# Patient Record
Sex: FEMALE | Race: AMERICAN INDIAN OR ALASKA NATIVE
[De-identification: names, ages, dates, MRNs, and addresses within clinical notes are randomized per-mention and may not be internally consistent; named-entity substitution may affect disease eponyms.]

---

## 2019-07-30 ENCOUNTER — LABORATORY RESULT (OUTPATIENT)
Age: 69
End: 2019-07-30

## 2019-07-30 ENCOUNTER — APPOINTMENT (OUTPATIENT)
Dept: HEMATOLOGY ONCOLOGY | Facility: CLINIC | Age: 69
End: 2019-07-30
Payer: MEDICARE

## 2019-07-30 VITALS
HEART RATE: 107 BPM | SYSTOLIC BLOOD PRESSURE: 166 MMHG | BODY MASS INDEX: 25.63 KG/M2 | RESPIRATION RATE: 16 BRPM | TEMPERATURE: 97.5 F | DIASTOLIC BLOOD PRESSURE: 79 MMHG | HEIGHT: 60.63 IN | WEIGHT: 134 LBS

## 2019-07-30 DIAGNOSIS — E03.9 HYPOTHYROIDISM, UNSPECIFIED: ICD-10-CM

## 2019-07-30 DIAGNOSIS — R73.03 PREDIABETES.: ICD-10-CM

## 2019-07-30 DIAGNOSIS — Z78.9 OTHER SPECIFIED HEALTH STATUS: ICD-10-CM

## 2019-07-30 DIAGNOSIS — I10 ESSENTIAL (PRIMARY) HYPERTENSION: ICD-10-CM

## 2019-07-30 DIAGNOSIS — Z80.41 FAMILY HISTORY OF MALIGNANT NEOPLASM OF OVARY: ICD-10-CM

## 2019-07-30 DIAGNOSIS — I73.00 RAYNAUD'S SYNDROME W/OUT GANGRENE: ICD-10-CM

## 2019-07-30 LAB
HCT VFR BLD CALC: 25.4 %
HGB BLD-MCNC: 7.8 G/DL
MCHC RBC-ENTMCNC: 26.4 PG
MCHC RBC-ENTMCNC: 30.7 G/DL
MCV RBC AUTO: 85.8 FL
PLATELET # BLD AUTO: 337 K/UL
PMV BLD: 9.4 FL
RBC # BLD: 2.96 M/UL
RBC # FLD: 19.8 %
WBC # FLD AUTO: 7.24 K/UL

## 2019-07-30 PROCEDURE — 99204 OFFICE O/P NEW MOD 45 MIN: CPT

## 2019-07-31 LAB
ALBUMIN SERPL ELPH-MCNC: 3.7 G/DL
ALP BLD-CCNC: 76 U/L
ALT SERPL-CCNC: 19 U/L
ANION GAP SERPL CALC-SCNC: 11 MMOL/L
AST SERPL-CCNC: 25 U/L
BILIRUB SERPL-MCNC: <0.2 MG/DL
BUN SERPL-MCNC: 21 MG/DL
CALCIUM SERPL-MCNC: 9.1 MG/DL
CHLORIDE SERPL-SCNC: 107 MMOL/L
CO2 SERPL-SCNC: 26 MMOL/L
CREAT SERPL-MCNC: 1.8 MG/DL
DEPRECATED KAPPA LC FREE/LAMBDA SER: 0.88 RATIO
FERRITIN SERPL-MCNC: 15 NG/ML
GLUCOSE SERPL-MCNC: 105 MG/DL
HAPTOGLOB SERPL-MCNC: 133 MG/DL
IGA SER QL IEP: 139 MG/DL
IGG SER QL IEP: 517 MG/DL
IGM SER QL IEP: 20 MG/DL
IRON SATN MFR SERPL: 6 %
IRON SERPL-MCNC: 22 UG/DL
KAPPA LC CSF-MCNC: 3.73 MG/DL
KAPPA LC SERPL-MCNC: 3.29 MG/DL
LDH SERPL-CCNC: 323
POTASSIUM SERPL-SCNC: 5.3 MMOL/L
PROT SERPL-MCNC: 5.9 G/DL
SODIUM SERPL-SCNC: 144 MMOL/L
TIBC SERPL-MCNC: 370 UG/DL
UIBC SERPL-MCNC: 348 UG/DL
VIT B12 SERPL-MCNC: 562 PG/ML

## 2019-07-31 NOTE — ASSESSMENT
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

## 2019-07-31 NOTE — HISTORY OF PRESENT ILLNESS
[de-identified] : 70 y/o F was referred by PMD Dr. Leroy Chacon for evaluation and treatment of anemia. \par \par History goes back to Oct 2018 when pt was found to have new anemia (Hb 5.9) and GIUSEPPE. She was treated at [facility] with blood transfusions. Since then, her Hb has ranged 7-10. Last Hb from 7/24/19 was 7.7. WBC and plts have been normal. Anemia has been micro to normocytic. Her GIUSEPPE has progressed to CKD 4. Per pt's daughter, pt has had extensive w/u at [facility] for CKD including renal biopsy on which she was found to have glomerulosclerosis. She is on cyclophosphamide and prednisone. Pt does have significant proteinuria (almost nephrotic range - 3200 prot/Cr ratio) with LE edema. Her highest serum Ca was 10.4 and total protein 8.4. She was never prescribed procrit or IV iron. She was only prescribed PO iron which she stopped taking because of constipation. Per daughter, the blood work done in Feb 2018 was normal. \par \par Pt does c/o fatigue, but no CP, SOB, palpitations or dizziness. No melena, BRBPR, hematochezia, hematemesis, hematuria or vaginal bleeding. No bleeding from any other site. Per daughter, pt had a normal colonoscopy about a year ago. She had EGD years ago for GERD.

## 2019-07-31 NOTE — CONSULT LETTER
[Dear  ___] : Dear  [unfilled], [Consult Letter:] : I had the pleasure of evaluating your patient, [unfilled]. [Please see my note below.] : Please see my note below. [Consult Closing:] : Thank you very much for allowing me to participate in the care of this patient.  If you have any questions, please do not hesitate to contact me. [Sincerely,] : Sincerely, [FreeTextEntry3] : Dr Treadwell

## 2019-07-31 NOTE — REASON FOR VISIT
[Initial Consultation] : an initial consultation for [Family Member] : family member [FreeTextEntry2] : Anemia

## 2019-07-31 NOTE — PHYSICAL EXAM
[Restricted in physically strenuous activity but ambulatory and able to carry out work of a light or sedentary nature] : Status 1- Restricted in physically strenuous activity but ambulatory and able to carry out work of a light or sedentary nature, e.g., light house work, office work [Normal] : normoactive bowel sounds, soft and nontender, no hepatosplenomegaly or masses appreciated [de-identified] : ?mild lymphadenopathy [de-identified] : Edema in B/L LE

## 2019-08-01 LAB
ALBUMIN MFR SERPL ELPH: 59.7 %
ALBUMIN SERPL-MCNC: 3.5 G/DL
ALBUMIN/GLOB SERPL: 1.5 RATIO
ALPHA1 GLOB MFR SERPL ELPH: 6.2 %
ALPHA1 GLOB SERPL ELPH-MCNC: 0.4 G/DL
ALPHA2 GLOB MFR SERPL ELPH: 11.4 %
ALPHA2 GLOB SERPL ELPH-MCNC: 0.7 G/DL
B-GLOBULIN MFR SERPL ELPH: 13.4 %
B-GLOBULIN SERPL ELPH-MCNC: 0.8 G/DL
EPO SERPL-MCNC: 37 MIU/ML
GAMMA GLOB FLD ELPH-MCNC: 0.5 G/DL
GAMMA GLOB MFR SERPL ELPH: 9.3 %
INTERPRETATION SERPL IEP-IMP: NORMAL
M PROTEIN SPEC IFE-MCNC: NORMAL
PROT SERPL-MCNC: 5.8 G/DL
PROT SERPL-MCNC: 5.8 G/DL

## 2019-08-02 LAB — METHYLMALONATE SERPL-SCNC: 261 NMOL/L

## 2019-08-14 ENCOUNTER — APPOINTMENT (OUTPATIENT)
Dept: INFUSION THERAPY | Facility: CLINIC | Age: 69
End: 2019-08-14

## 2019-08-14 RX ORDER — IRON SUCROSE 20 MG/ML
200 INJECTION, SOLUTION INTRAVENOUS ONCE
Refills: 0 | Status: COMPLETED | OUTPATIENT
Start: 2019-08-14 | End: 2019-08-14

## 2019-08-14 RX ADMIN — IRON SUCROSE 200 MILLIGRAM(S): 20 INJECTION, SOLUTION INTRAVENOUS at 10:46

## 2019-08-14 RX ADMIN — IRON SUCROSE 220 MILLIGRAM(S): 20 INJECTION, SOLUTION INTRAVENOUS at 09:46

## 2019-08-21 ENCOUNTER — APPOINTMENT (OUTPATIENT)
Dept: INFUSION THERAPY | Facility: CLINIC | Age: 69
End: 2019-08-21

## 2019-08-21 RX ORDER — IRON SUCROSE 20 MG/ML
200 INJECTION, SOLUTION INTRAVENOUS ONCE
Refills: 0 | Status: COMPLETED | OUTPATIENT
Start: 2019-08-21 | End: 2019-08-21

## 2019-08-21 RX ORDER — ONDANSETRON 8 MG/1
4 TABLET, FILM COATED ORAL ONCE
Refills: 0 | Status: COMPLETED | OUTPATIENT
Start: 2019-08-21 | End: 2019-08-21

## 2019-08-21 RX ADMIN — ONDANSETRON 4 MILLIGRAM(S): 8 TABLET, FILM COATED ORAL at 10:50

## 2019-08-21 RX ADMIN — IRON SUCROSE 200 MILLIGRAM(S): 20 INJECTION, SOLUTION INTRAVENOUS at 11:27

## 2019-08-21 RX ADMIN — ONDANSETRON 156 MILLIGRAM(S): 8 TABLET, FILM COATED ORAL at 10:26

## 2019-08-21 RX ADMIN — IRON SUCROSE 220 MILLIGRAM(S): 20 INJECTION, SOLUTION INTRAVENOUS at 10:27

## 2019-08-29 ENCOUNTER — APPOINTMENT (OUTPATIENT)
Dept: INFUSION THERAPY | Facility: CLINIC | Age: 69
End: 2019-08-29

## 2019-08-29 VITALS
HEART RATE: 80 BPM | SYSTOLIC BLOOD PRESSURE: 123 MMHG | TEMPERATURE: 96.9 F | RESPIRATION RATE: 18 BRPM | DIASTOLIC BLOOD PRESSURE: 68 MMHG

## 2019-08-29 RX ORDER — IRON SUCROSE 20 MG/ML
200 INJECTION, SOLUTION INTRAVENOUS ONCE
Refills: 0 | Status: COMPLETED | OUTPATIENT
Start: 2019-08-29 | End: 2019-08-29

## 2019-08-29 RX ADMIN — IRON SUCROSE 220 MILLIGRAM(S): 20 INJECTION, SOLUTION INTRAVENOUS at 09:44

## 2019-08-29 RX ADMIN — IRON SUCROSE 200 MILLIGRAM(S): 20 INJECTION, SOLUTION INTRAVENOUS at 10:15

## 2019-09-04 ENCOUNTER — APPOINTMENT (OUTPATIENT)
Dept: INFUSION THERAPY | Facility: CLINIC | Age: 69
End: 2019-09-04

## 2019-09-04 RX ORDER — IRON SUCROSE 20 MG/ML
200 INJECTION, SOLUTION INTRAVENOUS ONCE
Refills: 0 | Status: COMPLETED | OUTPATIENT
Start: 2019-09-04 | End: 2019-09-04

## 2019-09-04 RX ADMIN — IRON SUCROSE 200 MILLIGRAM(S): 20 INJECTION, SOLUTION INTRAVENOUS at 10:18

## 2019-09-04 RX ADMIN — IRON SUCROSE 220 MILLIGRAM(S): 20 INJECTION, SOLUTION INTRAVENOUS at 09:43

## 2019-09-11 ENCOUNTER — APPOINTMENT (OUTPATIENT)
Dept: INFUSION THERAPY | Facility: CLINIC | Age: 69
End: 2019-09-11

## 2019-09-11 RX ORDER — IRON SUCROSE 20 MG/ML
200 INJECTION, SOLUTION INTRAVENOUS ONCE
Refills: 0 | Status: COMPLETED | OUTPATIENT
Start: 2019-09-11 | End: 2019-09-11

## 2019-09-11 RX ORDER — ONDANSETRON 8 MG/1
8 TABLET, FILM COATED ORAL ONCE
Refills: 0 | Status: COMPLETED | OUTPATIENT
Start: 2019-09-11 | End: 2019-09-11

## 2019-09-11 RX ADMIN — IRON SUCROSE 220 MILLIGRAM(S): 20 INJECTION, SOLUTION INTRAVENOUS at 10:20

## 2019-09-11 RX ADMIN — ONDANSETRON 8 MILLIGRAM(S): 8 TABLET, FILM COATED ORAL at 10:18

## 2019-09-11 RX ADMIN — ONDANSETRON 162 MILLIGRAM(S): 8 TABLET, FILM COATED ORAL at 09:56

## 2019-09-11 RX ADMIN — IRON SUCROSE 200 MILLIGRAM(S): 20 INJECTION, SOLUTION INTRAVENOUS at 10:40

## 2019-10-09 ENCOUNTER — APPOINTMENT (OUTPATIENT)
Dept: HEMATOLOGY ONCOLOGY | Facility: CLINIC | Age: 69
End: 2019-10-09

## 2019-10-09 ENCOUNTER — LABORATORY RESULT (OUTPATIENT)
Age: 69
End: 2019-10-09

## 2019-10-09 DIAGNOSIS — Z00.00 ENCOUNTER FOR GENERAL ADULT MEDICAL EXAMINATION W/OUT ABNORMAL FINDINGS: ICD-10-CM

## 2019-10-09 LAB
FERRITIN SERPL-MCNC: 418 NG/ML
HCT VFR BLD CALC: 26.2 %
HGB BLD-MCNC: 8.1 G/DL
IRON SATN MFR SERPL: 22 %
IRON SERPL-MCNC: 48 UG/DL
MCHC RBC-ENTMCNC: 27 PG
MCHC RBC-ENTMCNC: 30.9 G/DL
MCV RBC AUTO: 87.3 FL
PLATELET # BLD AUTO: 222 K/UL
PMV BLD: 9.2 FL
RBC # BLD: 3 M/UL
RBC # FLD: 19.8 %
TIBC SERPL-MCNC: 222 UG/DL
UIBC SERPL-MCNC: 174 UG/DL
WBC # FLD AUTO: 2.7 K/UL

## 2019-10-21 ENCOUNTER — LABORATORY RESULT (OUTPATIENT)
Age: 69
End: 2019-10-21

## 2019-10-21 ENCOUNTER — APPOINTMENT (OUTPATIENT)
Dept: HEMATOLOGY ONCOLOGY | Facility: CLINIC | Age: 69
End: 2019-10-21
Payer: MEDICARE

## 2019-10-21 VITALS
TEMPERATURE: 96.3 F | WEIGHT: 120 LBS | HEIGHT: 60 IN | SYSTOLIC BLOOD PRESSURE: 95 MMHG | HEART RATE: 76 BPM | BODY MASS INDEX: 23.56 KG/M2 | DIASTOLIC BLOOD PRESSURE: 52 MMHG | RESPIRATION RATE: 18 BRPM

## 2019-10-21 LAB
DIRECT COOMBS: NORMAL
HCT VFR BLD CALC: 26.1 %
HGB BLD-MCNC: 8.1 G/DL
LDH SERPL-CCNC: 220
MCHC RBC-ENTMCNC: 27.3 PG
MCHC RBC-ENTMCNC: 31 G/DL
MCV RBC AUTO: 87.9 FL
PLATELET # BLD AUTO: 188 K/UL
PMV BLD: 9.9 FL
RBC # BLD: 2.97 M/UL
RBC # FLD: 20.7 %
RETICS # AUTO: 2.4 %
RETICS AGGREG/RBC NFR: 71.9 K/UL
WBC # FLD AUTO: 2.36 K/UL

## 2019-10-21 PROCEDURE — 99214 OFFICE O/P EST MOD 30 MIN: CPT

## 2019-10-21 NOTE — ASSESSMENT
[FreeTextEntry1] : 69 year old female with multifactorial anemia secondary to CKD , iron deficiency and chemotherapy , \par negative EGD and colonoscopy , consider capsule endoscopy . \par elevated LDH . will repeat , also check coomb's test . \par will also start on procrit 20,000 weekly . \par will likely switch to rituxan as per renal . \par Plan discussed with patient and daughter , all questions addressed . \par

## 2019-10-21 NOTE — HISTORY OF PRESENT ILLNESS
[de-identified] : 10/21/2019 patient returns for follow up for CKD with anemia . She was found with iron deficiency , Hb was is still  8.1 after corretion of iron deficiency . She complains only of mild easy fatigability . Pertinent findings were elevated LDH , retic : 2 % , cytoxan dose was recently reduced to 50mg daily probably due to low wbc .  [de-identified] : \par 70 y/o F was referred by PMD Dr. Leroy Chacon for evaluation and treatment of anemia. \par \par History goes back to Oct 2018 when pt was found to have new anemia (Hb 5.9) and GIUSEPPE. She was treated at [facility] with blood transfusions. Since then, her Hb has ranged 7-10. Last Hb from 7/24/19 was 7.7. WBC and plts have been normal. Anemia has been micro to normocytic. Her GIUSEPPE has progressed to CKD 4. Per pt's daughter, pt has had extensive w/u at [facility] for CKD including renal biopsy on which she was found to have glomerulosclerosis. She is on cyclophosphamide and prednisone. Pt does have significant proteinuria (almost nephrotic range - 3200 prot/Cr ratio) with LE edema. Her highest serum Ca was 10.4 and total protein 8.4. She was never prescribed procrit or IV iron. She was only prescribed PO iron which she stopped taking because of constipation. Per daughter, the blood work done in Feb 2018 was normal. \par \par Pt does c/o fatigue, but no CP, SOB, palpitations or dizziness. No melena, BRBPR, hematochezia, hematemesis, hematuria or vaginal bleeding. No bleeding from any other site. Per daughter, pt had a normal colonoscopy about a year ago. She had EGD years ago for GERD. \par \par

## 2019-10-21 NOTE — PHYSICAL EXAM
[Thin] : thin [Normal] : normoactive bowel sounds, soft and nontender, no hepatosplenomegaly or masses appreciated

## 2019-10-22 LAB — HAPTOGLOB SERPL-MCNC: 84 MG/DL

## 2019-10-28 LAB
LDH SERPL-CCNC: 198 U/L
LDH1 CFR SERPL ELPH: 23 %
LDH2 CFR SERPL ELPH: 35 %
LDH3 CFR SERPL ELPH: 24 %
LDH4 CFR SERPL ELPH: 9 %
LDH5 CFR SERPL ELPH: 10 %

## 2019-11-15 ENCOUNTER — OUTPATIENT (OUTPATIENT)
Dept: OUTPATIENT SERVICES | Facility: HOSPITAL | Age: 69
LOS: 1 days | Discharge: HOME | End: 2019-11-15

## 2019-11-15 ENCOUNTER — LABORATORY RESULT (OUTPATIENT)
Age: 69
End: 2019-11-15

## 2019-11-15 ENCOUNTER — APPOINTMENT (OUTPATIENT)
Dept: HEMATOLOGY ONCOLOGY | Facility: CLINIC | Age: 69
End: 2019-11-15
Payer: MEDICARE

## 2019-11-15 ENCOUNTER — APPOINTMENT (OUTPATIENT)
Dept: INFUSION THERAPY | Facility: CLINIC | Age: 69
End: 2019-11-15
Payer: MEDICARE

## 2019-11-15 VITALS
TEMPERATURE: 96.3 F | WEIGHT: 120 LBS | BODY MASS INDEX: 23.56 KG/M2 | DIASTOLIC BLOOD PRESSURE: 56 MMHG | HEART RATE: 70 BPM | HEIGHT: 60 IN | SYSTOLIC BLOOD PRESSURE: 116 MMHG | RESPIRATION RATE: 18 BRPM

## 2019-11-15 DIAGNOSIS — D64.9 ANEMIA, UNSPECIFIED: ICD-10-CM

## 2019-11-15 LAB
HCT VFR BLD CALC: 28.3 %
HGB BLD-MCNC: 9 G/DL
MCHC RBC-ENTMCNC: 28.2 PG
MCHC RBC-ENTMCNC: 31.8 G/DL
MCV RBC AUTO: 88.7 FL
PLATELET # BLD AUTO: 200 K/UL
PMV BLD: 11.4 FL
RBC # BLD: 3.19 M/UL
RBC # FLD: 17.7 %
WBC # FLD AUTO: 3.4 K/UL

## 2019-11-15 PROCEDURE — 99213 OFFICE O/P EST LOW 20 MIN: CPT

## 2019-11-15 RX ORDER — DARBEPOETIN ALFA IN POLYSORBAT 200MCG/0.4
60 PEN INJECTOR (ML) SUBCUTANEOUS ONCE
Refills: 0 | Status: COMPLETED | OUTPATIENT
Start: 2019-11-15 | End: 2019-11-15

## 2019-11-15 RX ADMIN — Medication 60 MICROGRAM(S): at 12:58

## 2019-11-15 NOTE — CONSULT LETTER
[Courtesy Letter:] : I had the pleasure of seeing your patient, [unfilled], in my office today. [Dear  ___] : Dear  [unfilled],

## 2019-11-15 NOTE — ASSESSMENT
[FreeTextEntry1] : Multifactorial anemia including iron deficiency , CKD and chemotherapy . Today's Hb is 9 . will start on aranesp 60 micrograms every 3 weeks and titrate to maintain Hb around 10 . she will also start on rituxan soon

## 2019-11-15 NOTE — HISTORY OF PRESENT ILLNESS
[de-identified] : \par 68 y/o F was referred by PMD Dr. Leroy Chacon for evaluation and treatment of anemia. \par \par History goes back to Oct 2018 when pt was found to have new anemia (Hb 5.9) and GIUSEPPE. She was treated at [facility] with blood transfusions. Since then, her Hb has ranged 7-10. Last Hb from 7/24/19 was 7.7. WBC and plts have been normal. Anemia has been micro to normocytic. Her GIUSEPPE has progressed to CKD 4. Per pt's daughter, pt has had extensive w/u at [facility] for CKD including renal biopsy on which she was found to have glomerulosclerosis. She is on cyclophosphamide and prednisone. Pt does have significant proteinuria (almost nephrotic range - 3200 prot/Cr ratio) with LE edema. Her highest serum Ca was 10.4 and total protein 8.4. She was never prescribed procrit or IV iron. She was only prescribed PO iron which she stopped taking because of constipation. Per daughter, the blood work done in Feb 2018 was normal. \par \par Pt does c/o fatigue, but no CP, SOB, palpitations or dizziness. No melena, BRBPR, hematochezia, hematemesis, hematuria or vaginal bleeding. No bleeding from any other site. Per daughter, pt had a normal colonoscopy about a year ago. She had EGD years ago for GERD. \par \par  \par  [de-identified] : 10/21/2019 patient returns for follow up for CKD with anemia . She was found with iron deficiency , Hb was is still  8.1 after corretion of iron deficiency . She complains only of mild easy fatigability . Pertinent findings were elevated LDH , retic : 2 % , cytoxan dose was recently reduced to 50mg daily probably due to low wbc . \par \par 11/14/2019 Patient returns for multifactorial  anemia , Hemoglobin improved to 9.0 with iron replacement and reducing cytoxan to 50 mg daily . She continues to complain of mild easy fatigability .

## 2019-11-29 ENCOUNTER — APPOINTMENT (OUTPATIENT)
Dept: INFUSION THERAPY | Facility: CLINIC | Age: 69
End: 2019-11-29

## 2019-12-13 ENCOUNTER — LABORATORY RESULT (OUTPATIENT)
Age: 69
End: 2019-12-13

## 2019-12-13 ENCOUNTER — APPOINTMENT (OUTPATIENT)
Dept: INFUSION THERAPY | Facility: CLINIC | Age: 69
End: 2019-12-13

## 2019-12-14 LAB
HCT VFR BLD CALC: 32.7 %
HGB BLD-MCNC: 10.4 G/DL
MCHC RBC-ENTMCNC: 28.3 PG
MCHC RBC-ENTMCNC: 31.8 G/DL
MCV RBC AUTO: 88.9 FL
PLATELET # BLD AUTO: 199 K/UL
PMV BLD: 10.2 FL
RBC # BLD: 3.68 M/UL
RBC # FLD: 15.3 %
WBC # FLD AUTO: 4.75 K/UL

## 2019-12-26 ENCOUNTER — LABORATORY RESULT (OUTPATIENT)
Age: 69
End: 2019-12-26

## 2019-12-26 ENCOUNTER — APPOINTMENT (OUTPATIENT)
Dept: HEMATOLOGY ONCOLOGY | Facility: CLINIC | Age: 69
End: 2019-12-26
Payer: MEDICARE

## 2019-12-26 ENCOUNTER — APPOINTMENT (OUTPATIENT)
Dept: INFUSION THERAPY | Facility: CLINIC | Age: 69
End: 2019-12-26
Payer: MEDICARE

## 2019-12-26 ENCOUNTER — OUTPATIENT (OUTPATIENT)
Dept: OUTPATIENT SERVICES | Facility: HOSPITAL | Age: 69
LOS: 1 days | Discharge: HOME | End: 2019-12-26

## 2019-12-26 VITALS
WEIGHT: 119 LBS | HEIGHT: 60 IN | DIASTOLIC BLOOD PRESSURE: 76 MMHG | RESPIRATION RATE: 18 BRPM | TEMPERATURE: 97.1 F | SYSTOLIC BLOOD PRESSURE: 143 MMHG | HEART RATE: 78 BPM | BODY MASS INDEX: 23.36 KG/M2

## 2019-12-26 DIAGNOSIS — C83.30 DIFFUSE LARGE B-CELL LYMPHOMA, UNSPECIFIED SITE: ICD-10-CM

## 2019-12-26 DIAGNOSIS — N18.4 CHRONIC KIDNEY DISEASE, STAGE 4 (SEVERE): ICD-10-CM

## 2019-12-26 PROCEDURE — 99213 OFFICE O/P EST LOW 20 MIN: CPT

## 2019-12-26 NOTE — ASSESSMENT
[FreeTextEntry1] : 69 year old female with multifactorial anemia including iron deficiency, CKD, and previously on cytoxan. Today's Hb is 10.6.  Currently on Aranesp 60 micrograms every 3 weeks and titrate to maintain Hb around 10.\par \par PLAN:\par -  Hold Aranesp 60 mcg today \par - Follow up with nephrology- continue to hold cytoxan as advised, possible rituxan\par - RTC in one month for CBC, visit, and possible aranesp \par \par Case discussed with Dr. Treadwell who agrees with plan of care.\par \par

## 2019-12-26 NOTE — END OF VISIT
[FreeTextEntry3] : I was physically present for the key portions of the evaluation and management service provided.  I agree with the history and physical, and plan which I have reviewed and edited where appropriate.\par \par

## 2019-12-26 NOTE — REVIEW OF SYSTEMS
[Fatigue] : fatigue [Negative] : Heme/Lymph [FreeTextEntry2] : occasional mild fatigue  Health Care Proxy (HCP)

## 2019-12-26 NOTE — HISTORY OF PRESENT ILLNESS
[de-identified] : \par 68 y/o F was referred by PMD Dr. Leroy Chacon for evaluation and treatment of anemia. \par \par History goes back to Oct 2018 when pt was found to have new anemia (Hb 5.9) and GIUSEPPE. She was treated at [facility] with blood transfusions. Since then, her Hb has ranged 7-10. Last Hb from 7/24/19 was 7.7. WBC and plts have been normal. Anemia has been micro to normocytic. Her GIUSEPPE has progressed to CKD 4. Per pt's daughter, pt has had extensive w/u at [facility] for CKD including renal biopsy on which she was found to have glomerulosclerosis. She is on cyclophosphamide and prednisone. Pt does have significant proteinuria (almost nephrotic range - 3200 prot/Cr ratio) with LE edema. Her highest serum Ca was 10.4 and total protein 8.4. She was never prescribed procrit or IV iron. She was only prescribed PO iron which she stopped taking because of constipation. Per daughter, the blood work done in Feb 2018 was normal. \par \par Pt does c/o fatigue, but no CP, SOB, palpitations or dizziness. No melena, BRBPR, hematochezia, hematemesis, hematuria or vaginal bleeding. No bleeding from any other site. Per daughter, pt had a normal colonoscopy about a year ago. She had EGD years ago for GERD. \par \par  [de-identified] : 10/21/2019 patient returns for follow up for CKD with anemia . She was found with iron deficiency , Hb was is still  8.1 after corretion of iron deficiency . She complains only of mild easy fatigability . Pertinent findings were elevated LDH , retic : 2 % , cytoxan dose was recently reduced to 50mg daily probably due to low wbc . \par \par 11/14/2019 Patient returns for multifactorial  anemia , Hemoglobin improved to 9.0 with iron replacement and reducing cytoxan to 50 mg daily . She continues to complain of mild easy fatigability \par \par 12/26/2019:\par Patient here for follow up of anemia with CKD. She was treated with venofer x5. She has stopped cytoxan. Her last dose of Aranesp was 11/15/2019. Anemia improving last Hgb 10.4. She denies SOB, chest palpitations, abnormal bleeding, and dizziness at this time.

## 2019-12-26 NOTE — PHYSICAL EXAM
[Thin] : thin [Normal] : RRR, normal S1S2, no murmurs, rubs, gallops [de-identified] : mildly pale

## 2019-12-30 LAB
HCT VFR BLD CALC: 33.3 %
HGB BLD-MCNC: 10.6 G/DL
MCHC RBC-ENTMCNC: 28 PG
MCHC RBC-ENTMCNC: 31.8 G/DL
MCV RBC AUTO: 87.9 FL
PLATELET # BLD AUTO: 172 K/UL
PMV BLD: 10.1 FL
RBC # BLD: 3.79 M/UL
RBC # FLD: 14.1 %
WBC # FLD AUTO: 4.84 K/UL

## 2020-01-06 DIAGNOSIS — D64.9 ANEMIA, UNSPECIFIED: ICD-10-CM

## 2020-01-06 DIAGNOSIS — N18.4 CHRONIC KIDNEY DISEASE, STAGE 4 (SEVERE): ICD-10-CM

## 2020-04-20 ENCOUNTER — LABORATORY RESULT (OUTPATIENT)
Age: 70
End: 2020-04-20

## 2020-04-20 ENCOUNTER — APPOINTMENT (OUTPATIENT)
Dept: INFUSION THERAPY | Facility: CLINIC | Age: 70
End: 2020-04-20
Payer: MEDICARE

## 2020-04-20 ENCOUNTER — APPOINTMENT (OUTPATIENT)
Dept: HEMATOLOGY ONCOLOGY | Facility: CLINIC | Age: 70
End: 2020-04-20
Payer: MEDICARE

## 2020-04-20 VITALS
SYSTOLIC BLOOD PRESSURE: 130 MMHG | RESPIRATION RATE: 14 BRPM | TEMPERATURE: 96.1 F | HEIGHT: 60 IN | DIASTOLIC BLOOD PRESSURE: 73 MMHG | HEART RATE: 72 BPM | WEIGHT: 105 LBS | BODY MASS INDEX: 20.62 KG/M2

## 2020-04-20 LAB
ALBUMIN SERPL ELPH-MCNC: 4 G/DL
ALP BLD-CCNC: 181 U/L
ALT SERPL-CCNC: 9 U/L
ANION GAP SERPL CALC-SCNC: 11 MMOL/L
AST SERPL-CCNC: 18 U/L
BILIRUB SERPL-MCNC: <0.2 MG/DL
BUN SERPL-MCNC: 27 MG/DL
CALCIUM SERPL-MCNC: 9.2 MG/DL
CHLORIDE SERPL-SCNC: 104 MMOL/L
CO2 SERPL-SCNC: 26 MMOL/L
CREAT SERPL-MCNC: 1.4 MG/DL
GLUCOSE SERPL-MCNC: 101 MG/DL
HCT VFR BLD CALC: 32.8 %
HGB BLD-MCNC: 9.9 G/DL
MCHC RBC-ENTMCNC: 24.7 PG
MCHC RBC-ENTMCNC: 30.2 G/DL
MCV RBC AUTO: 81.8 FL
PLATELET # BLD AUTO: 243 K/UL
PMV BLD: 8.8 FL
POTASSIUM SERPL-SCNC: 5.7 MMOL/L
PROT SERPL-MCNC: 6.9 G/DL
RBC # BLD: 4.01 M/UL
RBC # FLD: 16.4 %
SODIUM SERPL-SCNC: 141 MMOL/L
WBC # FLD AUTO: 2.51 K/UL

## 2020-04-20 PROCEDURE — 99214 OFFICE O/P EST MOD 30 MIN: CPT

## 2020-04-21 LAB — FERRITIN SERPL-MCNC: 64 NG/ML

## 2020-04-21 NOTE — ASSESSMENT
[FreeTextEntry1] : 69 year old female with h/o RPGN of the kidney -  multifactorial anemia including iron deficiency, CKD, and was previously on cytoxan. She received venofer Aug-Sep 2019 and received 1 dose aranesp in Nov 2019. She has been following with nephro Dr Chaitanya Javier at [facility] for RPGN/ ANCA+ nephritis and received 2 rituxan doses in Nov and Dec 2019. \par \par Today's Hb is 9.9, MCV-81 , WBC-2.51, ANC-360, Platelet- 243\par Will  order iron studies and ferritin today \par Leukopenia/neutropenia could be from rituxan treatment - No fever/chills/recent infections \par Patient is scheduled to see Nephrologist next week for dose 3 of rituxan infusion- Lab work CBC provided. \par Further decision about rituxan at Nephrologist discretion as she has severe neutropenia with ANC of 360. \par \par RTC in 2 months \par Case discussed with Dr. Treadwell who agrees with plan of care.\par \par

## 2020-04-21 NOTE — HISTORY OF PRESENT ILLNESS
[de-identified] : 10/21/2019 patient returns for follow up for CKD with anemia . She was found with iron deficiency , Hb was is still  8.1 after corretion of iron deficiency . She complains only of mild easy fatigability . Pertinent findings were elevated LDH , retic : 2 % , cytoxan dose was recently reduced to 50mg daily probably due to low wbc . \par \par 11/14/2019 Patient returns for multifactorial  anemia , Hemoglobin improved to 9.0 with iron replacement and reducing cytoxan to 50 mg daily . She continues to complain of mild easy fatigability \par \par 12/26/2019:\par Patient here for follow up of anemia with CKD. She was treated with venofer x5. She has stopped cytoxan. Her last dose of Aranesp was 11/15/2019. Anemia improving last Hgb 10.4. She denies SOB, chest palpitations, abnormal bleeding, and dizziness at this time.\par \par 4/20/20- She is here for follow up. She was treated with 5 doses venofer in Aug-Sep 2019 and received 1 dose aranesp in Nov 2019. She has been following with nephro Dr Chaitanya Javier at [facility] for RPGN/ ANCA+ nephritis and received rituxan dosos in Nov and Dec 2019 and was advised to get her 3rd dose. She feels well. c/o chronic fatigue, on and off dizziness. No CP/SOB. She has lost weight since last visit  [de-identified] : \par 68 y/o F was referred by PMD Dr. Leroy Chacon for evaluation and treatment of anemia. \par \par History goes back to Oct 2018 when pt was found to have new anemia (Hb 5.9) and GIUSEPPE. She was treated at [facility] with blood transfusions. Since then, her Hb has ranged 7-10. Last Hb from 7/24/19 was 7.7. WBC and plts have been normal. Anemia has been micro to normocytic. Her GIUSEPPE has progressed to CKD 4. Per pt's daughter, pt has had extensive w/u at [facility] for CKD including renal biopsy on which she was found to have glomerulosclerosis. She is on cyclophosphamide and prednisone. Pt does have significant proteinuria (almost nephrotic range - 3200 prot/Cr ratio) with LE edema. Her highest serum Ca was 10.4 and total protein 8.4. She was never prescribed procrit or IV iron. She was only prescribed PO iron which she stopped taking because of constipation. Per daughter, the blood work done in Feb 2018 was normal. \par \par Pt does c/o fatigue, but no CP, SOB, palpitations or dizziness. No melena, BRBPR, hematochezia, hematemesis, hematuria or vaginal bleeding. No bleeding from any other site. Per daughter, pt had a normal colonoscopy about a year ago. She had EGD years ago for GERD. \par \par

## 2020-04-24 ENCOUNTER — APPOINTMENT (OUTPATIENT)
Dept: INFUSION THERAPY | Facility: CLINIC | Age: 70
End: 2020-04-24

## 2020-04-24 VITALS
TEMPERATURE: 98.2 F | RESPIRATION RATE: 16 BRPM | SYSTOLIC BLOOD PRESSURE: 157 MMHG | DIASTOLIC BLOOD PRESSURE: 95 MMHG | HEART RATE: 85 BPM

## 2020-04-24 RX ORDER — DIPHENHYDRAMINE HCL 50 MG
50 CAPSULE ORAL ONCE
Refills: 0 | Status: COMPLETED | OUTPATIENT
Start: 2020-04-24 | End: 2020-04-24

## 2020-04-24 RX ORDER — ACETAMINOPHEN 500 MG
650 TABLET ORAL ONCE
Refills: 0 | Status: COMPLETED | OUTPATIENT
Start: 2020-04-24 | End: 2020-04-24

## 2020-04-24 RX ORDER — FERUMOXYTOL 510 MG/17ML
510 INJECTION INTRAVENOUS ONCE
Refills: 0 | Status: COMPLETED | OUTPATIENT
Start: 2020-04-24 | End: 2020-04-24

## 2020-04-24 RX ADMIN — FERUMOXYTOL 156 MILLIGRAM(S): 510 INJECTION INTRAVENOUS at 14:08

## 2020-04-24 RX ADMIN — FERUMOXYTOL 510 MILLIGRAM(S): 510 INJECTION INTRAVENOUS at 15:00

## 2020-04-24 RX ADMIN — Medication 50 MILLIGRAM(S): at 14:08

## 2020-04-24 RX ADMIN — Medication 650 MILLIGRAM(S): at 14:07

## 2020-06-22 ENCOUNTER — OUTPATIENT (OUTPATIENT)
Dept: OUTPATIENT SERVICES | Facility: HOSPITAL | Age: 70
LOS: 1 days | Discharge: HOME | End: 2020-06-22

## 2020-06-22 ENCOUNTER — APPOINTMENT (OUTPATIENT)
Dept: HEMATOLOGY ONCOLOGY | Facility: CLINIC | Age: 70
End: 2020-06-22
Payer: MEDICARE

## 2020-06-22 ENCOUNTER — LABORATORY RESULT (OUTPATIENT)
Age: 70
End: 2020-06-22

## 2020-06-22 VITALS
BODY MASS INDEX: 19.32 KG/M2 | RESPIRATION RATE: 16 BRPM | HEIGHT: 62 IN | SYSTOLIC BLOOD PRESSURE: 146 MMHG | WEIGHT: 105 LBS | TEMPERATURE: 99.6 F | HEART RATE: 77 BPM | DIASTOLIC BLOOD PRESSURE: 80 MMHG

## 2020-06-22 DIAGNOSIS — D64.9 ANEMIA, UNSPECIFIED: ICD-10-CM

## 2020-06-22 DIAGNOSIS — C83.30 DIFFUSE LARGE B-CELL LYMPHOMA, UNSPECIFIED SITE: ICD-10-CM

## 2020-06-22 LAB
HCT VFR BLD CALC: 33.7 %
HGB BLD-MCNC: 10.4 G/DL
MCHC RBC-ENTMCNC: 25.1 PG
MCHC RBC-ENTMCNC: 30.9 G/DL
MCV RBC AUTO: 81.2 FL
PLATELET # BLD AUTO: 219 K/UL
PMV BLD: 9.8 FL
RBC # BLD: 4.15 M/UL
RBC # FLD: 16.3 %
WBC # FLD AUTO: 6.19 K/UL

## 2020-06-22 PROCEDURE — 99213 OFFICE O/P EST LOW 20 MIN: CPT

## 2020-06-22 NOTE — HISTORY OF PRESENT ILLNESS
[de-identified] : \par 68 y/o F was referred by PMD Dr. Leroy Chacon for evaluation and treatment of anemia. \par \par History goes back to Oct 2018 when pt was found to have new anemia (Hb 5.9) and GIUSEPPE. She was treated at [facility] with blood transfusions. Since then, her Hb has ranged 7-10. Last Hb from 7/24/19 was 7.7. WBC and plts have been normal. Anemia has been micro to normocytic. Her GIUSEPPE has progressed to CKD 4. Per pt's daughter, pt has had extensive w/u at [facility] for CKD including renal biopsy on which she was found to have glomerulosclerosis. She is on cyclophosphamide and prednisone. Pt does have significant proteinuria (almost nephrotic range - 3200 prot/Cr ratio) with LE edema. Her highest serum Ca was 10.4 and total protein 8.4. She was never prescribed procrit or IV iron. She was only prescribed PO iron which she stopped taking because of constipation. Per daughter, the blood work done in Feb 2018 was normal. \par \par Pt does c/o fatigue, but no CP, SOB, palpitations or dizziness. No melena, BRBPR, hematochezia, hematemesis, hematuria or vaginal bleeding. No bleeding from any other site. Per daughter, pt had a normal colonoscopy about a year ago. She had EGD years ago for GERD. \par \par  [de-identified] : 10/21/2019 patient returns for follow up for CKD with anemia . She was found with iron deficiency , Hb was is still  8.1 after corretion of iron deficiency . She complains only of mild easy fatigability . Pertinent findings were elevated LDH , retic : 2 % , cytoxan dose was recently reduced to 50mg daily probably due to low wbc . \par \par 11/14/2019 Patient returns for multifactorial  anemia , Hemoglobin improved to 9.0 with iron replacement and reducing cytoxan to 50 mg daily . She continues to complain of mild easy fatigability . \par \par 06/22/2020 patient returns for follow up , she feels well , Hb is 10 to 11 for several months without EPO , wbc is normal .  she denies any hematochezia . no new complaints .

## 2020-06-22 NOTE — ASSESSMENT
[FreeTextEntry1] : 69 year old female with h/o RPGN of the kidney -  multifactorial anemia including iron deficiency, CKD, \par Hb >10 without growth factors. \par will repeat CBC ferritin , scheduled for another rituxan this week . \par follow up in 6 months .

## 2020-06-23 LAB
FERRITIN SERPL-MCNC: 161 NG/ML
IRON SERPL-MCNC: 21 UG/DL

## 2020-09-22 ENCOUNTER — APPOINTMENT (OUTPATIENT)
Dept: HEMATOLOGY ONCOLOGY | Facility: CLINIC | Age: 70
End: 2020-09-22

## 2020-09-22 ENCOUNTER — OUTPATIENT (OUTPATIENT)
Dept: OUTPATIENT SERVICES | Facility: HOSPITAL | Age: 70
LOS: 1 days | Discharge: HOME | End: 2020-09-22

## 2020-09-22 ENCOUNTER — LABORATORY RESULT (OUTPATIENT)
Age: 70
End: 2020-09-22

## 2020-09-22 LAB
HCT VFR BLD CALC: 34.5 %
HGB BLD-MCNC: 10.5 G/DL
IRON SATN MFR SERPL: 20 %
IRON SERPL-MCNC: 44 UG/DL
MCHC RBC-ENTMCNC: 25.7 PG
MCHC RBC-ENTMCNC: 30.4 G/DL
MCV RBC AUTO: 84.6 FL
PLATELET # BLD AUTO: 173 K/UL
PMV BLD: 8.9 FL
RBC # BLD: 4.08 M/UL
RBC # FLD: 15.9 %
TIBC SERPL-MCNC: 220 UG/DL
UIBC SERPL-MCNC: 176 UG/DL
WBC # FLD AUTO: 3.17 K/UL

## 2020-09-23 LAB — FERRITIN SERPL-MCNC: 82 NG/ML

## 2020-10-21 DIAGNOSIS — D64.9 ANEMIA, UNSPECIFIED: ICD-10-CM

## 2024-04-06 ENCOUNTER — INPATIENT (INPATIENT)
Facility: HOSPITAL | Age: 74
LOS: 30 days | Discharge: NURSING, REHAB OR LONG-TERM CARE | DRG: 641 | End: 2024-05-07
Attending: INTERNAL MEDICINE | Admitting: HOSPITALIST
Payer: MEDICARE

## 2024-04-06 VITALS — DIASTOLIC BLOOD PRESSURE: 57 MMHG | SYSTOLIC BLOOD PRESSURE: 90 MMHG | OXYGEN SATURATION: 100 % | HEART RATE: 54 BPM

## 2024-04-06 DIAGNOSIS — E87.5 HYPERKALEMIA: ICD-10-CM

## 2024-04-06 LAB
ALBUMIN SERPL ELPH-MCNC: 2.6 G/DL — LOW (ref 3.5–5.2)
ALP SERPL-CCNC: 322 U/L — HIGH (ref 30–115)
ALT FLD-CCNC: 52 U/L — HIGH (ref 0–41)
AMORPH SED URNS QL MICRO: PRESENT
ANION GAP SERPL CALC-SCNC: 9 MMOL/L — SIGNIFICANT CHANGE UP (ref 7–14)
APPEARANCE UR: CLEAR — SIGNIFICANT CHANGE UP
APTT BLD: 44 SEC — HIGH (ref 27–39.2)
AST SERPL-CCNC: 265 U/L — HIGH (ref 0–41)
BACTERIA # UR AUTO: ABNORMAL /HPF
BASE EXCESS BLDV CALC-SCNC: -8.2 MMOL/L — LOW (ref -2–3)
BASOPHILS # BLD AUTO: 0.02 K/UL — SIGNIFICANT CHANGE UP (ref 0–0.2)
BASOPHILS NFR BLD AUTO: 0.2 % — SIGNIFICANT CHANGE UP (ref 0–1)
BILIRUB SERPL-MCNC: 0.3 MG/DL — SIGNIFICANT CHANGE UP (ref 0.2–1.2)
BILIRUB UR-MCNC: NEGATIVE — SIGNIFICANT CHANGE UP
BLD GP AB SCN SERPL QL: SIGNIFICANT CHANGE UP
BUN SERPL-MCNC: 32 MG/DL — HIGH (ref 10–20)
CA-I SERPL-SCNC: 1.33 MMOL/L — SIGNIFICANT CHANGE UP (ref 1.15–1.33)
CALCIUM SERPL-MCNC: 9.4 MG/DL — SIGNIFICANT CHANGE UP (ref 8.4–10.5)
CHLORIDE SERPL-SCNC: 109 MMOL/L — SIGNIFICANT CHANGE UP (ref 98–110)
CO2 SERPL-SCNC: 22 MMOL/L — SIGNIFICANT CHANGE UP (ref 17–32)
COLOR SPEC: YELLOW — SIGNIFICANT CHANGE UP
CREAT SERPL-MCNC: 1.5 MG/DL — SIGNIFICANT CHANGE UP (ref 0.7–1.5)
DIFF PNL FLD: ABNORMAL
EGFR: 36 ML/MIN/1.73M2 — LOW
EOSINOPHIL # BLD AUTO: 0.06 K/UL — SIGNIFICANT CHANGE UP (ref 0–0.7)
EOSINOPHIL NFR BLD AUTO: 0.6 % — SIGNIFICANT CHANGE UP (ref 0–8)
EPI CELLS # UR: PRESENT
FLUAV AG NPH QL: SIGNIFICANT CHANGE UP
FLUBV AG NPH QL: SIGNIFICANT CHANGE UP
GAS PNL BLDA: SIGNIFICANT CHANGE UP
GAS PNL BLDA: SIGNIFICANT CHANGE UP
GAS PNL BLDV: 135 MMOL/L — LOW (ref 136–145)
GAS PNL BLDV: SIGNIFICANT CHANGE UP
GLUCOSE BLDC GLUCOMTR-MCNC: 271 MG/DL — HIGH (ref 70–99)
GLUCOSE SERPL-MCNC: 310 MG/DL — HIGH (ref 70–99)
GLUCOSE UR QL: 250 MG/DL
HCO3 BLDV-SCNC: 22 MMOL/L — SIGNIFICANT CHANGE UP (ref 22–29)
HCT VFR BLD CALC: 28.7 % — LOW (ref 37–47)
HCT VFR BLDA CALC: 35 % — SIGNIFICANT CHANGE UP (ref 34.5–46.5)
HGB BLD CALC-MCNC: 11.5 G/DL — LOW (ref 11.7–16.1)
HGB BLD-MCNC: 8.7 G/DL — LOW (ref 12–16)
HOROWITZ INDEX BLDV+IHG-RTO: 100 — SIGNIFICANT CHANGE UP
IMM GRANULOCYTES NFR BLD AUTO: 0.6 % — HIGH (ref 0.1–0.3)
INR BLD: 1.22 RATIO — SIGNIFICANT CHANGE UP (ref 0.65–1.3)
KETONES UR-MCNC: NEGATIVE MG/DL — SIGNIFICANT CHANGE UP
LACTATE BLDV-MCNC: 6.6 MMOL/L — CRITICAL HIGH (ref 0.5–2)
LACTATE SERPL-SCNC: 3.5 MMOL/L — HIGH (ref 0.7–2)
LEUKOCYTE ESTERASE UR-ACNC: NEGATIVE — SIGNIFICANT CHANGE UP
LIDOCAIN IGE QN: 256 U/L — HIGH (ref 7–60)
LYMPHOCYTES # BLD AUTO: 0.54 K/UL — LOW (ref 1.2–3.4)
LYMPHOCYTES # BLD AUTO: 5.4 % — LOW (ref 20.5–51.1)
MAGNESIUM SERPL-MCNC: 2.1 MG/DL — SIGNIFICANT CHANGE UP (ref 1.8–2.4)
MCHC RBC-ENTMCNC: 26.2 PG — LOW (ref 27–31)
MCHC RBC-ENTMCNC: 30.3 G/DL — LOW (ref 32–37)
MCV RBC AUTO: 86.4 FL — SIGNIFICANT CHANGE UP (ref 81–99)
MONOCYTES # BLD AUTO: 0.19 K/UL — SIGNIFICANT CHANGE UP (ref 0.1–0.6)
MONOCYTES NFR BLD AUTO: 1.9 % — SIGNIFICANT CHANGE UP (ref 1.7–9.3)
NEUTROPHILS # BLD AUTO: 9.05 K/UL — HIGH (ref 1.4–6.5)
NEUTROPHILS NFR BLD AUTO: 91.3 % — HIGH (ref 42.2–75.2)
NITRITE UR-MCNC: NEGATIVE — SIGNIFICANT CHANGE UP
NRBC # BLD: 0 /100 WBCS — SIGNIFICANT CHANGE UP (ref 0–0)
PCO2 BLDV: 67 MMHG — HIGH (ref 39–42)
PH BLDV: 7.12 — CRITICAL LOW (ref 7.32–7.43)
PH UR: 6.5 — SIGNIFICANT CHANGE UP (ref 5–8)
PLATELET # BLD AUTO: 163 K/UL — SIGNIFICANT CHANGE UP (ref 130–400)
PMV BLD: 10.7 FL — HIGH (ref 7.4–10.4)
PO2 BLDV: 51 MMHG — HIGH (ref 25–45)
POTASSIUM BLDV-SCNC: 7.3 MMOL/L — CRITICAL HIGH (ref 3.5–5.1)
POTASSIUM SERPL-MCNC: 5.3 MMOL/L — HIGH (ref 3.5–5)
POTASSIUM SERPL-SCNC: 5.3 MMOL/L — HIGH (ref 3.5–5)
PROT SERPL-MCNC: 5.3 G/DL — LOW (ref 6–8)
PROT UR-MCNC: 100 MG/DL
PROTHROM AB SERPL-ACNC: 13.9 SEC — HIGH (ref 9.95–12.87)
RBC # BLD: 3.32 M/UL — LOW (ref 4.2–5.4)
RBC # FLD: 17.7 % — HIGH (ref 11.5–14.5)
RBC CASTS # UR COMP ASSIST: 3 /HPF — SIGNIFICANT CHANGE UP (ref 0–4)
RSV RNA NPH QL NAA+NON-PROBE: SIGNIFICANT CHANGE UP
SAO2 % BLDV: 75.3 % — SIGNIFICANT CHANGE UP (ref 67–88)
SARS-COV-2 RNA SPEC QL NAA+PROBE: SIGNIFICANT CHANGE UP
SODIUM SERPL-SCNC: 140 MMOL/L — SIGNIFICANT CHANGE UP (ref 135–146)
SP GR SPEC: 1.02 — SIGNIFICANT CHANGE UP (ref 1–1.03)
TROPONIN SAMPLING TIME: SIGNIFICANT CHANGE UP
TROPONIN T, HIGH SENSITIVITY RESULT: 56 NG/L — CRITICAL HIGH (ref 6–13)
TROPONIN T, HIGH SENSITIVITY RESULT: 79 NG/L — CRITICAL HIGH (ref 6–13)
TROPONIN T, HIGH SENSITIVITY RESULT: 84 NG/L — CRITICAL HIGH (ref 6–13)
UROBILINOGEN FLD QL: 0.2 MG/DL — SIGNIFICANT CHANGE UP (ref 0.2–1)
WBC # BLD: 9.92 K/UL — SIGNIFICANT CHANGE UP (ref 4.8–10.8)
WBC # FLD AUTO: 9.92 K/UL — SIGNIFICANT CHANGE UP (ref 4.8–10.8)
WBC UR QL: 5 /HPF — SIGNIFICANT CHANGE UP (ref 0–5)

## 2024-04-06 PROCEDURE — 36415 COLL VENOUS BLD VENIPUNCTURE: CPT

## 2024-04-06 PROCEDURE — 94003 VENT MGMT INPAT SUBQ DAY: CPT

## 2024-04-06 PROCEDURE — 82533 TOTAL CORTISOL: CPT

## 2024-04-06 PROCEDURE — 0225U NFCT DS DNA&RNA 21 SARSCOV2: CPT

## 2024-04-06 PROCEDURE — 84436 ASSAY OF TOTAL THYROXINE: CPT

## 2024-04-06 PROCEDURE — 85014 HEMATOCRIT: CPT

## 2024-04-06 PROCEDURE — 82803 BLOOD GASES ANY COMBINATION: CPT

## 2024-04-06 PROCEDURE — 82330 ASSAY OF CALCIUM: CPT

## 2024-04-06 PROCEDURE — 80053 COMPREHEN METABOLIC PANEL: CPT

## 2024-04-06 PROCEDURE — 76705 ECHO EXAM OF ABDOMEN: CPT

## 2024-04-06 PROCEDURE — 74177 CT ABD & PELVIS W/CONTRAST: CPT | Mod: 26,MC

## 2024-04-06 PROCEDURE — 82746 ASSAY OF FOLIC ACID SERUM: CPT

## 2024-04-06 PROCEDURE — 82570 ASSAY OF URINE CREATININE: CPT

## 2024-04-06 PROCEDURE — 84156 ASSAY OF PROTEIN URINE: CPT

## 2024-04-06 PROCEDURE — 86900 BLOOD TYPING SEROLOGIC ABO: CPT

## 2024-04-06 PROCEDURE — 36430 TRANSFUSION BLD/BLD COMPNT: CPT

## 2024-04-06 PROCEDURE — 84540 ASSAY OF URINE/UREA-N: CPT

## 2024-04-06 PROCEDURE — 76770 US EXAM ABDO BACK WALL COMP: CPT

## 2024-04-06 PROCEDURE — 80048 BASIC METABOLIC PNL TOTAL CA: CPT

## 2024-04-06 PROCEDURE — 86803 HEPATITIS C AB TEST: CPT

## 2024-04-06 PROCEDURE — 71045 X-RAY EXAM CHEST 1 VIEW: CPT | Mod: 26,76

## 2024-04-06 PROCEDURE — 84132 ASSAY OF SERUM POTASSIUM: CPT

## 2024-04-06 PROCEDURE — 82962 GLUCOSE BLOOD TEST: CPT

## 2024-04-06 PROCEDURE — 70450 CT HEAD/BRAIN W/O DYE: CPT | Mod: MC

## 2024-04-06 PROCEDURE — 93005 ELECTROCARDIOGRAM TRACING: CPT

## 2024-04-06 PROCEDURE — 84295 ASSAY OF SERUM SODIUM: CPT

## 2024-04-06 PROCEDURE — 85730 THROMBOPLASTIN TIME PARTIAL: CPT

## 2024-04-06 PROCEDURE — 87640 STAPH A DNA AMP PROBE: CPT

## 2024-04-06 PROCEDURE — 99291 CRITICAL CARE FIRST HOUR: CPT | Mod: 25

## 2024-04-06 PROCEDURE — 95819 EEG AWAKE AND ASLEEP: CPT

## 2024-04-06 PROCEDURE — 83516 IMMUNOASSAY NONANTIBODY: CPT

## 2024-04-06 PROCEDURE — 84484 ASSAY OF TROPONIN QUANT: CPT

## 2024-04-06 PROCEDURE — 86923 COMPATIBILITY TEST ELECTRIC: CPT

## 2024-04-06 PROCEDURE — 84480 ASSAY TRIIODOTHYRONINE (T3): CPT

## 2024-04-06 PROCEDURE — 93010 ELECTROCARDIOGRAM REPORT: CPT | Mod: 76

## 2024-04-06 PROCEDURE — 84133 ASSAY OF URINE POTASSIUM: CPT

## 2024-04-06 PROCEDURE — 85610 PROTHROMBIN TIME: CPT

## 2024-04-06 PROCEDURE — 83935 ASSAY OF URINE OSMOLALITY: CPT

## 2024-04-06 PROCEDURE — 83540 ASSAY OF IRON: CPT

## 2024-04-06 PROCEDURE — 84443 ASSAY THYROID STIM HORMONE: CPT

## 2024-04-06 PROCEDURE — 87070 CULTURE OTHR SPECIMN AEROBIC: CPT

## 2024-04-06 PROCEDURE — 36620 INSERTION CATHETER ARTERY: CPT

## 2024-04-06 PROCEDURE — 85027 COMPLETE CBC AUTOMATED: CPT

## 2024-04-06 PROCEDURE — 86036 ANCA SCREEN EACH ANTIBODY: CPT

## 2024-04-06 PROCEDURE — 86235 NUCLEAR ANTIGEN ANTIBODY: CPT

## 2024-04-06 PROCEDURE — 85025 COMPLETE CBC W/AUTO DIFF WBC: CPT

## 2024-04-06 PROCEDURE — 95714 VEEG EA 12-26 HR UNMNTR: CPT

## 2024-04-06 PROCEDURE — 83605 ASSAY OF LACTIC ACID: CPT

## 2024-04-06 PROCEDURE — 82728 ASSAY OF FERRITIN: CPT

## 2024-04-06 PROCEDURE — 81001 URINALYSIS AUTO W/SCOPE: CPT

## 2024-04-06 PROCEDURE — 99292 CRITICAL CARE ADDL 30 MIN: CPT | Mod: 25

## 2024-04-06 PROCEDURE — 94002 VENT MGMT INPAT INIT DAY: CPT

## 2024-04-06 PROCEDURE — 70450 CT HEAD/BRAIN W/O DYE: CPT | Mod: 26,MC

## 2024-04-06 PROCEDURE — 85018 HEMOGLOBIN: CPT

## 2024-04-06 PROCEDURE — 86901 BLOOD TYPING SEROLOGIC RH(D): CPT

## 2024-04-06 PROCEDURE — 86431 RHEUMATOID FACTOR QUANT: CPT

## 2024-04-06 PROCEDURE — 86850 RBC ANTIBODY SCREEN: CPT

## 2024-04-06 PROCEDURE — L8699: CPT

## 2024-04-06 PROCEDURE — 84466 ASSAY OF TRANSFERRIN: CPT

## 2024-04-06 PROCEDURE — 83550 IRON BINDING TEST: CPT

## 2024-04-06 PROCEDURE — 93970 EXTREMITY STUDY: CPT

## 2024-04-06 PROCEDURE — 36556 INSERT NON-TUNNEL CV CATH: CPT | Mod: LT

## 2024-04-06 PROCEDURE — 87086 URINE CULTURE/COLONY COUNT: CPT

## 2024-04-06 PROCEDURE — 84100 ASSAY OF PHOSPHORUS: CPT

## 2024-04-06 PROCEDURE — 87641 MR-STAPH DNA AMP PROBE: CPT

## 2024-04-06 PROCEDURE — 87449 NOS EACH ORGANISM AG IA: CPT

## 2024-04-06 PROCEDURE — C9113: CPT

## 2024-04-06 PROCEDURE — P9016: CPT

## 2024-04-06 PROCEDURE — 84300 ASSAY OF URINE SODIUM: CPT

## 2024-04-06 PROCEDURE — 83735 ASSAY OF MAGNESIUM: CPT

## 2024-04-06 PROCEDURE — 93970 EXTREMITY STUDY: CPT | Mod: 26

## 2024-04-06 PROCEDURE — 86255 FLUORESCENT ANTIBODY SCREEN: CPT

## 2024-04-06 PROCEDURE — 84145 PROCALCITONIN (PCT): CPT

## 2024-04-06 PROCEDURE — 71275 CT ANGIOGRAPHY CHEST: CPT | Mod: 26,MC

## 2024-04-06 PROCEDURE — 93306 TTE W/DOPPLER COMPLETE: CPT

## 2024-04-06 PROCEDURE — 99291 CRITICAL CARE FIRST HOUR: CPT

## 2024-04-06 PROCEDURE — 95711 VEEG 2-12 HR UNMONITORED: CPT

## 2024-04-06 PROCEDURE — 86038 ANTINUCLEAR ANTIBODIES: CPT

## 2024-04-06 PROCEDURE — 82607 VITAMIN B-12: CPT

## 2024-04-06 PROCEDURE — 95700 EEG CONT REC W/VID EEG TECH: CPT

## 2024-04-06 PROCEDURE — 87899 AGENT NOS ASSAY W/OPTIC: CPT

## 2024-04-06 PROCEDURE — 82550 ASSAY OF CK (CPK): CPT

## 2024-04-06 PROCEDURE — 86256 FLUORESCENT ANTIBODY TITER: CPT

## 2024-04-06 PROCEDURE — 71045 X-RAY EXAM CHEST 1 VIEW: CPT

## 2024-04-06 PROCEDURE — 71045 X-RAY EXAM CHEST 1 VIEW: CPT | Mod: 26,77

## 2024-04-06 RX ORDER — DEXTROSE 50 % IN WATER 50 %
100 SYRINGE (ML) INTRAVENOUS ONCE
Refills: 0 | Status: COMPLETED | OUTPATIENT
Start: 2024-04-06 | End: 2024-04-06

## 2024-04-06 RX ORDER — HYDROCORTISONE 20 MG
100 TABLET ORAL ONCE
Refills: 0 | Status: COMPLETED | OUTPATIENT
Start: 2024-04-06 | End: 2024-04-06

## 2024-04-06 RX ORDER — FENTANYL CITRATE 50 UG/ML
0.5 INJECTION INTRAVENOUS
Qty: 2500 | Refills: 0 | Status: DISCONTINUED | OUTPATIENT
Start: 2024-04-06 | End: 2024-04-08

## 2024-04-06 RX ORDER — HEPARIN SODIUM 5000 [USP'U]/ML
5000 INJECTION INTRAVENOUS; SUBCUTANEOUS EVERY 12 HOURS
Refills: 0 | Status: DISCONTINUED | OUTPATIENT
Start: 2024-04-06 | End: 2024-04-24

## 2024-04-06 RX ORDER — SODIUM CHLORIDE 9 MG/ML
1000 INJECTION INTRAMUSCULAR; INTRAVENOUS; SUBCUTANEOUS ONCE
Refills: 0 | Status: COMPLETED | OUTPATIENT
Start: 2024-04-06 | End: 2024-04-06

## 2024-04-06 RX ORDER — ATROPINE SULFATE 0.1 MG/ML
1 SYRINGE (ML) INJECTION ONCE
Refills: 0 | Status: COMPLETED | OUTPATIENT
Start: 2024-04-06 | End: 2024-04-06

## 2024-04-06 RX ORDER — VANCOMYCIN HCL 1 G
1000 VIAL (EA) INTRAVENOUS EVERY 24 HOURS
Refills: 0 | Status: DISCONTINUED | OUTPATIENT
Start: 2024-04-06 | End: 2024-04-06

## 2024-04-06 RX ORDER — CEFEPIME 1 G/1
1000 INJECTION, POWDER, FOR SOLUTION INTRAMUSCULAR; INTRAVENOUS EVERY 12 HOURS
Refills: 0 | Status: DISCONTINUED | OUTPATIENT
Start: 2024-04-06 | End: 2024-04-07

## 2024-04-06 RX ORDER — VANCOMYCIN HCL 1 G
500 VIAL (EA) INTRAVENOUS EVERY 24 HOURS
Refills: 0 | Status: DISCONTINUED | OUTPATIENT
Start: 2024-04-06 | End: 2024-04-07

## 2024-04-06 RX ORDER — CALCITRIOL 0.5 UG/1
1 CAPSULE ORAL
Refills: 0 | DISCHARGE

## 2024-04-06 RX ORDER — CHOLECALCIFEROL (VITAMIN D3) 125 MCG
0 CAPSULE ORAL
Refills: 0 | DISCHARGE

## 2024-04-06 RX ORDER — INSULIN HUMAN 100 [IU]/ML
10 INJECTION, SOLUTION SUBCUTANEOUS ONCE
Refills: 0 | Status: COMPLETED | OUTPATIENT
Start: 2024-04-06 | End: 2024-04-06

## 2024-04-06 RX ORDER — LEVOTHYROXINE SODIUM 125 MCG
100 TABLET ORAL DAILY
Refills: 0 | Status: DISCONTINUED | OUTPATIENT
Start: 2024-04-06 | End: 2024-04-25

## 2024-04-06 RX ORDER — PANTOPRAZOLE SODIUM 20 MG/1
40 TABLET, DELAYED RELEASE ORAL DAILY
Refills: 0 | Status: DISCONTINUED | OUTPATIENT
Start: 2024-04-06 | End: 2024-04-25

## 2024-04-06 RX ORDER — FOLIC ACID 0.8 MG
0 TABLET ORAL
Refills: 0 | DISCHARGE

## 2024-04-06 RX ORDER — CALCIUM GLUCONATE 100 MG/ML
3 VIAL (ML) INTRAVENOUS ONCE
Refills: 0 | Status: COMPLETED | OUTPATIENT
Start: 2024-04-06 | End: 2024-04-06

## 2024-04-06 RX ORDER — CHLORHEXIDINE GLUCONATE 213 G/1000ML
15 SOLUTION TOPICAL EVERY 12 HOURS
Refills: 0 | Status: DISCONTINUED | OUTPATIENT
Start: 2024-04-06 | End: 2024-04-25

## 2024-04-06 RX ORDER — CHLORHEXIDINE GLUCONATE 213 G/1000ML
1 SOLUTION TOPICAL
Refills: 0 | Status: DISCONTINUED | OUTPATIENT
Start: 2024-04-06 | End: 2024-04-25

## 2024-04-06 RX ORDER — CEFEPIME 1 G/1
1000 INJECTION, POWDER, FOR SOLUTION INTRAMUSCULAR; INTRAVENOUS ONCE
Refills: 0 | Status: COMPLETED | OUTPATIENT
Start: 2024-04-06 | End: 2024-04-06

## 2024-04-06 RX ORDER — LABETALOL HCL 100 MG
1 TABLET ORAL
Refills: 0 | DISCHARGE

## 2024-04-06 RX ORDER — NOREPINEPHRINE BITARTRATE/D5W 8 MG/250ML
0.05 PLASTIC BAG, INJECTION (ML) INTRAVENOUS
Qty: 8 | Refills: 0 | Status: DISCONTINUED | OUTPATIENT
Start: 2024-04-06 | End: 2024-04-10

## 2024-04-06 RX ORDER — HYDRALAZINE HCL 50 MG
10 TABLET ORAL EVERY 6 HOURS
Refills: 0 | Status: DISCONTINUED | OUTPATIENT
Start: 2024-04-06 | End: 2024-04-07

## 2024-04-06 RX ADMIN — Medication 4.69 MICROGRAM(S)/KG/MIN: at 19:29

## 2024-04-06 RX ADMIN — Medication 1 MILLIGRAM(S): at 11:40

## 2024-04-06 RX ADMIN — SODIUM CHLORIDE 1000 MILLILITER(S): 9 INJECTION INTRAMUSCULAR; INTRAVENOUS; SUBCUTANEOUS at 12:29

## 2024-04-06 RX ADMIN — Medication 100 MILLIGRAM(S): at 18:35

## 2024-04-06 RX ADMIN — CEFEPIME 100 MILLIGRAM(S): 1 INJECTION, POWDER, FOR SOLUTION INTRAMUSCULAR; INTRAVENOUS at 18:35

## 2024-04-06 RX ADMIN — Medication 1 MILLIGRAM(S): at 11:00

## 2024-04-06 RX ADMIN — Medication 1 MILLIGRAM(S): at 11:27

## 2024-04-06 RX ADMIN — Medication 4.69 MICROGRAM(S)/KG/MIN: at 18:35

## 2024-04-06 RX ADMIN — CEFEPIME 100 MILLIGRAM(S): 1 INJECTION, POWDER, FOR SOLUTION INTRAMUSCULAR; INTRAVENOUS at 11:36

## 2024-04-06 RX ADMIN — FENTANYL CITRATE 2.5 MICROGRAM(S)/KG/HR: 50 INJECTION INTRAVENOUS at 19:29

## 2024-04-06 RX ADMIN — Medication 100 MILLIGRAM(S): at 11:36

## 2024-04-06 RX ADMIN — FENTANYL CITRATE 2.5 MICROGRAM(S)/KG/HR: 50 INJECTION INTRAVENOUS at 17:51

## 2024-04-06 RX ADMIN — INSULIN HUMAN 10 UNIT(S): 100 INJECTION, SOLUTION SUBCUTANEOUS at 11:27

## 2024-04-06 RX ADMIN — PANTOPRAZOLE SODIUM 40 MILLIGRAM(S): 20 TABLET, DELAYED RELEASE ORAL at 18:34

## 2024-04-06 RX ADMIN — Medication 100 GRAM(S): at 11:27

## 2024-04-06 RX ADMIN — Medication 10 MILLIGRAM(S): at 17:46

## 2024-04-06 RX ADMIN — Medication 100 MILLILITER(S): at 12:29

## 2024-04-06 RX ADMIN — HEPARIN SODIUM 5000 UNIT(S): 5000 INJECTION INTRAVENOUS; SUBCUTANEOUS at 17:51

## 2024-04-06 RX ADMIN — CHLORHEXIDINE GLUCONATE 15 MILLILITER(S): 213 SOLUTION TOPICAL at 17:51

## 2024-04-06 NOTE — ED PROVIDER NOTE - PHYSICAL EXAMINATION
VITAL SIGNS: I have reviewed nursing notes and confirm.  CONSTITUTIONAL: ill-appearing, intubated, thin female  SKIN: Warm dry, decreased skin turgor  HEAD: NCAT  EYES: PERRLA  ENT: Dry mucous membranes, normal pharynx with no erythema or exudates  NECK: No cervical LAD.  CARD: bradycardic, no murmurs, rubs or gallops  RESP: clear to ausculation b/l.  No rales, rhonchi, or wheezing.  ABD: soft, + BS, non-tender, non-distended, no rebound or guarding.   EXT: no pedal edema, no obvious deformities  NEURO: Unable to assess.

## 2024-04-06 NOTE — ED PROVIDER NOTE - WET READ LAUNCH FT
How Severe Is Your Cyst?: mild
Is This A New Presentation, Or A Follow-Up?: Cyst
Additional History: Patient states she has had the cyst for 17 years and it drained 10 years ago.
There are no Wet Read(s) to document.

## 2024-04-06 NOTE — H&P ADULT - ATTENDING COMMENTS
Pt seen w resident and agree w above.  PMHx noted above.  Pt became unresponsive at home, EMS called by the patient's daughter, upon arrival the pt was intubated and subsequent CPR. Epi x 4 administered, resuscitative efforts continued in the ED and pt was found to have an organized rhythm and Pulse.  Pt noted to be bradycardic and hypothermic which has improved. Pt was started on neosynephrine and sedation.  Subsequent w/ for cva was negtaive and ct angio was negative for PE.  Pt w multiorgan failure due to cardiac arrest.  Will continue supportive care, currently not requiring norepinephrine.  obtain EEG.  Neuro and Cardio evals.  Continue empiric abx. Monitor in ICU. Pt seen w resident and agree w above.  PMHx noted above.  Pt became unresponsive at home, EMS called by the patient's daughter, upon arrival the pt was intubated and subsequent CPR. Epi x 4 administered, resuscitative efforts continued in the ED and pt was found to have an organized rhythm and Pulse.  Pt noted to be bradycardic and hypothermic which has improved. Pt was started on neosynephrine and sedation.  Subsequent w/ for cva was negtaive and ct angio was negative for PE.  Pt w multiorgan failure due to cardiac arrest.  Will continue supportive care, currently not requiring norepinephrine.  obtain EEG.  Neuro and Cardio evals.  Continue empiric abx. Monitor in ICU.  Critical Care time spent 50 minutes.

## 2024-04-06 NOTE — ED ADULT NURSE NOTE - OTHER COMPLAINTS
As per son, at 0838 pt stumbled to bathroom; family assisted. Pt had BM, became disoriented and collapsed at 0910. 911 called.

## 2024-04-06 NOTE — ED PROVIDER NOTE - ATTENDING CONTRIBUTION TO CARE
74-year-old female with a past medical history brought in by EMS as notification status post cardiac arrest, subsequent history from family is that she was stumbling about and collapsed, on EMS arrival patient with agonal respirations and heart rate in the 40s, briefly transcutaneously paced then arrested, asystole, intubated and ACLS initiated, end-tidal CO2 33, given 4 epis and transported to ED, in the ED ROSC achieved on first pulse check, head NC/AT, pupils fixed, conjunctive slightly pale, cor bradycardic and irregular, lungs CTA, abdomen soft nondistended, no clubbing cyanosis or edema, GCS 3 T, patient placed on Levophed, intermittently in secondary heart block, empirically treated for hyperkalemia, found to be hypothermic, heart rate responded to warming, covered for possible sepsis as well as adrenal crisis, labs and studies appreciated, underlying etiology unclear, will admit ICU, family aware prognosis poor

## 2024-04-06 NOTE — ED ADULT TRIAGE NOTE - CHIEF COMPLAINT QUOTE
Notification BIBA CPR in progress.  Pt arrested 15 min PTA ALEXANDER in use.  Pt received  epinephrine 1 mg x 4 doses PTA .  Pt intubated ET tube 7.5 with LL 22 ambu .

## 2024-04-06 NOTE — ED ADULT NURSE NOTE - NSICDXPASTMEDICALHX_GEN_ALL_CORE_FT
PAST MEDICAL HISTORY:  H/O Raynaud's syndrome     H/O vasculitis     H/O Whipple procedure     History of COPD     History of knee replacement     Hypertension     Pancreatic cancer     Stage 3 chronic kidney disease

## 2024-04-06 NOTE — ED PROVIDER NOTE - OBJECTIVE STATEMENT
74-year-old female with past medical history of pancreatic cancer status post Whipple procedure, stage III chronic kidney disease, hypertension, COPD, Raynaud's syndrome who presents for cardiac arrest.  Patient was found down, EMS intubated her and gave her 4 rounds of epinephrine.  ROSC was achieved within a minute of her arrival to the ED.  Patient was found to be hypothermic and bradycardic.

## 2024-04-06 NOTE — H&P ADULT - NSHPPHYSICALEXAM_GEN_ALL_CORE
GENERAL: NAD, lying in bed comfortably  HEAD:  Atraumatic, normocephalic  EYES: EOMI, PERRLA, conjunctiva and sclera clear  ENT: Moist mucous membranes  NECK: Supple, no JVD  HEART: Regular rate and rhythm, no murmurs, rubs, or gallops  LUNGS: Unlabored respirations.  Clear to auscultation bilaterally, no crackles, wheezing, or rhonchi  ABDOMEN: Soft, nontender, nondistended, +BS  EXTREMITIES: 2+ peripheral pulses bilaterally. No clubbing, cyanosis, or edema  NERVOUS SYSTEM:  A&Ox3, no focal deficits   SKIN: No rashes or lesions GENERAL: acutely ill, intubated and sedated  HEAD:  Atraumatic, normocephalic  EYES:  pupils fixed, nonreactive, conjunctiva and sclera clear  ENT: Moist mucous membranes  NECK: Supple, no JVD  HEART: Regular rate and rhythm, no murmurs, rubs, or gallops  LUNGS: unlabored, clear breath sounds, diminished at bases  ABDOMEN: Soft, nontender, nondistended, +BS  EXTREMITIES: 2+ peripheral pulses bilaterally. No clubbing, cyanosis, or edema  NERVOUS SYSTEM:  A&Ox0, sedated, no focal deficits   SKIN: no rashes, decreased skin turgor

## 2024-04-06 NOTE — ED PROCEDURE NOTE - CPROC ED TIME OUT STATEMENT1
“Patient's name, , procedure and correct site were confirmed during the [place] Timeout.”
“Patient's name, , procedure and correct site were confirmed during the [place] Timeout.”

## 2024-04-06 NOTE — H&P ADULT - ASSESSMENT
IMPRESSION:  #Unresponsive s/p Cardiac arrest  #Acute Hypoxic Respiratory Failure s/p intubation   #Hyperkalemia  #Bradycardia s/p atropine x4  #Normocytic Anemia  #NSTEMI  #Lactic Acidosis  #Transaminitis r/o Cholangitis  #HO pancreatic CA s/p whipple  #HO HTN, COPD, CKD3, Raynaud's      PLAN:    CNS: CTH noted. Sedation with fentanyl. Can add precedex if needed. Daily SATs    HEENT: Oral care    PULMONARY:  HOB @ 45 degrees.  Aspiration precautions. On mechanical ventilation. Repeat ABG now. Daily CXR and ABGs.    CARDIOVASCULAR: Keep normotensive. BP parameters systolic 140-160. Labetalol prn for SBP >160. Trend CE. Check TTE.     GI: GI prophylaxis.  Feeding.  Bowel regimen     RENAL:  Follow up lytes.  Correct as needed    INFECTIOUS DISEASE: Follow up cultures. Empiric abx w/ vancomycin and cefepime. ID eval. Check procal.     HEMATOLOGICAL:  DVT prophylaxis. Check LE duplex     ENDOCRINE:  Follow up FS.  Insulin protocol if needed. Check thyroid function tests.     MUSCULOSKELETAL: Bedrest    MICU  Poor Prognosis      L IJ 4/6/24  L A-line 4/6/24  Stack       IMPRESSION:  #Unresponsive s/p Cardiac arrest  #Acute Hypoxic Respiratory Failure s/p intubation   #Hyperkalemia  #Bradycardia s/p atropine x4  #Normocytic Anemia  #NSTEMI  #Lactic Acidosis  #Transaminitis r/o Cholangitis  #HO pancreatic CA s/p whipple  #HO HTN, COPD, CKD3, Raynaud's      PLAN:    CNS: CTH noted. Check EEG. Sedation with fentanyl. Can add precedex if needed. Daily SATs    HEENT: Oral care    PULMONARY:  HOB @ 45 degrees.  Aspiration precautions. On mechanical ventilation. Repeat ABG now. Daily CXR and ABGs.    CARDIOVASCULAR: Keep normotensive. BP parameters systolic 140-160. Labetalol prn for SBP >160. Trend CE. Check TTE.     GI: GI prophylaxis.  Feeding.  Bowel regimen     RENAL:  Follow up lytes.  Correct as needed    INFECTIOUS DISEASE: Follow up cultures. Empiric abx w/ vancomycin and cefepime. ID eval. Check procal.     HEMATOLOGICAL:  DVT prophylaxis. Check LE duplex     ENDOCRINE:  Follow up FS.  Insulin protocol if needed. Check thyroid function tests.     MUSCULOSKELETAL: Bedrest    MICU  Poor Prognosis      L IJ 4/6/24  L A-line 4/6/24  Stack       IMPRESSION:  #Unresponsive s/p Cardiac arrest  #Acute Hypoxic Respiratory Failure s/p intubation   #Hyperkalemia  #Bradycardia s/p atropine x4  #Normocytic Anemia  #NSTEMI  #Lactic Acidosis  #Transaminitis r/o Cholangitis  #HO pancreatic CA s/p whipple  #HO HTN, COPD, CKD3, Raynaud's      PLAN:    CNS: CTH noted. Check EEG. Sedation with fentanyl. Can add precedex if needed. Daily SATs    HEENT: Oral care    PULMONARY:  HOB @ 45 degrees.  Aspiration precautions. On mechanical ventilation. Repeat ABG now. Daily CXR and ABGs.    CARDIOVASCULAR: Keep normotensive. BP parameters systolic 140-160. Labetalol prn for SBP >160. Trend CE. Check TTE.     GI: GI prophylaxis.  Feeding.  Bowel regimen. GI eval     RENAL:  Follow up lytes.  Correct as needed    INFECTIOUS DISEASE: Follow up cultures. Empiric abx w/ vancomycin and cefepime. ID eval. Check procal.     HEMATOLOGICAL:  DVT prophylaxis. Check LE duplex     ENDOCRINE:  Follow up FS.  Insulin protocol if needed. Check thyroid function tests.     MUSCULOSKELETAL: Bedrest    MICU  Poor Prognosis      L IJ 4/6/24  L A-line 4/6/24  Stack       IMPRESSION:  #Unresponsive s/p Cardiac arrest  #Acute Hypoxic Respiratory Failure s/p intubation   #Hyperkalemia  #Bradycardia s/p atropine x4  #Normocytic Anemia  #NSTEMI  #Lactic Acidosis  #Transaminitis r/o Cholangitis  #HO pancreatic CA s/p whipple  #HO HTN, COPD, CKD3, Raynaud's      PLAN:    CNS: CTH noted. Check EEG. Sedation with fentanyl. Can add precedex if needed. Daily SATs    HEENT: Oral care    PULMONARY:  HOB @ 45 degrees.  Aspiration precautions. On mechanical ventilation. Repeat ABG now. Daily CXR and ABGs.    CARDIOVASCULAR: Keep normotensive. BP parameters systolic 140-160. Labetalol prn for SBP >160. Trend CE. Check TTE. Hold home antihypertensives (labetalol and lisinopril)    GI: GI prophylaxis.  Feeding.  Bowel regimen. GI eval     RENAL:  Follow up lytes.  Correct as needed    INFECTIOUS DISEASE: Follow up cultures. Empiric abx w/ vancomycin and cefepime. ID eval. Check procal.     HEMATOLOGICAL:  DVT prophylaxis. Check LE duplex     ENDOCRINE:  Follow up FS.  Insulin protocol if needed. Check thyroid function tests.     MUSCULOSKELETAL: Bedrest    MICU  Poor Prognosis      L IJ 4/6/24  L A-line 4/6/24  Stack       IMPRESSION:  #Unresponsive s/p Cardiac arrest  #Acute Hypoxic Respiratory Failure s/p intubation   #Hyperkalemia  #Bradycardia s/p atropine x4  #Normocytic Anemia  #NSTEMI  #Lactic Acidosis  #Transaminitis r/o Cholangitis  #HO pancreatic CA s/p whipple, Pulmonary Fibrosis, ANCA Vasculitis, PRESS syndrome  #HO HTN, COPD, CKD3, Raynaud's      PLAN:    CNS: CTH noted. Check EEG. Sedation with fentanyl. Can add precedex if needed. Daily SATs    HEENT: Oral care    PULMONARY:  HOB @ 45 degrees.  Aspiration precautions. On mechanical ventilation. Repeat ABG now. Daily CXR and ABGs.    CARDIOVASCULAR: Keep normotensive. BP parameters systolic 140-160. Labetalol prn for SBP >160. Trend CE. Check TTE. Hold home antihypertensives (labetalol and lisinopril)    GI: GI prophylaxis.  Feeding.  Bowel regimen. GI eval     RENAL:  Follow up lytes.  Correct as needed    INFECTIOUS DISEASE: Follow up cultures. Empiric abx w/ vancomycin and cefepime. ID eval. Check procal.     HEMATOLOGICAL:  DVT prophylaxis. Check LE duplex     ENDOCRINE:  Follow up FS.  Insulin protocol if needed. Check thyroid function tests.     MUSCULOSKELETAL: Bedrest    MICU  Poor Prognosis      L IJ 4/6/24  L A-line 4/6/24  Stack

## 2024-04-06 NOTE — H&P ADULT - HISTORY OF PRESENT ILLNESS
74-year-old female with PHMx of pancreatic CA s/p Whipple, CKD stage 3, HTN, COPD, Raynaud's syndrome presents to ED due to cardiac arrest. Patient was found down by EMS, intubated her in the field, and resuscitated her with compressions and 4 rounds of epinephrine. ROSC achieved within a minute of her arrival to the ED. Patient was found to be hypothermic and bradycardic.    ED vitals and workup:  BP 90/57, HR 88, Temp 94.1F, satting 100% on ventilator   Labs: Hgb 8.7, Trop 56 -->79, K 5.3, Lactate 3.5, Lipase 256, , , ALT 52   74-year-old female with PHMx of pancreatic CA s/p Whipple, CKD stage 3, HTN, COPD, Raynaud's syndrome presents to ED due to cardiac arrest. Patient was found down by EMS, intubated her in the field, and resuscitated her with compressions and 4 rounds of epinephrine. ROSC achieved within a minute of her arrival to the ED. Patient was found to be hypothermic and bradycardic.    ED vitals and workup:  BP 90/57, HR 88, Temp 94.1F, satting 100% on ventilator   Labs: Hgb 8.7, Trop 56 -->79, K 5.3, Lactate 3.5, Lipase 256, , , ALT 52  VBG: pH 7.12, pCO2 67, Lactate 6.6    CXR bilateral opacities. cardiomegaly  CTH: mild atrophic changes  CT angio chest PE, A/P w/ IV cont: Bilary ductal enhancement s/p CCY. Trace bilateral effusions. NO PE.    s/p atropine 1mg x4 doses, calcium gluconate 3g, [id number] x1, insulin 10 units, cefepime 1g in the ED.     Admitted to MICU for unresponsiveness s/p cardiac arrest.     74-year-old female with PHMx of pancreatic CA s/p Whipple, CKD stage 3, HTN, COPD, pulmonary fibrosis, PRESS syndrome (hospitalized in 2019), Raynaud's syndrome, ANCA vasculitis, Scleroderma  presents to ED due to cardiac arrest. Pt lives at home with son and daughter, went to use the bathroom. Daughter notes that she heard the patient's walker scratching the floor, went upstairs to see what was going on. Daughter noticed patient was slumped over and struggling to get onto the toilet. Daughter helped patient onto the toilet, but noticed that the patient became less responsive and alert so she called EMS. Patient was found down by EMS, intubated her in the field, and resuscitated her with compressions and 4 rounds of epinephrine. ROSC achieved within a minute of her arrival to the ED. Patient was found to be hypothermic and bradycardic. Of note, patient was hospitalized back in Dec 2023 in [facility] for 18 days for multifocal pneumonia 2/2 human metapneumovirus. Patient also was hospitalized in 2019 for PRESS syndrome. Pt had a negative stress test in Sept 2023. She follows pulmonology for pulmonary fibrosis, had recent PFTs done.     ED vitals and workup:  BP 90/57, HR 88, Temp 94.1F, satting 100% on ventilator   Labs: Hgb 8.7, Trop 56 -->79, K 5.3, Lactate 3.5, Lipase 256, , , ALT 52  VBG: pH 7.12, pCO2 67, Lactate 6.6    CXR bilateral opacities. cardiomegaly  CTH: mild atrophic changes  CT angio chest PE, A/P w/ IV cont: Bilary ductal enhancement s/p CCY. Trace bilateral effusions. NO PE.    s/p atropine 1mg x4 doses, calcium gluconate 3g, [id number] x1, insulin 10 units, cefepime 1g in the ED.     Admitted to MICU for unresponsiveness s/p cardiac arrest.     74-year-old female with PHMx of pancreatic CA s/p Whipple, CKD stage 3, HTN, COPD, pulmonary fibrosis, PRESS syndrome (hospitalized in 2019), Raynaud's syndrome, ANCA vasculitis, Scleroderma  presents to ED due to cardiac arrest. Pt lives at home with son and daughter, went to use the bathroom. Daughter notes that she heard the patient's walker scratching the floor, went upstairs to see what was going on. Daughter noticed patient was slumped over and struggling to get onto the toilet. Daughter helped patient onto the toilet, but noticed that the patient became less responsive and alert so she called EMS. Patient was found down by EMS, intubated her in the field, and resuscitated her with compressions and 4 rounds of epinephrine. ROSC achieved within a minute of her arrival to the ED. Patient was found to be hypothermic and bradycardic. Of note, patient was hospitalized back in Dec 2023 in [facility] for 18 days for multifocal pneumonia 2/2 human metapneumovirus. Patient also was hospitalized in 2019 for PRESS syndrome. Pt had a negative stress test in Sept 2023. She follows pulmonology for pulmonary fibrosis, had recent PFTs done.     ED vitals and workup:  BP 90/57, HR 88, Temp 94.1F, satting 100% on ventilator   Labs: Hgb 8.7, Trop 56 -->79, K 5.3, Lactate 3.5, Lipase 256, , , ALT 52  VBG: pH 7.12, pCO2 67, Lactate 6.6    CXR bilateral opacities. cardiomegaly  CTH: mild atrophic changes  CT angio chest PE, A/P w/ IV cont: Bilary ductal enhancement s/p CCY. Diffuse fibrotic changes of lungs. Trace bilateral effusions. NO PE.    s/p atropine 1mg x4 doses, calcium gluconate 3g, [id number] x1, insulin 10 units, cefepime 1g in the ED.     Admitted to MICU for unresponsiveness s/p cardiac arrest.

## 2024-04-07 LAB
ALBUMIN SERPL ELPH-MCNC: 3 G/DL — LOW (ref 3.5–5.2)
ALP SERPL-CCNC: 250 U/L — HIGH (ref 30–115)
ALT FLD-CCNC: 41 U/L — SIGNIFICANT CHANGE UP (ref 0–41)
ANION GAP SERPL CALC-SCNC: 9 MMOL/L — SIGNIFICANT CHANGE UP (ref 7–14)
AST SERPL-CCNC: 78 U/L — HIGH (ref 0–41)
BASOPHILS # BLD AUTO: 0.01 K/UL — SIGNIFICANT CHANGE UP (ref 0–0.2)
BASOPHILS NFR BLD AUTO: 0.1 % — SIGNIFICANT CHANGE UP (ref 0–1)
BILIRUB SERPL-MCNC: 0.3 MG/DL — SIGNIFICANT CHANGE UP (ref 0.2–1.2)
BUN SERPL-MCNC: 49 MG/DL — HIGH (ref 10–20)
CALCIUM SERPL-MCNC: 8.8 MG/DL — SIGNIFICANT CHANGE UP (ref 8.4–10.5)
CHLORIDE SERPL-SCNC: 106 MMOL/L — SIGNIFICANT CHANGE UP (ref 98–110)
CO2 SERPL-SCNC: 22 MMOL/L — SIGNIFICANT CHANGE UP (ref 17–32)
CREAT SERPL-MCNC: 1.8 MG/DL — HIGH (ref 0.7–1.5)
EGFR: 29 ML/MIN/1.73M2 — LOW
EOSINOPHIL # BLD AUTO: 0 K/UL — SIGNIFICANT CHANGE UP (ref 0–0.7)
EOSINOPHIL NFR BLD AUTO: 0 % — SIGNIFICANT CHANGE UP (ref 0–8)
FERRITIN SERPL-MCNC: 42 NG/ML — SIGNIFICANT CHANGE UP (ref 13–330)
FOLATE SERPL-MCNC: 7.4 NG/ML — SIGNIFICANT CHANGE UP
GAS PNL BLDA: SIGNIFICANT CHANGE UP
GAS PNL BLDA: SIGNIFICANT CHANGE UP
GLUCOSE SERPL-MCNC: 131 MG/DL — HIGH (ref 70–99)
HCT VFR BLD CALC: 27.7 % — LOW (ref 37–47)
HGB BLD-MCNC: 9.1 G/DL — LOW (ref 12–16)
IMM GRANULOCYTES NFR BLD AUTO: 0.5 % — HIGH (ref 0.1–0.3)
LYMPHOCYTES # BLD AUTO: 0.95 K/UL — LOW (ref 1.2–3.4)
LYMPHOCYTES # BLD AUTO: 9.9 % — LOW (ref 20.5–51.1)
MAGNESIUM SERPL-MCNC: 2 MG/DL — SIGNIFICANT CHANGE UP (ref 1.8–2.4)
MCHC RBC-ENTMCNC: 26.2 PG — LOW (ref 27–31)
MCHC RBC-ENTMCNC: 32.9 G/DL — SIGNIFICANT CHANGE UP (ref 32–37)
MCV RBC AUTO: 80.3 FL — LOW (ref 81–99)
MONOCYTES # BLD AUTO: 0.67 K/UL — HIGH (ref 0.1–0.6)
MONOCYTES NFR BLD AUTO: 7 % — SIGNIFICANT CHANGE UP (ref 1.7–9.3)
MRSA PCR RESULT.: NEGATIVE — SIGNIFICANT CHANGE UP
NEUTROPHILS # BLD AUTO: 7.96 K/UL — HIGH (ref 1.4–6.5)
NEUTROPHILS NFR BLD AUTO: 82.5 % — HIGH (ref 42.2–75.2)
NRBC # BLD: 0 /100 WBCS — SIGNIFICANT CHANGE UP (ref 0–0)
PHOSPHATE SERPL-MCNC: 5.4 MG/DL — HIGH (ref 2.1–4.9)
PLATELET # BLD AUTO: 203 K/UL — SIGNIFICANT CHANGE UP (ref 130–400)
PMV BLD: 11.4 FL — HIGH (ref 7.4–10.4)
POTASSIUM SERPL-MCNC: 5.4 MMOL/L — HIGH (ref 3.5–5)
POTASSIUM SERPL-SCNC: 5.4 MMOL/L — HIGH (ref 3.5–5)
PROCALCITONIN SERPL-MCNC: 4.49 NG/ML — HIGH (ref 0.02–0.1)
PROT SERPL-MCNC: 6.3 G/DL — SIGNIFICANT CHANGE UP (ref 6–8)
RBC # BLD: 3.47 M/UL — LOW (ref 4.2–5.4)
RBC # FLD: 17.2 % — HIGH (ref 11.5–14.5)
RHEUMATOID FACT SERPL-ACNC: 13 IU/ML — SIGNIFICANT CHANGE UP (ref 0–13)
SODIUM SERPL-SCNC: 137 MMOL/L — SIGNIFICANT CHANGE UP (ref 135–146)
TROPONIN T, HIGH SENSITIVITY RESULT: 89 NG/L — CRITICAL HIGH (ref 6–13)
VIT B12 SERPL-MCNC: 452 PG/ML — SIGNIFICANT CHANGE UP (ref 232–1245)
WBC # BLD: 9.64 K/UL — SIGNIFICANT CHANGE UP (ref 4.8–10.8)
WBC # FLD AUTO: 9.64 K/UL — SIGNIFICANT CHANGE UP (ref 4.8–10.8)

## 2024-04-07 PROCEDURE — 95819 EEG AWAKE AND ASLEEP: CPT | Mod: 26

## 2024-04-07 PROCEDURE — 76705 ECHO EXAM OF ABDOMEN: CPT | Mod: 26

## 2024-04-07 PROCEDURE — 99223 1ST HOSP IP/OBS HIGH 75: CPT

## 2024-04-07 PROCEDURE — 99233 SBSQ HOSP IP/OBS HIGH 50: CPT

## 2024-04-07 PROCEDURE — 99291 CRITICAL CARE FIRST HOUR: CPT

## 2024-04-07 PROCEDURE — 71045 X-RAY EXAM CHEST 1 VIEW: CPT | Mod: 26

## 2024-04-07 RX ORDER — PIPERACILLIN AND TAZOBACTAM 4; .5 G/20ML; G/20ML
3.38 INJECTION, POWDER, LYOPHILIZED, FOR SOLUTION INTRAVENOUS ONCE
Refills: 0 | Status: COMPLETED | OUTPATIENT
Start: 2024-04-07 | End: 2024-04-07

## 2024-04-07 RX ORDER — PROPOFOL 10 MG/ML
10 INJECTION, EMULSION INTRAVENOUS
Qty: 1000 | Refills: 0 | Status: DISCONTINUED | OUTPATIENT
Start: 2024-04-07 | End: 2024-04-08

## 2024-04-07 RX ORDER — HYDRALAZINE HCL 50 MG
10 TABLET ORAL ONCE
Refills: 0 | Status: COMPLETED | OUTPATIENT
Start: 2024-04-07 | End: 2024-04-07

## 2024-04-07 RX ORDER — SODIUM CHLORIDE 9 MG/ML
1000 INJECTION, SOLUTION INTRAVENOUS
Refills: 0 | Status: DISCONTINUED | OUTPATIENT
Start: 2024-04-07 | End: 2024-04-09

## 2024-04-07 RX ORDER — PIPERACILLIN AND TAZOBACTAM 4; .5 G/20ML; G/20ML
3.38 INJECTION, POWDER, LYOPHILIZED, FOR SOLUTION INTRAVENOUS EVERY 8 HOURS
Refills: 0 | Status: DISCONTINUED | OUTPATIENT
Start: 2024-04-07 | End: 2024-04-11

## 2024-04-07 RX ORDER — SODIUM ZIRCONIUM CYCLOSILICATE 10 G/10G
10 POWDER, FOR SUSPENSION ORAL ONCE
Refills: 0 | Status: COMPLETED | OUTPATIENT
Start: 2024-04-07 | End: 2024-04-07

## 2024-04-07 RX ADMIN — PIPERACILLIN AND TAZOBACTAM 25 GRAM(S): 4; .5 INJECTION, POWDER, LYOPHILIZED, FOR SOLUTION INTRAVENOUS at 10:00

## 2024-04-07 RX ADMIN — PIPERACILLIN AND TAZOBACTAM 200 GRAM(S): 4; .5 INJECTION, POWDER, LYOPHILIZED, FOR SOLUTION INTRAVENOUS at 06:39

## 2024-04-07 RX ADMIN — CEFEPIME 100 MILLIGRAM(S): 1 INJECTION, POWDER, FOR SOLUTION INTRAMUSCULAR; INTRAVENOUS at 05:06

## 2024-04-07 RX ADMIN — CHLORHEXIDINE GLUCONATE 15 MILLILITER(S): 213 SOLUTION TOPICAL at 17:01

## 2024-04-07 RX ADMIN — PANTOPRAZOLE SODIUM 40 MILLIGRAM(S): 20 TABLET, DELAYED RELEASE ORAL at 11:29

## 2024-04-07 RX ADMIN — FENTANYL CITRATE 2.5 MICROGRAM(S)/KG/HR: 50 INJECTION INTRAVENOUS at 19:57

## 2024-04-07 RX ADMIN — CHLORHEXIDINE GLUCONATE 15 MILLILITER(S): 213 SOLUTION TOPICAL at 05:06

## 2024-04-07 RX ADMIN — PIPERACILLIN AND TAZOBACTAM 25 GRAM(S): 4; .5 INJECTION, POWDER, LYOPHILIZED, FOR SOLUTION INTRAVENOUS at 15:00

## 2024-04-07 RX ADMIN — FENTANYL CITRATE 2.5 MICROGRAM(S)/KG/HR: 50 INJECTION INTRAVENOUS at 07:35

## 2024-04-07 RX ADMIN — PROPOFOL 2.4 MICROGRAM(S)/KG/MIN: 10 INJECTION, EMULSION INTRAVENOUS at 02:12

## 2024-04-07 RX ADMIN — Medication 100 MICROGRAM(S): at 05:06

## 2024-04-07 RX ADMIN — PIPERACILLIN AND TAZOBACTAM 25 GRAM(S): 4; .5 INJECTION, POWDER, LYOPHILIZED, FOR SOLUTION INTRAVENOUS at 21:08

## 2024-04-07 RX ADMIN — Medication 10 MILLIGRAM(S): at 00:51

## 2024-04-07 RX ADMIN — SODIUM ZIRCONIUM CYCLOSILICATE 10 GRAM(S): 10 POWDER, FOR SUSPENSION ORAL at 09:48

## 2024-04-07 RX ADMIN — PIPERACILLIN AND TAZOBACTAM 3.38 GRAM(S): 4; .5 INJECTION, POWDER, LYOPHILIZED, FOR SOLUTION INTRAVENOUS at 14:00

## 2024-04-07 RX ADMIN — HEPARIN SODIUM 5000 UNIT(S): 5000 INJECTION INTRAVENOUS; SUBCUTANEOUS at 05:06

## 2024-04-07 RX ADMIN — CHLORHEXIDINE GLUCONATE 1 APPLICATION(S): 213 SOLUTION TOPICAL at 05:06

## 2024-04-07 RX ADMIN — SODIUM CHLORIDE 70 MILLILITER(S): 9 INJECTION, SOLUTION INTRAVENOUS at 19:57

## 2024-04-07 RX ADMIN — HEPARIN SODIUM 5000 UNIT(S): 5000 INJECTION INTRAVENOUS; SUBCUTANEOUS at 17:02

## 2024-04-07 RX ADMIN — SODIUM CHLORIDE 70 MILLILITER(S): 9 INJECTION, SOLUTION INTRAVENOUS at 09:48

## 2024-04-07 NOTE — PROGRESS NOTE ADULT - ASSESSMENT
74-year-old female with PHMx of pancreatic CA s/p Whipple, CKD stage 3, HTN, COPD, pulmonary fibrosis, PRESS syndrome (hospitalized in 2019), Raynaud's syndrome, ANCA vasculitis, Scleroderma, hypothyroid  presents to ED due to cardiac arrest. Pt lives at home with son and daughter, went to use the bathroom and became unresponsive.  EMS intubated her and gave her 4 rounds of epinephrine.  ROSC was achieved within a minute of her arrival to the ED.  Patient was found to be hypothermic and bradycardic    acute respiratory failure / cardiac arrest      - continue to hold sedation and follow mental status   - targeted temperature management    - poor prognosis - family aware   - IV Zosyn   - DVT and GI prophylaxis    I discussed plan with CC   50 minutes total spent today on patient care

## 2024-04-07 NOTE — CONSULT NOTE ADULT - SUBJECTIVE AND OBJECTIVE BOX
KIRAN MORFIN  74y, Female  Allergy: celecoxib (Rash)  Originally Entered as [U UNKNOWN] reaction to [celebrit] (Unknown)      All historical available data reviewed.    HPI:  74-year-old female with PHMx of pancreatic CA s/p Whipple, CKD stage 3, HTN, COPD, pulmonary fibrosis, PRESS syndrome (hospitalized in 2019), Raynaud's syndrome, ANCA vasculitis, Scleroderma  presents to ED due to cardiac arrest. Pt lives at home with son and daughter, went to use the bathroom. Daughter notes that she heard the patient's walker scratching the floor, went upstairs to see what was going on. Daughter noticed patient was slumped over and struggling to get onto the toilet. Daughter helped patient onto the toilet, but noticed that the patient became less responsive and alert so she called EMS. Patient was found down by EMS, intubated her in the field, and resuscitated her with compressions and 4 rounds of epinephrine. ROSC achieved within a minute of her arrival to the ED. Patient was found to be hypothermic and bradycardic. Of note, patient was hospitalized back in Dec 2023 in [facility] for 18 days for multifocal pneumonia 2/2 human metapneumovirus. Patient also was hospitalized in 2019 for PRESS syndrome. Pt had a negative stress test in 2023. She follows pulmonology for pulmonary fibrosis, had recent PFTs done.     ED vitals and workup:  BP 90/57, HR 88, Temp 94.1F, satting 100% on ventilator   Labs: Hgb 8.7, Trop 56 -->79, K 5.3, Lactate 3.5, Lipase 256, , , ALT 52  VBG: pH 7.12, pCO2 67, Lactate 6.6    CXR bilateral opacities. cardiomegaly  CTH: mild atrophic changes  CT angio chest PE, A/P w/ IV cont: Bilary ductal enhancement s/p CCY. Diffuse fibrotic changes of lungs. Trace bilateral effusions. NO PE.    s/p atropine 1mg x4 doses, calcium gluconate 3g, [id number] x1, insulin 10 units, cefepime 1g in the ED.     Admitted to MICU for unresponsiveness s/p cardiac arrest.     (2024 15:43)    FAMILY HISTORY:    PAST MEDICAL & SURGICAL HISTORY:  H/O vasculitis      Pancreatic cancer      History of COPD      Hypertension      H/O Raynaud's syndrome      Stage 3 chronic kidney disease      History of knee replacement      H/O Whipple procedure            VITALS:  T(F): 99.3, Max: 99.3 (24 @ 03:05)  HR: 62  BP: 113/60  RR: 20Vital Signs Last 24 Hrs  T(C): 37.4 (2024 03:05), Max: 37.4 (2024 03:05)  T(F): 99.3 (2024 03:05), Max: 99.3 (2024 03:05)  HR: 62 (2024 04:15) (40 - 88)  BP: 113/60 (2024 04:15) (80/48 - 191/88)  BP(mean): 81 (2024 04:15) (60 - 133)  RR: 20 (2024 04:15) (18 - 20)  SpO2: 100% (2024 04:15) (93% - 100%)    Parameters below as of 2024 04:15  Patient On (Oxygen Delivery Method): ventilator    O2 Concentration (%): 40    TESTS & MEASUREMENTS:                        8.7    9.92  )-----------( 163      ( 2024 11:45 )             28.7     04-06    140  |  109  |  32<H>  ----------------------------<  310<H>  5.3<H>   |  22  |  1.5    Ca    9.4      2024 11:45  Mg     2.1     04-06    TPro  5.3<L>  /  Alb  2.6<L>  /  TBili  0.3  /  DBili  x   /  AST  265<H>  /  ALT  52<H>  /  AlkPhos  322<H>  04-    LIVER FUNCTIONS - ( 2024 11:45 )  Alb: 2.6 g/dL / Pro: 5.3 g/dL / ALK PHOS: 322 U/L / ALT: 52 U/L / AST: 265 U/L / GGT: x             Urinalysis Basic - ( 2024 20:15 )    Color: Yellow / Appearance: Clear / S.018 / pH: x  Gluc: x / Ketone: Negative mg/dL  / Bili: Negative / Urobili: 0.2 mg/dL   Blood: x / Protein: 100 mg/dL / Nitrite: Negative   Leuk Esterase: Negative / RBC: 3 /HPF / WBC 5 /HPF   Sq Epi: x / Non Sq Epi: x / Bacteria: Few /HPF          RADIOLOGY & ADDITIONAL TESTS:  Personal review of radiological diagnostics performed  Echo and EKG results noted when applicable.     MEDICATIONS:  cefepime   IVPB 1000 milliGRAM(s) IV Intermittent every 12 hours  chlorhexidine 0.12% Liquid 15 milliLiter(s) Oral Mucosa every 12 hours  chlorhexidine 2% Cloths 1 Application(s) Topical <User Schedule>  fentaNYL   Infusion. 0.5 MICROgram(s)/kG/Hr IV Continuous <Continuous>  heparin   Injectable 5000 Unit(s) SubCutaneous every 12 hours  hydrALAZINE Injectable 10 milliGRAM(s) IV Push every 6 hours PRN  levothyroxine 100 MICROGram(s) Oral daily  norepinephrine Infusion 0.05 MICROgram(s)/kG/Min IV Continuous <Continuous>  pantoprazole  Injectable 40 milliGRAM(s) IV Push daily  propofol Infusion 10 MICROgram(s)/kG/Min IV Continuous <Continuous>  vancomycin  IVPB 500 milliGRAM(s) IV Intermittent every 24 hours      ANTIBIOTICS:  cefepime   IVPB 1000 milliGRAM(s) IV Intermittent every 12 hours  vancomycin  IVPB 500 milliGRAM(s) IV Intermittent every 24 hours

## 2024-04-07 NOTE — DIETITIAN INITIAL EVALUATION ADULT - PERTINENT MEDS FT
MEDICATIONS  (STANDING):  chlorhexidine 0.12% Liquid 15 milliLiter(s) Oral Mucosa every 12 hours  chlorhexidine 2% Cloths 1 Application(s) Topical <User Schedule>  fentaNYL   Infusion. 0.5 MICROgram(s)/kG/Hr (2.5 mL/Hr) IV Continuous <Continuous>  heparin   Injectable 5000 Unit(s) SubCutaneous every 12 hours  lactated ringers. 1000 milliLiter(s) (70 mL/Hr) IV Continuous <Continuous>  levothyroxine 100 MICROGram(s) Oral daily  norepinephrine Infusion 0.05 MICROgram(s)/kG/Min (4.69 mL/Hr) IV Continuous <Continuous>  pantoprazole  Injectable 40 milliGRAM(s) IV Push daily  piperacillin/tazobactam IVPB.. 3.375 Gram(s) IV Intermittent every 8 hours  propofol Infusion 10 MICROgram(s)/kG/Min (2.4 mL/Hr) IV Continuous <Continuous>    MEDICATIONS  (PRN):

## 2024-04-07 NOTE — DIETITIAN INITIAL EVALUATION ADULT - ORAL INTAKE PTA/DIET HISTORY
unable to assess pt intubated to vent, no family at bedside. No EMR hx, RD to obtain upon f/u visit       presently intubated to vent. MAP >65

## 2024-04-07 NOTE — CONSULT NOTE ADULT - ASSESSMENT
74-year-old female with PHMx of pancreatic CA s/p Whipple, CKD stage 3, HTN, COPD, pulmonary fibrosis, PRESS syndrome (hospitalized in 2019), Raynaud's syndrome, ANCA vasculitis, Scleroderma  presents to ED due to cardiac arrest. Pt lives at home with son and daughter, went to use the bathroom. Daughter notes that she heard the patient's walker scratching the floor, went upstairs to see what was going on. Daughter noticed patient was slumped over and struggling to get onto the toilet. Daughter helped patient onto the toilet, but noticed that the patient became less responsive and alert so she called EMS. Patient was found down by EMS, intubated her in the field, and resuscitated her with compressions and 4 rounds of epinephrine. ROSC achieved within a minute of her arrival to the ED. Patient was found to be hypothermic and bradycardic. Of note, patient was hospitalized back in Dec 2023 in [facility] for 18 days for multifocal pneumonia 2/2 human metapneumovirus. Patient also was hospitalized in 2019 for PRESS syndrome. Pt had a negative stress test in Sept 2023. She follows pulmonology for pulmonary fibrosis, had recent PFTs done.     ED : BP 90/57, HR 88, Temp 94.1F, satting 100% on ventilator   Labs: Hgb 8.7, Trop 56 -->79, K 5.3, Lactate 3.5, Lipase 256, , , ALT 52  VBG: pH 7.12, pCO2 67, Lactate 6.6    CXR bilateral opacities. cardiomegaly  CTH: mild atrophic changes  CT angio chest PE, A/P w/ IV cont: Bilary ductal enhancement s/p CCY. Diffuse fibrotic changes of lungs. Trace bilateral effusions. NO PE. 74-year-old female with PHMx of pancreatic CA s/p Whipple, CKD stage 3, HTN, COPD, pulmonary fibrosis, PRESS syndrome (hospitalized in 2019), Raynaud's syndrome, ANCA vasculitis, Scleroderma  presents to ED due to cardiac arrest. Pt lives at home with son and daughter, went to use the bathroom. Daughter notes that she heard the patient's walker scratching the floor, went upstairs to see what was going on. Daughter noticed patient was slumped over and struggling to get onto the toilet. Daughter helped patient onto the toilet, but noticed that the patient became less responsive and alert so she called EMS. Patient was found down by EMS, intubated her in the field, and resuscitated her with compressions and 4 rounds of epinephrine. ROSC achieved within a minute of her arrival to the ED. Patient was found to be hypothermic and bradycardic. Of note, patient was hospitalized back in Dec 2023 in [facility] for 18 days for multifocal pneumonia 2/2 human metapneumovirus. Patient also was hospitalized in 2019 for PRESS syndrome. Pt had a negative stress test in Sept 2023. She follows pulmonology for pulmonary fibrosis, had recent PFTs done.     ED : BP 90/57, HR 88, Temp 94.1F, satting 100% on ventilator   Labs: Hgb 8.7, Trop 56 -->79, K 5.3, Lactate 3.5, Lipase 256, , , ALT 52  VBG: pH 7.12, pCO2 67, Lactate 6.6    CXR bilateral opacities. cardiomegaly  CTH: mild atrophic changes  CT angio chest PE, A/P w/ IV cont: Bilary ductal enhancement s/p CCY. Diffuse fibrotic changes of lungs. Trace bilateral effusions. NO PE.      IMPRESSION/RECOMMENDATIONS  Immunosuppression/Immunosenescence could result in poor clinical outcome.   HO pancreatic CA s/p whipple, Pulmonary Fibrosis, ANCA Vasculitis, PRESS syndrome  S/p cardiac event with cardiogenic shock  Hypoxic respiratory failure leading to MV with FiO2 40%  NSTEMI  Does not seem that sepsis the inciting event. Clinically based on Ho doubt PNA or acute cholangitis but will recommend treating for both  CT shows diffuse opacities b/l ( which could well be old )  Cholangitis ? doubt with normal Tbili. US/MRCP would be helpful.    -Urine for strep pneumonia/legionella antigen  -Nares ORSA  -Sputum gm stain, cultures  -BCx   -RUQ sono  -GI evaluation  -trend LFTs  -Zosyn 3.375 gm iv q8h over 4h  -d/c other ABx  -Off loading to prevent pressure sores and preventive measures to avoid aspiration

## 2024-04-07 NOTE — PROGRESS NOTE ADULT - CONVERSATION DETAILS
wants to give at least 24hrs of full support and ventilator full code for now  however if pt does not improve may proceed to CMO

## 2024-04-07 NOTE — CONSULT NOTE ADULT - SUBJECTIVE AND OBJECTIVE BOX
74-year-old female with PHMx of pancreatic CA s/p Whipple, CKD stage 3, HTN, COPD, pulmonary fibrosis, PRESS syndrome (hospitalized in 2019), Raynaud's syndrome, ANCA vasculitis, Scleroderma, admitted after cardiac arrest, being evaluated for transaminitis and concern of cholangitis.     hx limited due to pt's status (intubate and sedated). most hx taken from chart.    pt found to be weak by daughter then became less responsive and EMS were called.   Patient was found down by EMS, intubated her in the field, and resuscitated her with compressions and 4 rounds of epinephrine.   ROSC achieved within a minute of her arrival to the ED. Patient was found to be hypothermic and bradycardic.   Of note, patient was hospitalized back in Dec 2023 in [facility] for 18 days for multifocal pneumonia 2/2 human metapneumovirus.   Patient also was hospitalized in 2019 for PRESS syndrome. Pt had a negative stress test in Sept 2023. She follows pulmonology for pulmonary fibrosis, had recent PFTs done.     we are called for concern of ? cholangitis as CT showed biliary duct enhancement.   limited ROS due to pt's status.     CXR bilateral opacities. cardiomegaly  CTH: mild atrophic changes  CT angio chest PE, A/P w/ IV cont: Bilary ductal enhancement s/p CCY. Diffuse fibrotic changes of lungs. Trace bilateral effusions. NO PE.    s/p atropine 1mg x4 doses, calcium gluconate 3g, [id number] x1, insulin 10 units, cefepime 1g in the ED.       PAST MEDICAL & SURGICAL HISTORY:  H/O vasculitis  Pancreatic cancer  History of COPD  Hypertension  H/O Raynaud's syndrome  Stage 3 chronic kidney disease  History of knee replacement  H/O Whipple procedure      FAMILY HISTORY: non contirbutory      Social History:  unable to obtain.    Home Medications:  calcitriol 0.25 mcg oral capsule: 1 cap(s) orally three times per week (06 Apr 2024 15:09)  folic acid: prn (06 Apr 2024 15:09)  labetalol 100 mg oral tablet: 1 tab(s) orally 3 times a day (06 Apr 2024 15:09)  levothyroxine 100 mcg (0.1 mg) oral tablet: 1 tab(s) orally once a day (at bedtime) (06 Apr 2024 15:09)  lisinopril 20 mg oral tablet: 1 tab(s) orally once a day (06 Apr 2024 15:09)  Lyrica 150 mg oral capsule: 1 cap(s) orally 3 times a day (06 Apr 2024 15:09)  vitamin d3: prn (06 Apr 2024 15:09)    MEDICATIONS  (STANDING):  chlorhexidine 0.12% Liquid 15 milliLiter(s) Oral Mucosa every 12 hours  chlorhexidine 2% Cloths 1 Application(s) Topical <User Schedule>  fentaNYL   Infusion. 0.5 MICROgram(s)/kG/Hr (2.5 mL/Hr) IV Continuous <Continuous>  heparin   Injectable 5000 Unit(s) SubCutaneous every 12 hours  lactated ringers. 1000 milliLiter(s) (70 mL/Hr) IV Continuous <Continuous>  levothyroxine 100 MICROGram(s) Oral daily  norepinephrine Infusion 0.05 MICROgram(s)/kG/Min (4.69 mL/Hr) IV Continuous <Continuous>  pantoprazole  Injectable 40 milliGRAM(s) IV Push daily  piperacillin/tazobactam IVPB.. 3.375 Gram(s) IV Intermittent every 8 hours  propofol Infusion 10 MICROgram(s)/kG/Min (2.4 mL/Hr) IV Continuous <Continuous>    MEDICATIONS  (PRN):      Allergies  celecoxib (Rash)  Originally Entered as [U UNKNOWN] reaction to [celebrit] (Unknown)      Review of Systems:   limited ROS due to pt's status; intubated and sedated.          Physical Examination:  T(C): 36.1 (04-07-24 @ 19:00), Max: 37.9 (04-07-24 @ 09:01)  HR: 54 (04-07-24 @ 18:30) (52 - 70)  BP: 114/61 (04-07-24 @ 18:30) (80/48 - 168/72)  RR: 22 (04-07-24 @ 19:00) (19 - 22)  SpO2: 100% (04-07-24 @ 18:30) (97% - 100%)      04-06-24 @ 07:01  -  04-07-24 @ 07:00  --------------------------------------------------------  IN: 512.2 mL / OUT: 605 mL / NET: -92.8 mL    04-07-24 @ 07:01  -  04-07-24 @ 20:02  --------------------------------------------------------  IN: 1100.9 mL / OUT: 210 mL / NET: 890.9 mL        Constitutional: intubated  Eyes:. Conjunctivae are clear, Sclera is non-icteric.  Respiratory:  intubated - ET tube in place. bilateral rhonchi  Cardiovascular:  S1 S2, RRR  GI: Abdomen is soft, symmetric, and non-tender without distention.   Neuro: sedated  Skin: No rashes, No Jaundice.          Data:                        9.1    9.64  )-----------( 203      ( 07 Apr 2024 06:05 )             27.7     Hgb Trend:  9.1  04-07-24 @ 06:05  8.7  04-06-24 @ 11:45        04-07    137  |  106  |  49<H>  ----------------------------<  131<H>  5.4<H>   |  22  |  1.8<H>    Ca    8.8      07 Apr 2024 06:05  Phos  5.4     04-07  Mg     2.0     04-07    TPro  6.3  /  Alb  3.0<L>  /  TBili  0.3  /  DBili  x   /  AST  78<H>  /  ALT  41  /  AlkPhos  250<H>  04-07    Liver panel trend:  TBili 0.3   /   AST 78   /   ALT 41   /   AlkP 250   /   Tptn 6.3   /   Alb 3.0    /   DBili --      04-07  TBili 0.3   /      /   ALT 52   /   AlkP 322   /   Tptn 5.3   /   Alb 2.6    /   DBili --      04-06      PT/INR - ( 06 Apr 2024 11:45 )   PT: 13.90 sec;   INR: 1.22 ratio         PTT - ( 06 Apr 2024 11:45 )  PTT:44.0 sec        Radiology:    US Abdomen Upper Quadrant Right:   ACC: [id number] EXAM:  US ABDOMEN RT UPR QUADRANT   ORDERED BY: LENNY ALCOCER     PROCEDURE DATE:  04/07/2024          INTERPRETATION:  CLINICAL INFORMATION: Elevated liver function enzymes.    COMPARISON: CT scan of the abdomen and pelvis performed the prior day.    TECHNIQUE: Sonography of the right upper quadrant.    FINDINGS:  Liver: Within normal limits.  Bile ducts: Normal caliber. Common bile duct measures 4 mm.  Gallbladder: Cholecystectomy.  Pancreas: Visualized portions are within normal limits.  Right kidney: 8.5 cm. Echogenic in appearance without hydronephrosis or   nephrolithiasis..  Ascites: Trace ascites.  IVC: Visualized portions are within normal limits.    IMPRESSION:  Status post cholecystectomy. No ductal dilatation.    Echogenic kidney consistent with medical renal disease.        --- End of Report ---      LACEY CAMPOS MD; Attending Interventional Radiologist  This document has been electronically signed. Apr 7 2024  5:41PM (04-07-24 @ 12:01)    < from: CT Abdomen and Pelvis w/ IV Cont (04.06.24 @ 14:11) >  1.  No central or segmental pulmonary embolus.  2.  Biliary ductal enhancement described above. Correlate for cholangitis.  3.  Trace bilateral effusions.  4.  Additional CT findings above, some of which can be seen in   hypoperfusion states.    < end of copied text >

## 2024-04-07 NOTE — PROGRESS NOTE ADULT - SUBJECTIVE AND OBJECTIVE BOX
Patient is a 74y old  Female who presents with a chief complaint of cardiac arrest (07 Apr 2024 07:47)      T(F): 99.9 (04-07-24 @ 07:05), Max: 99.9 (04-07-24 @ 07:05)  HR: 61 (04-07-24 @ 10:30)  BP: 126/62 (04-07-24 @ 10:00)  RR: 20 (04-07-24 @ 10:30)  SpO2: 100% (04-07-24 @ 10:30) (96% - 100%)    PHYSICAL EXAM:  GENERAL: NAD  HEAD:  Atraumatic, Normocephalic  EYES: EOMI, PERRLA, conjunctiva and sclera clear  NERVOUS SYSTEM:  Alert & Oriented X3, no focal deficits   CHEST/LUNG: Clear to percussion bilaterally; No rales, rhonchi, wheezing, or rubs  HEART: Regular rate and rhythm; No murmurs, rubs, or gallops  ABDOMEN: Soft, Nontender, Nondistended; Bowel sounds present  EXTREMITIES:  2+ Peripheral Pulses, No clubbing, cyanosis, or edema    LABS  04-07    137  |  106  |  49<H>  ----------------------------<  131<H>  5.4<H>   |  22  |  1.8<H>    Ca    8.8      07 Apr 2024 06:05  Phos  5.4     04-07  Mg     2.0     04-07    TPro  6.3  /  Alb  3.0<L>  /  TBili  0.3  /  DBili  x   /  AST  78<H>  /  ALT  41  /  AlkPhos  250<H>  04-07                          9.1    9.64  )-----------( 203      ( 07 Apr 2024 06:05 )             27.7     PT/INR - ( 06 Apr 2024 11:45 )   PT: 13.90 sec;   INR: 1.22 ratio         PTT - ( 06 Apr 2024 11:45 )  PTT:44.0 sec  Mode: AC/ CMV (Assist Control/ Continuous Mandatory Ventilation)  RR (machine): 20  TV (machine): 350  FiO2: 40  PEEP: 8  ITime: 0.79  MAP: 13  PIP: 30    CARDIAC ENZYMES            RADIOLOGY    MEDICATIONS  (STANDING):  chlorhexidine 0.12% Liquid 15 milliLiter(s) Oral Mucosa every 12 hours  chlorhexidine 2% Cloths 1 Application(s) Topical <User Schedule>  fentaNYL   Infusion. 0.5 MICROgram(s)/kG/Hr (2.5 mL/Hr) IV Continuous <Continuous>  heparin   Injectable 5000 Unit(s) SubCutaneous every 12 hours  lactated ringers. 1000 milliLiter(s) (70 mL/Hr) IV Continuous <Continuous>  levothyroxine 100 MICROGram(s) Oral daily  norepinephrine Infusion 0.05 MICROgram(s)/kG/Min (4.69 mL/Hr) IV Continuous <Continuous>  pantoprazole  Injectable 40 milliGRAM(s) IV Push daily  piperacillin/tazobactam IVPB.- 3.375 Gram(s) IV Intermittent once  piperacillin/tazobactam IVPB.. 3.375 Gram(s) IV Intermittent every 8 hours  propofol Infusion 10 MICROgram(s)/kG/Min (2.4 mL/Hr) IV Continuous <Continuous>    MEDICATIONS  (PRN):  hydrALAZINE Injectable 10 milliGRAM(s) IV Push every 6 hours PRN SBP >160      Patient seen and evaluated this am, off sedation  remains unresponsive on ventilator      T(F): 99.9 (04-07-24 @ 07:05), Max: 99.9 (04-07-24 @ 07:05)  HR: 61 (04-07-24 @ 10:30)  BP: 126/62 (04-07-24 @ 10:00)  RR: 20 (04-07-24 @ 10:30)  SpO2: 100% (04-07-24 @ 10:30) (96% - 100%)    PHYSICAL EXAM:  GENERAL: cachectic   HEAD:  Atraumatic, Normocephalic  EYES: EOMI, PERRLA, conjunctiva and sclera clear  NERVOUS SYSTEM:  positive corneal reflex  CHEST/LUNG:  bilateral rhonchi  HEART: Regular rate and rhythm; No murmurs, rubs, or gallops  ABDOMEN: Soft, Nontender, Nondistended; Bowel sounds present  EXTREMITIES:  2+ Peripheral Pulses, No clubbing, cyanosis, or edema    LABS  04-07    137  |  106  |  49<H>  ----------------------------<  131<H>  5.4<H>   |  22  |  1.8<H>    Ca    8.8      07 Apr 2024 06:05  Phos  5.4     04-07  Mg     2.0     04-07    TPro  6.3  /  Alb  3.0<L>  /  TBili  0.3  /  DBili  x   /  AST  78<H>  /  ALT  41  /  AlkPhos  250<H>  04-07                          9.1    9.64  )-----------( 203      ( 07 Apr 2024 06:05 )             27.7     PT/INR - ( 06 Apr 2024 11:45 )   PT: 13.90 sec;   INR: 1.22 ratio         PTT - ( 06 Apr 2024 11:45 )  PTT:44.0 sec    Mode: AC/ CMV (Assist Control/ Continuous Mandatory Ventilation)  RR (machine): 20  TV (machine): 350  FiO2: 40  PEEP: 8      RADIOLOGY  < from: Xray Chest 1 View- PORTABLE-Routine (04.07.24 @ 07:05) >  Impression:    Bilateral opacifications. Grossly stable appearance of the thorax.      < end of copied text >  < from: VA Duplex Lower Ext Vein Scan, Bilat (04.06.24 @ 22:54) >  IMPRESSION:  No evidence of deep venous thrombosis in either lower extremity.      < end of copied text >  < from: CT Head No Cont (04.06.24 @ 14:11) >  IMPRESSION:    Mild atrophic changes.    < end of copied text >  < from: CT Angio Chest PE Protocol w/ IV Cont (04.06.24 @ 14:11) >    IMPRESSION:    1.  No central or segmental pulmonary embolus.  2.  Biliary ductal enhancement described above. Correlate for cholangitis.  3.  Trace bilateral effusions.  4.  Additional CT findings above, some of which can be seen in   hypoperfusion states.    < end of copied text >    MEDICATIONS  (STANDING):  chlorhexidine 0.12% Liquid 15 milliLiter(s) Oral Mucosa every 12 hours  chlorhexidine 2% Cloths 1 Application(s) Topical <User Schedule>  fentaNYL   Infusion. 0.5 MICROgram(s)/kG/Hr (2.5 mL/Hr) IV Continuous <Continuous>  heparin   Injectable 5000 Unit(s) SubCutaneous every 12 hours  lactated ringers. 1000 milliLiter(s) (70 mL/Hr) IV Continuous <Continuous>  levothyroxine 100 MICROGram(s) Oral daily  norepinephrine Infusion 0.05 MICROgram(s)/kG/Min (4.69 mL/Hr) IV Continuous <Continuous>  pantoprazole  Injectable 40 milliGRAM(s) IV Push daily  piperacillin/tazobactam IVPB.. 3.375 Gram(s) IV Intermittent every 8 hours      MEDICATIONS  (PRN):  hydrALAZINE Injectable 10 milliGRAM(s) IV Push every 6 hours PRN SBP >160

## 2024-04-07 NOTE — CONSULT NOTE ADULT - SUBJECTIVE AND OBJECTIVE BOX
Patient is a 74y old  Female who presents with a chief complaint of cardiac arrest (07 Apr 2024 05:16)      HPI:  74-year-old female with PHMx of pancreatic CA s/p Whipple, CKD stage 3, HTN, COPD, pulmonary fibrosis, PRESS syndrome (hospitalized in 2019), Raynaud's syndrome, ANCA vasculitis, Scleroderma  presents to ED due to cardiac arrest. Pt lives at home with son and daughter, went to use the bathroom. Daughter notes that she heard the patient's walker scratching the floor, went upstairs to see what was going on. Daughter noticed patient was slumped over and struggling to get onto the toilet. Daughter helped patient onto the toilet, but noticed that the patient became less responsive and alert so she called EMS. Patient was found down by EMS, intubated her in the field, and resuscitated her with compressions and 4 rounds of epinephrine. ROSC achieved within a minute of her arrival to the ED. Patient was found to be hypothermic and bradycardic. Of note, patient was hospitalized back in Dec 2023 in [facility] for 18 days for multifocal pneumonia 2/2 human metapneumovirus. Patient also was hospitalized in 2019 for PRESS syndrome. Pt had a negative stress test in Sept 2023. She follows pulmonology for pulmonary fibrosis, had recent PFTs done.     ED vitals and workup:  BP 90/57, HR 88, Temp 94.1F, satting 100% on ventilator   Labs: Hgb 8.7, Trop 56 -->79, K 5.3, Lactate 3.5, Lipase 256, , , ALT 52  VBG: pH 7.12, pCO2 67, Lactate 6.6    CXR bilateral opacities. cardiomegaly  CTH: mild atrophic changes  CT angio chest PE, A/P w/ IV cont: Bilary ductal enhancement s/p CCY. Diffuse fibrotic changes of lungs. Trace bilateral effusions. NO PE.    s/p atropine 1mg x4 doses, calcium gluconate 3g, [id number] x1, insulin 10 units, cefepime 1g in the ED.     Admitted to MICU for unresponsiveness s/p cardiac arrest.     (06 Apr 2024 15:43)      PAST MEDICAL & SURGICAL HISTORY:  H/O vasculitis      Pancreatic cancer      History of COPD      Hypertension      H/O Raynaud's syndrome      Stage 3 chronic kidney disease      History of knee replacement      H/O Whipple procedure        Allergies    celecoxib (Rash)  Originally Entered as [U UNKNOWN] reaction to [celebrit] (Unknown)    Intolerances      Family history : no cardiovscular family history   Home Medications:  calcitriol 0.25 mcg oral capsule: 1 cap(s) orally three times per week (06 Apr 2024 15:09)  folic acid: prn (06 Apr 2024 15:09)  labetalol 100 mg oral tablet: 1 tab(s) orally 3 times a day (06 Apr 2024 15:09)  levothyroxine 100 mcg (0.1 mg) oral tablet: 1 tab(s) orally once a day (at bedtime) (06 Apr 2024 15:09)  lisinopril 20 mg oral tablet: 1 tab(s) orally once a day (06 Apr 2024 15:09)  Lyrica 150 mg oral capsule: 1 cap(s) orally 3 times a day (06 Apr 2024 15:09)  vitamin d3: prn (06 Apr 2024 15:09)    Occupation:  Alochol: Denied  Smoking: Denied  Drug Use: Denied  Marital Status:         ROS: as in HPI; All other systems reviewed are negative    ICU Vital Signs Last 24 Hrs  T(C): 37.7 (07 Apr 2024 07:05), Max: 37.7 (07 Apr 2024 07:05)  T(F): 99.9 (07 Apr 2024 07:05), Max: 99.9 (07 Apr 2024 07:05)  HR: 60 (07 Apr 2024 06:30) (40 - 88)  BP: 93/52 (07 Apr 2024 06:30) (80/48 - 191/88)  BP(mean): 67 (07 Apr 2024 06:30) (60 - 133)  ABP: 106/43 (07 Apr 2024 06:30) (92/40 - 207/88)  ABP(mean): 61 (07 Apr 2024 06:30) (58 - 133)  RR: 20 (07 Apr 2024 07:05) (18 - 20)  SpO2: 100% (07 Apr 2024 07:05) (93% - 100%)    O2 Parameters below as of 07 Apr 2024 06:30  Patient On (Oxygen Delivery Method): ventilator    O2 Concentration (%): 40          Physical Examination:    General:  open eyes     HEENT: Pupils equal, reactive to light.      PULM: Clear to auscultation bilaterally, no significant sputum production    CVS: Regular rate and rhythm, no murmurs, rubs, or gallops    ABD: Soft, nondistended, nontender, normoactive bowel sounds, no masses    EXT: No edema, nontender, no clubbing     SKIN: Warm and well perfused, no rashes noted.    Neurology :  positive ga         Mode: AC/ CMV (Assist Control/ Continuous Mandatory Ventilation)  RR (machine): 20  TV (machine): 400  FiO2: 40  PEEP: 8  MAP: 14  PIP: 33      ABG - ( 07 Apr 2024 03:41 )  pH, Arterial: 7.37  pH, Blood: x     /  pCO2: 37    /  pO2: 185   / HCO3: 21    / Base Excess: -3.5  /  SaO2: 99.6                I&O's Detail    06 Apr 2024 07:01  -  07 Apr 2024 07:00  --------------------------------------------------------  IN:    FentaNYL: 187.8 mL    IV PiggyBack: 300 mL    Norepinephrine: 22 mL    Propofol: 2.4 mL  Total IN: 512.2 mL    OUT:    Indwelling Catheter - Urethral (mL): 605 mL  Total OUT: 605 mL    Total NET: -92.8 mL      07 Apr 2024 07:01  -  07 Apr 2024 07:48  --------------------------------------------------------  IN:  Total IN: 0 mL    OUT:    Indwelling Catheter - Urethral (mL): 0 mL  Total OUT: 0 mL    Total NET: 0 mL            LABS:                        8.7    9.92  )-----------( 163      ( 06 Apr 2024 11:45 )             28.7     07 Apr 2024 06:05    137    |  106    |  49     ----------------------------<  131    5.4     |  22     |  1.8      Ca    8.8        07 Apr 2024 06:05  Phos  5.4       07 Apr 2024 06:05  Mg     2.0       07 Apr 2024 06:05    TPro  6.3    /  Alb  3.0    /  TBili  0.3    /  DBili  x      /  AST  78     /  ALT  41     /  AlkPhos  250    07 Apr 2024 06:05  Amylase x     lipase x              CAPILLARY BLOOD GLUCOSE      POCT Blood Glucose.: 271 mg/dL (06 Apr 2024 11:15)    PT/INR - ( 06 Apr 2024 11:45 )   PT: 13.90 sec;   INR: 1.22 ratio         PTT - ( 06 Apr 2024 11:45 )  PTT:44.0 sec  Urinalysis Basic - ( 07 Apr 2024 06:05 )    Color: x / Appearance: x / SG: x / pH: x  Gluc: 131 mg/dL / Ketone: x  / Bili: x / Urobili: x   Blood: x / Protein: x / Nitrite: x   Leuk Esterase: x / RBC: x / WBC x   Sq Epi: x / Non Sq Epi: x / Bacteria: x      Culture    Lactate, Blood: 3.5 mmol/L (04-06-24 @ 11:45)      MEDICATIONS  (STANDING):  chlorhexidine 0.12% Liquid 15 milliLiter(s) Oral Mucosa every 12 hours  chlorhexidine 2% Cloths 1 Application(s) Topical <User Schedule>  fentaNYL   Infusion. 0.5 MICROgram(s)/kG/Hr (2.5 mL/Hr) IV Continuous <Continuous>  heparin   Injectable 5000 Unit(s) SubCutaneous every 12 hours  levothyroxine 100 MICROGram(s) Oral daily  norepinephrine Infusion 0.05 MICROgram(s)/kG/Min (4.69 mL/Hr) IV Continuous <Continuous>  pantoprazole  Injectable 40 milliGRAM(s) IV Push daily  piperacillin/tazobactam IVPB.- 3.375 Gram(s) IV Intermittent once  piperacillin/tazobactam IVPB.- 3.375 Gram(s) IV Intermittent once  piperacillin/tazobactam IVPB.. 3.375 Gram(s) IV Intermittent every 8 hours  propofol Infusion 10 MICROgram(s)/kG/Min (2.4 mL/Hr) IV Continuous <Continuous>    MEDICATIONS  (PRN):  hydrALAZINE Injectable 10 milliGRAM(s) IV Push every 6 hours PRN SBP >160        RADIOLOGY: ***     CXR: b/l opacity   TLC:  OG:  ET tube:        CAM ICU:  ECHO:

## 2024-04-07 NOTE — DIETITIAN INITIAL EVALUATION ADULT - OTHER INFO
pt is 74-year-old female with PMhx of pancreatic CA s/p Whipple, CKD stage 3, HTN, COPD, pulmonary fibrosis, PRESS syndrome (hospitalized in 2019), Raynaud's syndrome, ANCA vasculitis, Scleroderma, patient was hospitalized back in Dec 2023 in [facility] for 18 days for multifocal pneumonia 2/2 human metapneumovirus.   presents to ED due to cardiac arrest. As per GI no concern for cholangitis clinically pt has normal bilirubin and has no jaundice to concern for cholangitis mild transaminitis is downtrending and could be due to CPR

## 2024-04-07 NOTE — DIETITIAN INITIAL EVALUATION ADULT - ENTERAL
once medically feasible to initiate EN recommend Osmolite 1.2 at 10 ml/hr increase as tolerated to goal rate of 45 ml/hr will provide pt with 1080 ml TV, 1296 kcals and 59g protein meeting >80% of estimated nutrient needs

## 2024-04-07 NOTE — CONSULT NOTE ADULT - ASSESSMENT
IMPRESSION:  ACUTE RESP FAILURE SECONDARY TO CARDIAC ARREST   unknown etiology ?? cardiac bradycardia   lung fibrosis   GIUSEPPE   ?? cholangitis   anemia   hx pancreatic cancer     PLAN:    CNS:  EEG   neurology eval   target temp avoid hyperthermia keep around 35degree mc  DO sat if no seizure activity     HEENT: oral care     PULMONARY: keep pox >92%   monitor peak and palteau   ct scan more in favor chronic fibrosis     CARDIOVASCULAR: follow CE   cardiology eval   echo   keep is =<os   Iv fluid 70 cc/ hr   check LA   ck level   GI: GI prophylaxis.  follow LFT   GI consult ?? cholangitis     RENAL: follow is and os   lytes   byn, cr       INFECTIOUS DISEASE:abx as per ID   zosyn   send DTA   procal   nasal mrsa     HEMATOLOGICAL:  DVT prophylaxis.    ENDOCRINE:  Follow up FS.  Insulin protocol if needed.    MUSCULOSKELETAL:  MICU       CRITICAL CARE TIME SPENT: ***

## 2024-04-07 NOTE — DIETITIAN INITIAL EVALUATION ADULT - PERTINENT LABORATORY DATA
04-07    137  |  106  |  49<H>  ----------------------------<  131<H>  5.4<H>   |  22  |  1.8<H>    Ca    8.8      07 Apr 2024 06:05  Phos  5.4     04-07  Mg     2.0     04-07    TPro  6.3  /  Alb  3.0<L>  /  TBili  0.3  /  DBili  x   /  AST  78<H>  /  ALT  41  /  AlkPhos  250<H>  04-07                          9.1    9.64  )-----------( 203      ( 07 Apr 2024 06:05 )             27.7

## 2024-04-07 NOTE — CONSULT NOTE ADULT - ASSESSMENT
74-year-old female with PHMx of pancreatic CA s/p Whipple, CKD stage 3, HTN, COPD, pulmonary fibrosis, PRESS syndrome (hospitalized in 2019), Raynaud's syndrome, ANCA vasculitis, Scleroderma, admitted after cardiac arrest, being evaluated for transaminitis and concern of cholangitis.    #biliary ductal enhancement on CT  #mild transaminitis    no concern for cholangitis clinically  pt has normal bilirubin and has no jaundice to concern for cholangitis  mild transaminitis is downtrending and could be due to CPR (elevated Alk phos from bone etiology and AST from CK elevation)    -no indication for any GI intervention  -trend LFTs daily  -supportive care per primary team  -IV abx per ID  -f/u bld cx  -rest of management per ICU team

## 2024-04-07 NOTE — CONSULT NOTE ADULT - COMMENTS
on vent  Fio2 40%. Non purulent secretions  opens eyes, does not follow commands  minimal pressors  no diarrhea

## 2024-04-08 ENCOUNTER — RESULT REVIEW (OUTPATIENT)
Age: 74
End: 2024-04-08

## 2024-04-08 LAB
ALBUMIN SERPL ELPH-MCNC: 2.8 G/DL — LOW (ref 3.5–5.2)
ALP SERPL-CCNC: 179 U/L — HIGH (ref 30–115)
ALT FLD-CCNC: 31 U/L — SIGNIFICANT CHANGE UP (ref 0–41)
ANION GAP SERPL CALC-SCNC: 10 MMOL/L — SIGNIFICANT CHANGE UP (ref 7–14)
ANION GAP SERPL CALC-SCNC: 8 MMOL/L — SIGNIFICANT CHANGE UP (ref 7–14)
AST SERPL-CCNC: 57 U/L — HIGH (ref 0–41)
BASOPHILS # BLD AUTO: 0.02 K/UL — SIGNIFICANT CHANGE UP (ref 0–0.2)
BASOPHILS NFR BLD AUTO: 0.3 % — SIGNIFICANT CHANGE UP (ref 0–1)
BILIRUB SERPL-MCNC: 0.4 MG/DL — SIGNIFICANT CHANGE UP (ref 0.2–1.2)
BUN SERPL-MCNC: 43 MG/DL — HIGH (ref 10–20)
BUN SERPL-MCNC: 47 MG/DL — HIGH (ref 10–20)
CALCIUM SERPL-MCNC: 7.9 MG/DL — LOW (ref 8.4–10.5)
CALCIUM SERPL-MCNC: 8 MG/DL — LOW (ref 8.4–10.5)
CENTROMERE AB SER-ACNC: <0.2 AI — SIGNIFICANT CHANGE UP
CHLORIDE SERPL-SCNC: 106 MMOL/L — SIGNIFICANT CHANGE UP (ref 98–110)
CHLORIDE SERPL-SCNC: 107 MMOL/L — SIGNIFICANT CHANGE UP (ref 98–110)
CK SERPL-CCNC: 340 U/L — HIGH (ref 0–225)
CO2 SERPL-SCNC: 22 MMOL/L — SIGNIFICANT CHANGE UP (ref 17–32)
CO2 SERPL-SCNC: 23 MMOL/L — SIGNIFICANT CHANGE UP (ref 17–32)
CREAT SERPL-MCNC: 2 MG/DL — HIGH (ref 0.7–1.5)
CREAT SERPL-MCNC: 2 MG/DL — HIGH (ref 0.7–1.5)
CULTURE RESULTS: NO GROWTH — SIGNIFICANT CHANGE UP
EGFR: 26 ML/MIN/1.73M2 — LOW
EGFR: 26 ML/MIN/1.73M2 — LOW
ENA SCL70 AB SER-ACNC: <0.2 AI — SIGNIFICANT CHANGE UP
EOSINOPHIL # BLD AUTO: 0.24 K/UL — SIGNIFICANT CHANGE UP (ref 0–0.7)
EOSINOPHIL NFR BLD AUTO: 3.8 % — SIGNIFICANT CHANGE UP (ref 0–8)
GAS PNL BLDA: SIGNIFICANT CHANGE UP
GLUCOSE BLDC GLUCOMTR-MCNC: 33 MG/DL — CRITICAL LOW (ref 70–99)
GLUCOSE BLDC GLUCOMTR-MCNC: 35 MG/DL — CRITICAL LOW (ref 70–99)
GLUCOSE BLDC GLUCOMTR-MCNC: 353 MG/DL — HIGH (ref 70–99)
GLUCOSE BLDC GLUCOMTR-MCNC: 49 MG/DL — CRITICAL LOW (ref 70–99)
GLUCOSE BLDC GLUCOMTR-MCNC: 82 MG/DL — SIGNIFICANT CHANGE UP (ref 70–99)
GLUCOSE SERPL-MCNC: 215 MG/DL — HIGH (ref 70–99)
GLUCOSE SERPL-MCNC: 95 MG/DL — SIGNIFICANT CHANGE UP (ref 70–99)
HCT VFR BLD CALC: 25.9 % — LOW (ref 37–47)
HCV AB S/CO SERPL IA: 0.04 COI — SIGNIFICANT CHANGE UP
HCV AB SERPL-IMP: SIGNIFICANT CHANGE UP
HGB BLD-MCNC: 8.7 G/DL — LOW (ref 12–16)
IMM GRANULOCYTES NFR BLD AUTO: 0.2 % — SIGNIFICANT CHANGE UP (ref 0.1–0.3)
IRON SATN MFR SERPL: 14 UG/DL — LOW (ref 35–150)
IRON SATN MFR SERPL: 4 % — LOW (ref 15–50)
LEGIONELLA AG UR QL: NEGATIVE — SIGNIFICANT CHANGE UP
LYMPHOCYTES # BLD AUTO: 1.11 K/UL — LOW (ref 1.2–3.4)
LYMPHOCYTES # BLD AUTO: 17.6 % — LOW (ref 20.5–51.1)
MAGNESIUM SERPL-MCNC: 1.9 MG/DL — SIGNIFICANT CHANGE UP (ref 1.8–2.4)
MCHC RBC-ENTMCNC: 26.4 PG — LOW (ref 27–31)
MCHC RBC-ENTMCNC: 33.6 G/DL — SIGNIFICANT CHANGE UP (ref 32–37)
MCV RBC AUTO: 78.7 FL — LOW (ref 81–99)
MONOCYTES # BLD AUTO: 0.57 K/UL — SIGNIFICANT CHANGE UP (ref 0.1–0.6)
MONOCYTES NFR BLD AUTO: 9 % — SIGNIFICANT CHANGE UP (ref 1.7–9.3)
NEUTROPHILS # BLD AUTO: 4.36 K/UL — SIGNIFICANT CHANGE UP (ref 1.4–6.5)
NEUTROPHILS NFR BLD AUTO: 69.1 % — SIGNIFICANT CHANGE UP (ref 42.2–75.2)
NRBC # BLD: 0 /100 WBCS — SIGNIFICANT CHANGE UP (ref 0–0)
PHOSPHATE SERPL-MCNC: 4.5 MG/DL — SIGNIFICANT CHANGE UP (ref 2.1–4.9)
PLATELET # BLD AUTO: 179 K/UL — SIGNIFICANT CHANGE UP (ref 130–400)
PMV BLD: 11.3 FL — HIGH (ref 7.4–10.4)
POTASSIUM SERPL-MCNC: 4.3 MMOL/L — SIGNIFICANT CHANGE UP (ref 3.5–5)
POTASSIUM SERPL-MCNC: 4.5 MMOL/L — SIGNIFICANT CHANGE UP (ref 3.5–5)
POTASSIUM SERPL-SCNC: 4.3 MMOL/L — SIGNIFICANT CHANGE UP (ref 3.5–5)
POTASSIUM SERPL-SCNC: 4.5 MMOL/L — SIGNIFICANT CHANGE UP (ref 3.5–5)
PROT SERPL-MCNC: 5.8 G/DL — LOW (ref 6–8)
RBC # BLD: 3.29 M/UL — LOW (ref 4.2–5.4)
RBC # FLD: 16.8 % — HIGH (ref 11.5–14.5)
S PNEUM AG UR QL: NEGATIVE — SIGNIFICANT CHANGE UP
SODIUM SERPL-SCNC: 138 MMOL/L — SIGNIFICANT CHANGE UP (ref 135–146)
SODIUM SERPL-SCNC: 138 MMOL/L — SIGNIFICANT CHANGE UP (ref 135–146)
SPECIMEN SOURCE: SIGNIFICANT CHANGE UP
T3 SERPL-MCNC: 63 NG/DL — LOW (ref 80–200)
T4 AB SER-ACNC: 6.1 UG/DL — SIGNIFICANT CHANGE UP (ref 4.6–12)
TIBC SERPL-MCNC: 314 UG/DL — SIGNIFICANT CHANGE UP (ref 260–445)
TRANSFERRIN SERPL-MCNC: 262 MG/DL — SIGNIFICANT CHANGE UP (ref 200–360)
TSH SERPL-MCNC: 2.94 UIU/ML — SIGNIFICANT CHANGE UP (ref 0.27–4.2)
UIBC SERPL-MCNC: 300 UG/DL — SIGNIFICANT CHANGE UP (ref 110–370)
WBC # BLD: 6.31 K/UL — SIGNIFICANT CHANGE UP (ref 4.8–10.8)
WBC # FLD AUTO: 6.31 K/UL — SIGNIFICANT CHANGE UP (ref 4.8–10.8)

## 2024-04-08 PROCEDURE — 99233 SBSQ HOSP IP/OBS HIGH 50: CPT

## 2024-04-08 PROCEDURE — 99232 SBSQ HOSP IP/OBS MODERATE 35: CPT

## 2024-04-08 PROCEDURE — 71045 X-RAY EXAM CHEST 1 VIEW: CPT | Mod: 26

## 2024-04-08 PROCEDURE — 93010 ELECTROCARDIOGRAM REPORT: CPT

## 2024-04-08 PROCEDURE — 93306 TTE W/DOPPLER COMPLETE: CPT | Mod: 26

## 2024-04-08 PROCEDURE — 99291 CRITICAL CARE FIRST HOUR: CPT

## 2024-04-08 PROCEDURE — ZZZZZ: CPT

## 2024-04-08 PROCEDURE — 99222 1ST HOSP IP/OBS MODERATE 55: CPT

## 2024-04-08 PROCEDURE — 99223 1ST HOSP IP/OBS HIGH 75: CPT

## 2024-04-08 RX ORDER — FENTANYL CITRATE 50 UG/ML
0.5 INJECTION INTRAVENOUS
Qty: 2500 | Refills: 0 | Status: DISCONTINUED | OUTPATIENT
Start: 2024-04-08 | End: 2024-04-10

## 2024-04-08 RX ORDER — DEXTROSE 50 % IN WATER 50 %
50 SYRINGE (ML) INTRAVENOUS ONCE
Refills: 0 | Status: COMPLETED | OUTPATIENT
Start: 2024-04-08 | End: 2024-04-08

## 2024-04-08 RX ORDER — FENTANYL CITRATE 50 UG/ML
0.5 INJECTION INTRAVENOUS
Qty: 2500 | Refills: 0 | Status: DISCONTINUED | OUTPATIENT
Start: 2024-04-08 | End: 2024-04-08

## 2024-04-08 RX ADMIN — Medication 50 MILLILITER(S): at 17:45

## 2024-04-08 RX ADMIN — CHLORHEXIDINE GLUCONATE 1 APPLICATION(S): 213 SOLUTION TOPICAL at 05:02

## 2024-04-08 RX ADMIN — FENTANYL CITRATE 2 MICROGRAM(S)/KG/HR: 50 INJECTION INTRAVENOUS at 17:31

## 2024-04-08 RX ADMIN — CHLORHEXIDINE GLUCONATE 15 MILLILITER(S): 213 SOLUTION TOPICAL at 17:30

## 2024-04-08 RX ADMIN — CHLORHEXIDINE GLUCONATE 15 MILLILITER(S): 213 SOLUTION TOPICAL at 05:02

## 2024-04-08 RX ADMIN — PIPERACILLIN AND TAZOBACTAM 25 GRAM(S): 4; .5 INJECTION, POWDER, LYOPHILIZED, FOR SOLUTION INTRAVENOUS at 05:01

## 2024-04-08 RX ADMIN — SODIUM CHLORIDE 70 MILLILITER(S): 9 INJECTION, SOLUTION INTRAVENOUS at 13:49

## 2024-04-08 RX ADMIN — PIPERACILLIN AND TAZOBACTAM 25 GRAM(S): 4; .5 INJECTION, POWDER, LYOPHILIZED, FOR SOLUTION INTRAVENOUS at 14:13

## 2024-04-08 RX ADMIN — FENTANYL CITRATE 2 MICROGRAM(S)/KG/HR: 50 INJECTION INTRAVENOUS at 19:45

## 2024-04-08 RX ADMIN — PANTOPRAZOLE SODIUM 40 MILLIGRAM(S): 20 TABLET, DELAYED RELEASE ORAL at 12:35

## 2024-04-08 RX ADMIN — HEPARIN SODIUM 5000 UNIT(S): 5000 INJECTION INTRAVENOUS; SUBCUTANEOUS at 05:02

## 2024-04-08 RX ADMIN — HEPARIN SODIUM 5000 UNIT(S): 5000 INJECTION INTRAVENOUS; SUBCUTANEOUS at 17:30

## 2024-04-08 RX ADMIN — FENTANYL CITRATE 2 MICROGRAM(S)/KG/HR: 50 INJECTION INTRAVENOUS at 13:50

## 2024-04-08 RX ADMIN — Medication 100 MICROGRAM(S): at 05:02

## 2024-04-08 RX ADMIN — PIPERACILLIN AND TAZOBACTAM 25 GRAM(S): 4; .5 INJECTION, POWDER, LYOPHILIZED, FOR SOLUTION INTRAVENOUS at 21:44

## 2024-04-08 NOTE — CONSULT NOTE ADULT - ASSESSMENT
Patient is a a74yo Rt handed F with PHMx of pancreatic CA s/p Whipple, CKD stage 3, HTN, COPD, pulmonary fibrosis, PRESS syndrome (hospitalized in 2019), Raynaud's syndrome, ANCA vasculitis, Scleroderma  presents to ED due to cardiac arrest.     Impression   Cardiac arrest  Recommendations:   Wean down to off on sedation   Will need be off sedation for atleast 24hrs   Please correct all electrolytes as needed   patient is currently being rewarmed   MRI brain w/o contrast   EEG negative for seizures.   Will continue to monitor   Collect NSE- needs to be down 72hrs if possible

## 2024-04-08 NOTE — PROGRESS NOTE ADULT - SUBJECTIVE AND OBJECTIVE BOX
INFECTIOUS DISEASE FOLLOW UP NOTE:    Interval History/ROS: Patient is a 74y old  Female who presents with a chief complaint of cardiac arrest (08 Apr 2024 09:13)      Overnight events:    REVIEW OF SYSTEMS:        Prior hospital charts reviewed [Yes]  Primary team notes reviewed [Yes]  Other consultant notes reviewed [Yes]    Allergies:  celecoxib (Rash)  Originally Entered as [U UNKNOWN] reaction to [celebrit] (Unknown)      ANTIMICROBIALS:   piperacillin/tazobactam IVPB.. 3.375 every 8 hours      OTHER MEDS: MEDICATIONS  (STANDING):  fentaNYL   Infusion. 0.5 <Continuous>  heparin   Injectable 5000 every 12 hours  levothyroxine 100 daily  norepinephrine Infusion 0.05 <Continuous>  pantoprazole  Injectable 40 daily  propofol Infusion 10 <Continuous>      Vital Signs Last 24 Hrs  T(F): 96.9 (04-08-24 @ 09:00), Max: 100.2 (04-07-24 @ 09:01)    Vital Signs Last 24 Hrs  HR: 53 (04-08-24 @ 09:00) (48 - 63)  BP: 129/60 (04-08-24 @ 09:00) (114/57 - 147/67)  RR: 22 (04-08-24 @ 09:00)  SpO2: 100% (04-08-24 @ 09:00) (97% - 100%)  Wt(kg): --    EXAM:      Labs:                        8.7    6.31  )-----------( 179      ( 08 Apr 2024 05:36 )             25.9     04-08    138  |  106  |  47<H>  ----------------------------<  95  4.5   |  22  |  2.0<H>    Ca    8.0<L>      08 Apr 2024 05:36  Phos  4.5     04-08  Mg     1.9     04-08    TPro  5.8<L>  /  Alb  2.8<L>  /  TBili  0.4  /  DBili  x   /  AST  57<H>  /  ALT  31  /  AlkPhos  179<H>  04-08      WBC Trend:  WBC Count: 6.31 (04-08-24 @ 05:36)  WBC Count: 9.64 (04-07-24 @ 06:05)  WBC Count: 9.92 (04-06-24 @ 11:45)      Creatine Trend:  Creatinine: 2.0 (04-08)  Creatinine: 1.8 (04-07)  Creatinine: 1.5 (04-06)      Liver Biochemical Testing Trend:  Alanine Aminotransferase (ALT/SGPT): 31 (04-08)  Alanine Aminotransferase (ALT/SGPT): 41 (04-07)  Alanine Aminotransferase (ALT/SGPT): 52 *H* (04-06)  Aspartate Aminotransferase (AST/SGOT): 57 (04-08-24 @ 05:36)  Aspartate Aminotransferase (AST/SGOT): 78 (04-07-24 @ 06:05)  Aspartate Aminotransferase (AST/SGOT): 265 (04-06-24 @ 11:45)  Bilirubin Total: 0.4 (04-08)  Bilirubin Total: 0.3 (04-07)  Bilirubin Total: 0.3 (04-06)      Trend LDH      Urinalysis Basic - ( 08 Apr 2024 05:36 )    Color: x / Appearance: x / SG: x / pH: x  Gluc: 95 mg/dL / Ketone: x  / Bili: x / Urobili: x   Blood: x / Protein: x / Nitrite: x   Leuk Esterase: x / RBC: x / WBC x   Sq Epi: x / Non Sq Epi: x / Bacteria: x        MICROBIOLOGY:    MRSA PCR Result.: Negative (04-07-24 @ 10:17)      Culture - Blood (collected 06 Apr 2024 12:05)  Source: .Blood Blood-Venous  Preliminary Report:    No growth at 24 hours    Culture - Blood (collected 06 Apr 2024 12:05)  Source: .Blood Blood-Venous  Preliminary Report:    No growth at 24 hours    Procalcitonin, Serum: 4.49 (04-06)      Ferritin: 42 (04-06)    Troponin T, High Sensitivity Result: 89 (04-07)  Troponin T, High Sensitivity Result: 84 (04-06)  Troponin T, High Sensitivity Result: 79 (04-06)  Troponin T, High Sensitivity Result: 56 (04-06)    Blood Gas Arterial, Lactate: 0.8 (04-08 @ 03:49)  Blood Gas Arterial, Lactate: 1.7 (04-07 @ 17:22)  Blood Gas Arterial, Lactate: 1.3 (04-07 @ 03:41)  Blood Gas Arterial, Lactate: 0.8 (04-06 @ 17:11)      RADIOLOGY:  imaging below personally reviewed     INFECTIOUS DISEASE FOLLOW UP NOTE:    Interval History/ROS: Patient is a 74y old  Female who presents with a chief complaint of cardiac arrest (08 Apr 2024 09:13)      Overnight events: Remains intubated and sedated.     REVIEW OF SYSTEMS:  Unable to provide as patient is intubated and sedated      Prior hospital charts reviewed [Yes]  Primary team notes reviewed [Yes]  Other consultant notes reviewed [Yes]    Allergies:  celecoxib (Rash)  Originally Entered as [U UNKNOWN] reaction to [celebrit] (Unknown)      ANTIMICROBIALS:   piperacillin/tazobactam IVPB.. 3.375 every 8 hours      OTHER MEDS: MEDICATIONS  (STANDING):  fentaNYL   Infusion. 0.5 <Continuous>  heparin   Injectable 5000 every 12 hours  levothyroxine 100 daily  norepinephrine Infusion 0.05 <Continuous>  pantoprazole  Injectable 40 daily  propofol Infusion 10 <Continuous>      Vital Signs Last 24 Hrs  T(F): 96.9 (04-08-24 @ 09:00), Max: 100.2 (04-07-24 @ 09:01)    Vital Signs Last 24 Hrs  HR: 53 (04-08-24 @ 09:00) (48 - 63)  BP: 129/60 (04-08-24 @ 09:00) (114/57 - 147/67)  RR: 22 (04-08-24 @ 09:00)  SpO2: 100% (04-08-24 @ 09:00) (97% - 100%)  Wt(kg): --    EXAM:  GENERAL: Sedated and intubated, lying in bed  HEAD: No head lesions  EYES: Scleral icterus, pupils not reacting to light  EAR, NOSE, MOUTH, THROAT: Normal external ears and nose, no discharges; dry mucous membranes; + ET  NECK: Supple, nontender to palpation; no JVD  RESPIRATORY: Bibasilar crackles  CARDIOVASCULAR: S1 S2  GASTROINTESTINAL: Soft, nontender, nondistended; normoactive bowel sounds  EXTREMITIES: No clubbing, cyanosis, or petal edema  NERVOUS SYSTEM: Sedated   MUSCULOSKELETAL: No joint erythema, swelling  SKIN: No rashes or lesions, no superficial thrombophlebitis  PSYCH: Sedated      Labs:                        8.7    6.31  )-----------( 179      ( 08 Apr 2024 05:36 )             25.9     04-08    138  |  106  |  47<H>  ----------------------------<  95  4.5   |  22  |  2.0<H>    Ca    8.0<L>      08 Apr 2024 05:36  Phos  4.5     04-08  Mg     1.9     04-08    TPro  5.8<L>  /  Alb  2.8<L>  /  TBili  0.4  /  DBili  x   /  AST  57<H>  /  ALT  31  /  AlkPhos  179<H>  04-08      WBC Trend:  WBC Count: 6.31 (04-08-24 @ 05:36)  WBC Count: 9.64 (04-07-24 @ 06:05)  WBC Count: 9.92 (04-06-24 @ 11:45)      Creatine Trend:  Creatinine: 2.0 (04-08)  Creatinine: 1.8 (04-07)  Creatinine: 1.5 (04-06)      Liver Biochemical Testing Trend:  Alanine Aminotransferase (ALT/SGPT): 31 (04-08)  Alanine Aminotransferase (ALT/SGPT): 41 (04-07)  Alanine Aminotransferase (ALT/SGPT): 52 *H* (04-06)  Aspartate Aminotransferase (AST/SGOT): 57 (04-08-24 @ 05:36)  Aspartate Aminotransferase (AST/SGOT): 78 (04-07-24 @ 06:05)  Aspartate Aminotransferase (AST/SGOT): 265 (04-06-24 @ 11:45)  Bilirubin Total: 0.4 (04-08)  Bilirubin Total: 0.3 (04-07)  Bilirubin Total: 0.3 (04-06)      Trend LDH      Urinalysis Basic - ( 08 Apr 2024 05:36 )    Color: x / Appearance: x / SG: x / pH: x  Gluc: 95 mg/dL / Ketone: x  / Bili: x / Urobili: x   Blood: x / Protein: x / Nitrite: x   Leuk Esterase: x / RBC: x / WBC x   Sq Epi: x / Non Sq Epi: x / Bacteria: x        MICROBIOLOGY:    MRSA PCR Result.: Negative (04-07-24 @ 10:17)      Culture - Blood (collected 06 Apr 2024 12:05)  Source: .Blood Blood-Venous  Preliminary Report:    No growth at 24 hours    Culture - Blood (collected 06 Apr 2024 12:05)  Source: .Blood Blood-Venous  Preliminary Report:    No growth at 24 hours    Procalcitonin, Serum: 4.49 (04-06)      Ferritin: 42 (04-06)    Troponin T, High Sensitivity Result: 89 (04-07)  Troponin T, High Sensitivity Result: 84 (04-06)  Troponin T, High Sensitivity Result: 79 (04-06)  Troponin T, High Sensitivity Result: 56 (04-06)    Blood Gas Arterial, Lactate: 0.8 (04-08 @ 03:49)  Blood Gas Arterial, Lactate: 1.7 (04-07 @ 17:22)  Blood Gas Arterial, Lactate: 1.3 (04-07 @ 03:41)  Blood Gas Arterial, Lactate: 0.8 (04-06 @ 17:11)      RADIOLOGY:  imaging below personally reviewed    < from: Xray Chest 1 View-PORTABLE IMMEDIATE (Xray Chest 1 View-PORTABLE IMMEDIATE .) (04.08.24 @ 08:38) >    Impression:    Tip of the orogastric tube is seen overlying left abdomen    Stable bilateral lung opacities    < end of copied text >      < from: US Abdomen Upper Quadrant Right (04.07.24 @ 12:01) >  IMPRESSION:  Status post cholecystectomy. No ductal dilatation.    Echogenic kidney consistent with medical renal disease.    < end of copied text >    < from: CT Abdomen and Pelvis w/ IV Cont (04.06.24 @ 14:11) >  IMPRESSION:    1.  No central or segmental pulmonary embolus.  2.  Biliary ductal enhancement described above. Correlate for cholangitis.  3.  Trace bilateral effusions.  4.  Additional CT findings above, some of which can be seen in   hypoperfusion states.    < end of copied text >

## 2024-04-08 NOTE — CONSULT NOTE ADULT - ASSESSMENT
74-year-old female with PHMx of pancreatic CA s/p Whipple, CKD stage 3, HTN, COPD, pulmonary fibrosis, PRESS syndrome (hospitalized in 2019), Raynaud's syndrome, ANCA vasculitis, Scleroderma  presents to ED due to cardiac arrest. Pt lives at home with son and daughter, went to use the bathroom.. She stood ater being in bathroom Family was cleaning and dressing. her. She became unresponsive. CPR not started. Now on vent. Not responsive. Suspect anoxic brain damage. Explained to family. Event possibly began as vasovagal and hypotension. Support for now. Defin e goals care. Prognosis poor. amily aware

## 2024-04-08 NOTE — PROGRESS NOTE ADULT - SUBJECTIVE AND OBJECTIVE BOX
Discussed with Israel Viera-   She is hoping that on 2/22 she will be able to have Marline be seen by an APC. States the wounds are looking really good. She ran out of acticoat today and will come in Monday and pick it up-     Told her that both Zoila Meraz and Dr. Blaire Davenport were out of the office until Monday so we will be in touch on Monday to discuss picking up acticoat and the appointment   No further questions. 74yFemale  Being followed for altered LFTs  Interval history:  No hematemesis, melena, blood in stool reported.       PAST MEDICAL & SURGICAL HISTORY:   H/O vasculitis      Pancreatic cancer      History of COPD      Hypertension      H/O Raynaud's syndrome      Stage 3 chronic kidney disease      History of knee replacement      H/O Whipple procedure                Social History: No smoking. No alcohol. No illegal drug use.            MEDICATIONS  (STANDING):  chlorhexidine 0.12% Liquid 15 milliLiter(s) Oral Mucosa every 12 hours  chlorhexidine 2% Cloths 1 Application(s) Topical <User Schedule>  fentaNYL   Infusion. 0.5 MICROgram(s)/kG/Hr (2.5 mL/Hr) IV Continuous <Continuous>  heparin   Injectable 5000 Unit(s) SubCutaneous every 12 hours  lactated ringers. 1000 milliLiter(s) (70 mL/Hr) IV Continuous <Continuous>  levothyroxine 100 MICROGram(s) Oral daily  norepinephrine Infusion 0.05 MICROgram(s)/kG/Min (4.69 mL/Hr) IV Continuous <Continuous>  pantoprazole  Injectable 40 milliGRAM(s) IV Push daily  piperacillin/tazobactam IVPB.. 3.375 Gram(s) IV Intermittent every 8 hours  propofol Infusion 10 MICROgram(s)/kG/Min (2.4 mL/Hr) IV Continuous <Continuous>    Allergies:   celecoxib (Rash)  Originally Entered as [U UNKNOWN] reaction to [celebrit] (Unknown)  Intolerances          REVIEW OF SYSTEMS:  unobtainable         VITAL SIGNS:   T(F): 96.7 (04-08-24 @ 11:00), Max: 100.2 (04-08-24 @ 02:00)  HR: 58 (04-08-24 @ 12:00) (48 - 63)  BP: 111/56 (04-08-24 @ 12:00) (111/56 - 147/67)  RR: 22 (04-08-24 @ 12:00) (19 - 23)  SpO2: 100% (04-08-24 @ 12:00) (100% - 100%)    PHYSICAL EXAM:  GENERAL: +NG tube  HEAD:  Atraumatic, Normocephalic  EYES: conjunctiva and sclera clear  NECK: Supple, no JVD or thyromegaly  CHEST/LUNG: b/l rhonchi  HEART: Regular rate and rhythm; normal S1, S2, No murmurs.  ABDOMEN: Soft, nontender, nondistended; Bowel sounds present  NEUROLOGY: No asterixis or tremor.   SKIN: Intact, no jaundice            LABS:                        8.7    6.31  )-----------( 179      ( 08 Apr 2024 05:36 )             25.9     04-08    138  |  106  |  47<H>  ----------------------------<  95  4.5   |  22  |  2.0<H>    Ca    8.0<L>      08 Apr 2024 05:36  Phos  4.5     04-08  Mg     1.9     04-08    TPro  5.8<L>  /  Alb  2.8<L>  /  TBili  0.4  /  DBili  x   /  AST  57<H>  /  ALT  31  /  AlkPhos  179<H>  04-08    LIVER FUNCTIONS - ( 08 Apr 2024 05:36 )  Alb: 2.8 g/dL / Pro: 5.8 g/dL / ALK PHOS: 179 U/L / ALT: 31 U/L / AST: 57 U/L / GGT: x               IMAGING:    < from: Xray Chest 1 View-PORTABLE IMMEDIATE (Xray Chest 1 View-PORTABLE IMMEDIATE .) (04.08.24 @ 08:38) >    ACC: [id number] EXAM:  XR CHEST PORTABLE IMMED 1V   ORDERED BY: MERY HART     PROCEDURE DATE:  04/08/2024          INTERPRETATION:  Clinical History / Reason for exam: Advanced orogastric   tube    Comparison : Chest radiograph earlier in theday.    Technique/Positioning: Frontal., Lung apices not included    Findings:    Support devices: Tip of the orogastric tube is seen overlying left   abdomen. Left central venous catheter is stable. Endotracheal tube is not   well seen.    Cardiac/mediastinum/hilum: Stable    Lung parenchyma/Pleura: Stable bilateral lung opacities    Skeleton/soft tissues: Stable    Impression:    Tip of the orogastric tube is seen overlying left abdomen    Stable bilateral lung opacities    --- End of Report ---            MARICEL ROSENTHAL MD; Attending Radiologist  This document has been electronically signed. Apr 8 2024  8:42AM    < end of copied text >

## 2024-04-08 NOTE — PROGRESS NOTE ADULT - ASSESSMENT
IMPRESSION:    Acute Hypoxemic Respiratory Failure  SP CPA downtime 30 minutes   Cardiac bradycardia   Lung fibrosis   GIUSEPPE  ? Cholangitis   HO Raynaud's  HO PRESS    HO Anemia  HO Pancreatic cancer     PLAN:    CNS:  SAT. wean fentanyl, EEG, Neurology evaluation, rewarming     HEENT: oral and ET care     PULMONARY: keep pox >92%   monitor peak and palteau   CT Chest noted, decrease TV to 6cc/IBW    CARDIOVASCULAR: trend CE, CK level, Cardiology evaluation, 2D-Echo, LR@75cc/hr     RENAL: follow is and os, trend Cr    INFECTIOUS DISEASE: procalcitonin elevated, continue Zosyn, Vancomycin, ID evaluation, check nasal MRSA, full RVP      HEMATOLOGICAL:  DVT prophylaxis.    ENDOCRINE:  Follow up FS.  Insulin protocol if needed.    MUSCULOSKELETAL: bedrest    Full Code    Left Cordis    Stack    left Radial A-line    Grave prognosis     Palliative Care evaluation    ICU monitoring

## 2024-04-08 NOTE — PROGRESS NOTE ADULT - SUBJECTIVE AND OBJECTIVE BOX
KIRAN MORFIN, 74y, Female; MRN# [id number]  Hospital Stay: 2d.    Patient is a 74y old Female who presents with a chief complaint of cardiac arrest (08 Apr 2024 10:43)  Currently admitted to the ICU with the primary diagnosis of Cardiac arrest      SUBJECTIVE:  Interval/Overnight events: No acute events overnight; patient remains intubated on Fentanyl and Zosyn; TTM     REVIEW OF SYSTEMS:  As per HPI  OBJECTIVE:  VITALS:  T(F): 96.7 (04-08-24 @ 11:00), Max: 100.2 (04-08-24 @ 02:00)  HR: 58 (04-08-24 @ 12:00) (48 - 63)  BP: 111/56 (04-08-24 @ 12:00) (111/56 - 147/67)  ABP: 128/56 (04-08-24 @ 12:00) (114/53 - 166/65)  ABP(mean): 80 (04-08-24 @ 12:00) (76 - 100)  RR: 22 (04-08-24 @ 12:00) (19 - 23)  SpO2: 100% (04-08-24 @ 12:00) (100% - 100%)    Vent Settings  Device: [employer], Mode: AC/ CMV (Assist Control/ Continuous Mandatory Ventilation), RR (machine): 22, TV (machine): 300, FiO2: 40, PEEP: 8, ITime: 0.8, MAP: 10, PIP: 28  Blood Gas  04-08-24 @ 03:49  pH 7.35 | pCO2 41 | pO2 198 | HCO3 23 | O2 Sat 100.0  04-07-24 @ 17:22  pH 7.31 | pCO2 42 | pO2 208 | HCO3 21 | O2 Sat 99.6    Drips  fentaNYL   Infusion. 0.5 MICROgram(s)/kG/Hr (2.5 mL/Hr) IV Continuous <Continuous>  lactated ringers. 1000 milliLiter(s) (70 mL/Hr) IV Continuous <Continuous>  norepinephrine Infusion 0.05 MICROgram(s)/kG/Min (4.69 mL/Hr) IV Continuous <Continuous>  propofol Infusion 10 MICROgram(s)/kG/Min (2.4 mL/Hr) IV Continuous <Continuous>    I&Os:    04-07-24 @ 07:01  -  04-08-24 @ 07:00  --------------------------------------------------------  IN: 2163.3 mL / OUT: 470 mL / NET: 1693.3 mL    04-08-24 @ 07:01  -  04-08-24 @ 12:33  --------------------------------------------------------  IN: 0 mL / OUT: 20 mL / NET: -20 mL      PHYSICAL EXAM:    ACTIVE MEDICATIONS:  Standing  chlorhexidine 0.12% Liquid 15 milliLiter(s) Oral Mucosa every 12 hours  chlorhexidine 2% Cloths 1 Application(s) Topical <User Schedule>  fentaNYL   Infusion. 0.5 MICROgram(s)/kG/Hr (2.5 mL/Hr) IV Continuous <Continuous>  heparin   Injectable 5000 Unit(s) SubCutaneous every 12 hours  lactated ringers. 1000 milliLiter(s) (70 mL/Hr) IV Continuous <Continuous>  levothyroxine 100 MICROGram(s) Oral daily  norepinephrine Infusion 0.05 MICROgram(s)/kG/Min (4.69 mL/Hr) IV Continuous <Continuous>  pantoprazole  Injectable 40 milliGRAM(s) IV Push daily  piperacillin/tazobactam IVPB.. 3.375 Gram(s) IV Intermittent every 8 hours  propofol Infusion 10 MICROgram(s)/kG/Min (2.4 mL/Hr) IV Continuous <Continuous>    PRN    LABS:  04-08-24 @ 05:36  WBC 6.31, H/H 8.7<L>/25.9<L>, plt 179  Na 138, K 4.5, Cl 106, CO2 22, BUN 47<H>, Cr 2.0<H>, Glu 95    Ca 8.0<L>, Mg 1.9, Phosphorus 4.5    Tot Protein 5.8<L>, Alb 2.8<L>, T. Bili 0.4, D. Bili --  AST 57<H>, ALT 31, Alk phos 179<H>        04-06-24 @ 19:55:  CRP --, ESR --, Procal 4.49<H>, Ferritin 42, Fungitell --, D-dimer --    04-06-24 @ 19:55:  TSH 2.94                  CULTURES:    Culture - Blood (collected 04-06-24 @ 12:05)  Source: .Blood Blood-Venous  Preliminary Report (04-07-24 @ 20:02):    No growth at 24 hours    Culture - Blood (collected 04-06-24 @ 12:05)  Source: .Blood Blood-Venous  Preliminary Report (04-07-24 @ 20:02):    No growth at 24 hours      PENDING:  Creatine Kinase, Serum: AM Sched. Collection: 09-Apr-2024 04:30 (04-08-24 @ 11:31)  Respiratory Viral Panel with COVID-19 by SHANNA: Routine (04-08-24 @ 09:25)    IMAGING:  Latest imaging reviewed.  Xray Chest 1 View-PORTABLE IMMEDIATE: IMMEDIATE   Indication: advanced OGT  Transport: Portable,  w/ Monitor,  w/ Ventilator,  w/ IV Pole  Exam Completed (04-08-24 @ 08:08)  Xray Chest 1 View- PORTABLE-Routine: AM   Indication: intubated  Transport: Portable,  w/ Monitor,  w/ Ventilator,  w/ IV Pole  Exam Completed (04-07-24 @ 15:02)    ECHO:  TTE Echo Complete w/o Contrast w/ Doppler:   Transport: Stretcher-Crib  Monitor: w/ Monitor,  Transport w/ Ventilator,  Transport w/ IV Pole (04-06-24 @ 16:26)

## 2024-04-08 NOTE — CONSULT NOTE ADULT - SUBJECTIVE AND OBJECTIVE BOX
MRN-[id number]  Patient is a 74y old  Female who presents with a chief complaint of cardiac arrest (08 Apr 2024 13:05)    HPI:  Patient is a a74yo Rt handed F with PHMx of pancreatic CA s/p Whipple, CKD stage 3, HTN, COPD, pulmonary fibrosis, PRESS syndrome (hospitalized in 2019), Raynaud's syndrome, ANCA vasculitis, Scleroderma  presents to ED due to cardiac arrest. AS per chart review,  patient went to use the bathroom and the daughter noticed patient was slumped over and struggling to get onto the toilet. Daughter helped patient onto the toilet, but noticed that the patient became less responsive and alert so she called EMS. Patient was found down by EMS, intubated her in the field, and resuscitated her with compressions and 4 rounds of epinephrine. ROSC achieved within a minute of her arrival to the ED. Patient was found to be hypothermic and bradycardic.  Neurology consulted for cardiac arrest.         PAST MEDICAL & SURGICAL HISTORY:  H/O vasculitis      Pancreatic cancer      History of COPD      Hypertension      H/O Raynaud's syndrome      Stage 3 chronic kidney disease      History of knee replacement      H/O Whipple procedure        FAMILY HISTORY:    Social Hx:  Nonsmoker, no drug or alcohol use    Home Medications:  calcitriol 0.25 mcg oral capsule: 1 cap(s) orally three times per week (06 Apr 2024 15:09)  folic acid: prn (06 Apr 2024 15:09)  labetalol 100 mg oral tablet: 1 tab(s) orally 3 times a day (06 Apr 2024 15:09)  levothyroxine 100 mcg (0.1 mg) oral tablet: 1 tab(s) orally once a day (at bedtime) (06 Apr 2024 15:09)  lisinopril 20 mg oral tablet: 1 tab(s) orally once a day (06 Apr 2024 15:09)  Lyrica 150 mg oral capsule: 1 cap(s) orally 3 times a day (06 Apr 2024 15:09)  vitamin d3: prn (06 Apr 2024 15:09)    MEDICATIONS  (STANDING):  chlorhexidine 0.12% Liquid 15 milliLiter(s) Oral Mucosa every 12 hours  chlorhexidine 2% Cloths 1 Application(s) Topical <User Schedule>  fentaNYL   Infusion. 0.5 MICROgram(s)/kG/Hr (2 mL/Hr) IV Continuous <Continuous>  heparin   Injectable 5000 Unit(s) SubCutaneous every 12 hours  lactated ringers. 1000 milliLiter(s) (70 mL/Hr) IV Continuous <Continuous>  levothyroxine 100 MICROGram(s) Oral daily  norepinephrine Infusion 0.05 MICROgram(s)/kG/Min (4.69 mL/Hr) IV Continuous <Continuous>  pantoprazole  Injectable 40 milliGRAM(s) IV Push daily  piperacillin/tazobactam IVPB.. 3.375 Gram(s) IV Intermittent every 8 hours    MEDICATIONS  (PRN):    Allergies  celecoxib (Rash)  Originally Entered as [U UNKNOWN] reaction to [celebrit] (Unknown)    Intolerances      REVIEW OF SYSTEMS  General:	  Skin/Breast:	  Ophthalmologic:  ENMT:	  Respiratory and Thorax:	  Cardiovascular:	  Gastrointestinal:	  Genitourinary:	  Musculoskeletal:	  Neurological:	  Psychiatric:	  Hematology/Lymphatics:	  Endocrine:	  Allergic/Immunologic:	    ROS: Pertinent positives in HPI, all other ROS were reviewed and are negative.      Vital Signs Last 24 Hrs  T(C): 36.3 (08 Apr 2024 15:00), Max: 37.9 (08 Apr 2024 02:00)  T(F): 97.4 (08 Apr 2024 15:00), Max: 100.2 (08 Apr 2024 02:00)  HR: 66 (08 Apr 2024 14:50) (48 - 67)  BP: 113/56 (08 Apr 2024 15:00) (111/56 - 147/67)  BP(mean): 80 (08 Apr 2024 15:00) (78 - 97)  RR: 22 (08 Apr 2024 15:00) (19 - 23)  SpO2: 100% (08 Apr 2024 14:50) (100% - 100%)    Parameters below as of 08 Apr 2024 13:00      O2 Concentration (%): 40    GENERAL EXAM:  Constitutional: awake and alert. NAD  HEENT: PERRLA, EOMI  Neck: Supple  Respiratory: Breath sounds are clear bilaterally  Cardiovascular: S1 and S2, regular / irregular rhythm  Gastrointestinal: soft, nontender  Extremities: no edema, no cyanosis  Vascular: no carotid bruits  Musculoskeletal: no joint swelling/tenderness, no abnormal movements  Skin: no rashes    NEUROLOGICAL EXAM:  MS: Intubated and sedated on fentanyl. Does not follow commands.   CN: PERRL. Corneals, cough/gag intact   OCR not intact   Motor: no spontanous movement noted.   Sensation: no w/d to noxious stimuli.         Labs:   cbc                      8.7    6.31  )-----------( 179      ( 08 Apr 2024 05:36 )             25.9     [id number]    138  |  106  |  47<H>  ----------------------------<  95  4.5   |  22  |  2.0<H>    Ca    8.0<L>      08 Apr 2024 05:36  Phos  4.5     04-08  Mg     1.9     04-08    TPro  5.8<L>  /  Alb  2.8<L>  /  TBili  0.4  /  DBili  x   /  AST  57<H>  /  ALT  31  /  AlkPhos  179<H>  04-08    Coags  Lipids  A1C  CardiacMarkersCARDIAC MARKERS ( 08 Apr 2024 05:36 )  x     / x     / 340 U/L / x     / x          LFTsLIVER FUNCTIONS - ( 08 Apr 2024 05:36 )  Alb: 2.8 g/dL / Pro: 5.8 g/dL / ALK PHOS: 179 U/L / ALT: 31 U/L / AST: 57 U/L / GGT: x           UAUrinalysis Basic - ( 08 Apr 2024 05:36 )    Color: x / Appearance: x / SG: x / pH: x  Gluc: 95 mg/dL / Ketone: x  / Bili: x / Urobili: x   Blood: x / Protein: x / Nitrite: x   Leuk Esterase: x / RBC: x / WBC x   Sq Epi: x / Non Sq Epi: x / Bacteria: x    Radiology:  CT head w/o:   The third and lateral ventricles are mildly enlarged as are the cortical   sulci consistent with a mild degree of cortical atrophy.  The fourth   ventricle is normal in size and position.    There is no shift of the midline structures, evidence of acute   intracranial hemorrhage, or depressed skull fracture.    Bilateral basal ganglia calcifications.    IMPRESSION:    Mild atrophic changes.

## 2024-04-08 NOTE — PROGRESS NOTE ADULT - ASSESSMENT
74-year-old female with PHMx of pancreatic CA s/p Whipple, CKD stage 3, HTN, COPD, pulmonary fibrosis, PRESS syndrome (hospitalized in 2019), Raynaud's syndrome, ANCA vasculitis, Scleroderma  presents to ED due to cardiac arrest. Pt lives at home with son and daughter, went to use the bathroom. Daughter notes that she heard the patient's walker scratching the floor, went upstairs to see what was going on. Daughter noticed patient was slumped over and struggling to get onto the toilet. Daughter helped patient onto the toilet, but noticed that the patient became less responsive and alert so she called EMS. Patient was found down by EMS, intubated her in the field, and resuscitated her with compressions and 4 rounds of epinephrine. ROSC achieved within a minute of her arrival to the ED. Patient was found to be hypothermic and bradycardic. Of note, patient was hospitalized back in Dec 2023 in [facility] for 18 days for multifocal pneumonia 2/2 human metapneumovirus. Patient also was hospitalized in 2019 for PRESS syndrome. Pt had a negative stress test in Sept 2023. She follows pulmonology for pulmonary fibrosis, had recent PFTs done.     ED : BP 90/57, HR 88, Temp 94.1F, satting 100% on ventilator   Labs: Hgb 8.7, Trop 56 -->79, K 5.3, Lactate 3.5, Lipase 256, , , ALT 52  VBG: pH 7.12, pCO2 67, Lactate 6.6    CXR bilateral opacities. cardiomegaly  CTH: mild atrophic changes  CT angio chest PE, A/P w/ IV cont: Bilary ductal enhancement s/p CCY. Diffuse fibrotic changes of lungs. Trace bilateral effusions. NO PE.  MRSA nare PCR negative  Urine Strep Ag negative  Urine Legionella Ag negative      IMPRESSION:   Possible aspiration pneumonia  Sepsis  Acute hypoxemic respiratory failure  Cardiac arrest, ROSC after 30 minutes  HO pancreatic CA s/p whipple    RECOMMENDATIONS:  - Continue IV Zosyn 3.375g q8hrs  - MRSA nare PCR negative, d/c vancomycin  - Follow up BCx and UCx  - Follow up DTA  - Neuro eval for encephalopathy  - Offloading and frequent position changes, aspiration precaution  - Trend WBC, fever curve, transaminases, creatinine daily      Yolanda Franco D.O.  Attending Physician  Division of Infectious Diseases  [facility]  Please contact me via [employer] Teams

## 2024-04-08 NOTE — PROGRESS NOTE ADULT - ASSESSMENT
74-year-old female with PHMx of pancreatic CA s/p Whipple, CKD stage 3, HTN, COPD, pulmonary fibrosis, PRESS syndrome (hospitalized in 2019), Raynaud's syndrome, ANCA vasculitis, Scleroderma, admitted after cardiac arrest, being evaluated for transaminitis and concern of cholangitis.    #biliary ductal enhancement on CT  #mild transaminitis    no concern for cholangitis clinically  pt has normal bilirubin and has no jaundice to concern for cholangitis  mild transaminitis is downtrending and could be due to CPR (elevated Alk phos from bone etiology and AST from CK elevation)  Rec  -no indication for any GI intervention  -trend LFTs daily  -supportive care per primary team  -IV abx per ID  -f/u bld cx  -rest of management per ICU team    Recall GI if needed      74-year-old female with PHMx of pancreatic CA s/p Whipple, CKD stage 3, HTN, COPD, pulmonary fibrosis, PRESS syndrome (hospitalized in 2019), Raynaud's syndrome, ANCA vasculitis, Scleroderma, admitted after cardiac arrest, being evaluated for transaminitis and concern of cholangitis.    #biliary ductal enhancement on CT  #mild transaminitis    no concern for cholangitis clinically  pt has normal bilirubin and has no jaundice to concern for cholangitis  mild transaminitis is downtrending and could be due to CPR (elevated Alk phos from bone etiology and AST from CK elevation)  CBD 4mm on ultrasound  possibly altered LFTs from DILI patient on zosyn  Rec  -no indication for any GI intervention  -trend LFTs daily  -supportive care per primary team  -IV abx per ID  -f/u bld cx  -rest of management per ICU team    Recall GI if needed

## 2024-04-08 NOTE — PROGRESS NOTE ADULT - ASSESSMENT
Assessment and Plan:   · Assessment	  IMPRESSION:    Acute Hypoxemic Respiratory Failure  SP CPA downtime 30 minutes   Cardiac bradycardia   Lung fibrosis   GIUSEPPE  ? Cholangitis   HO Raynaud's  HO PRESS    HO Anemia  HO Pancreatic cancer     PLAN:    CNS:  SAT. wean fentanyl, EEG, Neurology evaluation, rewarming     HEENT: oral and ET care     PULMONARY: keep pox >92%   monitor peak and palteau   CT Chest noted, decrease TV to 6cc/IBW    CARDIOVASCULAR: trend CE, CK level, Cardiology evaluation, 2D-Echo, LR@75cc/hr     RENAL:   - Monitor urine is and os, trend Cr    INFECTIOUS DISEASE:   - trend procalcitonin (4.49>)  - continue Zosyn, Vancomycin,  - ID evaluation  - check nasal MRSA  - full RVP      HEMATOLOGICAL:    - DVT prophylaxis.    ENDOCRINE:   - Follow up FS; Insulin protocol if needed.    MUSCULOSKELETAL: bedrest    Misc:  - Full Code  - Palliative Consulted; f/u recommendations     Lines:   - Left Cordis  - Stack  - left Radial A-line    ICU monitoring  Poor Prognosis  Assessment and Plan:   · Assessment	  IMPRESSION:    Acute Hypoxemic Respiratory Failure  SP CPA downtime 30 minutes   Cardiac bradycardia   Lung fibrosis   GIUSEPPE  ? Cholangitis   HO Raynaud's  HO PRESS    HO Anemia  HO Pancreatic cancer     PLAN:    CNS:    - SAT  - D/C Propofol  - wean fentanyl  - vEEG, (4/7): severe generalized slowing  - f/u Neurology recommendation  - rewarming     HEENT: oral and ET care     PULMONARY: keep pox >92%   monitor peak and palteau   CT Chest noted, decrease TV to 6cc/IBW    CARDIOVASCULAR: trend CE, CK level, Cardiology evaluation, 2D-Echo, LR@75cc/hr     RENAL:   - Monitor urine is and os, trend Cr    INFECTIOUS DISEASE:   - trend procalcitonin (4.49>)  - continue Zosyn, Vancomycin,  - ID evaluation  - check nasal MRSA  - full RVP      HEMATOLOGICAL:    - DVT prophylaxis.    ENDOCRINE:   - Follow up FS; Insulin protocol if needed.    MUSCULOSKELETAL: bedrest    Misc:  - Full Code  - Palliative Consulted; f/u recommendations     Lines:   - Left Cordis  - Stack  - left Radial A-line    ICU monitoring  Poor Prognosis

## 2024-04-08 NOTE — PROGRESS NOTE ADULT - SUBJECTIVE AND OBJECTIVE BOX
Patient is a 74y old  Female who presents with a chief complaint of Hyperkalemia (07 Apr 2024 20:33)        Over Night Events: remains critically ill on mechanical ventilation, sedated on fentanyl         ICU Vital Signs Last 24 Hrs  T(C): 36.1 (08 Apr 2024 08:00), Max: 37.9 (08 Apr 2024 02:00)  T(F): 97.1 (08 Apr 2024 08:00), Max: 100.2 (08 Apr 2024 02:00)  HR: 51 (08 Apr 2024 08:00) (48 - 63)  BP: 139/63 (08 Apr 2024 08:00) (114/57 - 147/67)  BP(mean): 91 (08 Apr 2024 08:00) (79 - 97)  ABP: 143/54 (08 Apr 2024 08:00) (114/53 - 166/65)  ABP(mean): 86 (08 Apr 2024 08:00) (76 - 100)  RR: 22 (08 Apr 2024 08:00) (19 - 23)  SpO2: 100% (08 Apr 2024 08:00) (97% - 100%)    O2 Parameters below as of 08 Apr 2024 08:00  Patient On (Oxygen Delivery Method): ventilator    O2 Concentration (%): 40        CONSTITUTIONAL:   Ill appearing.     EYES:   Pupils equal,   Round and reactive to light.    CARDIAC:   bradycardica    RESPIRATORY:   Decreased bilateral breath sounds   No wheezing  Bilateral BS  Normal chest expansion  Not tachypneic,  No use of accessory muscles    GASTROINTESTINAL:  Abdomen soft,   Non-tender,   No guarding,   + BS      MUSCULOSKELETAL:   No clubbing, cyanosis    NEUROLOGICAL:   sedated     SKIN:   sacral stage I        04-07-24 @ 07:01  -  04-08-24 @ 07:00  --------------------------------------------------------  IN:    FentaNYL: 173.3 mL    Free Water: 120 mL    IV PiggyBack: 400 mL    Lactated Ringers: 1470 mL  Total IN: 2163.3 mL    OUT:    Indwelling Catheter - Urethral (mL): 470 mL  Total OUT: 470 mL    Total NET: 1693.3 mL      04-08-24 @ 07:01  -  04-08-24 @ 09:13  --------------------------------------------------------  IN:  Total IN: 0 mL    OUT:    Indwelling Catheter - Urethral (mL): 20 mL  Total OUT: 20 mL    Total NET: -20 mL          LABS:                            8.7    6.31  )-----------( 179      ( 08 Apr 2024 05:36 )             25.9                                               04-08    138  |  106  |  47<H>  ----------------------------<  95  4.5   |  22  |  2.0<H>    Ca    8.0<L>      08 Apr 2024 05:36  Phos  4.5     04-08  Mg     1.9     04-08    TPro  5.8<L>  /  Alb  2.8<L>  /  TBili  0.4  /  DBili  x   /  AST  57<H>  /  ALT  31  /  AlkPhos  179<H>  04-08      PT/INR - ( 06 Apr 2024 11:45 )   PT: 13.90 sec;   INR: 1.22 ratio         PTT - ( 06 Apr 2024 11:45 )  PTT:44.0 sec                                       Urinalysis Basic - ( 08 Apr 2024 05:36 )    Color: x / Appearance: x / SG: x / pH: x  Gluc: 95 mg/dL / Ketone: x  / Bili: x / Urobili: x   Blood: x / Protein: x / Nitrite: x   Leuk Esterase: x / RBC: x / WBC x   Sq Epi: x / Non Sq Epi: x / Bacteria: x        CARDIAC MARKERS ( 08 Apr 2024 05:36 )  x     / x     / 340 U/L / x     / x                                                LIVER FUNCTIONS - ( 08 Apr 2024 05:36 )  Alb: 2.8 g/dL / Pro: 5.8 g/dL / ALK PHOS: 179 U/L / ALT: 31 U/L / AST: 57 U/L / GGT: x                                                  Culture - Blood (collected 06 Apr 2024 12:05)  Source: .Blood Blood-Venous  Preliminary Report (07 Apr 2024 20:02):    No growth at 24 hours    Culture - Blood (collected 06 Apr 2024 12:05)  Source: .Blood Blood-Venous  Preliminary Report (07 Apr 2024 20:02):    No growth at 24 hours                                                   Mode: AC/ CMV (Assist Control/ Continuous Mandatory Ventilation)  RR (machine): 22  TV (machine): 350  FiO2: 40  PEEP: 5  ITime: 0.8  MAP: 10  PIP: 29                                      ABG - ( 08 Apr 2024 03:49 )  pH, Arterial: 7.35  pH, Blood: x     /  pCO2: 41    /  pO2: 198   / HCO3: 23    / Base Excess: -2.8  /  SaO2: 100.0               MEDICATIONS  (STANDING):  chlorhexidine 0.12% Liquid 15 milliLiter(s) Oral Mucosa every 12 hours  chlorhexidine 2% Cloths 1 Application(s) Topical <User Schedule>  fentaNYL   Infusion. 0.5 MICROgram(s)/kG/Hr (2.5 mL/Hr) IV Continuous <Continuous>  heparin   Injectable 5000 Unit(s) SubCutaneous every 12 hours  lactated ringers. 1000 milliLiter(s) (70 mL/Hr) IV Continuous <Continuous>  levothyroxine 100 MICROGram(s) Oral daily  norepinephrine Infusion 0.05 MICROgram(s)/kG/Min (4.69 mL/Hr) IV Continuous <Continuous>  pantoprazole  Injectable 40 milliGRAM(s) IV Push daily  piperacillin/tazobactam IVPB.. 3.375 Gram(s) IV Intermittent every 8 hours  propofol Infusion 10 MICROgram(s)/kG/Min (2.4 mL/Hr) IV Continuous <Continuous>    MEDICATIONS  (PRN):

## 2024-04-08 NOTE — PROGRESS NOTE ADULT - ASSESSMENT
74-year-old female with PHMx of pancreatic CA s/p Whipple, CKD stage 3, HTN, COPD, pulmonary fibrosis, PRESS syndrome (hospitalized in 2019), Raynaud's syndrome, ANCA vasculitis, Scleroderma, hypothyroid  presents to ED due to cardiac arrest. Pt lives at home with son and daughter, went to use the bathroom and became unresponsive.  EMS intubated her and gave her 4 rounds of epinephrine.  ROSC was achieved within a minute of her arrival to the ED.  Patient was found to be hypothermic and bradycardic    acute respiratory failure / cardiac arrest      - follow mental status   - targeted temperature management    - no anti seizure meds as per neuro   - poor prognosis - family aware   - still full code for now   - palliative consult   - continue IV Zosyn   - DVT and GI prophylaxis   - I discussed plan with CC

## 2024-04-08 NOTE — PROGRESS NOTE ADULT - SUBJECTIVE AND OBJECTIVE BOX
Patient seen and evaluated this am, sedated on ventilator with fentanyl      T(F): 97.4 (04-08-24 @ 15:00), Max: 100.2 (04-08-24 @ 02:00)  HR: 66 (04-08-24 @ 14:50)  BP: 113/56 (04-08-24 @ 15:00)  RR: 20  SpO2: 100% (04-08-24 @ 14:50) (100% - 100%)    PHYSICAL EXAM:  GENERAL: NAD  HEAD:  Atraumatic, Normocephalic  EYES: EOMI, PERRLA, conjunctiva and sclera clear  NERVOUS SYSTEM:  positive gag and corneal reflex  CHEST/LUNG:  bilateral rhonchi  HEART: Regular rate and rhythm; No murmurs, rubs, or gallops  ABDOMEN: Soft, Nontender, Nondistended; Bowel sounds present  EXTREMITIES:  2+ Peripheral Pulses, No clubbing, cyanosis, or edema    LABS  04-08    138  |  106  |  47<H>  ----------------------------<  95  4.5   |  22  |  2.0<H>    Ca    8.0<L>      08 Apr 2024 05:36  Phos  4.5     04-08  Mg     1.9     04-08    TPro  5.8<L>  /  Alb  2.8<L>  /  TBili  0.4  /  DBili  x   /  AST  57<H>  /  ALT  31  /  AlkPhos  179<H>  04-08                          8.7    6.31  )-----------( 179      ( 08 Apr 2024 05:36 )             25.9       Mode: AC/ CMV (Assist Control/ Continuous Mandatory Ventilation)  RR (machine): 22  TV (machine): 300  FiO2: 40  PEEP: 8    CARDIAC ENZYMES  Creatine Kinase, Serum: 340 (04-08 @ 05:36)    < from: TTE Echo Complete w/o Contrast w/ Doppler (04.08.24 @ 09:36) >    Summary:   1. LV Ejection Fraction by Carpenter's Method with a biplane EF of 61 %.   2. Increased LV wall thickness.   3. Normal left atrial size.   4. Mild mitral valve regurgitation.   5. Mild tricuspid regurgitation.   6. Mild aortic regurgitation.   7. Normal trileaflet aortic valve with normal opening.   8. Ascending aorta 3.7 cm.    < end of copied text >      Culture Results:   No growth (04-06-24)  Culture Results:   No growth at 24 hours (04-06-24)  Culture Results:   No growth at 24 hours (04-06-24)    RADIOLOGY  < from: Xray Chest 1 View-PORTABLE IMMEDIATE (Xray Chest 1 View-PORTABLE IMMEDIATE .) (04.08.24 @ 08:38) >  Stable bilateral lung opacities    < end of copied text >    MEDICATIONS  (STANDING):  chlorhexidine 0.12% Liquid 15 milliLiter(s) Oral Mucosa every 12 hours  chlorhexidine 2% Cloths 1 Application(s) Topical <User Schedule>  fentaNYL   Infusion. 0.5 MICROgram(s)/kG/Hr (2 mL/Hr) IV Continuous <Continuous>  heparin   Injectable 5000 Unit(s) SubCutaneous every 12 hours  lactated ringers. 1000 milliLiter(s) (70 mL/Hr) IV Continuous <Continuous>  levothyroxine 100 MICROGram(s) Oral daily  norepinephrine Infusion 0.05 MICROgram(s)/kG/Min (4.69 mL/Hr) IV Continuous <Continuous>  pantoprazole  Injectable 40 milliGRAM(s) IV Push daily  piperacillin/tazobactam IVPB.. 3.375 Gram(s) IV Intermittent every 8 hours    MEDICATIONS  (PRN):

## 2024-04-08 NOTE — CONSULT NOTE ADULT - SUBJECTIVE AND OBJECTIVE BOX
CARDIOLOGY CONSULT NOTE     CHIEF COMPLAINT/REASON FOR CONSULT:    HPI:  74-year-old female with PHMx of pancreatic CA s/p Whipple, CKD stage 3, HTN, COPD, pulmonary fibrosis, PRESS syndrome (hospitalized in 2019), Raynaud's syndrome, ANCA vasculitis, Scleroderma  presents to ED due to cardiac arrest. Pt lives at home with son and daughter, went to use the bathroom. Daughter notes that she heard the patient's walker scratching the floor, went upstairs to see what was going on. Daughter noticed patient was slumped over and struggling to get onto the toilet. Daughter helped patient onto the toilet, but noticed that the patient became less responsive and alert so she called EMS. Patient was found down by EMS, intubated her in the field, and resuscitated her with compressions and 4 rounds of epinephrine. ROSC achieved within a minute of her arrival to the ED. Patient was found to be hypothermic and bradycardic. Of note, patient was hospitalized back in Dec 2023 in [facility] for 18 days for multifocal pneumonia 2/2 human metapneumovirus. Patient also was hospitalized in 2019 for PRESS syndrome. Pt had a negative stress test in Sept 2023. She follows pulmonology for pulmonary fibrosis, had recent PFTs done.     ED vitals and workup:  BP 90/57, HR 88, Temp 94.1F, satting 100% on ventilator   Labs: Hgb 8.7, Trop 56 -->79, K 5.3, Lactate 3.5, Lipase 256, , , ALT 52  VBG: pH 7.12, pCO2 67, Lactate 6.6    CXR bilateral opacities. cardiomegaly  CTH: mild atrophic changes  CT angio chest PE, A/P w/ IV cont: Bilary ductal enhancement s/p CCY. Diffuse fibrotic changes of lungs. Trace bilateral effusions. NO PE.    s/p atropine 1mg x4 doses, calcium gluconate 3g, [id number] x1, insulin 10 units, cefepime 1g in the ED.     Admitted to MICU for unresponsiveness s/p cardiac arrest.     (06 Apr 2024 15:43)      PAST MEDICAL & SURGICAL HISTORY:  H/O vasculitis      Pancreatic cancer      History of COPD      Hypertension      H/O Raynaud's syndrome      Stage 3 chronic kidney disease      History of knee replacement      H/O Whipple procedure          Cardiac Risks:   [x ]HTN, [ ] DM, [ ] Smoking, [ ] FH,  [ ] Lipids        MEDICATIONS:  MEDICATIONS  (STANDING):  chlorhexidine 0.12% Liquid 15 milliLiter(s) Oral Mucosa every 12 hours  chlorhexidine 2% Cloths 1 Application(s) Topical <User Schedule>  fentaNYL   Infusion. 0.5 MICROgram(s)/kG/Hr (2.5 mL/Hr) IV Continuous <Continuous>  heparin   Injectable 5000 Unit(s) SubCutaneous every 12 hours  lactated ringers. 1000 milliLiter(s) (70 mL/Hr) IV Continuous <Continuous>  levothyroxine 100 MICROGram(s) Oral daily  norepinephrine Infusion 0.05 MICROgram(s)/kG/Min (4.69 mL/Hr) IV Continuous <Continuous>  pantoprazole  Injectable 40 milliGRAM(s) IV Push daily  piperacillin/tazobactam IVPB.. 3.375 Gram(s) IV Intermittent every 8 hours  propofol Infusion 10 MICROgram(s)/kG/Min (2.4 mL/Hr) IV Continuous <Continuous>      FAMILY HISTORY:      SOCIAL HISTORY:        Allergies    celecoxib (Rash)  Originally Entered as [U UNKNOWN] reaction to [celebrit] (Unknown)    Intolerances    	    REVIEW OF SYSTEMS:  CONSTITUTIONAL: No fever, weight loss, or fatigue  EYES: No eye pain, visual disturbances, or discharge  ENMT:  No difficulty hearing, tinnitus, vertigo; No sinus or throat pain  NECK: No pain or stiffness  RESPIRATORY: No cough, wheezing, chills or hemoptysis; No Shortness of Breath  CARDIOVASCULAR: No chest pain, palpitations, passing out, dizziness, or leg swelling  GASTROINTESTINAL: No abdominal or epigastric pain. No nausea, vomiting, or hematemesis; No diarrhea or constipation. No melena or hematochezia.  GENITOURINARY: No dysuria, frequency, hematuria, or incontinence  NEUROLOGICAL: No headaches, memory loss, loss of strength, numbness, or tremors  SKIN: No itching, burning, rashes, or lesions   	        PHYSICAL EXAM:  T(C): 36 (04-08-24 @ 09:00), Max: 37.9 (04-08-24 @ 02:00)  HR: 53 (04-08-24 @ 09:00) (48 - 63)  BP: 129/60 (04-08-24 @ 09:00) (114/57 - 147/67)  RR: 22 (04-08-24 @ 09:00) (19 - 23)  SpO2: 100% (04-08-24 @ 09:00) (97% - 100%)  Wt(kg): --  I&O's Summary    07 Apr 2024 07:01  -  08 Apr 2024 07:00  --------------------------------------------------------  IN: 2163.3 mL / OUT: 470 mL / NET: 1693.3 mL    08 Apr 2024 07:01  -  08 Apr 2024 10:44  --------------------------------------------------------  IN: 0 mL / OUT: 20 mL / NET: -20 mL        Appearance: Normal	  Psychiatry: A & O x 3, Mood & affect appropriate  HEENT:   Normal oral mucosa, PERRL, EOMI	  Lymphatic: No lymphadenopathy  Cardiovascular: Normal S1 S2,RRR, No JVD, No murmurs  Respiratory: Lungs clear to auscultation	  Gastrointestinal:  Soft, Non-tender, + BS	  Skin: No rashes, No ecchymoses, No cyanosis	  Neurologic: Non-focal  Extremities: Normal range of motion, No clubbing, cyanosis or edema  Vascular: Peripheral pulses palpable 2+ bilaterally      ECG:  	  < from: 12 Lead ECG (04.06.24 @ 11:15) >  Diagnosis Line Junctional rhythm  Rightward axis  Left ventricular hypertrophy with QRS widening  Nonspecific T wave abnormality  Abnormal ECG    Confirmed by GUNNAR SALINAS MD ([id number]) on 4/7/2024 6:26:29 AM    < end of copied text >    	  LABS:	 	    CARDIAC MARKERS:                                    8.7    6.31  )-----------( 179      ( 08 Apr 2024 05:36 )             25.9     04-08    138  |  106  |  47<H>  ----------------------------<  95  4.5   |  22  |  2.0<H>    Ca    8.0<L>      08 Apr 2024 05:36  Phos  4.5     04-08  Mg     1.9     04-08    TPro  5.8<L>  /  Alb  2.8<L>  /  TBili  0.4  /  DBili  x   /  AST  57<H>  /  ALT  31  /  AlkPhos  179<H>  04-08    PT/INR - ( 06 Apr 2024 11:45 )   PT: 13.90 sec;   INR: 1.22 ratio         PTT - ( 06 Apr 2024 11:45 )  PTT:44.0 sec

## 2024-04-09 DIAGNOSIS — J96.01 ACUTE RESPIRATORY FAILURE WITH HYPOXIA: ICD-10-CM

## 2024-04-09 DIAGNOSIS — N17.9 ACUTE KIDNEY FAILURE, UNSPECIFIED: ICD-10-CM

## 2024-04-09 DIAGNOSIS — I46.9 CARDIAC ARREST, CAUSE UNSPECIFIED: ICD-10-CM

## 2024-04-09 DIAGNOSIS — Z51.5 ENCOUNTER FOR PALLIATIVE CARE: ICD-10-CM

## 2024-04-09 LAB
ALBUMIN SERPL ELPH-MCNC: 2.7 G/DL — LOW (ref 3.5–5.2)
ALP SERPL-CCNC: 160 U/L — HIGH (ref 30–115)
ALT FLD-CCNC: 25 U/L — SIGNIFICANT CHANGE UP (ref 0–41)
ANION GAP SERPL CALC-SCNC: 10 MMOL/L — SIGNIFICANT CHANGE UP (ref 7–14)
AST SERPL-CCNC: 66 U/L — HIGH (ref 0–41)
BASE EXCESS BLDA CALC-SCNC: -2 MMOL/L — SIGNIFICANT CHANGE UP (ref -2–3)
BASE EXCESS BLDA CALC-SCNC: -3.8 MMOL/L — LOW (ref -2–3)
BILIRUB SERPL-MCNC: 0.2 MG/DL — SIGNIFICANT CHANGE UP (ref 0.2–1.2)
BUN SERPL-MCNC: 41 MG/DL — HIGH (ref 10–20)
CALCIUM SERPL-MCNC: 7.8 MG/DL — LOW (ref 8.4–10.5)
CHLORIDE SERPL-SCNC: 111 MMOL/L — HIGH (ref 98–110)
CK SERPL-CCNC: 200 U/L — SIGNIFICANT CHANGE UP (ref 0–225)
CO2 SERPL-SCNC: 21 MMOL/L — SIGNIFICANT CHANGE UP (ref 17–32)
CREAT SERPL-MCNC: 2.1 MG/DL — HIGH (ref 0.7–1.5)
EGFR: 24 ML/MIN/1.73M2 — LOW
FUNGITELL: 31 PG/ML — SIGNIFICANT CHANGE UP
GLUCOSE BLDC GLUCOMTR-MCNC: 138 MG/DL — HIGH (ref 70–99)
GLUCOSE BLDC GLUCOMTR-MCNC: 59 MG/DL — LOW (ref 70–99)
GLUCOSE BLDC GLUCOMTR-MCNC: 77 MG/DL — SIGNIFICANT CHANGE UP (ref 70–99)
GLUCOSE BLDC GLUCOMTR-MCNC: 86 MG/DL — SIGNIFICANT CHANGE UP (ref 70–99)
GLUCOSE BLDC GLUCOMTR-MCNC: 92 MG/DL — SIGNIFICANT CHANGE UP (ref 70–99)
GLUCOSE SERPL-MCNC: 81 MG/DL — SIGNIFICANT CHANGE UP (ref 70–99)
HCO3 BLDA-SCNC: 23 MMOL/L — SIGNIFICANT CHANGE UP (ref 21–28)
HCO3 BLDA-SCNC: 24 MMOL/L — SIGNIFICANT CHANGE UP (ref 21–28)
HCT VFR BLD CALC: 25.3 % — LOW (ref 37–47)
HGB BLD-MCNC: 8.1 G/DL — LOW (ref 12–16)
HOROWITZ INDEX BLDA+IHG-RTO: 40 — SIGNIFICANT CHANGE UP
MCHC RBC-ENTMCNC: 26.3 PG — LOW (ref 27–31)
MCHC RBC-ENTMCNC: 32 G/DL — SIGNIFICANT CHANGE UP (ref 32–37)
MCV RBC AUTO: 82.1 FL — SIGNIFICANT CHANGE UP (ref 81–99)
MPO AB + PR3 PNL SER: SIGNIFICANT CHANGE UP
NRBC # BLD: 0 /100 WBCS — SIGNIFICANT CHANGE UP (ref 0–0)
PCO2 BLDA: 40 MMHG — SIGNIFICANT CHANGE UP (ref 32–45)
PCO2 BLDA: 57 MMHG — HIGH (ref 32–45)
PH BLDA: 7.24 — LOW (ref 7.35–7.45)
PH BLDA: 7.37 — SIGNIFICANT CHANGE UP (ref 7.35–7.45)
PLATELET # BLD AUTO: 153 K/UL — SIGNIFICANT CHANGE UP (ref 130–400)
PMV BLD: 11.3 FL — HIGH (ref 7.4–10.4)
PO2 BLDA: 172 MMHG — HIGH (ref 83–108)
PO2 BLDA: 198 MMHG — HIGH (ref 83–108)
POTASSIUM SERPL-MCNC: 4.7 MMOL/L — SIGNIFICANT CHANGE UP (ref 3.5–5)
POTASSIUM SERPL-SCNC: 4.7 MMOL/L — SIGNIFICANT CHANGE UP (ref 3.5–5)
PROCALCITONIN SERPL-MCNC: 10 NG/ML — HIGH (ref 0.02–0.1)
PROT SERPL-MCNC: 5.5 G/DL — LOW (ref 6–8)
RAPID RVP RESULT: SIGNIFICANT CHANGE UP
RBC # BLD: 3.08 M/UL — LOW (ref 4.2–5.4)
RBC # FLD: 16.9 % — HIGH (ref 11.5–14.5)
SAO2 % BLDA: 99.3 % — HIGH (ref 94–98)
SAO2 % BLDA: 99.9 % — HIGH (ref 94–98)
SARS-COV-2 RNA SPEC QL NAA+PROBE: SIGNIFICANT CHANGE UP
SODIUM SERPL-SCNC: 142 MMOL/L — SIGNIFICANT CHANGE UP (ref 135–146)
TROPONIN T, HIGH SENSITIVITY RESULT: 64 NG/L — CRITICAL HIGH (ref 6–13)
WBC # BLD: 5.79 K/UL — SIGNIFICANT CHANGE UP (ref 4.8–10.8)
WBC # FLD AUTO: 5.79 K/UL — SIGNIFICANT CHANGE UP (ref 4.8–10.8)

## 2024-04-09 PROCEDURE — 99233 SBSQ HOSP IP/OBS HIGH 50: CPT

## 2024-04-09 PROCEDURE — 99291 CRITICAL CARE FIRST HOUR: CPT

## 2024-04-09 PROCEDURE — 99223 1ST HOSP IP/OBS HIGH 75: CPT | Mod: 95

## 2024-04-09 RX ORDER — LABETALOL HCL 100 MG
100 TABLET ORAL THREE TIMES A DAY
Refills: 0 | Status: DISCONTINUED | OUTPATIENT
Start: 2024-04-09 | End: 2024-04-09

## 2024-04-09 RX ORDER — LISINOPRIL 2.5 MG/1
20 TABLET ORAL DAILY
Refills: 0 | Status: DISCONTINUED | OUTPATIENT
Start: 2024-04-09 | End: 2024-04-24

## 2024-04-09 RX ORDER — LABETALOL HCL 100 MG
10 TABLET ORAL ONCE
Refills: 0 | Status: COMPLETED | OUTPATIENT
Start: 2024-04-09 | End: 2024-04-09

## 2024-04-09 RX ORDER — LABETALOL HCL 100 MG
100 TABLET ORAL THREE TIMES A DAY
Refills: 0 | Status: DISCONTINUED | OUTPATIENT
Start: 2024-04-09 | End: 2024-04-25

## 2024-04-09 RX ORDER — LABETALOL HCL 100 MG
20 TABLET ORAL ONCE
Refills: 0 | Status: COMPLETED | OUTPATIENT
Start: 2024-04-09 | End: 2024-04-09

## 2024-04-09 RX ORDER — SODIUM CHLORIDE 9 MG/ML
1000 INJECTION, SOLUTION INTRAVENOUS
Refills: 0 | Status: DISCONTINUED | OUTPATIENT
Start: 2024-04-09 | End: 2024-04-10

## 2024-04-09 RX ORDER — LISINOPRIL 2.5 MG/1
20 TABLET ORAL DAILY
Refills: 0 | Status: DISCONTINUED | OUTPATIENT
Start: 2024-04-09 | End: 2024-04-09

## 2024-04-09 RX ORDER — LABETALOL HCL 100 MG
20 TABLET ORAL
Refills: 0 | Status: DISCONTINUED | OUTPATIENT
Start: 2024-04-09 | End: 2024-04-14

## 2024-04-09 RX ORDER — LABETALOL HCL 100 MG
10 TABLET ORAL
Refills: 0 | Status: DISCONTINUED | OUTPATIENT
Start: 2024-04-09 | End: 2024-04-09

## 2024-04-09 RX ADMIN — CHLORHEXIDINE GLUCONATE 15 MILLILITER(S): 213 SOLUTION TOPICAL at 18:06

## 2024-04-09 RX ADMIN — Medication 100 MILLIGRAM(S): at 13:32

## 2024-04-09 RX ADMIN — Medication 20 MILLIGRAM(S): at 16:59

## 2024-04-09 RX ADMIN — PIPERACILLIN AND TAZOBACTAM 25 GRAM(S): 4; .5 INJECTION, POWDER, LYOPHILIZED, FOR SOLUTION INTRAVENOUS at 05:35

## 2024-04-09 RX ADMIN — SODIUM CHLORIDE 75 MILLILITER(S): 9 INJECTION, SOLUTION INTRAVENOUS at 14:07

## 2024-04-09 RX ADMIN — HEPARIN SODIUM 5000 UNIT(S): 5000 INJECTION INTRAVENOUS; SUBCUTANEOUS at 05:35

## 2024-04-09 RX ADMIN — Medication 20 MILLIGRAM(S): at 18:16

## 2024-04-09 RX ADMIN — CHLORHEXIDINE GLUCONATE 15 MILLILITER(S): 213 SOLUTION TOPICAL at 05:35

## 2024-04-09 RX ADMIN — Medication 100 MILLIGRAM(S): at 10:26

## 2024-04-09 RX ADMIN — PIPERACILLIN AND TAZOBACTAM 25 GRAM(S): 4; .5 INJECTION, POWDER, LYOPHILIZED, FOR SOLUTION INTRAVENOUS at 14:10

## 2024-04-09 RX ADMIN — HEPARIN SODIUM 5000 UNIT(S): 5000 INJECTION INTRAVENOUS; SUBCUTANEOUS at 18:06

## 2024-04-09 RX ADMIN — PANTOPRAZOLE SODIUM 40 MILLIGRAM(S): 20 TABLET, DELAYED RELEASE ORAL at 12:00

## 2024-04-09 RX ADMIN — Medication 20 MILLIGRAM(S): at 15:42

## 2024-04-09 RX ADMIN — Medication 10 MILLIGRAM(S): at 14:03

## 2024-04-09 RX ADMIN — Medication 100 MILLIGRAM(S): at 21:04

## 2024-04-09 RX ADMIN — Medication 10 MILLIGRAM(S): at 08:24

## 2024-04-09 RX ADMIN — PIPERACILLIN AND TAZOBACTAM 25 GRAM(S): 4; .5 INJECTION, POWDER, LYOPHILIZED, FOR SOLUTION INTRAVENOUS at 21:04

## 2024-04-09 RX ADMIN — SODIUM CHLORIDE 75 MILLILITER(S): 9 INJECTION, SOLUTION INTRAVENOUS at 21:05

## 2024-04-09 RX ADMIN — LISINOPRIL 20 MILLIGRAM(S): 2.5 TABLET ORAL at 15:48

## 2024-04-09 RX ADMIN — CHLORHEXIDINE GLUCONATE 1 APPLICATION(S): 213 SOLUTION TOPICAL at 05:41

## 2024-04-09 RX ADMIN — Medication 100 MICROGRAM(S): at 05:36

## 2024-04-09 NOTE — PROGRESS NOTE ADULT - ASSESSMENT
IMPRESSION:    Acute Hypoxemic Respiratory Failure on MV  SP CPA downtime 30 minutes   Cardiac bradycardia   Lung Fibrosis   GIUSEPPE  HO Raynaud's  HO PRESS    HO Anemia  HO Pancreatic cancer     PLAN:    CNS:  SAT. wean fentanyl, EEG noted, Neurology recommendations noted, rewarming     HEENT: oral and ET care     PULMONARY: keep pox >92%   monitor peak and palteau  CT Chest noted    CARDIOVASCULAR: trend CE, CK level downtrending, Cardiology evaluation, 2D-Echo, D51/2NS@75cc/hr, resume home labetalol dose     RENAL: follow is and os, trend Cr, Nephrology evaluation     INFECTIOUS DISEASE: procalcitonin elevated, continue Zosyn, DC Vancomycin, nasal MRSA, procalcitonin 10,. ID following     GASTRO: start tube feeds    HEMATOLOGICAL:  DVT prophylaxis.    ENDOCRINE:  Follow up FS.  Insulin protocol if needed.    MUSCULOSKELETAL: bedrest    Full Code, f/u Palliative     Left Cordis    Stack    left Radial A-line    Grave prognosis       ICU monitoring

## 2024-04-09 NOTE — CONSULT NOTE ADULT - NS ATTEST RISK PROBLEM GEN_ALL_CORE FT
[x ] 1 acute or chronic illnesses or injuries that may pose a threat to life or bodily function  2.2  [ x] Personally review and interpretation of  image or testing   2.3  [ x] Discussion of management or test interpretation with external physician/other qualified health care professional\appropriate source (not separately reported)

## 2024-04-09 NOTE — CONSULT NOTE ADULT - PROBLEM SELECTOR RECOMMENDATION 9
With sepsis in the setting of cardiac arrest, possible PNA  -continue IV antibiotics, pressors  -vent management per ICU

## 2024-04-09 NOTE — PROGRESS NOTE ADULT - SUBJECTIVE AND OBJECTIVE BOX
Patient is a 74y old  Female who presents with a chief complaint of cardiac arrest (08 Apr 2024 17:10)        Over Night Events: hypertensive overnight SP labetalol push, remains critically ill on MV, sedated on fentanyl, not on pressors, LR@70cc/hr     ICU Vital Signs Last 24 Hrs  T(C): 36.5 (09 Apr 2024 09:00), Max: 37.8 (09 Apr 2024 05:00)  T(F): 97.7 (09 Apr 2024 09:00), Max: 100.1 (09 Apr 2024 05:00)  HR: 66 (09 Apr 2024 09:00) (56 - 77)  BP: 203/82 (09 Apr 2024 08:00) (100/50 - 203/82)  BP(mean): 118 (09 Apr 2024 08:00) (72 - 118)  ABP: 188/68 (09 Apr 2024 09:00) (121/50 - 195/73)  ABP(mean): 110 (09 Apr 2024 09:00) (71 - 117)  RR: 22 (09 Apr 2024 09:00) (22 - 22)  SpO2: 100% (09 Apr 2024 09:00) (100% - 100%)    O2 Parameters below as of 09 Apr 2024 08:00  Patient On (Oxygen Delivery Method): ventilator    O2 Concentration (%): 40        CONSTITUTIONAL:   Ill appearing.     EYES:   positive corneal  Pupils equal,   Round and reactive to light.    CARDIAC:   bradycardica    RESPIRATORY:   Decreased bilateral breath sounds   No wheezing  Bilateral BS  Normal chest expansion  Not tachypneic,  No use of accessory muscles    GASTROINTESTINAL:  Abdomen soft,   Non-tender,   No guarding,   + BS      MUSCULOSKELETAL:   No clubbing, cyanosis    NEUROLOGICAL:   sedated   positive gag reflex     SKIN:   sacral stage I      04-08-24 @ 07:01  -  04-09-24 @ 07:00  --------------------------------------------------------  IN:    FentaNYL: 63 mL    FentaNYL: 100 mL    IV PiggyBack: 200 mL    Lactated Ringers: 910 mL  Total IN: 1273 mL    OUT:    Indwelling Catheter - Urethral (mL): 790 mL    Norepinephrine: 0 mL  Total OUT: 790 mL    Total NET: 483 mL      04-09-24 @ 07:01  -  04-09-24 @ 09:35  --------------------------------------------------------  IN:    FentaNYL: 12 mL    Lactated Ringers: 140 mL  Total IN: 152 mL    OUT:    Indwelling Catheter - Urethral (mL): 60 mL  Total OUT: 60 mL    Total NET: 92 mL          LABS:                            8.1    5.79  )-----------( 153      ( 09 Apr 2024 05:49 )             25.3                                               04-09    142  |  111<H>  |  41<H>  ----------------------------<  81  4.7   |  21  |  2.1<H>    Ca    7.8<L>      09 Apr 2024 05:49  Phos  4.5     04-08  Mg     1.9     04-08    TPro  5.5<L>  /  Alb  2.7<L>  /  TBili  0.2  /  DBili  x   /  AST  66<H>  /  ALT  25  /  AlkPhos  160<H>  04-09                                             Urinalysis Basic - ( 09 Apr 2024 05:49 )    Color: x / Appearance: x / SG: x / pH: x  Gluc: 81 mg/dL / Ketone: x  / Bili: x / Urobili: x   Blood: x / Protein: x / Nitrite: x   Leuk Esterase: x / RBC: x / WBC x   Sq Epi: x / Non Sq Epi: x / Bacteria: x        CARDIAC MARKERS ( 09 Apr 2024 05:59 )  x     / x     / 200 U/L / x     / x      CARDIAC MARKERS ( 08 Apr 2024 05:36 )  x     / x     / 340 U/L / x     / x                                                LIVER FUNCTIONS - ( 09 Apr 2024 05:49 )  Alb: 2.7 g/dL / Pro: 5.5 g/dL / ALK PHOS: 160 U/L / ALT: 25 U/L / AST: 66 U/L / GGT: x                                                  Culture - Urine (collected 06 Apr 2024 20:15)  Source: Clean Catch Clean Catch (Midstream)  Final Report (08 Apr 2024 16:27):    No growth    Culture - Blood (collected 06 Apr 2024 12:05)  Source: .Blood Blood-Venous  Preliminary Report (08 Apr 2024 20:02):    No growth at 48 Hours    Culture - Blood (collected 06 Apr 2024 12:05)  Source: .Blood Blood-Venous  Preliminary Report (08 Apr 2024 20:02):    No growth at 48 Hours                                                   Mode: AC/ CMV (Assist Control/ Continuous Mandatory Ventilation)  RR (machine): 22  TV (machine): 350  FiO2: 40  PEEP: 8  ITime: 0.8  MAP: 14  PIP: 34                                      ABG - ( 09 Apr 2024 05:45 )  pH, Arterial: 7.37  pH, Blood: x     /  pCO2: 40    /  pO2: 198   / HCO3: 23    / Base Excess: -2.0  /  SaO2: 99.9                MEDICATIONS  (STANDING):  chlorhexidine 0.12% Liquid 15 milliLiter(s) Oral Mucosa every 12 hours  chlorhexidine 2% Cloths 1 Application(s) Topical <User Schedule>  fentaNYL   Infusion. 0.5 MICROgram(s)/kG/Hr (2 mL/Hr) IV Continuous <Continuous>  heparin   Injectable 5000 Unit(s) SubCutaneous every 12 hours  lactated ringers. 1000 milliLiter(s) (70 mL/Hr) IV Continuous <Continuous>  levothyroxine 100 MICROGram(s) Oral daily  norepinephrine Infusion 0.05 MICROgram(s)/kG/Min (4.69 mL/Hr) IV Continuous <Continuous>  pantoprazole  Injectable 40 milliGRAM(s) IV Push daily  piperacillin/tazobactam IVPB.. 3.375 Gram(s) IV Intermittent every 8 hours    MEDICATIONS  (PRN):

## 2024-04-09 NOTE — CHART NOTE - NSCHARTNOTEFT_GEN_A_CORE
Registered Dietitian Follow-Up     Patient Profile Reviewed                           Yes [X]   No []     Nutrition History Previously Obtained        Yes []  No []       Pertinent  Information:     pt is 74-year-old female with PMhx of pancreatic CA s/p Whipple, CKD stage 3, HTN, COPD, pulmonary fibrosis, PRESS syndrome (hospitalized in 2019), Raynaud's syndrome, ANCA vasculitis, Scleroderma, patient was hospitalized back in Dec 2023 in [place] for 18 days for multifocal pneumonia 2/2 human metapneumovirus.   presents to ED due to cardiac arrest. As per GI no concern for cholangitis clinically pt has normal bilirubin and has no jaundice to concern for cholangitis mild transaminitis is downtrending and could be due to CPR  Pt remains intubated to vent, as per medical team pt to begin EN today, RD recommendation below, presently infusing at 10 ml/hr.     Diet, NPO with Tube Feed:   Tube Feeding Modality: Orogastric  Osmolite 1.2 Lei (OSMOLITERTH)  Total Volume for 24 Hours (mL): 1080  Continuous  Starting Tube Feed Rate {mL per Hour}: 10  Until Goal Tube Feed Rate (mL per Hour): 45  Tube Feed Duration (in Hours): 24  Tube Feed Start Time: 12:00  Free Water Flush  Free Water Flush Instructions:  150 Q8hr (04-09-24 @ 11:48) [Active]       Anthropometrics:  - Ht. 154.9 cm   - Wt.  39.6 kgs today vs 37.9 kgs upon admission   - %wt change  - BMI   - IBW        04-09    142  |  111<H>  |  41<H>  ----------------------------<  81  4.7   |  21  |  2.1<H>    Ca    7.8<L>      09 Apr 2024 05:49  Phos  4.5     04-08  Mg     1.9     04-08    TPro  5.5<L>  /  Alb  2.7<L>  /  TBili  0.2  /  DBili  x   /  AST  66<H>  /  ALT  25  /  AlkPhos  160<H>  04-09                            8.1    5.79  )-----------( 153      ( 09 Apr 2024 05:49 )             25.3       MEDICATIONS  (STANDING):  dextrose 5% + sodium chloride 0.45%. 1000 milliLiter(s) (75 mL/Hr) IV Continuous <Continuous>  fentaNYL   Infusion. 0.5 MICROgram(s)/kG/Hr (2 mL/Hr) IV Continuous <Continuous>  labetalol 100 milliGRAM(s) Oral three times a day  levothyroxine 100 MICROGram(s) Oral daily  lisinopril 20 milliGRAM(s) Oral daily  norepinephrine Infusion 0.05 MICROgram(s)/kG/Min (4.69 mL/Hr) IV Continuous <Continuous>  pantoprazole  Injectable 40 milliGRAM(s) IV Push daily  piperacillin/tazobactam IVPB.. 3.375 Gram(s) IV Intermittent every 8 hours    MEDICATIONS  (PRN):  labetalol Injectable 20 milliGRAM(s) IV Push every 1 hour PRN Systolic blood pressure >       Physical Findings:  - Appearance: intubated to vent   - GI function: +BS   - Tubes: OGT   - Oral/Mouth cavity:  - Skin: sacral spine stage I      Nutrition Requirements  Weight Used:  37.9 kgs     Estimated Energy Needs:  2942-9323 kcals (35-40 kcal/kg/BW)  45-53g protein (1.2-1.4g/kg/BW)  1;1 kcal for estimated fluid needs 2/2 elevated BUN noted         Nutrient Intake: presently receiving Osmolite 1.2 at 10 ml/hr tolerating thus far, at goal will provide pt with 1080 ml + 450 ml flushes, 1296 kcals and 59g protein meeting >80% of estimated needs        [] Previous Nutrition Diagnosis:            [X] Ongoing          [] Resolved    inadequate pro-energy intake remains      Nutrition Intervention: maintain on present feeds continue to advance as tolerated to goal     Goal/Expected Outcome: pt to tolerate EN and meet at least 80% of estimated nutrient needs within 3-5 days     Indicator/Monitoring: RD to monitor tolerance to EN, labs/meds, NFPF and f/u as needed within 3 days  high risk

## 2024-04-09 NOTE — CONSULT NOTE ADULT - SUBJECTIVE AND OBJECTIVE BOX
CC: cardiac arrest    HPI:  74-year-old female with PHMx of pancreatic CA s/p Whipple, CKD stage 3, HTN, COPD, pulmonary fibrosis, PRESS syndrome (hospitalized in 2019), Raynaud's syndrome, ANCA vasculitis, Scleroderma  presents to ED due to cardiac arrest. Pt lives at home with son and daughter, went to use the bathroom. Daughter notes that she heard the patient's walker scratching the floor, went upstairs to see what was going on. Daughter noticed patient was slumped over and struggling to get onto the toilet. Daughter helped patient onto the toilet, but noticed that the patient became less responsive and alert so she called EMS. Patient was found down by EMS, intubated her in the field, and resuscitated her with compressions and 4 rounds of epinephrine. ROSC achieved within a minute of her arrival to the ED. Patient was found to be hypothermic and bradycardic. Of note, patient was hospitalized back in Dec 2023 in [facility] for 18 days for multifocal pneumonia 2/2 human metapneumovirus. Patient also was hospitalized in 2019 for PRESS syndrome. Pt had a negative stress test in Sept 2023. She follows pulmonology for pulmonary fibrosis, had recent PFTs done.     ED vitals and workup:  BP 90/57, HR 88, Temp 94.1F, satting 100% on ventilator   Labs: Hgb 8.7, Trop 56 -->79, K 5.3, Lactate 3.5, Lipase 256, , , ALT 52  VBG: pH 7.12, pCO2 67, Lactate 6.6    CXR bilateral opacities. cardiomegaly  CTH: mild atrophic changes  CT angio chest PE, A/P w/ IV cont: Bilary ductal enhancement s/p CCY. Diffuse fibrotic changes of lungs. Trace bilateral effusions. NO PE.    s/p atropine 1mg x4 doses, calcium gluconate 3g, [id number] x1, insulin 10 units, cefepime 1g in the ED.     Admitted to MICU for unresponsiveness s/p cardiac arrest.     (06 Apr 2024 15:43)    PERTINENT PM/SXH:   H/O vasculitis    Pancreatic cancer    History of COPD    Hypertension    H/O Raynaud's syndrome    Stage 3 chronic kidney disease    History of knee replacement    H/O Whipple procedure        FAMILY HISTORY:  None pertinent    ITEMS NOT CHECKED ARE NOT PRESENT    SOCIAL HISTORY:   Significant other/partner[ ]  Children[ ]  Gnosticism/Spirituality:  Substance hx:  [ ]   Tobacco hx:  [ ]   Alcohol hx: [ ]   Living Situation: [x ]Home  [ ]Long term care  [ ]Rehab [ ]Other  Home Services: [ ] HHA [ ] Visting RN [ ] Hospice  Occupation:  Home Opioid hx:  [ ] Y [ ] N [x ] I-Stop Reference No:    Reference #: [id number]      Patient Demographic Information (PDI)       PDI	First Name	Last Name	Birth Date	Gender	Street Address	City	State	Zip Code  MANAV Sr	[place]	1950	Female	[address] 	[place]	[id number]    Prescription Information      PDI Filter:    PDI	Current Rx	Drug Type	Rx Written	Rx Dispensed	Drug	Quantity	Days Supply	Prescriber Name	Prescriber JACK #	Payment Method	Dispenser  A	N		08/07/2023	11/10/2023	lyrica 150 mg capsule	180	90	PerelStephan MD	[id number]	Medicare	[id number]  A	N		08/07/2023	08/08/2023	lyrica 150 mg capsule	180	90	PerelStephan MD	[id number]	Medicare	[id number]     ADVANCE DIRECTIVES:     [ x] Full Code [ ] DNR  MOLST  [ ]  Living Will  [ ]   DECISION MAKER(s):  [ ] Health Care Proxy(s)  [ x] Surrogate(s)  [ ] Guardian           Name(s): Phone Number(s):   Children      BASELINE (I)ADL(s) (prior to admission):    Cache: [ ]Total  [ ] Moderate [ ]Dependent  Palliative Performance Status Version 2:         %    http://[email or web address]/files/news/palliative_performance_scale_[email or web address].pdf    Allergies    celecoxib (Rash)  Originally Entered as [U UNKNOWN] reaction to [celebrit] (Unknown)    Intolerances    MEDICATIONS  (STANDING):  chlorhexidine 0.12% Liquid 15 milliLiter(s) Oral Mucosa every 12 hours  chlorhexidine 2% Cloths 1 Application(s) Topical <User Schedule>  dextrose 5% + sodium chloride 0.45%. 1000 milliLiter(s) (75 mL/Hr) IV Continuous <Continuous>  fentaNYL   Infusion. 0.5 MICROgram(s)/kG/Hr (2 mL/Hr) IV Continuous <Continuous>  heparin   Injectable 5000 Unit(s) SubCutaneous every 12 hours  labetalol 100 milliGRAM(s) Oral three times a day  levothyroxine 100 MICROGram(s) Oral daily  lisinopril 20 milliGRAM(s) Oral daily  norepinephrine Infusion 0.05 MICROgram(s)/kG/Min (4.69 mL/Hr) IV Continuous <Continuous>  pantoprazole  Injectable 40 milliGRAM(s) IV Push daily  piperacillin/tazobactam IVPB.. 3.375 Gram(s) IV Intermittent every 8 hours    MEDICATIONS  (PRN):    PRESENT SYMPTOMS: [ x]Unable to obtain due to poor mentation   Source if other than patient:  [ ]Family   [ ]Team     Pain: [ ]yes [ ]no  ALL PAIN ASSESSMENT COMPONENTS WERE ASKED ABOUT UNLESS OTHERWISE NOTED  QOL impact -   Location -                    Aggravating factors -  Quality -  Radiation -  Timing-  Severity (0-10 scale):  Minimal acceptable level (0-10 scale):     CPOT:  1  https://www.[facility].org/getattachment/[email or web address]-[email or web address]-[email or web address]/Critical-Care-Pain-Observation-Tool-(CPOT)    PAIN AD Score:   http://geriatrictoolkit.[facility]/cog/painad.pdf (press ctrl +  left click to view)    Dyspnea:                           [ ]None[ ]Mild [ ]Moderate [ ]Severe     Respiratory Distress Observation Scale (RDOS): 2  A score of 0 to 2 signifies little or no respiratory distress, 3 signifies mild distress, scores 4 to 6 indicate moderate distress, and scores greater than 7 signify severe distress  https://www.[facility].ca/sites/default/files/PDFS/935236-esisyzubmwu-wkcbfqom-fbuenbczxqr-ijecb.pdf    Anxiety:                             [ ]None[ ]Mild [ ]Moderate [ ]Severe   Fatigue:                             [ ]None[ ]Mild [ ]Moderate [ ]Severe   Nausea:                             [ ]None[ ]Mild [ ]Moderate [ ]Severe   Loss of appetite:              [ ]None[ ]Mild [ ]Moderate [ ]Severe   Constipation:                    [ ]None[ ]Mild [ ]Moderate [ ]Severe    Other Symptoms:  [ ]All other review of systems negative     Palliative Performance Status Version 2:         %    http://[facility].org/files/news/palliative_performance_scale_ppsv2.pdf    PHYSICAL EXAM:  Vital Signs Last 24 Hrs  T(C): 37.7 (09 Apr 2024 14:00), Max: 37.8 (09 Apr 2024 05:00)  T(F): 99.9 (09 Apr 2024 14:00), Max: 100.1 (09 Apr 2024 05:00)  HR: 60 (09 Apr 2024 14:00) (56 - 77)  BP: 196/81 (09 Apr 2024 14:00) (100/50 - 203/82)  BP(mean): 116 (09 Apr 2024 14:00) (72 - 118)  RR: 22 (09 Apr 2024 14:00) (12 - 22)  SpO2: 100% (09 Apr 2024 14:00) (100% - 100%)    Parameters below as of 09 Apr 2024 10:00  Patient On (Oxygen Delivery Method): ventilator    O2 Concentration (%): 40 I&O's Summary    08 Apr 2024 07:01  -  09 Apr 2024 07:00  --------------------------------------------------------  IN: 1273 mL / OUT: 790 mL / NET: 483 mL    09 Apr 2024 07:01  -  09 Apr 2024 15:10  --------------------------------------------------------  IN: 792 mL / OUT: 310 mL / NET: 482 mL        GENERAL:  [ ] No acute distress [ ]Lethargic  [x ]Unarousable  [ ]Verbal  [ ]Non-Verbal [ ]Cachexia    BEHAVIORAL/PSYCH:  [ ]Alert and Oriented x  [ ] Anxiety [ ] Delirium [ ] Agitation [x ] Calm   EYES: [ ] No scleral icterus [ ] Scleral icterus [ x] Closed  ENMT:  [ ]Dry mouth  [x ]No external oral lesions [ ] No external ear or nose lesions  CARDIOVASCULAR:  [ ]Regular [ ]Irregular [ ]Tachy [x ]Not Tachy  [ ]Jayy [ ] Edema [ ] No edema  PULMONARY:  [ ]Tachypnea  [ ]Audible excessive secretions [ x] No labored breathing [ ] labored breathing  GASTROINTESTINAL: [ ]Soft  [ ]Distended  [ x]Not distended [ ]Non tender [ ]Tender  MUSCULOSKELETAL: [ ]No clubbing [ ] clubbing  [x ] No cyanosis [ ] cyanosis  NEUROLOGIC: [ ]No focal deficits  [ ]Follows commands  [ x]Does not follow commands  [ ]Cognitive impairment  [ ]Dysphagia  [ ]Dysarthria  [ ]Paresis   SKIN: [ ] Jaundiced [x ] Non-jaundiced [ ]Rash [ ]No Rash [ ] Warm [ ] Dry  MISC/LINES: [ ] ET tube [ ] Trach [ ]NGT/OGT [ ]PEG [ ]Stack    LABS: reviewed by me                        8.1    5.79  )-----------( 153      ( 09 Apr 2024 05:49 )             25.3   04-09    142  |  111<H>  |  41<H>  ----------------------------<  81  4.7   |  21  |  2.1<H>    Ca    7.8<L>      09 Apr 2024 05:49  Phos  4.5     04-08  Mg     1.9     04-08    TPro  5.5<L>  /  Alb  2.7<L>  /  TBili  0.2  /  DBili  x   /  AST  66<H>  /  ALT  25  /  AlkPhos  160<H>  04-09      Urinalysis Basic - ( 09 Apr 2024 05:49 )    Color: x / Appearance: x / SG: x / pH: x  Gluc: 81 mg/dL / Ketone: x  / Bili: x / Urobili: x   Blood: x / Protein: x / Nitrite: x   Leuk Esterase: x / RBC: x / WBC x   Sq Epi: x / Non Sq Epi: x / Bacteria: x      RADIOLOGY & ADDITIONAL STUDIES: reviewed by me    < from: Xray Chest 1 View-PORTABLE IMMEDIATE (Xray Chest 1 View-PORTABLE IMMEDIATE .) (04.08.24 @ 08:38) >  Impression:    Tip of the orogastric tube is seen overlying left abdomen    Stable bilateral lung opacities    < end of copied text >      EKG: reviewed by me    < from: 12 Lead ECG (04.08.24 @ 13:48) >    Ventricular Rate 63 BPM    Atrial Rate 63 BPM    P-R Interval 168 ms    QRS Duration 132 ms    Q-T Interval 442 ms    QTC Calculation(Bazett) 452 ms    P Axis 52 degrees    R Axis 149 degrees    T Axis 56 degrees    Diagnosis Line Normal sinus rhythm  Right bundle branch block  Left posterior fascicular block  *** Bifascicular block ***  Abnormal ECG    < end of copied text >      PROTEIN CALORIE MALNUTRITION PRESENT: [ ]mild [ ]moderate [ ]severe [ ]underweight [ ]morbid obesity  https://www.andeal.org/vault/2440/web/files/ONC/Table_Clinical%20Characteristics%20to%20Document%20Malnutrition-White%20JV%20et%20al%202012.pdf    Height (cm): 154.9 (04-06-24 @ 16:20)  Weight (kg): 40 (04-06-24 @ 16:20)  BMI (kg/m2): 16.7 (04-06-24 @ 16:20)  [ ]PPSV2 < or = to 30% [ ]significant weight loss  [ ]poor nutritional intake  [ ]anasarca      [ ]Artificial Nutrition      Palliative Care Spiritual/Emotional Screening Tool Question  Severity (0-4):                    OR                    [ x] Unable to determine. Will assess at later time if appropriate.  Score of 2 or greater indicates recommendation of Chaplaincy and/or SW referral  Chaplaincy Referral: [ ] Yes [ ] Refused [ ] Following     Caregiver Dexter:  [ ] Yes [ ] No    OR    [x ] Unable to determine. Will assess at later time if appropriate.  Social Work Referral [ ]  Patient and Family Centered Care Referral [ ]    Anticipatory Grief Present: [ ] Yes [ ] No    OR     [ x] Unable to determine. Will assess at later time if appropriate.  Social Work Referral [ ]  Patient and Family Centered Care Referral [ ]    Patient discussed with primary medical team MD  Palliative care education provided to patient and/or family

## 2024-04-09 NOTE — PROGRESS NOTE ADULT - SUBJECTIVE AND OBJECTIVE BOX
Patient seen and evaluated this am, sedated on ventilator with fentanyl      T(F): 99 (04-09-24 @ 17:00), Max: 100.1 (04-09-24 @ 05:00)  HR: 63 (04-09-24 @ 17:00)  BP: 161/72 (04-09-24 @ 17:00)  RR: 22 (04-09-24 @ 17:00)  SpO2: 100% (04-09-24 @ 17:00) (100% - 100%)    PHYSICAL EXAM:  GENERAL: NAD  HEAD:  Atraumatic, Normocephalic  EYES: EOMI, PERRLA, conjunctiva and sclera clear  NERVOUS SYSTEM:  positive corneal reflex  CHEST/LUNG:  bilateral rhonchi  HEART: Regular rate and rhythm; No murmurs, rubs, or gallops  ABDOMEN: Soft, Nontender, Nondistended; Bowel sounds present  EXTREMITIES:  2+ Peripheral Pulses, No clubbing, cyanosis, or edema    LABS  04-09    142  |  111<H>  |  41<H>  ----------------------------<  81  4.7   |  21  |  2.1<H>    Ca    7.8<L>      09 Apr 2024 05:49  Phos  4.5     04-08  Mg     1.9     04-08    TPro  5.5<L>  /  Alb  2.7<L>  /  TBili  0.2  /  DBili  x   /  AST  66<H>  /  ALT  25  /  AlkPhos  160<H>  04-09                          8.1    5.79  )-----------( 153      ( 09 Apr 2024 05:49 )             25.3       Mode: AC/ CMV (Assist Control/ Continuous Mandatory Ventilation)  RR (machine): 22  TV (machine): 350  FiO2: 40  PEEP: 8      CARDIAC ENZYMES  Creatine Kinase, Serum: 200 (04-09 @ 05:59)  Creatine Kinase, Serum: 340 (04-08 @ 05:36)      < from: TTE Echo Complete w/o Contrast w/ Doppler (04.08.24 @ 09:36) >  Summary:   1. LV Ejection Fraction by Carpenter's Method with a biplane EF of 61 %.   2. Increased LV wall thickness.   3. Normal left atrial size.   4. Mild mitral valve regurgitation.   5. Mild tricuspid regurgitation.   6. Mild aortic regurgitation.   7. Normal trileaflet aortic valve with normal opening.   8. Ascending aorta 3.7 cm.    < end of copied text >      Culture Results:   No growth (04-06-24)  Culture Results:   No growth at 48 Hours (04-06-24)  Culture Results:   No growth at 48 Hours (04-06-24)    RADIOLOGY  < from: Xray Chest 1 View-PORTABLE IMMEDIATE (Xray Chest 1 View-PORTABLE IMMEDIATE .) (04.08.24 @ 08:38) >  Stable bilateral lung opacities    < end of copied text >  < from: CT Head No Cont (04.06.24 @ 14:11) >  IMPRESSION:    Mild atrophic changes.    < end of copied text >    MEDICATIONS  (STANDING):  chlorhexidine 0.12% Liquid 15 milliLiter(s) Oral Mucosa every 12 hours  chlorhexidine 2% Cloths 1 Application(s) Topical <User Schedule>  dextrose 5% + sodium chloride 0.45%. 1000 milliLiter(s) (75 mL/Hr) IV Continuous <Continuous>  fentaNYL   Infusion. 0.5 MICROgram(s)/kG/Hr (2 mL/Hr) IV Continuous <Continuous>  heparin   Injectable 5000 Unit(s) SubCutaneous every 12 hours  labetalol 100 milliGRAM(s) Oral three times a day  levothyroxine 100 MICROGram(s) Oral daily  lisinopril 20 milliGRAM(s) Oral daily  pantoprazole  Injectable 40 milliGRAM(s) IV Push daily  piperacillin/tazobactam IVPB.. 3.375 Gram(s) IV Intermittent every 8 hours    MEDICATIONS  (PRN):  labetalol Injectable 20 milliGRAM(s) IV Push every 1 hour PRN Systolic blood pressure >

## 2024-04-09 NOTE — PROGRESS NOTE ADULT - ASSESSMENT
Assessment and Plan:     IMPRESSION:    Acute Hypoxemic Respiratory Failure  SP CPA downtime 30 minutes   Cardiac bradycardia   Lung fibrosis   GIUSEPPE  ? Cholangitis   HO Raynaud's  HO PRESS    HO Anemia  HO Pancreatic cancer     PLAN:    CNS:    - SAT daily  - D/C Propofol  - wean fentanyl; currently off sedation  - Repeat vEEG 24 hour pending  - vEEG, (4/7): severe generalized slowing  - f/u Neurology recommendation  - rewarming     HEENT:   - oral and ET care     PULMONARY:   - keep pox >92%   - monitor peak and plateau    CT Chest noted  - decrease TV to 6cc/IBW    CARDIOVASCULAR:  - f/u Troponin   - CK downtrend   - Cardio recommendations pending  - Cardiology evaluation  - Echo (4/8): EF of 61 %  - D5 1/2NS @75cc/hr     RENAL:   - Monitor urine is and os,   - trend Cr    INFECTIOUS DISEASE:   - trend procalcitonin (4.49>10)  - continue Zosyn  - D/C Vancomycin  - ID evaluation  - check nasal MRSA  - full RVP      HEMATOLOGICAL:    - DVT prophylaxis.    ENDOCRINE:   - Follow up FS; Insulin protocol if needed.    MUSCULOSKELETAL: bedrest    Misc:  - Full Code  - Palliative Consulted; f/u recommendations     Lines:   - Left Cordis  - Stack  - left Radial A-line    ICU monitoring  Poor Prognosis

## 2024-04-09 NOTE — CONSULT NOTE ADULT - CONVERSATION DETAILS
Called and spoke with daughter Obdulia. Palliative care introduced. She was able to provide a medical history and hospital course. She noted that she was waiting on more information from the team and from neurology about expectations and prognosis moving forward. We discussed code status, including DNR.  She noted that she wished to hear about neuroprognosis prior to additional decisions about GOC. She wishes for the patient to remain Full code at this time.

## 2024-04-09 NOTE — PROGRESS NOTE ADULT - SUBJECTIVE AND OBJECTIVE BOX
KIRAN MORFIN, 74y, Female; MRN# [id number]  Hospital Stay: 3d.    Patient is a 74y old Female who presents with a chief complaint of cardiac arrest (09 Apr 2024 09:35)  Currently admitted to the ICU with the primary diagnosis of Cardiac arrest      SUBJECTIVE:  Interval/Overnight events: No acute events overnight; weaning Fentanyl    REVIEW OF SYSTEMS:  As per HPI  OBJECTIVE:  VITALS:  T(F): 97.2 (04-09-24 @ 11:03), Max: 100.1 (04-09-24 @ 05:00)  HR: 65 (04-09-24 @ 11:00) (56 - 77)  BP: 185/77 (04-09-24 @ 11:00) (100/50 - 203/82)  ABP: 191/69 (04-09-24 @ 11:00) (121/50 - 195/73)  ABP(mean): 112 (04-09-24 @ 11:00) (71 - 117)  RR: 12 (04-09-24 @ 11:03) (12 - 22)  SpO2: 100% (04-09-24 @ 11:00) (100% - 100%)    Vent Settings  Device: BellaVista, Mode: AC/ CMV (Assist Control/ Continuous Mandatory Ventilation), RR (machine): 22, TV (machine): 350, FiO2: 40, PEEP: 8, ITime: 0.8, MAP: 14, PIP: 34  Blood Gas  04-09-24 @ 05:45  pH 7.37 | pCO2 40 | pO2 198 | HCO3 23 | O2 Sat 99.9  04-09-24 @ 03:57  pH 7.24 | pCO2 57 | pO2 172 | HCO3 24 | O2 Sat 99.3    Drips  dextrose 5% + sodium chloride 0.45%. 1000 milliLiter(s) (75 mL/Hr) IV Continuous <Continuous>  fentaNYL   Infusion. 0.5 MICROgram(s)/kG/Hr (2 mL/Hr) IV Continuous <Continuous>  norepinephrine Infusion 0.05 MICROgram(s)/kG/Min (4.69 mL/Hr) IV Continuous <Continuous>    I&Os:    04-08-24 @ 07:01  -  04-09-24 @ 07:00  --------------------------------------------------------  IN: 1273 mL / OUT: 790 mL / NET: 483 mL    04-09-24 @ 07:01  -  04-09-24 @ 12:29  --------------------------------------------------------  IN: 282 mL / OUT: 210 mL / NET: 72 mL      PHYSICAL EXAM:    ACTIVE MEDICATIONS:  Standing  chlorhexidine 0.12% Liquid 15 milliLiter(s) Oral Mucosa every 12 hours  chlorhexidine 2% Cloths 1 Application(s) Topical <User Schedule>  dextrose 5% + sodium chloride 0.45%. 1000 milliLiter(s) (75 mL/Hr) IV Continuous <Continuous>  fentaNYL   Infusion. 0.5 MICROgram(s)/kG/Hr (2 mL/Hr) IV Continuous <Continuous>  heparin   Injectable 5000 Unit(s) SubCutaneous every 12 hours  labetalol 100 milliGRAM(s) Oral three times a day  labetalol 100 milliGRAM(s) Oral three times a day  levothyroxine 100 MICROGram(s) Oral daily  lisinopril 20 milliGRAM(s) Oral daily  norepinephrine Infusion 0.05 MICROgram(s)/kG/Min (4.69 mL/Hr) IV Continuous <Continuous>  pantoprazole  Injectable 40 milliGRAM(s) IV Push daily  piperacillin/tazobactam IVPB.. 3.375 Gram(s) IV Intermittent every 8 hours    PRN    LABS:  04-09-24 @ 05:49  WBC 5.79, H/H 8.1<L>/25.3<L>, plt 153  Na 142, K 4.7, Cl 111<H>, CO2 21, BUN 41<H>, Cr 2.1<H>, Glu 81    Ca 7.8<L>, Mg --, Phosphorus --    Tot Protein 5.5<L>, Alb 2.7<L>, T. Bili 0.2, D. Bili --  AST 66<H>, ALT 25, Alk phos 160<H>    04-08-24 @ 18:15  WBC --, H/H --/--, plt --  Na 138, K 4.3, Cl 107, CO2 23, BUN 43<H>, Cr 2.0<H>, Glu 215<H>    Ca 7.9<L>, Mg --, Phosphorus --    Tot Protein --, Alb --, T. Bili --, D. Bili --  AST --, ALT --, Alk phos --        04-08-24 @ 14:00:  CRP --, ESR --, Procal 10.00<H>, Ferritin --, Fungitell --, D-dimer --  04-06-24 @ 19:55:  CRP --, ESR --, Procal 4.49<H>, Ferritin 42, Fungitell --, D-dimer --    04-06-24 @ 19:55:  TSH 2.94                  CULTURES:    Culture - Urine (collected 04-06-24 @ 20:15)  Source: Clean Catch Clean Catch (Midstream)  Final Report (04-08-24 @ 16:27):    No growth    Culture - Blood (collected 04-06-24 @ 12:05)  Source: .Blood Blood-Venous  Preliminary Report (04-08-24 @ 20:02):    No growth at 48 Hours    Culture - Blood (collected 04-06-24 @ 12:05)  Source: .Blood Blood-Venous  Preliminary Report (04-08-24 @ 20:02):    No growth at 48 Hours      PENDING:    IMAGING:  Latest imaging reviewed.    ECHO:  TTE Echo Complete w/o Contrast w/ Doppler:   Transport: Stretcher-Crib  Monitor: w/ Monitor,  Transport w/ Ventilator,  Transport w/ IV Pole (04-06-24 @ 16:26)   KIRAN MORFIN, 74y, Female; MRN# [id number]  Hospital Stay: 3d.    Patient is a 74y old Female who presents with a chief complaint of cardiac arrest (09 Apr 2024 09:35)  Currently admitted to the ICU with the primary diagnosis of Cardiac arrest      SUBJECTIVE:  Interval/Overnight events: No acute events overnight; weaning Fentanyl. Became hypertensive (203/100) 10mg Labetalol IVP given and restarted home medications (Labetalol 100 TID and Lisinopril 20 QD).    REVIEW OF SYSTEMS:  As per HPI  OBJECTIVE:  VITALS:  T(F): 97.2 (04-09-24 @ 11:03), Max: 100.1 (04-09-24 @ 05:00)  HR: 65 (04-09-24 @ 11:00) (56 - 77)  BP: 185/77 (04-09-24 @ 11:00) (100/50 - 203/82)  ABP: 191/69 (04-09-24 @ 11:00) (121/50 - 195/73)  ABP(mean): 112 (04-09-24 @ 11:00) (71 - 117)  RR: 12 (04-09-24 @ 11:03) (12 - 22)  SpO2: 100% (04-09-24 @ 11:00) (100% - 100%)    Vent Settings  Device: BellaVista, Mode: AC/ CMV (Assist Control/ Continuous Mandatory Ventilation), RR (machine): 22, TV (machine): 350, FiO2: 40, PEEP: 8, ITime: 0.8, MAP: 14, PIP: 34  Blood Gas  04-09-24 @ 05:45  pH 7.37 | pCO2 40 | pO2 198 | HCO3 23 | O2 Sat 99.9  04-09-24 @ 03:57  pH 7.24 | pCO2 57 | pO2 172 | HCO3 24 | O2 Sat 99.3    Drips  dextrose 5% + sodium chloride 0.45%. 1000 milliLiter(s) (75 mL/Hr) IV Continuous <Continuous>  fentaNYL   Infusion. 0.5 MICROgram(s)/kG/Hr (2 mL/Hr) IV Continuous <Continuous>  norepinephrine Infusion 0.05 MICROgram(s)/kG/Min (4.69 mL/Hr) IV Continuous <Continuous>    I&Os:    04-08-24 @ 07:01  -  04-09-24 @ 07:00  --------------------------------------------------------  IN: 1273 mL / OUT: 790 mL / NET: 483 mL    04-09-24 @ 07:01  -  04-09-24 @ 12:29  --------------------------------------------------------  IN: 282 mL / OUT: 210 mL / NET: 72 mL      PHYSICAL EXAM:    ACTIVE MEDICATIONS:  Standing  chlorhexidine 0.12% Liquid 15 milliLiter(s) Oral Mucosa every 12 hours  chlorhexidine 2% Cloths 1 Application(s) Topical <User Schedule>  dextrose 5% + sodium chloride 0.45%. 1000 milliLiter(s) (75 mL/Hr) IV Continuous <Continuous>  fentaNYL   Infusion. 0.5 MICROgram(s)/kG/Hr (2 mL/Hr) IV Continuous <Continuous>  heparin   Injectable 5000 Unit(s) SubCutaneous every 12 hours  labetalol 100 milliGRAM(s) Oral three times a day  labetalol 100 milliGRAM(s) Oral three times a day  levothyroxine 100 MICROGram(s) Oral daily  lisinopril 20 milliGRAM(s) Oral daily  norepinephrine Infusion 0.05 MICROgram(s)/kG/Min (4.69 mL/Hr) IV Continuous <Continuous>  pantoprazole  Injectable 40 milliGRAM(s) IV Push daily  piperacillin/tazobactam IVPB.. 3.375 Gram(s) IV Intermittent every 8 hours    PRN    LABS:  04-09-24 @ 05:49  WBC 5.79, H/H 8.1<L>/25.3<L>, plt 153  Na 142, K 4.7, Cl 111<H>, CO2 21, BUN 41<H>, Cr 2.1<H>, Glu 81    Ca 7.8<L>, Mg --, Phosphorus --    Tot Protein 5.5<L>, Alb 2.7<L>, T. Bili 0.2, D. Bili --  AST 66<H>, ALT 25, Alk phos 160<H>    04-08-24 @ 18:15  WBC --, H/H --/--, plt --  Na 138, K 4.3, Cl 107, CO2 23, BUN 43<H>, Cr 2.0<H>, Glu 215<H>    Ca 7.9<L>, Mg --, Phosphorus --    Tot Protein --, Alb --, T. Bili --, D. Bili --  AST --, ALT --, Alk phos --        04-08-24 @ 14:00:  CRP --, ESR --, Procal 10.00<H>, Ferritin --, Fungitell --, D-dimer --  04-06-24 @ 19:55:  CRP --, ESR --, Procal 4.49<H>, Ferritin 42, Fungitell --, D-dimer --    04-06-24 @ 19:55:  TSH 2.94                  CULTURES:    Culture - Urine (collected 04-06-24 @ 20:15)  Source: Clean Catch Clean Catch (Midstream)  Final Report (04-08-24 @ 16:27):    No growth    Culture - Blood (collected 04-06-24 @ 12:05)  Source: .Blood Blood-Venous  Preliminary Report (04-08-24 @ 20:02):    No growth at 48 Hours    Culture - Blood (collected 04-06-24 @ 12:05)  Source: .Blood Blood-Venous  Preliminary Report (04-08-24 @ 20:02):    No growth at 48 Hours      PENDING:    IMAGING:  Latest imaging reviewed.    ECHO:  TTE Echo Complete w/o Contrast w/ Doppler:   Transport: Stretcher-Crib  Monitor: w/ Monitor,  Transport w/ Ventilator,  Transport w/ IV Pole (04-06-24 @ 16:26)

## 2024-04-09 NOTE — PROGRESS NOTE ADULT - ASSESSMENT
74-year-old female with PHMx of pancreatic CA s/p Whipple, CKD stage 3, HTN, COPD, pulmonary fibrosis, PRESS syndrome (hospitalized in 2019), Raynaud's syndrome, ANCA vasculitis, Scleroderma, hypothyroid  presents to ED due to cardiac arrest. Pt lives at home with son and daughter, went to use the bathroom and became unresponsive.  EMS intubated her and gave her 4 rounds of epinephrine.  ROSC was achieved within a minute of her arrival to the ED.  Patient was found to be hypothermic and bradycardic    acute respiratory failure / cardiac arrest / HTN     - DC sedation and follow mental status   - targeted temperature management    - no anti seizure meds as per neuro   - very poor prognosis - family aware   - still full code for now - palliative following    - continue IV Zosyn   - DVT and GI prophylaxis   - I discussed plan with CC     50 minutes total spent today on patient care

## 2024-04-09 NOTE — CONSULT NOTE ADULT - ASSESSMENT
74-year-old female with PHMx of pancreatic CA s/p Whipple, CKD stage 3, HTN, COPD, pulmonary fibrosis, PRESS syndrome (hospitalized in 2019), Raynaud's syndrome, ANCA vasculitis, Scleroderma  presents to ED due to cardiac arrest with ROSC after 30 minutes. Hospital course complicated by respiratory failure requiring intubation, sepsis, GIUSEPPE. Palliative care consulted for GOC.    Called and spoke with daughter Obdulia. Palliative care introduced. She was able to provide a medical history and hospital course. She noted that she was waiting on more information from the team and from neurology about expectations and prognosis moving forward. We discussed code status, including DNR.  She noted that she wished to hear about neuroprognosis prior to additional decisions about GOC. She wishes for the patient to remain Full code at this time.      Education about palliative care provided to patient/family.  See Recs below.    Please call [phone] with questions or concerns 24/7.   We will continue to follow.

## 2024-04-10 LAB
ALBUMIN SERPL ELPH-MCNC: 2.6 G/DL — LOW (ref 3.5–5.2)
ALP SERPL-CCNC: 183 U/L — HIGH (ref 30–115)
ALT FLD-CCNC: 26 U/L — SIGNIFICANT CHANGE UP (ref 0–41)
ANA PAT FLD IF-IMP: ABNORMAL
ANA TITR SER: ABNORMAL
ANION GAP SERPL CALC-SCNC: 6 MMOL/L — LOW (ref 7–14)
AST SERPL-CCNC: 87 U/L — HIGH (ref 0–41)
AUTO DIFF PNL BLD: NEGATIVE — SIGNIFICANT CHANGE UP
BASE EXCESS BLDA CALC-SCNC: -3.1 MMOL/L — LOW (ref -2–3)
BILIRUB SERPL-MCNC: 0.2 MG/DL — SIGNIFICANT CHANGE UP (ref 0.2–1.2)
BUN SERPL-MCNC: 37 MG/DL — HIGH (ref 10–20)
C-ANCA SER-ACNC: POSITIVE
C-ANCA TITR SER: ABNORMAL
CALCIUM SERPL-MCNC: 7.7 MG/DL — LOW (ref 8.4–10.5)
CHLORIDE SERPL-SCNC: 108 MMOL/L — SIGNIFICANT CHANGE UP (ref 98–110)
CO2 SERPL-SCNC: 22 MMOL/L — SIGNIFICANT CHANGE UP (ref 17–32)
CREAT SERPL-MCNC: 1.9 MG/DL — HIGH (ref 0.7–1.5)
EGFR: 27 ML/MIN/1.73M2 — LOW
GAS PNL BLDA: SIGNIFICANT CHANGE UP
GLUCOSE BLDC GLUCOMTR-MCNC: 135 MG/DL — HIGH (ref 70–99)
GLUCOSE BLDC GLUCOMTR-MCNC: 157 MG/DL — HIGH (ref 70–99)
GLUCOSE BLDC GLUCOMTR-MCNC: 162 MG/DL — HIGH (ref 70–99)
GLUCOSE BLDC GLUCOMTR-MCNC: 166 MG/DL — HIGH (ref 70–99)
GLUCOSE SERPL-MCNC: 165 MG/DL — HIGH (ref 70–99)
HCO3 BLDA-SCNC: 24 MMOL/L — SIGNIFICANT CHANGE UP (ref 21–28)
HCT VFR BLD CALC: 26.5 % — LOW (ref 37–47)
HGB BLD-MCNC: 8.2 G/DL — LOW (ref 12–16)
HOROWITZ INDEX BLDA+IHG-RTO: 40 — SIGNIFICANT CHANGE UP
MAGNESIUM SERPL-MCNC: 1.8 MG/DL — SIGNIFICANT CHANGE UP (ref 1.8–2.4)
MCHC RBC-ENTMCNC: 25.5 PG — LOW (ref 27–31)
MCHC RBC-ENTMCNC: 30.9 G/DL — LOW (ref 32–37)
MCV RBC AUTO: 82.6 FL — SIGNIFICANT CHANGE UP (ref 81–99)
NRBC # BLD: 0 /100 WBCS — SIGNIFICANT CHANGE UP (ref 0–0)
P-ANCA SER-ACNC: NEGATIVE — SIGNIFICANT CHANGE UP
PCO2 BLDA: 50 MMHG — HIGH (ref 32–45)
PH BLDA: 7.29 — LOW (ref 7.35–7.45)
PLATELET # BLD AUTO: 159 K/UL — SIGNIFICANT CHANGE UP (ref 130–400)
PMV BLD: 11.5 FL — HIGH (ref 7.4–10.4)
PO2 BLDA: 190 MMHG — HIGH (ref 83–108)
POTASSIUM SERPL-MCNC: 4.7 MMOL/L — SIGNIFICANT CHANGE UP (ref 3.5–5)
POTASSIUM SERPL-SCNC: 4.7 MMOL/L — SIGNIFICANT CHANGE UP (ref 3.5–5)
PROT SERPL-MCNC: 5.7 G/DL — LOW (ref 6–8)
RBC # BLD: 3.21 M/UL — LOW (ref 4.2–5.4)
RBC # FLD: 17.1 % — HIGH (ref 11.5–14.5)
SAO2 % BLDA: 99.4 % — HIGH (ref 94–98)
SODIUM SERPL-SCNC: 136 MMOL/L — SIGNIFICANT CHANGE UP (ref 135–146)
WBC # BLD: 6.19 K/UL — SIGNIFICANT CHANGE UP (ref 4.8–10.8)
WBC # FLD AUTO: 6.19 K/UL — SIGNIFICANT CHANGE UP (ref 4.8–10.8)

## 2024-04-10 PROCEDURE — 76770 US EXAM ABDO BACK WALL COMP: CPT | Mod: 26

## 2024-04-10 PROCEDURE — 99233 SBSQ HOSP IP/OBS HIGH 50: CPT

## 2024-04-10 PROCEDURE — 99291 CRITICAL CARE FIRST HOUR: CPT

## 2024-04-10 PROCEDURE — 95720 EEG PHY/QHP EA INCR W/VEEG: CPT

## 2024-04-10 RX ORDER — FENTANYL CITRATE 50 UG/ML
20 INJECTION INTRAVENOUS ONCE
Refills: 0 | Status: DISCONTINUED | OUTPATIENT
Start: 2024-04-10 | End: 2024-04-10

## 2024-04-10 RX ORDER — HYDRALAZINE HCL 50 MG
10 TABLET ORAL ONCE
Refills: 0 | Status: DISCONTINUED | OUTPATIENT
Start: 2024-04-10 | End: 2024-04-10

## 2024-04-10 RX ORDER — POLYETHYLENE GLYCOL 3350 17 G/17G
17 POWDER, FOR SOLUTION ORAL DAILY
Refills: 0 | Status: DISCONTINUED | OUTPATIENT
Start: 2024-04-10 | End: 2024-04-15

## 2024-04-10 RX ORDER — HYDRALAZINE HCL 50 MG
10 TABLET ORAL ONCE
Refills: 0 | Status: COMPLETED | OUTPATIENT
Start: 2024-04-10 | End: 2024-04-10

## 2024-04-10 RX ORDER — SENNA PLUS 8.6 MG/1
2 TABLET ORAL AT BEDTIME
Refills: 0 | Status: DISCONTINUED | OUTPATIENT
Start: 2024-04-10 | End: 2024-04-15

## 2024-04-10 RX ADMIN — Medication 20 MILLIGRAM(S): at 15:08

## 2024-04-10 RX ADMIN — Medication 1 APPLICATION(S): at 15:08

## 2024-04-10 RX ADMIN — FENTANYL CITRATE 20 MICROGRAM(S): 50 INJECTION INTRAVENOUS at 23:36

## 2024-04-10 RX ADMIN — PANTOPRAZOLE SODIUM 40 MILLIGRAM(S): 20 TABLET, DELAYED RELEASE ORAL at 13:58

## 2024-04-10 RX ADMIN — Medication 100 MILLIGRAM(S): at 21:03

## 2024-04-10 RX ADMIN — PIPERACILLIN AND TAZOBACTAM 25 GRAM(S): 4; .5 INJECTION, POWDER, LYOPHILIZED, FOR SOLUTION INTRAVENOUS at 13:57

## 2024-04-10 RX ADMIN — Medication 100 MILLIGRAM(S): at 13:57

## 2024-04-10 RX ADMIN — POLYETHYLENE GLYCOL 3350 17 GRAM(S): 17 POWDER, FOR SOLUTION ORAL at 18:01

## 2024-04-10 RX ADMIN — Medication 100 MILLIGRAM(S): at 05:18

## 2024-04-10 RX ADMIN — LISINOPRIL 20 MILLIGRAM(S): 2.5 TABLET ORAL at 05:19

## 2024-04-10 RX ADMIN — Medication 20 MILLIGRAM(S): at 22:43

## 2024-04-10 RX ADMIN — Medication 1 DROP(S): at 21:50

## 2024-04-10 RX ADMIN — SENNA PLUS 2 TABLET(S): 8.6 TABLET ORAL at 21:03

## 2024-04-10 RX ADMIN — Medication 100 MICROGRAM(S): at 05:18

## 2024-04-10 RX ADMIN — CHLORHEXIDINE GLUCONATE 15 MILLILITER(S): 213 SOLUTION TOPICAL at 18:00

## 2024-04-10 RX ADMIN — HEPARIN SODIUM 5000 UNIT(S): 5000 INJECTION INTRAVENOUS; SUBCUTANEOUS at 18:00

## 2024-04-10 RX ADMIN — Medication 20 MILLIGRAM(S): at 02:13

## 2024-04-10 RX ADMIN — Medication 20 MILLIGRAM(S): at 03:30

## 2024-04-10 RX ADMIN — Medication 10 MILLIGRAM(S): at 16:37

## 2024-04-10 RX ADMIN — PIPERACILLIN AND TAZOBACTAM 25 GRAM(S): 4; .5 INJECTION, POWDER, LYOPHILIZED, FOR SOLUTION INTRAVENOUS at 21:03

## 2024-04-10 RX ADMIN — HEPARIN SODIUM 5000 UNIT(S): 5000 INJECTION INTRAVENOUS; SUBCUTANEOUS at 05:18

## 2024-04-10 RX ADMIN — Medication 20 MILLIGRAM(S): at 08:47

## 2024-04-10 RX ADMIN — PIPERACILLIN AND TAZOBACTAM 25 GRAM(S): 4; .5 INJECTION, POWDER, LYOPHILIZED, FOR SOLUTION INTRAVENOUS at 05:18

## 2024-04-10 RX ADMIN — FENTANYL CITRATE 20 MICROGRAM(S): 50 INJECTION INTRAVENOUS at 23:51

## 2024-04-10 RX ADMIN — CHLORHEXIDINE GLUCONATE 15 MILLILITER(S): 213 SOLUTION TOPICAL at 05:18

## 2024-04-10 RX ADMIN — CHLORHEXIDINE GLUCONATE 1 APPLICATION(S): 213 SOLUTION TOPICAL at 05:19

## 2024-04-10 NOTE — PROGRESS NOTE ADULT - ASSESSMENT
74-year-old female with PHMx of pancreatic CA s/p Whipple, CKD stage 3, HTN, COPD, pulmonary fibrosis, PRESS syndrome (hospitalized in 2019), Raynaud's syndrome, ANCA vasculitis, Scleroderma  presents to ED due to cardiac arrest. Pt lives at home with son and daughter, went to use the bathroom. Daughter notes that she heard the patient's walker scratching the floor, went upstairs to see what was going on. Daughter noticed patient was slumped over and struggling to get onto the toilet. Daughter helped patient onto the toilet, but noticed that the patient became less responsive and alert so she called EMS. Patient was found down by EMS, intubated her in the field, and resuscitated her with compressions and 4 rounds of epinephrine. ROSC achieved within a minute of her arrival to the ED. Patient was found to be hypothermic and bradycardic. Of note, patient was hospitalized back in Dec 2023 in [facility] for 18 days for multifocal pneumonia 2/2 human metapneumovirus. Patient also was hospitalized in 2019 for PRESS syndrome. Pt had a negative stress test in Sept 2023. She follows pulmonology for pulmonary fibrosis, had recent PFTs done.     ED : BP 90/57, HR 88, Temp 94.1F, satting 100% on ventilator   Labs: Hgb 8.7, Trop 56 -->79, K 5.3, Lactate 3.5, Lipase 256, , , ALT 52  VBG: pH 7.12, pCO2 67, Lactate 6.6    CXR bilateral opacities. cardiomegaly  CTH: mild atrophic changes  CT angio chest PE, A/P w/ IV cont: Bilary ductal enhancement s/p CCY. Diffuse fibrotic changes of lungs. Trace bilateral effusions. NO PE.  MRSA nare PCR negative  Urine Strep Ag negative  Urine Legionella Ag negative      IMPRESSION:   Possible aspiration pneumonia  Anoxic brain injury  Acute hypoxemic respiratory failure  Cardiac arrest, ROSC after 30 minutes  HO pancreatic CA s/p whipple    RECOMMENDATIONS:  - Continue IV Zosyn 3.375g q8hrs and finish a 5-day course (until 4/11)  - Follow up BCx and UCx  - Follow up DTA  - Neuro eval for encephalopathy  - Offloading and frequent position changes, aspiration precaution  - Trend WBC, fever curve, transaminases, creatinine daily        * THIS IS AN INCOMPLETE NOTE. FINAL RECOMMENDATION IS PENDING *   74-year-old female with PHMx of pancreatic CA s/p Whipple, CKD stage 3, HTN, COPD, pulmonary fibrosis, PRESS syndrome (hospitalized in 2019), Raynaud's syndrome, ANCA vasculitis, Scleroderma  presents to ED due to cardiac arrest. Pt lives at home with son and daughter, went to use the bathroom. Daughter notes that she heard the patient's walker scratching the floor, went upstairs to see what was going on. Daughter noticed patient was slumped over and struggling to get onto the toilet. Daughter helped patient onto the toilet, but noticed that the patient became less responsive and alert so she called EMS. Patient was found down by EMS, intubated her in the field, and resuscitated her with compressions and 4 rounds of epinephrine. ROSC achieved within a minute of her arrival to the ED. Patient was found to be hypothermic and bradycardic. Of note, patient was hospitalized back in Dec 2023 in [facility] for 18 days for multifocal pneumonia 2/2 human metapneumovirus. Patient also was hospitalized in 2019 for PRESS syndrome. Pt had a negative stress test in Sept 2023. She follows pulmonology for pulmonary fibrosis, had recent PFTs done.     ED : BP 90/57, HR 88, Temp 94.1F, satting 100% on ventilator   Labs: Hgb 8.7, Trop 56 -->79, K 5.3, Lactate 3.5, Lipase 256, , , ALT 52  VBG: pH 7.12, pCO2 67, Lactate 6.6    CXR bilateral opacities. cardiomegaly  CTH: mild atrophic changes  CT angio chest PE, A/P w/ IV cont: Bilary ductal enhancement s/p CCY. Diffuse fibrotic changes of lungs. Trace bilateral effusions. NO PE.  MRSA nare PCR negative  Urine Strep Ag negative  Urine Legionella Ag negative      IMPRESSION:   Possible aspiration pneumonia  Anoxic brain injury  Acute hypoxemic respiratory failure  Cardiac arrest, ROSC after 30 minutes  HO pancreatic CA s/p whipple    RECOMMENDATIONS:  - Continue IV Zosyn 3.375g q8hrs and finish a 5-day course (until 4/11)  - Follow up BCx and UCx  - Follow up DTA  - Neuro eval for encephalopathy  - Offloading and frequent position changes, aspiration precaution  - Trend WBC, fever curve, transaminases, creatinine daily  - GOC with family      Yolanda Franco D.O.  Attending Physician  Division of Infectious Diseases  [facility]  Please contact me via [employer] Teams

## 2024-04-10 NOTE — EEG REPORT - NS EEG TEXT BOX
Patient Name:	KIRAN MORFIN    :	1950  MRN:	-  Study Date/Time:	2024, 8:32:22 AM  Referred by:	-    Brief Clinical History:  KIRAN MORFIN is a 74 year old Female; study performed to investigate for seizures or markers of epilepsy.   Diagnosis Code: R40.4 Transient alteration of awareness  CPT:  Sedated/comatos      Patient Medication:  -    SYNTROID    LEVOPHED    ZOSYN    DIPRAN    PROTONIX      Acquisition Details:  Electroencephalography was acquired using a minimum of 21 channels on an [employer] Neurology system v 9.3.1 with electrode placement according to the standard International 10-20 system following ACNS (American Clinical Neurophysiology Society) guidelines.  Anterior temporal T1 and T2 electrodes were utilized whenever possible.   The [employer] automated spike & seizure detections were all reviewed in detail, in addition to the entire raw EEG.    Findings:  Background:  discontinuous (10-49% suppressed).   Voltage:  Low (most <20uV LBP Peak-Trough)  Organization:  Absent  Posterior Dominant Rhythm:  <7 Hz ,symmetrical  Variability:  Unclear	Reactivity:  No  Sleep:  Absent.    Interictal Activity:  1- Independent left and right high amplitude sharp waves.2- Bursts of diffusely expressed high                                    Amplitude sharply contoured notched  theta admixed with spikes         Location:   bilateral and diffuse  Focal Slowing:  None  Generalized Slowing:  Severe  Events:  1)	No electrographic seizures or significant clinical events.  Provocations:  1)	Hyperventilation: was not performed.  2)	Photic stimulation: was not performed.  Impression:  Abnormal due the severe generalized slowing  as described above and also abnormal interictal as above    Clinical Correlation:  Findings consistent with severe diffuse electro cerebral dysfunction secondary to structural/metabolic/nonspecific etiology  The recording is also C/W very irritable cortical focus/ Foci     Christopher Woodson MD  Attending Neurologist, Division of Epilepsy  
Epilepsy Attending Note:     KIRAN MORFIN    74y Female  MRN [id number]    Vital Signs Last 24 Hrs  T(C): 36.7 (10 Apr 2024 10:00), Max: 37.8 (09 Apr 2024 15:45)  T(F): 98.2 (10 Apr 2024 10:00), Max: 100.1 (09 Apr 2024 15:45)  HR: 58 (10 Apr 2024 10:00) (58 - 69)  BP: 157/65 (10 Apr 2024 10:00) (136/62 - 206/81)  BP(mean): 94 (10 Apr 2024 10:00) (89 - 117)  RR: 26 (10 Apr 2024 10:00) (21 - 26)  SpO2: 100% (10 Apr 2024 10:00) (99% - 100%)    Parameters below as of 10 Apr 2024 10:00  Patient On (Oxygen Delivery Method): ventilator    O2 Concentration (%): 40                          8.2    6.19  )-----------( 159      ( 10 Apr 2024 06:28 )             26.5       04-10    136  |  108  |  37<H>  ----------------------------<  165<H>  4.7   |  22  |  1.9<H>    Ca    7.7<L>      10 Apr 2024 06:28  Mg     1.8     04-10    TPro  5.7<L>  /  Alb  2.6<L>  /  TBili  0.2  /  DBili  x   /  AST  87<H>  /  ALT  26  /  AlkPhos  183<H>  04-10      MEDICATIONS  (STANDING):  chlorhexidine 0.12% Liquid 15 milliLiter(s) Oral Mucosa every 12 hours  chlorhexidine 2% Cloths 1 Application(s) Topical <User Schedule>  heparin   Injectable 5000 Unit(s) SubCutaneous every 12 hours  labetalol 100 milliGRAM(s) Oral three times a day  levothyroxine 100 MICROGram(s) Oral daily  lisinopril 20 milliGRAM(s) Oral daily  pantoprazole  Injectable 40 milliGRAM(s) IV Push daily  piperacillin/tazobactam IVPB.. 3.375 Gram(s) IV Intermittent every 8 hours    MEDICATIONS  (PRN):  labetalol Injectable 20 milliGRAM(s) IV Push every 1 hour PRN Systolic blood pressure >            VEEG in the last 24 hours:    Background - very low amplitude superimposed by very large amount of muscle and noise artifact. In the early portions of the recording it shows burst suppression pattern, with bursts lasting less than 1 second and comprised of 1-2 Hz cortical activity more prominently from the right side. Towards the latter part the background is completely suppressed with no clear cortical activity and no reactivity to external stumulation.    Focal and generalized slowing - severe generalized slowing    Interictal activity - none    Events - none    Seizures - none    Impression: Abnormal VEEG as above. In the right setting, this EEG could be suggestive of ECS.    Plan - per neurology team

## 2024-04-10 NOTE — PROGRESS NOTE ADULT - ASSESSMENT
74-year-old female with PHMx of pancreatic CA s/p Whipple, CKD stage 3, HTN, COPD, pulmonary fibrosis, PRESS syndrome (hospitalized in 2019), Raynaud's syndrome, ANCA vasculitis, Scleroderma, hypothyroid  presents to ED due to cardiac arrest. Pt lives at home with son and daughter, went to use the bathroom and became unresponsive.  EMS intubated her and gave her 4 rounds of epinephrine.  ROSC was achieved within a minute of her arrival to the ED.  Patient was found to be hypothermic and bradycardic    acute respiratory failure / cardiac arrest / HTN     - remains unresponsive off sedation   - no anti seizure meds as per neuro   - very poor prognosis - family aware   - still full code for now - palliative following    - continue IV Zosyn   - DVT and GI prophylaxis   - I discussed plan with CC     50 minutes total spent today on patient care         74-year-old female with PHMx of pancreatic CA s/p Whipple, CKD stage 3, HTN, COPD, pulmonary fibrosis, PRESS syndrome (hospitalized in 2019), Raynaud's syndrome, ANCA vasculitis, Scleroderma, hypothyroid  presents to ED due to cardiac arrest. Pt lives at home with son and daughter, went to use the bathroom and became unresponsive.  EMS intubated her and gave her 4 rounds of epinephrine.  ROSC was achieved within a minute of her arrival to the ED.  Patient was found to be hypothermic and bradycardic    acute respiratory failure / cardiac arrest / HTN / possible aspiration pneumonia      - remains unresponsive off sedation   - continue to hold sedation and monitor mental status   - possible anoxic brain injury   - no anti seizure meds as per neuro    - I spoke with family at bedside    - they want to maintain  full code for now   - palliative following    - continue IV Zosyn   - Labetalol   - DVT and GI prophylaxis   - I discussed plan with CC     50 minutes total spent today on patient care

## 2024-04-10 NOTE — PROGRESS NOTE ADULT - SUBJECTIVE AND OBJECTIVE BOX
INFECTIOUS DISEASE FOLLOW UP NOTE:    Interval History/ROS: Patient is a 74y old  Female who presents with a chief complaint of cardiac arrest (10 Apr 2024 10:38)      Overnight events:    REVIEW OF SYSTEMS:        Prior hospital charts reviewed [Yes]  Primary team notes reviewed [Yes]  Other consultant notes reviewed [Yes]    Allergies:  celecoxib (Rash)  Originally Entered as [U UNKNOWN] reaction to [celebrit] (Unknown)      ANTIMICROBIALS:   piperacillin/tazobactam IVPB.. 3.375 every 8 hours      OTHER MEDS: MEDICATIONS  (STANDING):  heparin   Injectable 5000 every 12 hours  labetalol 100 three times a day  labetalol Injectable 20 every 1 hour PRN  levothyroxine 100 daily  lisinopril 20 daily  pantoprazole  Injectable 40 daily      Vital Signs Last 24 Hrs  T(F): 98.2 (04-10-24 @ 10:00), Max: 100.2 (04-07-24 @ 09:01)    Vital Signs Last 24 Hrs  HR: 58 (04-10-24 @ 10:00) (58 - 69)  BP: 157/65 (04-10-24 @ 10:00) (136/62 - 206/81)  RR: 26 (04-10-24 @ 10:00)  SpO2: 100% (04-10-24 @ 10:00) (99% - 100%)  Wt(kg): --    EXAM:      Labs:                        8.2    6.19  )-----------( 159      ( 10 Apr 2024 06:28 )             26.5     04-10    136  |  108  |  37<H>  ----------------------------<  165<H>  4.7   |  22  |  1.9<H>    Ca    7.7<L>      10 Apr 2024 06:28  Mg     1.8     04-10    TPro  5.7<L>  /  Alb  2.6<L>  /  TBili  0.2  /  DBili  x   /  AST  87<H>  /  ALT  26  /  AlkPhos  183<H>  04-10      WBC Trend:  WBC Count: 6.19 (04-10-24 @ 06:28)  WBC Count: 5.79 (04-09-24 @ 05:49)  WBC Count: 6.31 (04-08-24 @ 05:36)  WBC Count: 9.64 (04-07-24 @ 06:05)      Creatine Trend:  Creatinine: 1.9 (04-10)  Creatinine: 2.1 (04-09)  Creatinine: 2.0 (04-08)  Creatinine: 2.0 (04-08)      Liver Biochemical Testing Trend:  Alanine Aminotransferase (ALT/SGPT): 26 (04-10)  Alanine Aminotransferase (ALT/SGPT): 25 (04-09)  Alanine Aminotransferase (ALT/SGPT): 31 (04-08)  Alanine Aminotransferase (ALT/SGPT): 41 (04-07)  Alanine Aminotransferase (ALT/SGPT): 52 *H* (04-06)  Aspartate Aminotransferase (AST/SGOT): 87 (04-10-24 @ 06:28)  Aspartate Aminotransferase (AST/SGOT): 66 (04-09-24 @ 05:49)  Aspartate Aminotransferase (AST/SGOT): 57 (04-08-24 @ 05:36)  Aspartate Aminotransferase (AST/SGOT): 78 (04-07-24 @ 06:05)  Aspartate Aminotransferase (AST/SGOT): 265 (04-06-24 @ 11:45)  Bilirubin Total: 0.2 (04-10)  Bilirubin Total: 0.2 (04-09)  Bilirubin Total: 0.4 (04-08)  Bilirubin Total: 0.3 (04-07)  Bilirubin Total: 0.3 (04-06)      Trend LDH      Urinalysis Basic - ( 10 Apr 2024 06:28 )    Color: x / Appearance: x / SG: x / pH: x  Gluc: 165 mg/dL / Ketone: x  / Bili: x / Urobili: x   Blood: x / Protein: x / Nitrite: x   Leuk Esterase: x / RBC: x / WBC x   Sq Epi: x / Non Sq Epi: x / Bacteria: x        MICROBIOLOGY:    MRSA PCR Result.: Negative (04-07-24 @ 10:17)      Culture - Urine (collected 06 Apr 2024 20:15)  Source: Clean Catch Clean Catch (Midstream)  Final Report:    No growth    Culture - Blood (collected 06 Apr 2024 12:05)  Source: .Blood Blood-Venous  Preliminary Report:    No growth at 72 Hours    Culture - Blood (collected 06 Apr 2024 12:05)  Source: .Blood Blood-Venous  Preliminary Report:    No growth at 72 Hours    Rapid RVP Result: NotDetec (04-08 @ 11:00)  Legionella Antigen, Urine: Negative (04-07 @ 09:55)  Fungitell: 31 pg/mL (04-07 @ 06:05)        Procalcitonin, Serum: 10.00 (04-08)  Procalcitonin, Serum: 4.49 (04-06)      Ferritin: 42 (04-06)          Troponin T, High Sensitivity Result: 64 (04-09)  Troponin T, High Sensitivity Result: 89 (04-07)  Troponin T, High Sensitivity Result: 84 (04-06)  Troponin T, High Sensitivity Result: 79 (04-06)  Troponin T, High Sensitivity Result: 56 (04-06)    Blood Gas Arterial, Lactate: 0.8 (04-10 @ 05:17)  Blood Gas Arterial, Lactate: 0.7 (04-09 @ 05:45)  Blood Gas Arterial, Lactate: 0.5 (04-09 @ 03:57)  Blood Gas Arterial, Lactate: 0.8 (04-08 @ 03:49)      RADIOLOGY:  imaging below personally reviewed    < from: Xray Chest 1 View-PORTABLE IMMEDIATE (Xray Chest 1 View-PORTABLE IMMEDIATE .) (04.08.24 @ 08:38) >  Impression:    Tip of the orogastric tube is seen overlying left abdomen    Stable bilateral lung opacities    < end of copied text >   INFECTIOUS DISEASE FOLLOW UP NOTE:    Interval History/ROS: Patient is a 74y old  Female who presents with a chief complaint of cardiac arrest (10 Apr 2024 10:38)      Overnight events: Remains intubated. Off sedation. ON vEEG.    REVIEW OF SYSTEMS:  Unable to provide as patient is intubated      Prior hospital charts reviewed [Yes]  Primary team notes reviewed [Yes]  Other consultant notes reviewed [Yes]    Allergies:  celecoxib (Rash)  Originally Entered as [U UNKNOWN] reaction to [celebrit] (Unknown)      ANTIMICROBIALS:   piperacillin/tazobactam IVPB.. 3.375 every 8 hours      OTHER MEDS: MEDICATIONS  (STANDING):  heparin   Injectable 5000 every 12 hours  labetalol 100 three times a day  labetalol Injectable 20 every 1 hour PRN  levothyroxine 100 daily  lisinopril 20 daily  pantoprazole  Injectable 40 daily      Vital Signs Last 24 Hrs  T(F): 98.2 (04-10-24 @ 10:00), Max: 100.2 (04-07-24 @ 09:01)    Vital Signs Last 24 Hrs  HR: 58 (04-10-24 @ 10:00) (58 - 69)  BP: 157/65 (04-10-24 @ 10:00) (136/62 - 206/81)  RR: 26 (04-10-24 @ 10:00)  SpO2: 100% (04-10-24 @ 10:00) (99% - 100%)  Wt(kg): --    EXAM:  GENERAL: Intubated, lying in bed  HEAD: No head lesions  EYES: Scleral icterus, pupils not reacting to light  EAR, NOSE, MOUTH, THROAT: Normal external ears and nose, no discharges; dry mucous membranes; + ET and NGT  NECK: Supple, nontender to palpation; no JVD  RESPIRATORY: Bibasilar crackles  CARDIOVASCULAR: S1 S2  GASTROINTESTINAL: Soft, nondistended; normoactive bowel sounds  EXTREMITIES: No clubbing, cyanosis, or petal edema  NERVOUS SYSTEM: Unresponsive  MUSCULOSKELETAL: No joint erythema, swelling  SKIN: No rashes or lesions, no superficial thrombophlebitis  PSYCH: Unresponsive    Labs:                        8.2    6.19  )-----------( 159      ( 10 Apr 2024 06:28 )             26.5     04-10    136  |  108  |  37<H>  ----------------------------<  165<H>  4.7   |  22  |  1.9<H>    Ca    7.7<L>      10 Apr 2024 06:28  Mg     1.8     04-10    TPro  5.7<L>  /  Alb  2.6<L>  /  TBili  0.2  /  DBili  x   /  AST  87<H>  /  ALT  26  /  AlkPhos  183<H>  04-10      WBC Trend:  WBC Count: 6.19 (04-10-24 @ 06:28)  WBC Count: 5.79 (04-09-24 @ 05:49)  WBC Count: 6.31 (04-08-24 @ 05:36)  WBC Count: 9.64 (04-07-24 @ 06:05)      Creatine Trend:  Creatinine: 1.9 (04-10)  Creatinine: 2.1 (04-09)  Creatinine: 2.0 (04-08)  Creatinine: 2.0 (04-08)      Liver Biochemical Testing Trend:  Alanine Aminotransferase (ALT/SGPT): 26 (04-10)  Alanine Aminotransferase (ALT/SGPT): 25 (04-09)  Alanine Aminotransferase (ALT/SGPT): 31 (04-08)  Alanine Aminotransferase (ALT/SGPT): 41 (04-07)  Alanine Aminotransferase (ALT/SGPT): 52 *H* (04-06)  Aspartate Aminotransferase (AST/SGOT): 87 (04-10-24 @ 06:28)  Aspartate Aminotransferase (AST/SGOT): 66 (04-09-24 @ 05:49)  Aspartate Aminotransferase (AST/SGOT): 57 (04-08-24 @ 05:36)  Aspartate Aminotransferase (AST/SGOT): 78 (04-07-24 @ 06:05)  Aspartate Aminotransferase (AST/SGOT): 265 (04-06-24 @ 11:45)  Bilirubin Total: 0.2 (04-10)  Bilirubin Total: 0.2 (04-09)  Bilirubin Total: 0.4 (04-08)  Bilirubin Total: 0.3 (04-07)  Bilirubin Total: 0.3 (04-06)      Trend LDH      Urinalysis Basic - ( 10 Apr 2024 06:28 )    Color: x / Appearance: x / SG: x / pH: x  Gluc: 165 mg/dL / Ketone: x  / Bili: x / Urobili: x   Blood: x / Protein: x / Nitrite: x   Leuk Esterase: x / RBC: x / WBC x   Sq Epi: x / Non Sq Epi: x / Bacteria: x        MICROBIOLOGY:    MRSA PCR Result.: Negative (04-07-24 @ 10:17)      Culture - Urine (collected 06 Apr 2024 20:15)  Source: Clean Catch Clean Catch (Midstream)  Final Report:    No growth    Culture - Blood (collected 06 Apr 2024 12:05)  Source: .Blood Blood-Venous  Preliminary Report:    No growth at 72 Hours    Culture - Blood (collected 06 Apr 2024 12:05)  Source: .Blood Blood-Venous  Preliminary Report:    No growth at 72 Hours    Rapid RVP Result: NotDetec (04-08 @ 11:00)  Legionella Antigen, Urine: Negative (04-07 @ 09:55)  Fungitell: 31 pg/mL (04-07 @ 06:05)        Procalcitonin, Serum: 10.00 (04-08)  Procalcitonin, Serum: 4.49 (04-06)      Ferritin: 42 (04-06)          Troponin T, High Sensitivity Result: 64 (04-09)  Troponin T, High Sensitivity Result: 89 (04-07)  Troponin T, High Sensitivity Result: 84 (04-06)  Troponin T, High Sensitivity Result: 79 (04-06)  Troponin T, High Sensitivity Result: 56 (04-06)    Blood Gas Arterial, Lactate: 0.8 (04-10 @ 05:17)  Blood Gas Arterial, Lactate: 0.7 (04-09 @ 05:45)  Blood Gas Arterial, Lactate: 0.5 (04-09 @ 03:57)  Blood Gas Arterial, Lactate: 0.8 (04-08 @ 03:49)      RADIOLOGY:  imaging below personally reviewed    < from: Xray Chest 1 View-PORTABLE IMMEDIATE (Xray Chest 1 View-PORTABLE IMMEDIATE .) (04.08.24 @ 08:38) >  Impression:    Tip of the orogastric tube is seen overlying left abdomen    Stable bilateral lung opacities    < end of copied text >

## 2024-04-10 NOTE — PROGRESS NOTE ADULT - SUBJECTIVE AND OBJECTIVE BOX
Patient seen and evaluated this am, remains unresponsive on ventilator off sedation       T(F): 98.2 (04-10-24 @ 10:00), Max: 100.1 (04-09-24 @ 15:45)  HR: 58 (04-10-24 @ 10:00)  BP: 157/65 (04-10-24 @ 10:00)  RR: 26 (04-10-24 @ 10:00)  SpO2: 100% (04-10-24 @ 10:00) (99% - 100%)    PHYSICAL EXAM:  GENERAL: NAD  HEAD:  Atraumatic, Normocephalic  EYES: EOMI, PERRLA, conjunctiva and sclera clear  NERVOUS SYSTEM: positive corneal reflex   CHEST/LUNG:  bilateral rhonchi  HEART: Regular rate and rhythm; No murmurs, rubs, or gallops  ABDOMEN: Soft, Nontender, Nondistended; Bowel sounds present  EXTREMITIES:  2+ Peripheral Pulses, No clubbing, cyanosis, or edema    LABS  04-10    136  |  108  |  37<H>  ----------------------------<  165<H>  4.7   |  22  |  1.9<H>    Ca    7.7<L>      10 Apr 2024 06:28  Mg     1.8     04-10    TPro  5.7<L>  /  Alb  2.6<L>  /  TBili  0.2  /  DBili  x   /  AST  87<H>  /  ALT  26  /  AlkPhos  183<H>  04-10                          8.2    6.19  )-----------( 159      ( 10 Apr 2024 06:28 )             26.5     Procalcitonin, Serum (04.08.24 @ 14:00)   Procalcitonin, Serum: 4.5 -> 10.00    EEG:  Findings:  Background:  discontinuous (10-49% suppressed).   Voltage:  Low (most <20uV LBP Peak-Trough)  Organization:  Absent  Posterior Dominant Rhythm:  <7 Hz ,symmetrical  Variability:  Unclear	Reactivity:  No  Sleep:  Absent.    Interictal Activity:  1- Independent left and right high amplitude sharp waves.2- Bursts of diffusely expressed high                                    Amplitude sharply contoured notched  theta admixed with spikes         Location:   bilateral and diffuse  Focal Slowing:  None  Generalized Slowing:  Severe  Events:  1)	No electrographic seizures or significant clinical events.  Provocations:  1)	Hyperventilation: was not performed.  2)	Photic stimulation: was not performed.  Impression:  Abnormal due the severe generalized slowing  as described above and also abnormal interictal as above    Clinical Correlation:  Findings consistent with severe diffuse electro cerebral dysfunction secondary to structural/metabolic/nonspecific etiology  The recording is also C/W very irritable cortical focus/ Foci     Mode: AC/ CMV (Assist Control/ Continuous Mandatory Ventilation)  RR (machine): 24  TV (machine): 350  FiO2: 40  PEEP: 5        CARDIAC ENZYMES  Creatine Kinase, Serum: 200 (04-09 @ 05:59)  Creatine Kinase, Serum: 340 (04-08 @ 05:36)    < from: TTE Echo Complete w/o Contrast w/ Doppler (04.08.24 @ 09:36) >    Summary:   1. LV Ejection Fraction by Carpenter's Method with a biplane EF of 61 %.   2. Increased LV wall thickness.   3. Normal left atrial size.   4. Mild mitral valve regurgitation.   5. Mild tricuspid regurgitation.   6. Mild aortic regurgitation.   7. Normal trileaflet aortic valve with normal opening.   8. Ascending aorta 3.7 cm.    < end of copied text >      Culture Results:   No growth (04-06-24)  Culture Results:   No growth at 72 Hours (04-06-24)  Culture Results:   No growth at 72 Hours (04-06-24)    RADIOLOGY  < from: Xray Chest 1 View-PORTABLE IMMEDIATE (Xray Chest 1 View-PORTABLE IMMEDIATE .) (04.08.24 @ 08:38) >  Impression:    Tip of the orogastric tube is seen overlying left abdomen    Stable bilateral lung opacities    < end of copied text >    MEDICATIONS  (STANDING):  chlorhexidine 0.12% Liquid 15 milliLiter(s) Oral Mucosa every 12 hours  chlorhexidine 2% Cloths 1 Application(s) Topical <User Schedule>  heparin   Injectable 5000 Unit(s) SubCutaneous every 12 hours  labetalol 100 milliGRAM(s) Oral three times a day  levothyroxine 100 MICROGram(s) Oral daily  lisinopril 20 milliGRAM(s) Oral daily  pantoprazole  Injectable 40 milliGRAM(s) IV Push daily  piperacillin/tazobactam IVPB.. 3.375 Gram(s) IV Intermittent every 8 hours    MEDICATIONS  (PRN):  labetalol Injectable 20 milliGRAM(s) IV Push every 1 hour PRN Systolic blood pressure >

## 2024-04-10 NOTE — PROGRESS NOTE ADULT - SUBJECTIVE AND OBJECTIVE BOX
Patient is a 74y old  Female who presents with a chief complaint of cardiac arrest (09 Apr 2024 17:23)        Over Night Events: remains critically ill on MC, no acute events overnight, on VEEG, off sedation        ICU Vital Signs Last 24 Hrs  T(C): 36.6 (10 Apr 2024 07:05), Max: 37.8 (09 Apr 2024 15:45)  T(F): 97.9 (10 Apr 2024 07:05), Max: 100.1 (09 Apr 2024 15:45)  HR: 61 (10 Apr 2024 08:15) (59 - 69)  BP: 136/64 (10 Apr 2024 07:00) (136/62 - 206/81)  BP(mean): 92 (10 Apr 2024 07:00) (89 - 117)  ABP: 159/57 (10 Apr 2024 07:00) (159/57 - 210/79)  ABP(mean): 89 (10 Apr 2024 07:00) (89 - 127)  RR: 24 (10 Apr 2024 07:05) (12 - 24)  SpO2: 100% (10 Apr 2024 08:15) (99% - 100%)    O2 Parameters below as of 10 Apr 2024 07:00  Patient On (Oxygen Delivery Method): ventilator    O2 Concentration (%): 40          CONSTITUTIONAL:   Ill appearing.     EYES:   Pupils sluggish  Round and reactive to light.    CARDIAC:   bradycardica    RESPIRATORY:   Decreased bilateral breath sounds   No wheezing  Bilateral BS  Normal chest expansion  Not tachypneic,  No use of accessory muscles    GASTROINTESTINAL:  Abdomen soft,   Non-tender,   No guarding,   + BS      MUSCULOSKELETAL:   No clubbing, cyanosis    NEUROLOGICAL:   sedated   positive gag reflex     SKIN:   sacral stage I      04-09-24 @ 07:01  -  04-10-24 @ 07:00  --------------------------------------------------------  IN:    dextrose 5% + sodium chloride 0.45%: 1500 mL    FentaNYL: 12 mL    Free Water: 300 mL    IV PiggyBack: 200 mL    Lactated Ringers: 210 mL    Oral Fluid: 160 mL    Osmolite 1.2: 445 mL  Total IN: 2827 mL    OUT:    Indwelling Catheter - Urethral (mL): 825 mL  Total OUT: 825 mL    Total NET: 2002 mL      04-10-24 @ 07:01  -  04-10-24 @ 08:36  --------------------------------------------------------  IN:  Total IN: 0 mL    OUT:    Indwelling Catheter - Urethral (mL): 40 mL  Total OUT: 40 mL    Total NET: -40 mL          LABS:                            8.2    6.19  )-----------( 159      ( 10 Apr 2024 06:28 )             26.5                                               04-10    136  |  108  |  37<H>  ----------------------------<  165<H>  4.7   |  22  |  1.9<H>    Ca    7.7<L>      10 Apr 2024 06:28  Mg     1.8     04-10    TPro  5.7<L>  /  Alb  2.6<L>  /  TBili  0.2  /  DBili  x   /  AST  87<H>  /  ALT  26  /  AlkPhos  183<H>  04-10                                             Urinalysis Basic - ( 10 Apr 2024 06:28 )    Color: x / Appearance: x / SG: x / pH: x  Gluc: 165 mg/dL / Ketone: x  / Bili: x / Urobili: x   Blood: x / Protein: x / Nitrite: x   Leuk Esterase: x / RBC: x / WBC x   Sq Epi: x / Non Sq Epi: x / Bacteria: x        CARDIAC MARKERS ( 09 Apr 2024 05:59 )  x     / x     / 200 U/L / x     / x                                                LIVER FUNCTIONS - ( 10 Apr 2024 06:28 )  Alb: 2.6 g/dL / Pro: 5.7 g/dL / ALK PHOS: 183 U/L / ALT: 26 U/L / AST: 87 U/L / GGT: x                                                                                               Mode: AC/ CMV (Assist Control/ Continuous Mandatory Ventilation)  RR (machine): 24  TV (machine): 350  FiO2: 40  PEEP: 5  ITime: 0.8  MAP: 12  PIP: 31                                      ABG - ( 10 Apr 2024 05:17 )  pH, Arterial: 7.29  pH, Blood: x     /  pCO2: 50    /  pO2: 190   / HCO3: 24    / Base Excess: -3.1  /  SaO2: 99.4                MEDICATIONS  (STANDING):  chlorhexidine 0.12% Liquid 15 milliLiter(s) Oral Mucosa every 12 hours  chlorhexidine 2% Cloths 1 Application(s) Topical <User Schedule>  dextrose 5% + sodium chloride 0.45%. 1000 milliLiter(s) (75 mL/Hr) IV Continuous <Continuous>  fentaNYL   Infusion. 0.5 MICROgram(s)/kG/Hr (2 mL/Hr) IV Continuous <Continuous>  heparin   Injectable 5000 Unit(s) SubCutaneous every 12 hours  labetalol 100 milliGRAM(s) Oral three times a day  levothyroxine 100 MICROGram(s) Oral daily  lisinopril 20 milliGRAM(s) Oral daily  pantoprazole  Injectable 40 milliGRAM(s) IV Push daily  piperacillin/tazobactam IVPB.. 3.375 Gram(s) IV Intermittent every 8 hours    MEDICATIONS  (PRN):  labetalol Injectable 20 milliGRAM(s) IV Push every 1 hour PRN Systolic blood pressure >

## 2024-04-11 LAB
ANION GAP SERPL CALC-SCNC: 8 MMOL/L — SIGNIFICANT CHANGE UP (ref 7–14)
BASE EXCESS BLDA CALC-SCNC: -0.8 MMOL/L — SIGNIFICANT CHANGE UP (ref -2–3)
BUN SERPL-MCNC: 35 MG/DL — HIGH (ref 10–20)
CALCIUM SERPL-MCNC: 8 MG/DL — LOW (ref 8.4–10.5)
CHLORIDE SERPL-SCNC: 103 MMOL/L — SIGNIFICANT CHANGE UP (ref 98–110)
CO2 SERPL-SCNC: 23 MMOL/L — SIGNIFICANT CHANGE UP (ref 17–32)
CREAT SERPL-MCNC: 1.7 MG/DL — HIGH (ref 0.7–1.5)
CULTURE RESULTS: SIGNIFICANT CHANGE UP
CULTURE RESULTS: SIGNIFICANT CHANGE UP
EGFR: 31 ML/MIN/1.73M2 — LOW
GLUCOSE BLDC GLUCOMTR-MCNC: 147 MG/DL — HIGH (ref 70–99)
GLUCOSE BLDC GLUCOMTR-MCNC: 162 MG/DL — HIGH (ref 70–99)
GLUCOSE BLDC GLUCOMTR-MCNC: 167 MG/DL — HIGH (ref 70–99)
GLUCOSE BLDC GLUCOMTR-MCNC: 167 MG/DL — HIGH (ref 70–99)
GLUCOSE SERPL-MCNC: 170 MG/DL — HIGH (ref 70–99)
HCO3 BLDA-SCNC: 24 MMOL/L — SIGNIFICANT CHANGE UP (ref 21–28)
HCT VFR BLD CALC: 26.6 % — LOW (ref 37–47)
HGB BLD-MCNC: 8.7 G/DL — LOW (ref 12–16)
MCHC RBC-ENTMCNC: 25.8 PG — LOW (ref 27–31)
MCHC RBC-ENTMCNC: 32.7 G/DL — SIGNIFICANT CHANGE UP (ref 32–37)
MCV RBC AUTO: 78.9 FL — LOW (ref 81–99)
NRBC # BLD: 0 /100 WBCS — SIGNIFICANT CHANGE UP (ref 0–0)
PCO2 BLDA: 41 MMHG — SIGNIFICANT CHANGE UP (ref 32–45)
PH BLDA: 7.38 — SIGNIFICANT CHANGE UP (ref 7.35–7.45)
PLATELET # BLD AUTO: 161 K/UL — SIGNIFICANT CHANGE UP (ref 130–400)
PMV BLD: 11.1 FL — HIGH (ref 7.4–10.4)
PO2 BLDA: 212 MMHG — HIGH (ref 83–108)
POTASSIUM SERPL-MCNC: 4.3 MMOL/L — SIGNIFICANT CHANGE UP (ref 3.5–5)
POTASSIUM SERPL-SCNC: 4.3 MMOL/L — SIGNIFICANT CHANGE UP (ref 3.5–5)
RBC # BLD: 3.37 M/UL — LOW (ref 4.2–5.4)
RBC # FLD: 16.7 % — HIGH (ref 11.5–14.5)
SAO2 % BLDA: 99.7 % — HIGH (ref 94–98)
SODIUM SERPL-SCNC: 134 MMOL/L — LOW (ref 135–146)
SPECIMEN SOURCE: SIGNIFICANT CHANGE UP
SPECIMEN SOURCE: SIGNIFICANT CHANGE UP
WBC # BLD: 5.89 K/UL — SIGNIFICANT CHANGE UP (ref 4.8–10.8)
WBC # FLD AUTO: 5.89 K/UL — SIGNIFICANT CHANGE UP (ref 4.8–10.8)

## 2024-04-11 PROCEDURE — ZZZZZ: CPT

## 2024-04-11 PROCEDURE — 99291 CRITICAL CARE FIRST HOUR: CPT

## 2024-04-11 PROCEDURE — 99233 SBSQ HOSP IP/OBS HIGH 50: CPT

## 2024-04-11 PROCEDURE — 99232 SBSQ HOSP IP/OBS MODERATE 35: CPT

## 2024-04-11 PROCEDURE — 70450 CT HEAD/BRAIN W/O DYE: CPT | Mod: 26

## 2024-04-11 PROCEDURE — 99221 1ST HOSP IP/OBS SF/LOW 40: CPT

## 2024-04-11 RX ORDER — HYDRALAZINE HCL 50 MG
25 TABLET ORAL ONCE
Refills: 0 | Status: COMPLETED | OUTPATIENT
Start: 2024-04-11 | End: 2024-04-11

## 2024-04-11 RX ORDER — FENTANYL CITRATE 50 UG/ML
0.5 INJECTION INTRAVENOUS
Qty: 2500 | Refills: 0 | Status: DISCONTINUED | OUTPATIENT
Start: 2024-04-11 | End: 2024-04-11

## 2024-04-11 RX ORDER — FENTANYL CITRATE 50 UG/ML
25 INJECTION INTRAVENOUS ONCE
Refills: 0 | Status: DISCONTINUED | OUTPATIENT
Start: 2024-04-11 | End: 2024-04-11

## 2024-04-11 RX ORDER — FENTANYL CITRATE 50 UG/ML
0.5 INJECTION INTRAVENOUS
Qty: 2500 | Refills: 0 | Status: DISCONTINUED | OUTPATIENT
Start: 2024-04-11 | End: 2024-04-18

## 2024-04-11 RX ADMIN — Medication 20 MILLIGRAM(S): at 01:47

## 2024-04-11 RX ADMIN — Medication 1 DROP(S): at 21:24

## 2024-04-11 RX ADMIN — Medication 100 MICROGRAM(S): at 05:06

## 2024-04-11 RX ADMIN — FENTANYL CITRATE 25 MICROGRAM(S): 50 INJECTION INTRAVENOUS at 07:44

## 2024-04-11 RX ADMIN — Medication 0.1 MILLIGRAM(S): at 01:26

## 2024-04-11 RX ADMIN — CHLORHEXIDINE GLUCONATE 1 APPLICATION(S): 213 SOLUTION TOPICAL at 05:06

## 2024-04-11 RX ADMIN — PANTOPRAZOLE SODIUM 40 MILLIGRAM(S): 20 TABLET, DELAYED RELEASE ORAL at 11:53

## 2024-04-11 RX ADMIN — Medication 25 MILLIGRAM(S): at 01:00

## 2024-04-11 RX ADMIN — Medication 1 DROP(S): at 05:18

## 2024-04-11 RX ADMIN — Medication 20 MILLIGRAM(S): at 06:17

## 2024-04-11 RX ADMIN — CHLORHEXIDINE GLUCONATE 15 MILLILITER(S): 213 SOLUTION TOPICAL at 05:06

## 2024-04-11 RX ADMIN — FENTANYL CITRATE 2 MICROGRAM(S)/KG/HR: 50 INJECTION INTRAVENOUS at 21:20

## 2024-04-11 RX ADMIN — SENNA PLUS 2 TABLET(S): 8.6 TABLET ORAL at 21:20

## 2024-04-11 RX ADMIN — HEPARIN SODIUM 5000 UNIT(S): 5000 INJECTION INTRAVENOUS; SUBCUTANEOUS at 05:05

## 2024-04-11 RX ADMIN — FENTANYL CITRATE 2 MICROGRAM(S)/KG/HR: 50 INJECTION INTRAVENOUS at 08:57

## 2024-04-11 RX ADMIN — LISINOPRIL 20 MILLIGRAM(S): 2.5 TABLET ORAL at 05:06

## 2024-04-11 RX ADMIN — HEPARIN SODIUM 5000 UNIT(S): 5000 INJECTION INTRAVENOUS; SUBCUTANEOUS at 17:14

## 2024-04-11 RX ADMIN — PIPERACILLIN AND TAZOBACTAM 25 GRAM(S): 4; .5 INJECTION, POWDER, LYOPHILIZED, FOR SOLUTION INTRAVENOUS at 05:05

## 2024-04-11 RX ADMIN — Medication 1 APPLICATION(S): at 11:54

## 2024-04-11 RX ADMIN — CHLORHEXIDINE GLUCONATE 15 MILLILITER(S): 213 SOLUTION TOPICAL at 17:13

## 2024-04-11 RX ADMIN — Medication 1 DROP(S): at 13:29

## 2024-04-11 NOTE — PROGRESS NOTE ADULT - SUBJECTIVE AND OBJECTIVE BOX
[id number]  Patient is a 74y old  Female who presents with a chief complaint of s/p cardiac arrest; anoxic brain injury (11 Apr 2024 11:59)    Subjective: CT head w/o this AM noted to show anoxic brain injury.     Objective:   Home Medications:  calcitriol 0.25 mcg oral capsule: 1 cap(s) orally three times per week (06 Apr 2024 15:09)  folic acid: prn (06 Apr 2024 15:09)  labetalol 100 mg oral tablet: 1 tab(s) orally 3 times a day (06 Apr 2024 15:09)  levothyroxine 100 mcg (0.1 mg) oral tablet: 1 tab(s) orally once a day (at bedtime) (06 Apr 2024 15:09)  lisinopril 20 mg oral tablet: 1 tab(s) orally once a day (06 Apr 2024 15:09)  Lyrica 150 mg oral capsule: 1 cap(s) orally 3 times a day (06 Apr 2024 15:09)  vitamin d3: prn (06 Apr 2024 15:09)    MEDICATIONS  (STANDING):  artificial  tears Solution 1 Drop(s) Both EYES three times a day  chlorhexidine 0.12% Liquid 15 milliLiter(s) Oral Mucosa every 12 hours  chlorhexidine 2% Cloths 1 Application(s) Topical <User Schedule>  fentaNYL   Infusion 0.5 MICROgram(s)/kG/Hr (2 mL/Hr) IV Continuous <Continuous>  heparin   Injectable 5000 Unit(s) SubCutaneous every 12 hours  labetalol 100 milliGRAM(s) Oral three times a day  levothyroxine 100 MICROGram(s) Oral daily  lisinopril 20 milliGRAM(s) Oral daily  pantoprazole  Injectable 40 milliGRAM(s) IV Push daily  petrolatum Ophthalmic Ointment 1 Application(s) Both EYES daily  polyethylene glycol 3350 17 Gram(s) Oral daily  senna 2 Tablet(s) Oral at bedtime    MEDICATIONS  (PRN):  labetalol Injectable 20 milliGRAM(s) IV Push every 1 hour PRN Systolic blood pressure >    Allergies  celecoxib (Rash)  Originally Entered as [U UNKNOWN] reaction to [celebrit] (Unknown)    Intolerances      Vital Signs Last 24 Hrs  T(C): 36.8 (11 Apr 2024 15:01), Max: 36.8 (11 Apr 2024 15:01)  T(F): 98.3 (11 Apr 2024 15:01), Max: 98.3 (11 Apr 2024 15:01)  HR: 57 (11 Apr 2024 17:00) (48 - 70)  BP: 154/71 (11 Apr 2024 17:00) (117/56 - 196/88)  BP(mean): 102 (11 Apr 2024 17:00) (80 - 133)  RR: 26 (11 Apr 2024 17:00) (25 - 34)  SpO2: 100% (11 Apr 2024 17:00) (100% - 100%)    Parameters below as of 11 Apr 2024 17:00  Patient On (Oxygen Delivery Method): ventilator    O2 Concentration (%): 40    GENERAL EXAM:  Constitutional: comatose and intubated. not on sedation   Musculoskeletal: no joint swelling/tenderness, no abnormal movements  Skin: no rashes    NEUROLOGICAL EXAM:  MS: intubated. Comatose. Not on sedation Does not follow commands.   CN: pupils pinpoint and nonreactive b/l. OCR reflex not intact. mild corneal, cough/gag intact.    Motor: no spontaneous movement noted.   Tone: decrease.    Sensation: not intact to noxious stimuli x4.   Coordination: intact 4x.       Labs:   cbc                      8.7    5.89  )-----------( 161      ( 11 Apr 2024 05:40 )             26.6     [id number]    134<L>  |  103  |  35<H>  ----------------------------<  170<H>  4.3   |  23  |  1.7<H>    Ca    8.0<L>      11 Apr 2024 05:40  Mg     1.8     04-10    TPro  5.7<L>  /  Alb  2.6<L>  /  TBili  0.2  /  DBili  x   /  AST  87<H>  /  ALT  26  /  AlkPhos  183<H>  04-10    LFTsLIVER FUNCTIONS - ( 10 Apr 2024 06:28 )  Alb: 2.6 g/dL / Pro: 5.7 g/dL / ALK PHOS: 183 U/L / ALT: 26 U/L / AST: 87 U/L / GGT: x           UAUrinalysis Basic - ( 11 Apr 2024 05:40 )    Color: x / Appearance: x / SG: x / pH: x  Gluc: 170 mg/dL / Ketone: x  / Bili: x / Urobili: x   Blood: x / Protein: x / Nitrite: x   Leuk Esterase: x / RBC: x / WBC x   Sq Epi: x / Non Sq Epi: x / Bacteria: x    Radiology:  -CT Headw/o :   There has been interval development of diffuse cerebral and cerebellar   edema with loss of gray-white differentiation and hypoattenuation of the   deep gray nuclei and posterior fossa.    There is no evidence of acute intracranial hemorrhage, midline shift or   hydrocephalus.    The calvarium is intact. The visualized paranasal sinuses and mastoids   are clear. Status post left cataract surgery.    IMPRESSION:    Findings compatible with diffuse anoxic injury of the brain.

## 2024-04-11 NOTE — CONSULT NOTE ADULT - ASSESSMENT
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   Clean coccyx with soap and  water , pat dry apply triad with Allevyn foam dressing - monitor for progression   Pressure injury  preventive  measures  skin  and incontinence  care  Assess skin  and inform primary provider of any changes   Case discussed with primary Rn  Wound/ ostomy specialist  to f/u as needed     Offloading: [x ] Frequent position changes [ ] Devices/Equipment  Cleansing: [ ] Saline [ x] Soap/Water [ ] Other: ______  Topicals: [ x] Barrier Cream [ ] Antimicrobial [ ] Enzymatic Wound Debridement  Dressings: [ ] Dry, sterile [ ] Allevyn  Foam [ ] Absorbant Pads [ ] Collagenase    Other Recs.   Per Primary team      Total time for bedside assessment , review of medical records  and  discussion of plan of care with primary team greater than 35 min

## 2024-04-11 NOTE — CONSULT NOTE ADULT - SUBJECTIVE AND OBJECTIVE BOX
HPI:   Patient is a 74-year-old female with PHMx of pancreatic CA s/p Whipple, CKD stage 3, HTN, COPD, pulmonary fibrosis, PRESS syndrome (hospitalized in 2019), Raynaud's syndrome, ANCA vasculitis, Scleroderma  presents to ED due to cardiac arrest. Pt lives at home with son and daughter, went to use the bathroom. Daughter notes that she heard the patient's walker scratching the floor, went upstairs to see what was going on. Daughter noticed patient was slumped over and struggling to get onto the toilet. Daughter helped patient onto the toilet, but noticed that the patient became less responsive and alert so she called EMS. Patient was found down by EMS, intubated her in the field, and resuscitated her with compressions and 4 rounds of epinephrine. ROSC achieved within a minute of her arrival to the ED. Patient was found to be hypothermic and bradycardic. Of note, patient was hospitalized back in Dec 2023 in [facility] for 18 days for multifocal pneumonia 2/2 human metapneumovirus. Patient also was hospitalized in 2019 for PRESS syndrome. Pt had a negative stress test in Sept 2023. She follows pulmonology for pulmonary fibrosis, had recent PFTs done.   ED vitals and workup:  BP 90/57, HR 88, Temp 94.1F, satting 100% on ventilator   Labs: Hgb 8.7, Trop 56 -->79, K 5.3, Lactate 3.5, Lipase 256, , , ALT 52  VBG: pH 7.12, pCO2 67, Lactate 6.6  CXR bilateral opacities. cardiomegaly  CTH: mild atrophic changes  CT angio chest PE, A/P w/ IV cont: Bilary ductal enhancement s/p CCY. Diffuse fibrotic changes of lungs. Trace bilateral effusions. NO PE.  s/p atropine 1mg x4 doses, calcium gluconate 3g, [id number] x1, insulin 10 units, cefepime 1g in the ED.     Admitted to MICU for unresponsiveness s/p cardiac arrest.    PAST MEDICAL & SURGICAL HISTORY:  H/O vasculitis  Pancreatic cancer  History of COPD  Hypertension  H/O Raynaud's syndrome  Stage 3 chronic kidney disease  History of knee replacement  H/O Whipple procedure    REVIEW OF SYSTEMS: Pt unable to offer    MEDICATIONS  (STANDING):  artificial  tears Solution 1 Drop(s) Both EYES three times a day  chlorhexidine 0.12% Liquid 15 milliLiter(s) Oral Mucosa every 12 hours  chlorhexidine 2% Cloths 1 Application(s) Topical <User Schedule>  fentaNYL   Infusion 0.5 MICROgram(s)/kG/Hr (2 mL/Hr) IV Continuous <Continuous>  heparin   Injectable 5000 Unit(s) SubCutaneous every 12 hours  labetalol 100 milliGRAM(s) Oral three times a day  levothyroxine 100 MICROGram(s) Oral daily  lisinopril 20 milliGRAM(s) Oral daily  pantoprazole  Injectable 40 milliGRAM(s) IV Push daily  petrolatum Ophthalmic Ointment 1 Application(s) Both EYES daily  polyethylene glycol 3350 17 Gram(s) Oral daily  senna 2 Tablet(s) Oral at bedtime    MEDICATIONS  (PRN):  labetalol Injectable 20 milliGRAM(s) IV Push every 1 hour PRN Systolic blood pressure >      Allergies  celecoxib (Rash)  Originally Entered as [U UNKNOWN] reaction to [celebrit] (Unknown)  Intolerances        SOCIAL HISTORY:   Lives at home with family     FAMILY HISTORY:   No Pertinent family hx noted   PHYSICAL EXAM:  Vital Signs Last 24 Hrs  T(C): 35.7 (11 Apr 2024 07:05), Max: 37.2 (10 Apr 2024 13:00)  T(F): 96.3 (11 Apr 2024 07:05), Max: 98.9 (10 Apr 2024 13:00)  HR: 57 (11 Apr 2024 10:00) (50 - 70)  BP: 179/75 (11 Apr 2024 10:00) (121/59 - 196/88)  BP(mean): 108 (11 Apr 2024 10:00) (85 - 133)  RR: 26 (11 Apr 2024 10:00) (26 - 28)  SpO2: 100% (11 Apr 2024 10:00) (99% - 100%)    Parameters below as of 11 Apr 2024 10:00  Patient On (Oxygen Delivery Method): ventilator    O2 Concentration (%): 40     General: NAD , frail    HEENT:  NC/AT, PERRL, EOMI, sclera clear, mucosa moist, throat clear, trachea midline, neck supple  Cardiovascular: RRR   Respiratory: Vented   Gastrointestinal : Soft NT/ND (+)BS  (+) digni shield   Neurology:  Weakened strength & sensation   Psych:  Sedated   Musculoskeletal:  Limited   Vascular: B/ L LE equally cool,  Skin:  moist w/ good turgor      LABS/ CULTURES/ RADIOLOGY:                        8.7    5.89  )-----------( 161      ( 11 Apr 2024 05:40 )             26.6       134  |  103  |  35  ----------------------------<  170      [04-11-24 @ 05:40]  4.3   |  23  |  1.7        Ca     8.0     [04-11-24 @ 05:40]      Mg     1.8     [04-10-24 @ 06:28]      Phos  4.5     [04-08-24 @ 05:36]    TPro  5.7  /  Alb  2.6  /  TBili  0.2  /  DBili  x   /  AST  87  /  ALT  26  /  AlkPhos  183  [04-10-24 @ 06:28]              Culture - Blood (collected 04-06-24 @ 12:05)  Source: .Blood Blood-Venous  Preliminary Report (04-10-24 @ 20:01):    No growth at 4 days    Culture - Blood (collected 04-06-24 @ 12:05)  Source: .Blood Blood-Venous  Preliminary Report (04-10-24 @ 20:01):    No growth at 4 days

## 2024-04-11 NOTE — PROGRESS NOTE ADULT - SUBJECTIVE AND OBJECTIVE BOX
KIRAN MORFIN, 74y, Female; MRN# [id number]  Hospital Stay: 5d.    Patient is a 74y old Female who presents with a chief complaint of cardiac arrest (11 Apr 2024 10:49)  Currently admitted to the ICU with the primary diagnosis of Cardiac arrest      SUBJECTIVE:  Interval/Overnight events: No acute events overnight; patient withdrawing from pain; restarted fentanyl drip and BP has now improved.     REVIEW OF SYSTEMS:  As per HPI  OBJECTIVE:  VITALS:  T(F): 96.3 (04-11-24 @ 07:05), Max: 98.9 (04-10-24 @ 13:00)  HR: 57 (04-11-24 @ 10:00) (50 - 70)  BP: 179/75 (04-11-24 @ 10:00) (121/59 - 196/88)  ABP: 170/66 (04-11-24 @ 10:00) (126/53 - 192/74)  ABP(mean): 105 (04-11-24 @ 10:00) (75 - 123)  RR: 26 (04-11-24 @ 10:00) (26 - 27)  SpO2: 100% (04-11-24 @ 10:00) (99% - 100%)    Vent Settings  Device: bellavista, Mode: AC/ CMV (Assist Control/ Continuous Mandatory Ventilation), RR (machine): 26, TV (machine): 350, FiO2: 40, PEEP: 5, MAP: 9, PIP: 21  Blood Gas  04-11-24 @ 04:38  pH 7.38 | pCO2 41 | pO2 212 | HCO3 24 | O2 Sat 99.7    Drips  fentaNYL   Infusion 0.5 MICROgram(s)/kG/Hr (2 mL/Hr) IV Continuous <Continuous>    I&Os:    04-10-24 @ 07:01  -  04-11-24 @ 07:00  --------------------------------------------------------  IN: 1980 mL / OUT: 1555 mL / NET: 425 mL    04-11-24 @ 07:01  -  04-11-24 @ 12:00  --------------------------------------------------------  IN: 0 mL / OUT: 185 mL / NET: -185 mL      PHYSICAL EXAM:    ACTIVE MEDICATIONS:  Standing  artificial  tears Solution 1 Drop(s) Both EYES three times a day  chlorhexidine 0.12% Liquid 15 milliLiter(s) Oral Mucosa every 12 hours  chlorhexidine 2% Cloths 1 Application(s) Topical <User Schedule>  fentaNYL   Infusion 0.5 MICROgram(s)/kG/Hr (2 mL/Hr) IV Continuous <Continuous>  heparin   Injectable 5000 Unit(s) SubCutaneous every 12 hours  labetalol 100 milliGRAM(s) Oral three times a day  levothyroxine 100 MICROGram(s) Oral daily  lisinopril 20 milliGRAM(s) Oral daily  pantoprazole  Injectable 40 milliGRAM(s) IV Push daily  petrolatum Ophthalmic Ointment 1 Application(s) Both EYES daily  polyethylene glycol 3350 17 Gram(s) Oral daily  senna 2 Tablet(s) Oral at bedtime    PRN  labetalol Injectable 20 milliGRAM(s) IV Push every 1 hour PRN    LABS:  04-11-24 @ 05:40  WBC 5.89, H/H 8.7<L>/26.6<L>, plt 161  Na 134<L>, K 4.3, Cl 103, CO2 23, BUN 35<H>, Cr 1.7<H>, Glu 170<H>    Ca 8.0<L>, Mg --, Phosphorus --    Tot Protein --, Alb --, T. Bili --, D. Bili --  AST --, ALT --, Alk phos --        04-08-24 @ 14:00:  CRP --, ESR --, Procal 10.00<H>, Ferritin --, Fungitell --, D-dimer --    04-06-24 @ 19:55:  TSH 2.94                  CULTURES:    Culture - Urine (collected 04-06-24 @ 20:15)  Source: Clean Catch Clean Catch (Midstream)  Final Report (04-08-24 @ 16:27):    No growth    Culture - Blood (collected 04-06-24 @ 12:05)  Source: .Blood Blood-Venous  Preliminary Report (04-10-24 @ 20:01):    No growth at 4 days    Culture - Blood (collected 04-06-24 @ 12:05)  Source: .Blood Blood-Venous  Preliminary Report (04-10-24 @ 20:01):    No growth at 4 days      PENDING:  CT Head No Cont: Routine   Indication: s/p cardiac arrest  Transport: Stretcher-Crib,  w/ Monitor,  w/ Ventilator,  w/ IV Pole  Exam Completed (04-11-24 @ 09:59)  Proteinase 3 Antibody Assay: 06:05 (04-10-24 @ 21:05)  Myeloperoxidase Antibody Assay: 06:05 (04-10-24 @ 21:05)  EEG w/ Video 2-12 Hours, Unmonitored (04-10-24 @ 15:58)  EEG w/ Video Each 12-26 Hours, Unmonitored (04-10-24 @ 14:16)    IMAGING:  Latest imaging reviewed.    ECHO:  TTE Echo Complete w/o Contrast w/ Doppler:   Transport: Stretcher-Crib  Monitor: w/ Monitor,  Transport w/ Ventilator,  Transport w/ IV Pole (04-06-24 @ 16:26)

## 2024-04-11 NOTE — PROGRESS NOTE ADULT - ASSESSMENT
ASSESSMENT AND PLAN:    NEUROLOGICAL:  #Anoxic Brain Injury  - Not brain dead  - EEG and VEEG noted  - f/u Neurology Recommendations    - Repeat CT Head (4/11); results pending   - Aspiration precautions  - HOB 30  HEENT: oral and ET care    RESPIRATORY:     #Intubated  - SAT Daily    - fentanyl gtt/IV, for pain/comfort    ABG - ( 11 Apr 2024 04:38 )  pH, Arterial: 7.38  pH, Blood: x     /  pCO2: 41    /  pO2: 212   / HCO3: 24    / Base Excess: -0.8  /  SaO2: 99.7        CARDS:   #Hypertension  - Labetalol 10mg IVP  - Now Resolved    Meds:  labetalol 100 mg oral tablet: 1 tab(s) orally 3 times a day  lisinopril 20 mg oral tablet: 1 tab(s) orally once a day      GASTROINTESTINAL/NUTRITION:   #Diet, NPO with Tube Feed:   - Tube Feeding Modality: Orogastric  - Osmolite 1.2 Lei (OSMOLITERTH)   -OGT tube in place    #GI Prophylaxis    pantoprazole  Injectable 40 IV Push daily    #Bowel regimen    -senna/miralax if indicated    -last bowel movement      /RENAL:   #urine output in critically ill    -indwelling hill (placed     Labs          Electrolytes-(04-11 @ 05:40)Na 134 // K 4.3 // Mg -- //         HEME/ONC:   #DVT prophylaxis     -Chemical: heparin   Injectable 5000 Unit(s) SubCutaneous every 12 hours     -Mechanical: SCDs    -if DVT prophylaxis to be held, document and place VTE order        Labs: Hb/Hct:  8.2/26.5  (04-10 @ 06:28)  -->,  8.7/26.6  (04-11 @ 05:40)  -->                      Plts:  159  -->,  161  -->         ID:  #Sepsis  WBC- 5.79  --->>,  6.19  --->>,  5.89  --->>  Temp trend- 24hrs T(F): 96.3 (04-11 @ 07:05), Max: 98.9 (04-10 @ 13:00)  Current antibiotics- Zosyn d/c (4/11)  Cultures:   Culture - Urine (collected 04-06)  Source: Clean Catch Clean Catch (Midstream)  Final Report:    No growth    Culture - Blood (collected 04-06)  Source: .Blood Blood-Venous  Preliminary Report:    No growth at 4 days    Culture - Blood (collected 04-06)  Source: .Blood Blood-Venous  Preliminary Report:    No growth at 4 days      ENDOCRINE:    -FSG q6 if NPO or Tube feeds    -Glucose goal 140-180. if above 180 start ISS    MSK:         LINES/DRAINS:  PIV, Left Cordis, Hill    ADVANCED DIRECTIVES:  Full Code    HCP/Emergency Contact- Daughter: Marbella [phone]    INDICATION FOR SICU/SDU:     - S/P CARDIAC ARREST  - Anoxic Brain Injury      DISPO:  ICU ASSESSMENT AND PLAN:    NEUROLOGICAL:  #Anoxic Brain Injury  - Not brain dead  - EEG and VEEG noted  - f/u Neurology Recommendations    - Repeat CT Head (4/11); diffuse anoxic brain injury  - Aspiration precautions  - HOB 30  HEENT: oral and ET care    RESPIRATORY:     #Intubated  - SAT Daily    - fentanyl gtt/IV, for pain/comfort    ABG - ( 11 Apr 2024 04:38 )  pH, Arterial: 7.38  pH, Blood: x     /  pCO2: 41    /  pO2: 212   / HCO3: 24    / Base Excess: -0.8  /  SaO2: 99.7        CARDS:   #Hypertension  - Labetalol 10mg IVP  - Now Resolved    Meds:  labetalol 100 mg oral tablet: 1 tab(s) orally 3 times a day  lisinopril 20 mg oral tablet: 1 tab(s) orally once a day      GASTROINTESTINAL/NUTRITION:   #Diet, NPO with Tube Feed:   - Tube Feeding Modality: Orogastric  - Osmolite 1.2 Lei (OSMOLITERTH)   -OGT tube in place    #GI Prophylaxis    pantoprazole  Injectable 40 IV Push daily    #Bowel regimen    -senna/miralax if indicated    -last bowel movement      /RENAL:   #urine output in critically ill    -indwelling hill (placed     Labs          Electrolytes-(04-11 @ 05:40)Na 134 // K 4.3 // Mg -- //         HEME/ONC:   #DVT prophylaxis     -Chemical: heparin   Injectable 5000 Unit(s) SubCutaneous every 12 hours     -Mechanical: SCDs    -if DVT prophylaxis to be held, document and place VTE order        Labs: Hb/Hct:  8.2/26.5  (04-10 @ 06:28)  -->,  8.7/26.6  (04-11 @ 05:40)  -->                      Plts:  159  -->,  161  -->         ID:  #Sepsis  WBC- 5.79  --->>,  6.19  --->>,  5.89  --->>  Temp trend- 24hrs T(F): 96.3 (04-11 @ 07:05), Max: 98.9 (04-10 @ 13:00)  Current antibiotics- Zosyn d/c (4/11)  Cultures:   Culture - Urine (collected 04-06)  Source: Clean Catch Clean Catch (Midstream)  Final Report:    No growth    Culture - Blood (collected 04-06)  Source: .Blood Blood-Venous  Preliminary Report:    No growth at 4 days    Culture - Blood (collected 04-06)  Source: .Blood Blood-Venous  Preliminary Report:    No growth at 4 days      ENDOCRINE:    -FSG q6 if NPO or Tube feeds    -Glucose goal 140-180. if above 180 start ISS    MSK:         LINES/DRAINS:  PIV, Left Cordis, Hill    ADVANCED DIRECTIVES:  Full Code    HCP/Emergency Contact- Daughter: Marbella [phone]    INDICATION FOR SICU/SDU:     - S/P CARDIAC ARREST  - Anoxic Brain Injury      DISPO:  ICU

## 2024-04-11 NOTE — PROGRESS NOTE ADULT - ASSESSMENT
Patient is a a74yo Rt handed F with PHMx of pancreatic CA s/p Whipple, CKD stage 3, HTN, COPD, pulmonary fibrosis, PRESS syndrome (hospitalized in 2019), Raynaud's syndrome, ANCA vasculitis, Scleroderma  presents to ED due to cardiac arrest.     Impression   Cardiac arrest now has Anoxic brain injury   Recommendations:   CT head w/o noted to show- diffuse anoxic brain injury.   Been off sedation for greater than 24hrs   Please correct all electrolytes as needed   EEG negative for seizures-  there is concern for Electroclinical silence noted.   Will continue to monitor   Prognosis is poor- patient will not return to baseline   palliative care recommendations appreciated.

## 2024-04-11 NOTE — PROGRESS NOTE ADULT - ASSESSMENT
IMPRESSION:  Anoxic brain damage  Acute Hypoxemic Respiratory Failure on MV  SP CPA downtime 30 minutes   Cardiac bradycardia   Lung Fibrosis   GIUSEPPE  HO Raynaud's  HO PRESS    HO Anemia  HO Pancreatic cancer     PLAN:    CNS:  SAT. wean fentanyl, EEG noted, on VEEG, Neurology recommendations noted,    HEENT: oral and ET care     PULMONARY: keep pox >92%, increase rate 26  monitor peak and plateau  CT Chest noted    CARDIOVASCULAR: trend CE, CK level downtrending, Cardiology evaluation, 2D-Echo, DC IVF, continue labetalol dose     RENAL: follow is and os, trend Cr, Nephrology evaluation, RBUS    INFECTIOUS DISEASE: procalcitonin elevated, continue Zosyn, DC Vancomycin, nasal MRSA, procalcitonin 10,. ID following     GASTRO: GI ppx,  tube feeds    HEMATOLOGICAL:  DVT prophylaxis.    ENDOCRINE:  Follow up FS.  Insulin protocol if needed.    MUSCULOSKELETAL: bedrest    Full Code, Palliative     Left Cordis    Stack    left Radial A-line    Grave prognosis     The patient is critically ill with a high probability of further deterioration.  GOC discussion  ICU monitoring IMPRESSION:    Anoxic brain damage  Acute Hypoxemic Respiratory Failure on MV  SP CPA downtime 30 minutes   Cardiac bradycardia   Lung Fibrosis   GIUSEPPE, Cr downtrending   HO Raynaud's  HO PRESS    HO Anemia  HO Pancreatic cancer     PLAN:    CNS:  SAT. wean fentanyl, EEG and VEEG noted, Neurology following, repeat CT Head     HEENT: oral and ET care     PULMONARY: keep pox >92%, increase rate 26  monitor peak and plateau  CT Chest noted    CARDIOVASCULAR: trend CE, CK level downtrending, Cardiology evaluation, 2D-Echo, continue labetalol dose     RENAL: follow is and os, trend Cr, Nephrology evaluation, RBUS unremarkable     INFECTIOUS DISEASE: cultures negative to date, procalcitonin elevated, continue Zosyn, nasal MRSA, procalcitonin 10,. ID following     GASTRO: GI ppx,  tube feeds    HEMATOLOGICAL:  DVT prophylaxis.    ENDOCRINE:  Follow up FS.  Insulin protocol if needed.    MUSCULOSKELETAL: bedrest    Full Code, Palliative     Left Cordis    Stack    DC left Radial A-line    Grave prognosis     The patient is critically ill with a high probability of further deterioration.  GOC discussion  ICU monitoring IMPRESSION:    Anoxic brain damage  Acute Hypoxemic Respiratory Failure on MV  SP CPA downtime 30 minutes   Cardiac bradycardia   Lung Fibrosis   GIUSEPPE, Cr downtrending   HO Raynaud's  HO PRESS    HO Anemia  HO Pancreatic cancer     PLAN:    CNS:  not brain dead, SAT. wean fentanyl, EEG and VEEG noted, Neurology following, repeat CT Head     HEENT: oral and ET care     PULMONARY: keep pox >92%, increase rate 26  monitor peak and plateau  CT Chest noted    CARDIOVASCULAR: trend CE, CK level downtrending, Cardiology evaluation, 2D-Echo, continue labetalol dose     RENAL: follow is and os, trend Cr, Nephrology evaluation, RBUS unremarkable     INFECTIOUS DISEASE: cultures negative to date, procalcitonin elevated, continue Zosyn, nasal MRSA, procalcitonin 10,. ID following     GASTRO: GI ppx,  tube feeds    HEMATOLOGICAL:  DVT prophylaxis.    ENDOCRINE:  Follow up FS.  Insulin protocol if needed.    MUSCULOSKELETAL: bedrest    Full Code, Palliative     Left Cordis    Stack    DC left Radial A-line    Grave prognosis     The patient is critically ill with a high probability of further deterioration.  GOC discussion  ICU monitoring

## 2024-04-11 NOTE — PROGRESS NOTE ADULT - SUBJECTIVE AND OBJECTIVE BOX
Patient is a 74y old  Female who presents with a chief complaint of cardiac arrest (10 Apr 2024 11:24)        Over Night Events:      ICU Vital Signs Last 24 Hrs  T(C): 35.7 (11 Apr 2024 07:05), Max: 37.2 (10 Apr 2024 13:00)  T(F): 96.3 (11 Apr 2024 07:05), Max: 98.9 (10 Apr 2024 13:00)  HR: 50 (11 Apr 2024 08:00) (50 - 70)  BP: 121/59 (11 Apr 2024 08:00) (121/59 - 196/88)  BP(mean): 85 (11 Apr 2024 08:00) (85 - 133)  ABP: 126/53 (11 Apr 2024 08:00) (126/53 - 192/74)  ABP(mean): 75 (11 Apr 2024 08:00) (75 - 123)  RR: 27 (11 Apr 2024 08:00) (24 - 28)  SpO2: 100% (11 Apr 2024 08:00) (99% - 100%)    O2 Parameters below as of 11 Apr 2024 08:00  Patient On (Oxygen Delivery Method): ventilator    O2 Concentration (%): 40        CONSTITUTIONAL:   Ill appearing.     EYES:   Pupils sluggish  Round and reactive to light.    CARDIAC:   bradycardica    RESPIRATORY:   Decreased bilateral breath sounds   No wheezing  Bilateral BS  Normal chest expansion  Not tachypneic,  No use of accessory muscles    GASTROINTESTINAL:  Abdomen soft,   Non-tender,   No guarding,   + BS      MUSCULOSKELETAL:   No clubbing, cyanosis    NEUROLOGICAL:   sedated   positive gag reflex     SKIN:   sacral stage I    04-10-24 @ 07:01  -  04-11-24 @ 07:00  --------------------------------------------------------  IN:    dextrose 5% + sodium chloride 0.45%: 300 mL    Free Water: 300 mL    IV PiggyBack: 300 mL    Osmolite 1.2: 1080 mL  Total IN: 1980 mL    OUT:    Indwelling Catheter - Urethral (mL): 1255 mL    Rectal Tube (mL): 300 mL  Total OUT: 1555 mL    Total NET: 425 mL      04-11-24 @ 07:01  -  04-11-24 @ 08:59  --------------------------------------------------------  IN:  Total IN: 0 mL    OUT:    Indwelling Catheter - Urethral (mL): 130 mL  Total OUT: 130 mL    Total NET: -130 mL          LABS:                            8.7    5.89  )-----------( 161      ( 11 Apr 2024 05:40 )             26.6                                               04-11    134<L>  |  103  |  35<H>  ----------------------------<  170<H>  4.3   |  23  |  1.7<H>    Ca    8.0<L>      11 Apr 2024 05:40  Mg     1.8     04-10    TPro  5.7<L>  /  Alb  2.6<L>  /  TBili  0.2  /  DBili  x   /  AST  87<H>  /  ALT  26  /  AlkPhos  183<H>  04-10                                             Urinalysis Basic - ( 11 Apr 2024 05:40 )    Color: x / Appearance: x / SG: x / pH: x  Gluc: 170 mg/dL / Ketone: x  / Bili: x / Urobili: x   Blood: x / Protein: x / Nitrite: x   Leuk Esterase: x / RBC: x / WBC x   Sq Epi: x / Non Sq Epi: x / Bacteria: x                                                  LIVER FUNCTIONS - ( 10 Apr 2024 06:28 )  Alb: 2.6 g/dL / Pro: 5.7 g/dL / ALK PHOS: 183 U/L / ALT: 26 U/L / AST: 87 U/L / GGT: x                                                                                               Mode: AC/ CMV (Assist Control/ Continuous Mandatory Ventilation)  RR (machine): 26  TV (machine): 350  FiO2: 40  PEEP: 5  MAP: 9  PIP: 21                                      ABG - ( 11 Apr 2024 04:38 )  pH, Arterial: 7.38  pH, Blood: x     /  pCO2: 41    /  pO2: 212   / HCO3: 24    / Base Excess: -0.8  /  SaO2: 99.7                MEDICATIONS  (STANDING):  artificial  tears Solution 1 Drop(s) Both EYES three times a day  chlorhexidine 0.12% Liquid 15 milliLiter(s) Oral Mucosa every 12 hours  chlorhexidine 2% Cloths 1 Application(s) Topical <User Schedule>  fentaNYL   Infusion 0.5 MICROgram(s)/kG/Hr (2 mL/Hr) IV Continuous <Continuous>  heparin   Injectable 5000 Unit(s) SubCutaneous every 12 hours  labetalol 100 milliGRAM(s) Oral three times a day  levothyroxine 100 MICROGram(s) Oral daily  lisinopril 20 milliGRAM(s) Oral daily  pantoprazole  Injectable 40 milliGRAM(s) IV Push daily  petrolatum Ophthalmic Ointment 1 Application(s) Both EYES daily  polyethylene glycol 3350 17 Gram(s) Oral daily  senna 2 Tablet(s) Oral at bedtime    MEDICATIONS  (PRN):  labetalol Injectable 20 milliGRAM(s) IV Push every 1 hour PRN Systolic blood pressure >           Patient is a 74y old  Female who presents with a chief complaint of cardiac arrest (10 Apr 2024 11:24)        Over Night Events: resumed on fentanyl, remains on MV      ICU Vital Signs Last 24 Hrs  T(C): 35.7 (11 Apr 2024 07:05), Max: 37.2 (10 Apr 2024 13:00)  T(F): 96.3 (11 Apr 2024 07:05), Max: 98.9 (10 Apr 2024 13:00)  HR: 50 (11 Apr 2024 08:00) (50 - 70)  BP: 121/59 (11 Apr 2024 08:00) (121/59 - 196/88)  BP(mean): 85 (11 Apr 2024 08:00) (85 - 133)  ABP: 126/53 (11 Apr 2024 08:00) (126/53 - 192/74)  ABP(mean): 75 (11 Apr 2024 08:00) (75 - 123)  RR: 27 (11 Apr 2024 08:00) (24 - 28)  SpO2: 100% (11 Apr 2024 08:00) (99% - 100%)    O2 Parameters below as of 11 Apr 2024 08:00  Patient On (Oxygen Delivery Method): ventilator    O2 Concentration (%): 40        CONSTITUTIONAL:   Ill appearing.     EYES:   Pupils sluggish  Round and reactive to light.    CARDIAC:   bradycardica    RESPIRATORY:   Decreased bilateral breath sounds   No wheezing  Bilateral BS  Normal chest expansion  Not tachypneic,  No use of accessory muscles    GASTROINTESTINAL:  Abdomen soft,   Non-tender,   No guarding,   + BS      MUSCULOSKELETAL:   No clubbing, cyanosis    NEUROLOGICAL:   sedated   responds to pain  positive gag reflex     SKIN:   sacral stage I    04-10-24 @ 07:01  -  04-11-24 @ 07:00  --------------------------------------------------------  IN:    dextrose 5% + sodium chloride 0.45%: 300 mL    Free Water: 300 mL    IV PiggyBack: 300 mL    Osmolite 1.2: 1080 mL  Total IN: 1980 mL    OUT:    Indwelling Catheter - Urethral (mL): 1255 mL    Rectal Tube (mL): 300 mL  Total OUT: 1555 mL    Total NET: 425 mL      04-11-24 @ 07:01  -  04-11-24 @ 08:59  --------------------------------------------------------  IN:  Total IN: 0 mL    OUT:    Indwelling Catheter - Urethral (mL): 130 mL  Total OUT: 130 mL    Total NET: -130 mL          LABS:                            8.7    5.89  )-----------( 161      ( 11 Apr 2024 05:40 )             26.6                                               04-11    134<L>  |  103  |  35<H>  ----------------------------<  170<H>  4.3   |  23  |  1.7<H>    Ca    8.0<L>      11 Apr 2024 05:40  Mg     1.8     04-10    TPro  5.7<L>  /  Alb  2.6<L>  /  TBili  0.2  /  DBili  x   /  AST  87<H>  /  ALT  26  /  AlkPhos  183<H>  04-10                                             Urinalysis Basic - ( 11 Apr 2024 05:40 )    Color: x / Appearance: x / SG: x / pH: x  Gluc: 170 mg/dL / Ketone: x  / Bili: x / Urobili: x   Blood: x / Protein: x / Nitrite: x   Leuk Esterase: x / RBC: x / WBC x   Sq Epi: x / Non Sq Epi: x / Bacteria: x                                                  LIVER FUNCTIONS - ( 10 Apr 2024 06:28 )  Alb: 2.6 g/dL / Pro: 5.7 g/dL / ALK PHOS: 183 U/L / ALT: 26 U/L / AST: 87 U/L / GGT: x                                                                                               Mode: AC/ CMV (Assist Control/ Continuous Mandatory Ventilation)  RR (machine): 26  TV (machine): 350  FiO2: 40  PEEP: 5  MAP: 9  PIP: 21                                      ABG - ( 11 Apr 2024 04:38 )  pH, Arterial: 7.38  pH, Blood: x     /  pCO2: 41    /  pO2: 212   / HCO3: 24    / Base Excess: -0.8  /  SaO2: 99.7                MEDICATIONS  (STANDING):  artificial  tears Solution 1 Drop(s) Both EYES three times a day  chlorhexidine 0.12% Liquid 15 milliLiter(s) Oral Mucosa every 12 hours  chlorhexidine 2% Cloths 1 Application(s) Topical <User Schedule>  fentaNYL   Infusion 0.5 MICROgram(s)/kG/Hr (2 mL/Hr) IV Continuous <Continuous>  heparin   Injectable 5000 Unit(s) SubCutaneous every 12 hours  labetalol 100 milliGRAM(s) Oral three times a day  levothyroxine 100 MICROGram(s) Oral daily  lisinopril 20 milliGRAM(s) Oral daily  pantoprazole  Injectable 40 milliGRAM(s) IV Push daily  petrolatum Ophthalmic Ointment 1 Application(s) Both EYES daily  polyethylene glycol 3350 17 Gram(s) Oral daily  senna 2 Tablet(s) Oral at bedtime    MEDICATIONS  (PRN):  labetalol Injectable 20 milliGRAM(s) IV Push every 1 hour PRN Systolic blood pressure >

## 2024-04-12 LAB
ALBUMIN SERPL ELPH-MCNC: 2.7 G/DL — LOW (ref 3.5–5.2)
ALP SERPL-CCNC: 165 U/L — HIGH (ref 30–115)
ALT FLD-CCNC: 20 U/L — SIGNIFICANT CHANGE UP (ref 0–41)
ANION GAP SERPL CALC-SCNC: 7 MMOL/L — SIGNIFICANT CHANGE UP (ref 7–14)
AST SERPL-CCNC: 66 U/L — HIGH (ref 0–41)
BILIRUB SERPL-MCNC: 0.2 MG/DL — SIGNIFICANT CHANGE UP (ref 0.2–1.2)
BUN SERPL-MCNC: 34 MG/DL — HIGH (ref 10–20)
CA-I SERPL-SCNC: 1.18 MMOL/L — SIGNIFICANT CHANGE UP (ref 1.15–1.33)
CALCIUM SERPL-MCNC: 8.1 MG/DL — LOW (ref 8.4–10.5)
CHLORIDE SERPL-SCNC: 104 MMOL/L — SIGNIFICANT CHANGE UP (ref 98–110)
CO2 SERPL-SCNC: 24 MMOL/L — SIGNIFICANT CHANGE UP (ref 17–32)
CREAT SERPL-MCNC: 1.6 MG/DL — HIGH (ref 0.7–1.5)
EGFR: 33 ML/MIN/1.73M2 — LOW
GAS PNL BLDV: 132 MMOL/L — LOW (ref 136–145)
GAS PNL BLDV: SIGNIFICANT CHANGE UP
GAS PNL BLDV: SIGNIFICANT CHANGE UP
GLUCOSE BLDC GLUCOMTR-MCNC: 101 MG/DL — HIGH (ref 70–99)
GLUCOSE BLDC GLUCOMTR-MCNC: 128 MG/DL — HIGH (ref 70–99)
GLUCOSE BLDC GLUCOMTR-MCNC: 131 MG/DL — HIGH (ref 70–99)
GLUCOSE BLDC GLUCOMTR-MCNC: 139 MG/DL — HIGH (ref 70–99)
GLUCOSE BLDC GLUCOMTR-MCNC: 168 MG/DL — HIGH (ref 70–99)
GLUCOSE SERPL-MCNC: 125 MG/DL — HIGH (ref 70–99)
HCO3 BLDV-SCNC: 25 MMOL/L — SIGNIFICANT CHANGE UP (ref 22–29)
HCT VFR BLD CALC: 27.8 % — LOW (ref 37–47)
HCT VFR BLDA CALC: 28 % — LOW (ref 34.5–46.5)
HGB BLD CALC-MCNC: 9.2 G/DL — LOW (ref 11.7–16.1)
HGB BLD-MCNC: 9 G/DL — LOW (ref 12–16)
LACTATE BLDV-MCNC: 0.9 MMOL/L — SIGNIFICANT CHANGE UP (ref 0.5–2)
MCHC RBC-ENTMCNC: 25.7 PG — LOW (ref 27–31)
MCHC RBC-ENTMCNC: 32.4 G/DL — SIGNIFICANT CHANGE UP (ref 32–37)
MCV RBC AUTO: 79.4 FL — LOW (ref 81–99)
MYELOPEROXIDASE AB SER-ACNC: <5 UNITS — SIGNIFICANT CHANGE UP
MYELOPEROXIDASE CELLS FLD QL: NEGATIVE — SIGNIFICANT CHANGE UP
NRBC # BLD: 0 /100 WBCS — SIGNIFICANT CHANGE UP (ref 0–0)
PCO2 BLDV: 40 MMHG — SIGNIFICANT CHANGE UP (ref 39–42)
PH BLDV: 7.4 — SIGNIFICANT CHANGE UP (ref 7.32–7.43)
PLATELET # BLD AUTO: 181 K/UL — SIGNIFICANT CHANGE UP (ref 130–400)
PMV BLD: 11 FL — HIGH (ref 7.4–10.4)
PO2 BLDV: 99 MMHG — HIGH (ref 25–45)
POTASSIUM BLDV-SCNC: 4.6 MMOL/L — SIGNIFICANT CHANGE UP (ref 3.5–5.1)
POTASSIUM SERPL-MCNC: 4.7 MMOL/L — SIGNIFICANT CHANGE UP (ref 3.5–5)
POTASSIUM SERPL-SCNC: 4.7 MMOL/L — SIGNIFICANT CHANGE UP (ref 3.5–5)
PROT SERPL-MCNC: 5.9 G/DL — LOW (ref 6–8)
PROTEINASE3 AB FLD-ACNC: <5 UNITS — SIGNIFICANT CHANGE UP
PROTEINASE3 AB SER-ACNC: NEGATIVE — SIGNIFICANT CHANGE UP
RBC # BLD: 3.5 M/UL — LOW (ref 4.2–5.4)
RBC # FLD: 16.7 % — HIGH (ref 11.5–14.5)
SAO2 % BLDV: 99.4 % — HIGH (ref 67–88)
SODIUM SERPL-SCNC: 135 MMOL/L — SIGNIFICANT CHANGE UP (ref 135–146)
WBC # BLD: 7.47 K/UL — SIGNIFICANT CHANGE UP (ref 4.8–10.8)
WBC # FLD AUTO: 7.47 K/UL — SIGNIFICANT CHANGE UP (ref 4.8–10.8)

## 2024-04-12 PROCEDURE — 99291 CRITICAL CARE FIRST HOUR: CPT

## 2024-04-12 PROCEDURE — 99232 SBSQ HOSP IP/OBS MODERATE 35: CPT

## 2024-04-12 PROCEDURE — 99233 SBSQ HOSP IP/OBS HIGH 50: CPT

## 2024-04-12 PROCEDURE — 71045 X-RAY EXAM CHEST 1 VIEW: CPT | Mod: 26

## 2024-04-12 RX ORDER — HYDRALAZINE HCL 50 MG
10 TABLET ORAL ONCE
Refills: 0 | Status: COMPLETED | OUTPATIENT
Start: 2024-04-12 | End: 2024-04-12

## 2024-04-12 RX ORDER — LABETALOL HCL 100 MG
10 TABLET ORAL ONCE
Refills: 0 | Status: DISCONTINUED | OUTPATIENT
Start: 2024-04-12 | End: 2024-04-12

## 2024-04-12 RX ADMIN — CHLORHEXIDINE GLUCONATE 15 MILLILITER(S): 213 SOLUTION TOPICAL at 05:04

## 2024-04-12 RX ADMIN — HEPARIN SODIUM 5000 UNIT(S): 5000 INJECTION INTRAVENOUS; SUBCUTANEOUS at 05:04

## 2024-04-12 RX ADMIN — HEPARIN SODIUM 5000 UNIT(S): 5000 INJECTION INTRAVENOUS; SUBCUTANEOUS at 17:01

## 2024-04-12 RX ADMIN — Medication 10 MILLIGRAM(S): at 16:01

## 2024-04-12 RX ADMIN — FENTANYL CITRATE 2 MICROGRAM(S)/KG/HR: 50 INJECTION INTRAVENOUS at 07:30

## 2024-04-12 RX ADMIN — Medication 1 APPLICATION(S): at 11:53

## 2024-04-12 RX ADMIN — FENTANYL CITRATE 2 MICROGRAM(S)/KG/HR: 50 INJECTION INTRAVENOUS at 16:04

## 2024-04-12 RX ADMIN — Medication 100 MICROGRAM(S): at 05:03

## 2024-04-12 RX ADMIN — LISINOPRIL 20 MILLIGRAM(S): 2.5 TABLET ORAL at 05:03

## 2024-04-12 RX ADMIN — Medication 1 DROP(S): at 22:14

## 2024-04-12 RX ADMIN — Medication 1 DROP(S): at 13:17

## 2024-04-12 RX ADMIN — Medication 10 MILLIGRAM(S): at 05:19

## 2024-04-12 RX ADMIN — CHLORHEXIDINE GLUCONATE 15 MILLILITER(S): 213 SOLUTION TOPICAL at 17:01

## 2024-04-12 RX ADMIN — Medication 1 DROP(S): at 05:22

## 2024-04-12 RX ADMIN — SENNA PLUS 2 TABLET(S): 8.6 TABLET ORAL at 22:11

## 2024-04-12 RX ADMIN — CHLORHEXIDINE GLUCONATE 1 APPLICATION(S): 213 SOLUTION TOPICAL at 05:04

## 2024-04-12 RX ADMIN — Medication 100 MILLIGRAM(S): at 22:09

## 2024-04-12 RX ADMIN — PANTOPRAZOLE SODIUM 40 MILLIGRAM(S): 20 TABLET, DELAYED RELEASE ORAL at 11:53

## 2024-04-12 NOTE — PROGRESS NOTE ADULT - SUBJECTIVE AND OBJECTIVE BOX
Patient is a 74y old  Female who presents with a chief complaint of cardiac arrest (11 Apr 2024 18:12)        Over Night Events: hypertensive overnight, remains on fentanyl 5.0      ICU Vital Signs Last 24 Hrs  T(C): 35.7 (12 Apr 2024 07:12), Max: 36.9 (11 Apr 2024 19:00)  T(F): 96.3 (12 Apr 2024 07:12), Max: 98.5 (11 Apr 2024 19:00)  HR: 51 (12 Apr 2024 08:13) (48 - 92)  BP: 123/57 (12 Apr 2024 07:00) (117/56 - 192/87)  BP(mean): 83 (12 Apr 2024 07:00) (80 - 132)  ABP: 170/66 (11 Apr 2024 10:00) (170/66 - 170/66)  ABP(mean): 105 (11 Apr 2024 10:00) (105 - 105)  RR: 26 (12 Apr 2024 09:00) (25 - 34)  SpO2: 100% (12 Apr 2024 08:13) (99% - 100%)    O2 Parameters below as of 12 Apr 2024 07:00  Patient On (Oxygen Delivery Method): ventilator    O2 Concentration (%): 40          CONSTITUTIONAL:   Ill appearing.     EYES:   Pupils sluggish  Round and reactive to light.    CARDIAC:   bradycardica    RESPIRATORY:   Decreased bilateral breath sounds   No wheezing  Bilateral BS  Normal chest expansion  Not tachypneic,  No use of accessory muscles    GASTROINTESTINAL:  Abdomen soft,   Non-tender,   No guarding,   + BS      MUSCULOSKELETAL:   No clubbing, cyanosis    NEUROLOGICAL:   sedated   responds to pain  positive gag reflex     SKIN:   sacral stage I    04-11-24 @ 07:01  -  04-12-24 @ 07:00  --------------------------------------------------------  IN:    FentaNYL: 158 mL    Free Water: 300 mL    Osmolite 1.2: 1080 mL  Total IN: 1538 mL    OUT:    Indwelling Catheter - Urethral (mL): 935 mL    Rectal Tube (mL): 200 mL  Total OUT: 1135 mL    Total NET: 403 mL      04-12-24 @ 07:01  -  04-12-24 @ 09:02  --------------------------------------------------------  IN:    FentaNYL: 40 mL  Total IN: 40 mL    OUT:    Indwelling Catheter - Urethral (mL): 145 mL  Total OUT: 145 mL    Total NET: -105 mL          LABS:                            9.0    7.47  )-----------( 181      ( 12 Apr 2024 07:00 )             27.8                                               04-12    135  |  104  |  34<H>  ----------------------------<  125<H>  4.7   |  24  |  1.6<H>    Ca    8.1<L>      12 Apr 2024 07:00    TPro  5.9<L>  /  Alb  2.7<L>  /  TBili  0.2  /  DBili  x   /  AST  66<H>  /  ALT  20  /  AlkPhos  165<H>  04-12                                             Urinalysis Basic - ( 12 Apr 2024 07:00 )    Color: x / Appearance: x / SG: x / pH: x  Gluc: 125 mg/dL / Ketone: x  / Bili: x / Urobili: x   Blood: x / Protein: x / Nitrite: x   Leuk Esterase: x / RBC: x / WBC x   Sq Epi: x / Non Sq Epi: x / Bacteria: x                                                  LIVER FUNCTIONS - ( 12 Apr 2024 07:00 )  Alb: 2.7 g/dL / Pro: 5.9 g/dL / ALK PHOS: 165 U/L / ALT: 20 U/L / AST: 66 U/L / GGT: x                                                                                               Mode: AC/ CMV (Assist Control/ Continuous Mandatory Ventilation)  RR (machine): 26  TV (machine): 350  FiO2: 40  PEEP: 5  ITime: 0.8  MAP: 13  PIP: 32                                      ABG - ( 11 Apr 2024 04:38 )  pH, Arterial: 7.38  pH, Blood: x     /  pCO2: 41    /  pO2: 212   / HCO3: 24    / Base Excess: -0.8  /  SaO2: 99.7                MEDICATIONS  (STANDING):  artificial  tears Solution 1 Drop(s) Both EYES three times a day  chlorhexidine 0.12% Liquid 15 milliLiter(s) Oral Mucosa every 12 hours  chlorhexidine 2% Cloths 1 Application(s) Topical <User Schedule>  fentaNYL   Infusion 0.5 MICROgram(s)/kG/Hr (2 mL/Hr) IV Continuous <Continuous>  heparin   Injectable 5000 Unit(s) SubCutaneous every 12 hours  labetalol 100 milliGRAM(s) Oral three times a day  levothyroxine 100 MICROGram(s) Oral daily  lisinopril 20 milliGRAM(s) Oral daily  pantoprazole  Injectable 40 milliGRAM(s) IV Push daily  petrolatum Ophthalmic Ointment 1 Application(s) Both EYES daily  polyethylene glycol 3350 17 Gram(s) Oral daily  senna 2 Tablet(s) Oral at bedtime    MEDICATIONS  (PRN):  labetalol Injectable 20 milliGRAM(s) IV Push every 1 hour PRN Systolic blood pressure >

## 2024-04-12 NOTE — CHART NOTE - NSCHARTNOTEFT_GEN_A_CORE
Registered Dietitian Follow-Up     Patient Profile Reviewed                           Yes [X]   No []     Nutrition History Previously Obtained        Yes []  No []       Pertinent  Information:  pt is 74-year-old female with PMhx of pancreatic CA s/p Whipple, CKD stage 3, HTN, COPD, pulmonary fibrosis, PRESS syndrome (hospitalized in 2019), Raynaud's syndrome, ANCA vasculitis, Scleroderma, patient was hospitalized back in Dec 2023 in [place] for 18 days for multifocal pneumonia 2/2 human metapneumovirus.   presents to ED due to cardiac arrest. As per GI no concern for cholangitis clinically pt has normal bilirubin and has no jaundice to concern for cholangitis mild transaminitis is downtrending and could be due to CPR  Pt remains intubated to vent, + anoxic brain injury.     Diet, NPO with Tube Feed:   Tube Feeding Modality: Orogastric  Osmolite 1.2 Lei (OSMOLITERTH)  Total Volume for 24 Hours (mL): 1080  Continuous  Starting Tube Feed Rate {mL per Hour}: 10  Until Goal Tube Feed Rate (mL per Hour): 45  Tube Feed Duration (in Hours): 24  Tube Feed Start Time: 12:00  Free Water Flush  Free Water Flush Instructions:  150 Q8hr (04-09-24 @ 11:48) [Active]    **presently at goal, tolerating thus far     Anthropometrics:  - Ht. 154.9 cm   - Wt.  42.6 kg today vs 37.9 kgs upon admission   - %wt change    04-12    135  |  104  |  34<H>  ----------------------------<  125<H>  4.7   |  24  |  1.6<H>    Ca    8.1<L>      12 Apr 2024 07:00    TPro  5.9<L>  /  Alb  2.7<L>  /  TBili  0.2  /  DBili  x   /  AST  66<H>  /  ALT  20  /  AlkPhos  165<H>  04-12                          9.0    7.47  )-----------( 181      ( 12 Apr 2024 07:00 )             27.8        MEDICATIONS  (STANDING):  fentaNYL   Infusion 0.5 MICROgram(s)/kG/Hr (2 mL/Hr) IV Continuous <Continuous>  heparin   Injectable 5000 Unit(s) SubCutaneous every 12 hours  labetalol 100 milliGRAM(s) Oral three times a day  levothyroxine 100 MICROGram(s) Oral daily  lisinopril 20 milliGRAM(s) Oral daily  pantoprazole  Injectable 40 milliGRAM(s) IV Push daily  petrolatum Ophthalmic Ointment 1 Application(s) Both EYES daily  polyethylene glycol 3350 17 Gram(s) Oral daily  senna 2 Tablet(s) Oral at bedtime    MEDICATIONS  (PRN):  labetalol Injectable 20 milliGRAM(s) IV Push every 1 hour PRN Systolic blood pressure >       Physical Findings:  - Appearance: remains intubated to vent  - GI function: +BS, BM noted on 4/11  - Tubes: OGT  - Oral/Mouth cavity:  - Skin: as per wound care noted DTPI to coccyx (previously a stage I)       Nutrition Requirements  Weight Used:  37.9 kgs     Estimated Energy Needs:  6460-3653 kcals (35-40 kcal/kg/BW)  45-53g protein (1.2-1.4g/kg/BW)  1;1 kcal for estimated fluid needs 2/2 elevated BUN noted         Nutrient Intake: present EN regimen provides pt with 1080 ml + 450 ml flushes, 1296 kcals and 59g protein meeting >80% of estimated needs        [] Previous Nutrition Diagnosis:            [] Ongoing          [x] Resolved    inadequate pro-energy intake resolved     Nutrition Intervention: maintain on present feeds     Goal/Expected Outcome: pt to continue to tolerate EN and meet >80% of estimated nutrient needs within 3-5 days     Indicator/Monitoring: RD to monitor tolerance to EN, labs/meds, NFPF and f/u as needed within 5 days  high risk

## 2024-04-12 NOTE — PROGRESS NOTE ADULT - ASSESSMENT
74-year-old female with PHMx of pancreatic CA s/p Whipple, CKD stage 3, HTN, COPD, pulmonary fibrosis, PRESS syndrome (hospitalized in 2019), Raynaud's syndrome, ANCA vasculitis, Scleroderma  presents to ED due to cardiac arrest. Pt lives at home with son and daughter, went to use the bathroom. Daughter notes that she heard the patient's walker scratching the floor, went upstairs to see what was going on. Daughter noticed patient was slumped over and struggling to get onto the toilet. Daughter helped patient onto the toilet, but noticed that the patient became less responsive and alert so she called EMS. Patient was found down by EMS, intubated her in the field, and resuscitated her with compressions and 4 rounds of epinephrine. ROSC achieved within a minute of her arrival to the ED. Patient was found to be hypothermic and bradycardic. Of note, patient was hospitalized back in Dec 2023 in [facility] for 18 days for multifocal pneumonia 2/2 human metapneumovirus. Patient also was hospitalized in 2019 for PRESS syndrome. Pt had a negative stress test in Sept 2023. She follows pulmonology for pulmonary fibrosis, had recent PFTs done.   Admitted to MICU for unresponsiveness s/p cardiac arrest. Neurological work up including CT Head indicate diffuse anoxic brain injury, family discussed with neurology and was informed that patient wou'd not have "a meaningful recovery."         Problem/Recommendation - 1:  ·  Problem: Acute respiratory failure with hypoxia.   ·  Recommendation: With sepsis in the setting of cardiac arrest, possible PNA,  -treatment per ID  --vent management per ICU.    Problem/Recommendation - 2:  ·  Problem: Cardiac arrest.   ·  Recommendation: -wean sedation per neurology, plan for SATs today    -GOC as appropriate.    Problem/Recommendation - 3:  ·  Problem: GIUSEPPE (acute kidney injury).   ·  Recommendation: -trend lytes, creatinine, UOP  -avoid nephrotoxins.    Problem/Recommendation - 4:  ·  Problem: Palliative care by specialist.   ·  Recommendation: -Full code  -ongoing medical management  -GOC as appropriate  -will continue to  follow.  -Plan for family meeting sometime early next week per family's request

## 2024-04-12 NOTE — PROGRESS NOTE ADULT - ASSESSMENT
IMPRESSION:    Anoxic brain damage  Acute Hypoxemic Respiratory Failure on MV  SP CPA downtime 30 minutes   Cardiac bradycardia   Lung Fibrosis   GIUSEPPE, Cr downtrending   HO Raynaud's  HO PRESS    HO Anemia  HO Pancreatic cancer     PLAN:    CNS:  not brain dead, SAT. wean fentanyl, EEG and VEEG noted, Neurology following, repeat CT Head diffuse anoxic injury    HEENT: oral and ET care     PULMONARY: keep pox >92%  monitor peak and plateau  CT Chest noted    CARDIOVASCULAR: trend CE, CK level downtrending, Cardiology seen, 2D-Echo noted, continue labetalol dose     RENAL: follow is and os, trend Cr, Nephrology following, RBUS unremarkable     INFECTIOUS DISEASE: cultures negative to date, procalcitonin elevated, completed  Zosyn course, nasal MRSA, procalcitonin 10,. ID following     GASTRO: GI ppx, tube feeds    HEMATOLOGICAL:  DVT prophylaxis.    ENDOCRINE:  Follow up FS.  Insulin protocol if needed.    MUSCULOSKELETAL: bedrest    Full Code, Palliative     Left Cordis    Stack    Grave prognosis     The patient is critically ill with a high probability of further deterioration.  GOC discussion  ICU

## 2024-04-12 NOTE — PROGRESS NOTE ADULT - ASSESSMENT
ASSESSMENT AND PLAN    NEUROLOGICAL:  #Anoxic Brain Injury  - Not brain dead  - EEG and VEEG noted  - f/u Neurology Recommendations: EEG negative for seizures-  there is concern for Electroclinical silence noted. will continue to monitor   - Repeat CT Head (4/11); diffuse anoxic brain injury  - Aspiration precautions  - HOB 30  HEENT: oral and ET care    RESPIRATORY:     #Intubated  - SAT Daily    - fentanyl gtt/IV, for pain/comfort    ABG - ( 11 Apr 2024 04:38 )  pH, Arterial: 7.38  pH, Blood: x     /  pCO2: 41    /  pO2: 212   / HCO3: 24    / Base Excess: -0.8  /  SaO2: 99.7        CARDS:   #Hypertension  - Labetalol 10mg IVP  - Now Resolved    Meds:  labetalol 100 mg oral tablet: 1 tab(s) orally 3 times a day  lisinopril 20 mg oral tablet: 1 tab(s) orally once a day      GASTROINTESTINAL/NUTRITION:   #Diet, NPO with Tube Feed:   - Tube Feeding Modality: Orogastric  - Osmolite 1.2 Lei (OSMOLITERTH)   -OGT tube in place    #GI Prophylaxis    pantoprazole  Injectable 40 IV Push daily    #Bowel regimen    -senna/miralax if indicated    -last bowel movement      /RENAL:   #urine output in critically ill    -indwelling hill (placed     Labs          Electrolytes-(04-11 @ 05:40)Na 134 // K 4.3 // Mg -- //         HEME/ONC:   #DVT prophylaxis     -Chemical: heparin   Injectable 5000 Unit(s) SubCutaneous every 12 hours     -Mechanical: SCDs    -if DVT prophylaxis to be held, document and place VTE order        Labs: Hb/Hct:  8.2/26.5  (04-10 @ 06:28)  -->,  8.7/26.6  (04-11 @ 05:40)  -->                      Plts:  159  -->,  161  -->         ID:  #Sepsis  WBC- 5.79  --->>,  6.19  --->>,  5.89  --->> 7.47  Temp trend- 24hrs T(F): 96.3 (04-11 @ 07:05), Max: 98.9 (04-10 @ 13:00)  Current antibiotics- Zosyn d/c (4/11)  Cultures:   Culture - Urine (collected 04-06)  Source: Clean Catch Clean Catch (Midstream)  Final Report:    No growth    Culture - Blood (collected 04-06)  Source: .Blood Blood-Venous  Preliminary Report:    No growth at 4 days    Culture - Blood (collected 04-06)  Source: .Blood Blood-Venous  Preliminary Report:    No growth at 4 days      ENDOCRINE:    -FSG q6 if NPO or Tube feeds    -Glucose goal 140-180. if above 180 start ISS    MSK:         LINES/DRAINS:  PIV, Left Cordis, Hill    ADVANCED DIRECTIVES:  Full Code    HCP/Emergency Contact- Daughter: Marbella [phone]    INDICATION FOR SICU/SDU:     - S/P CARDIAC ARREST  - Anoxic Brain Injury      DISPO:  ICU

## 2024-04-12 NOTE — CHART NOTE - NSCHARTNOTEFT_GEN_A_CORE
Pt is sedated, no assessment was done. Family meeting was schedule foe next week. I will follow up if needed.

## 2024-04-12 NOTE — PROGRESS NOTE ADULT - SUBJECTIVE AND OBJECTIVE BOX
CC: cardiac arrest    HPI:  74-year-old female with PHMx of pancreatic CA s/p Whipple, CKD stage 3, HTN, COPD, pulmonary fibrosis, PRESS syndrome (hospitalized in 2019), Raynaud's syndrome, ANCA vasculitis, Scleroderma  presents to ED due to cardiac arrest. Pt lives at home with son and daughter, went to use the bathroom. Daughter notes that she heard the patient's walker scratching the floor, went upstairs to see what was going on. Daughter noticed patient was slumped over and struggling to get onto the toilet. Daughter helped patient onto the toilet, but noticed that the patient became less responsive and alert so she called EMS. Patient was found down by EMS, intubated her in the field, and resuscitated her with compressions and 4 rounds of epinephrine. ROSC achieved within a minute of her arrival to the ED. Patient was found to be hypothermic and bradycardic. Of note, patient was hospitalized back in Dec 2023 in [facility] for 18 days for multifocal pneumonia 2/2 human metapneumovirus. Patient also was hospitalized in 2019 for PRESS syndrome. Pt had a negative stress test in Sept 2023. She follows pulmonology for pulmonary fibrosis, had recent PFTs done.     Interval Events:  4/11/24: Neuro follow-up and repeat CT scan, confirmed diffuse anoxic brain injury. Family made aware by neuro team  4/12/24: Per medical team, plan is for SATs today. Family "wanting more time and feels rushed to make decision."      CXR bilateral opacities. cardiomegaly  CTH: mild atrophic changes  CT angio chest PE, A/P w/ IV cont: Bilary ductal enhancement s/p CCY. Diffuse fibrotic changes of lungs. Trace bilateral effusions. NO PE.    s/p atropine 1mg x4 doses, calcium gluconate 3g, [id number] x1, insulin 10 units, cefepime 1g in the ED.     Admitted to MICU for unresponsiveness s/p cardiac arrest.     (06 Apr 2024 15:43)    PERTINENT PM/SXH:   H/O vasculitis    Pancreatic cancer    History of COPD    Hypertension    H/O Raynaud's syndrome    Stage 3 chronic kidney disease    History of knee replacement    H/O Whipple procedure        FAMILY HISTORY:  None pertinent    ITEMS NOT CHECKED ARE NOT PRESENT    SOCIAL HISTORY:   Significant other/partner[ ]  Children[ ]  Church/Spirituality:  Substance hx:  [ ]   Tobacco hx:  [ ]   Alcohol hx: [ ]   Living Situation: [x ]Home  [ ]Long term care  [ ]Rehab [ ]Other  Home Services: [ ] HHA [ ] Visting RN [ ] Hospice  Occupation:  Home Opioid hx:  [ ] Y [ ] N [x ] I-Stop Reference No:    Reference #: [id number]      Patient Demographic Information (PDI)       PDI	First Name	Last Name	Birth Date	Gender	Street Address	City	State	Zip Code  MANAV Sr	In[place]	1950	Female	[address]	[address]    Prescription Information      PDI Filter:    PDI	Current Rx	Drug Type	Rx Written	Rx Dispensed	Drug	Quantity	Days Supply	Prescriber Name	Prescriber JACK #	Payment Method	Dispenser  A	N		08/07/2023	11/10/2023	lyrica 150 mg capsule	180	90	PerelStephan MD	[id number]	Medicare	[id number]  A	N		08/07/2023	08/08/2023	lyrica 150 mg capsule	180	90	PerelStephan MD	[id number]	Medicare	[id number]     ADVANCE DIRECTIVES:     [ x] Full Code [ ] DNR  MOLST  [ ]  Living Will  [ ]   DECISION MAKER(s):  [ ] Health Care Proxy(s)  [ x] Surrogate(s)  [ ] Guardian           Name(s): Phone Number(s):   Children      BASELINE (I)ADL(s) (prior to admission):    [place]: [ ]Total  [ ] Moderate [ ]Dependent  Palliative Performance Status Version 2:         %    http://[facility].org/files/news/palliative_performance_scale_ppsv2.pdf    Allergies    celecoxib (Rash)  Originally Entered as [U UNKNOWN] reaction to [celebrit] (Unknown)    Intolerances    MEDICATIONS  (STANDING):  chlorhexidine 0.12% Liquid 15 milliLiter(s) Oral Mucosa every 12 hours  chlorhexidine 2% Cloths 1 Application(s) Topical <User Schedule>  dextrose 5% + sodium chloride 0.45%. 1000 milliLiter(s) (75 mL/Hr) IV Continuous <Continuous>  fentaNYL   Infusion. 0.5 MICROgram(s)/kG/Hr (2 mL/Hr) IV Continuous <Continuous>  heparin   Injectable 5000 Unit(s) SubCutaneous every 12 hours  labetalol 100 milliGRAM(s) Oral three times a day  levothyroxine 100 MICROGram(s) Oral daily  lisinopril 20 milliGRAM(s) Oral daily  norepinephrine Infusion 0.05 MICROgram(s)/kG/Min (4.69 mL/Hr) IV Continuous <Continuous>  pantoprazole  Injectable 40 milliGRAM(s) IV Push daily  piperacillin/tazobactam IVPB.. 3.375 Gram(s) IV Intermittent every 8 hours    MEDICATIONS  (PRN):    PRESENT SYMPTOMS: [ x]Unable to obtain due to poor mentation   Source if other than patient:  [ ]Family   [ ]Team     Pain: [ ]yes [ ]no  ALL PAIN ASSESSMENT COMPONENTS WERE ASKED ABOUT UNLESS OTHERWISE NOTED  QOL impact -   Location -                    Aggravating factors -  Quality -  Radiation -  Timing-  Severity (0-10 scale):  Minimal acceptable level (0-10 scale):     CPOT:  1  https://www.[facility].org/getattachment/[email or web address]/Critical-Care-Pain-Observation-Tool-(CPOT)    PAIN AD Score:   http://geriatrictoolkit.[facility]/cog/painad.pdf (press ctrl +  left click to view)    Dyspnea:                           [ ]None[ ]Mild [ ]Moderate [ ]Severe     Respiratory Distress Observation Scale (RDOS): 2  A score of 0 to 2 signifies little or no respiratory distress, 3 signifies mild distress, scores 4 to 6 indicate moderate distress, and scores greater than 7 signify severe distress  https://www.[facility].ca/sites/default/files/PDFS/091088-ljfltimylsp-dutloebn-yoknsosutiu-kkapp.pdf    Anxiety:                             [ ]None[ ]Mild [ ]Moderate [ ]Severe   Fatigue:                             [ ]None[ ]Mild [ ]Moderate [ ]Severe   Nausea:                             [ ]None[ ]Mild [ ]Moderate [ ]Severe   Loss of appetite:              [ ]None[ ]Mild [ ]Moderate [ ]Severe   Constipation:                    [ ]None[ ]Mild [ ]Moderate [ ]Severe    Other Symptoms:  [ ]All other review of systems negative     Palliative Performance Status Version 2:         %    http://[facility].org/files/news/palliative_performance_scale_ppsv2.pdf  ICU Vital Signs Last 24 Hrs  T(C): 35.9 (12 Apr 2024 11:05), Max: 36.9 (11 Apr 2024 19:00)  T(F): 96.7 (12 Apr 2024 11:05), Max: 98.5 (11 Apr 2024 19:00)  HR: 58 (12 Apr 2024 11:00) (49 - 92)  BP: 169/81 (12 Apr 2024 11:00) (97/53 - 192/87)  BP(mean): 116 (12 Apr 2024 11:00) (70 - 132)  ABP: --  ABP(mean): --  RR: 26 (12 Apr 2024 11:05) (25 - 34)  SpO2: 100% (12 Apr 2024 11:05) (97% - 100%)    O2 Parameters below as of 12 Apr 2024 11:00  Patient On (Oxygen Delivery Method): ventilator    O2 Concentration (%): 40    PHYSICAL EXAM:  Vital Signs Last 24 Hrs    GENERAL:  [ ] No acute distress [ ]Lethargic  [x ]Unarousable  [ ]Verbal  [ ]Non-Verbal [ ]Cachexia    BEHAVIORAL/PSYCH:  [ ]Alert and Oriented x  [ ] Anxiety [ ] Delirium [ ] Agitation [x ] Calm   EYES: [ ] No scleral icterus [ ] Scleral icterus [ x] Closed  ENMT:  [ ]Dry mouth  [x ]No external oral lesions [ ] No external ear or nose lesions  CARDIOVASCULAR:  [ ]Regular [ ]Irregular [ ]Tachy [x ]Not Tachy  [ ]Jayy [ ] Edema [ ] No edema  PULMONARY:  [ ]Tachypnea  [ ]Audible excessive secretions [ x] No labored breathing [ ] labored breathing  GASTROINTESTINAL: [ ]Soft  [ ]Distended  [ x]Not distended [ ]Non tender [ ]Tender  MUSCULOSKELETAL: [ ]No clubbing [ ] clubbing  [x ] No cyanosis [ ] cyanosis  NEUROLOGIC: [ ]No focal deficits  [ ]Follows commands  [ x]Does not follow commands  [ ]Cognitive impairment  [ ]Dysphagia  [ ]Dysarthria  [ ]Paresis   SKIN: [ ] Jaundiced [x ] Non-jaundiced [ ]Rash [ ]No Rash [ ] Warm [ ] Dry  MISC/LINES: [ ] ET tube [ ] Trach [ ]NGT/OGT [ ]PEG [ ]Stack    LABS: reviewed by me   CBC:            9.0    7.47  )-----------( 181      ( 04-12-24 @ 07:00 )             27.8       Chem:         ( 04-12-24 @ 07:00 )    135  |  104  |  34<H>  ----------------------------<  125<H>  4.7   |  24  |  1.6<H>      Liver Functions: ( 04-12-24 @ 07:00 )  Alb: 2.7 g/dL / Pro: 5.9 g/dL / ALK PHOS: 165 U/L / ALT: 20 U/L / AST: 66 U/L / GGT: x         Type & Screen:   Bilirubin: (04-12-24 @ 07:00)  Direct: x  / Indirect: x  / Total: 0.2    TSH:   T4:              RADIOLOGY & ADDITIONAL STUDIES: reviewed by me    < from: Xray Chest 1 View-PORTABLE IMMEDIATE (Xray Chest 1 View-PORTABLE IMMEDIATE .) (04.08.24 @ 08:38) >  Impression:    Tip of the orogastric tube is seen overlying left abdomen    Stable bilateral lung opacities    < end of copied text >      EKG: reviewed by me    < from: 12 Lead ECG (04.08.24 @ 13:48) >    Ventricular Rate 63 BPM    Atrial Rate 63 BPM    P-R Interval 168 ms    QRS Duration 132 ms    Q-T Interval 442 ms    QTC Calculation(Bazett) 452 ms    P Axis 52 degrees    R Axis 149 degrees    T Axis 56 degrees    Diagnosis Line Normal sinus rhythm  Right bundle branch block  Left posterior fascicular block  *** Bifascicular block ***  Abnormal ECG    < end of copied text >      PROTEIN CALORIE MALNUTRITION PRESENT: [ ]mild [ ]moderate [ ]severe [ ]underweight [ ]morbid obesity  https://www.andeal.org/vault/2440/web/files/ONC/Table_Clinical%20Characteristics%20to%20Document%20Malnutrition-White%20JV%20et%20al%202012.pdf    Height (cm): 154.9 (04-06-24 @ 16:20)  Weight (kg): 40 (04-06-24 @ 16:20)  BMI (kg/m2): 16.7 (04-06-24 @ 16:20)  [ ]PPSV2 < or = to 30% [ ]significant weight loss  [ ]poor nutritional intake  [ ]anasarca      [ ]Artificial Nutrition      Palliative Care Spiritual/Emotional Screening Tool Question  Severity (0-4):                    OR                    [ x] Unable to determine. Will assess at later time if appropriate.  Score of 2 or greater indicates recommendation of Chaplaincy and/or SW referral  Chaplaincy Referral: [ ] Yes [ ] Refused [ ] Following     Caregiver [place]:  [x ] Yes [ ] No    OR    [] Unable to determine. Will assess at later time if appropriate.  Social Work Referral [x ]  Patient and Family Centered Care Referral [ ]    Anticipatory Grief Present: [ ] Yes [ ] No    OR     [ x] Unable to determine. Will assess at later time if appropriate.  Social Work Referral [ ]  Patient and Family Centered Care Referral [ ]    Patient discussed with primary medical team MD  Palliative care education provided to patient and/or family   CC: cardiac arrest    HPI:  74-year-old female with PHMx of pancreatic CA s/p Whipple, CKD stage 3, HTN, COPD, pulmonary fibrosis, PRESS syndrome (hospitalized in 2019), Raynaud's syndrome, ANCA vasculitis, Scleroderma  presents to ED due to cardiac arrest. Pt lives at home with son and daughter, went to use the bathroom. Daughter notes that she heard the patient's walker scratching the floor, went upstairs to see what was going on. Daughter noticed patient was slumped over and struggling to get onto the toilet. Daughter helped patient onto the toilet, but noticed that the patient became less responsive and alert so she called EMS. Patient was found down by EMS, intubated her in the field, and resuscitated her with compressions and 4 rounds of epinephrine. ROSC achieved within a minute of her arrival to the ED. Patient was found to be hypothermic and bradycardic. Of note, patient was hospitalized back in Dec 2023 in [facility] for 18 days for multifocal pneumonia 2/2 human metapneumovirus. Patient also was hospitalized in 2019 for PRESS syndrome. Pt had a negative stress test in Sept 2023. She follows pulmonology for pulmonary fibrosis, had recent PFTs done.   Admitted to MICU for unresponsiveness s/p cardiac arrest  Interval Events:  4/11/24: Neuro follow-up and repeat CT scan, confirmed diffuse anoxic brain injury. Family made aware by neuro team  4/12/24: Per medical team, plan is for SATs today. Family "wanting more time and feels rushed to make decision."      PERTINENT PM/SXH:   H/O vasculitis    Pancreatic cancer    History of COPD    Hypertension    H/O Raynaud's syndrome    Stage 3 chronic kidney disease    History of knee replacement    H/O Whipple procedure        FAMILY HISTORY:  None pertinent    ITEMS NOT CHECKED ARE NOT PRESENT    SOCIAL HISTORY:   Significant other/partner[ ]  Children[ ]  Yarsani/Spirituality:  Substance hx:  [ ]   Tobacco hx:  [ ]   Alcohol hx: [ ]   Living Situation: [x ]Home  [ ]Long term care  [ ]Rehab [ ]Other  Home Services: [ ] HHA [ ] Ck RN [ ] Hospice  Occupation:  Home Opioid hx:  [ ] Y [ ] N [x ] I-Stop Reference No:    Reference #: [id number]      Patient Demographic Information (PDI)       PDI	First Name	Last Name	Birth Date	Gender	Street Address	City	State	Zip Code  A	Remberto	[place]	1950	Female	[address]	[address]    Prescription Information      PDI Filter:    PDI	Current Rx	Drug Type	Rx Written	Rx Dispensed	Drug	Quantity	Days Supply	Prescriber Name	Prescriber JACK #	Payment Method	Dispenser  A	N		08/07/2023	11/10/2023	lyrica 150 mg capsule	180	90	PerelStephan MD	[id number]	Medicare	[id number]  A	N		08/07/2023	08/08/2023	lyrica 150 mg capsule	180	90	PerelStephan MD	[id number]	Medicare	[id number]     ADVANCE DIRECTIVES:     [ x] Full Code [ ] DNR  MOLST  [ ]  Living Will  [ ]   DECISION MAKER(s):  [ ] Health Care Proxy(s)  [ x] Surrogate(s)  [ ] Guardian           Name(s): Phone Number(s):   Children      BASELINE (I)ADL(s) (prior to admission):    [place]: [ ]Total  [ ] Moderate [ ]Dependent  Palliative Performance Status Version 2:         %    http://npcrc.org/files/news/palliative_performance_scale_ppsv2.pdf    Allergies    celecoxib (Rash)  Originally Entered as [U UNKNOWN] reaction to [celebrit] (Unknown)    Intolerances    MEDICATIONS  (STANDING):  chlorhexidine 0.12% Liquid 15 milliLiter(s) Oral Mucosa every 12 hours  chlorhexidine 2% Cloths 1 Application(s) Topical <User Schedule>  dextrose 5% + sodium chloride 0.45%. 1000 milliLiter(s) (75 mL/Hr) IV Continuous <Continuous>  fentaNYL   Infusion. 0.5 MICROgram(s)/kG/Hr (2 mL/Hr) IV Continuous <Continuous>  heparin   Injectable 5000 Unit(s) SubCutaneous every 12 hours  labetalol 100 milliGRAM(s) Oral three times a day  levothyroxine 100 MICROGram(s) Oral daily  lisinopril 20 milliGRAM(s) Oral daily  norepinephrine Infusion 0.05 MICROgram(s)/kG/Min (4.69 mL/Hr) IV Continuous <Continuous>  pantoprazole  Injectable 40 milliGRAM(s) IV Push daily  piperacillin/tazobactam IVPB.. 3.375 Gram(s) IV Intermittent every 8 hours    MEDICATIONS  (PRN):    PRESENT SYMPTOMS: [ x]Unable to obtain due to poor mentation   Source if other than patient:  [ ]Family   [ ]Team     Pain: [ ]yes [ ]no  ALL PAIN ASSESSMENT COMPONENTS WERE ASKED ABOUT UNLESS OTHERWISE NOTED  QOL impact -   Location -                    Aggravating factors -  Quality -  Radiation -  Timing-  Severity (0-10 scale):  Minimal acceptable level (0-10 scale):     CPOT:  1  https://www.[facility].org/getattachment/[email or web address]/Critical-Care-Pain-Observation-Tool-(CPOT)    PAIN AD Score:   http://geriatrictoolkit.[facility]/cog/painad.pdf (press ctrl +  left click to view)    Dyspnea:                           [ ]None[ ]Mild [ ]Moderate [ ]Severe     Respiratory Distress Observation Scale (RDOS): 2  A score of 0 to 2 signifies little or no respiratory distress, 3 signifies mild distress, scores 4 to 6 indicate moderate distress, and scores greater than 7 signify severe distress  https://www.[facility].ca/sites/default/files/PDFS/428439-qlozopvqusg-vtoeeigx-cixfpenhwgd-zlzav.pdf    Anxiety:                             [ ]None[ ]Mild [ ]Moderate [ ]Severe   Fatigue:                             [ ]None[ ]Mild [ ]Moderate [ ]Severe   Nausea:                             [ ]None[ ]Mild [ ]Moderate [ ]Severe   Loss of appetite:              [ ]None[ ]Mild [ ]Moderate [ ]Severe   Constipation:                    [ ]None[ ]Mild [ ]Moderate [ ]Severe    Other Symptoms:  [ ]All other review of systems negative     Palliative Performance Status Version 2:         %    http://npcrc.org/files/news/palliative_performance_scale_ppsv2.pdf  ICU Vital Signs Last 24 Hrs  T(C): 35.9 (12 Apr 2024 11:05), Max: 36.9 (11 Apr 2024 19:00)  T(F): 96.7 (12 Apr 2024 11:05), Max: 98.5 (11 Apr 2024 19:00)  HR: 58 (12 Apr 2024 11:00) (49 - 92)  BP: 169/81 (12 Apr 2024 11:00) (97/53 - 192/87)  BP(mean): 116 (12 Apr 2024 11:00) (70 - 132)  ABP: --  ABP(mean): --  RR: 26 (12 Apr 2024 11:05) (25 - 34)  SpO2: 100% (12 Apr 2024 11:05) (97% - 100%)    O2 Parameters below as of 12 Apr 2024 11:00  Patient On (Oxygen Delivery Method): ventilator    O2 Concentration (%): 40    PHYSICAL EXAM:  Vital Signs Last 24 Hrs    GENERAL:  [ ] No acute distress [ ]Lethargic  [x ]Unarousable  [ ]Verbal  [ ]Non-Verbal [ ]Cachexia    BEHAVIORAL/PSYCH:  [ ]Alert and Oriented x  [ ] Anxiety [ ] Delirium [ ] Agitation [x ] Calm   EYES: [ ] No scleral icterus [ ] Scleral icterus [ x] Closed  ENMT:  [ ]Dry mouth  [x ]No external oral lesions [ ] No external ear or nose lesions  CARDIOVASCULAR:  [ ]Regular [ ]Irregular [ ]Tachy [x ]Not Tachy  [ ]Jayy [ ] Edema [ ] No edema  PULMONARY:  [ ]Tachypnea  [ ]Audible excessive secretions [ x] No labored breathing [ ] labored breathing  GASTROINTESTINAL: [ ]Soft  [ ]Distended  [ x]Not distended [ ]Non tender [ ]Tender  MUSCULOSKELETAL: [ ]No clubbing [ ] clubbing  [x ] No cyanosis [ ] cyanosis  NEUROLOGIC: [ ]No focal deficits  [ ]Follows commands  [ x]Does not follow commands  [ ]Cognitive impairment  [ ]Dysphagia  [ ]Dysarthria  [ ]Paresis   SKIN: [ ] Jaundiced [x ] Non-jaundiced [ ]Rash [ ]No Rash [ ] Warm [ ] Dry  MISC/LINES: [ ] ET tube [ ] Trach [ ]NGT/OGT [ ]PEG [ ]Stack    LABS: reviewed by me   CBC:            9.0    7.47  )-----------( 181      ( 04-12-24 @ 07:00 )             27.8       Chem:         ( 04-12-24 @ 07:00 )    135  |  104  |  34<H>  ----------------------------<  125<H>  4.7   |  24  |  1.6<H>      Liver Functions: ( 04-12-24 @ 07:00 )  Alb: 2.7 g/dL / Pro: 5.9 g/dL / ALK PHOS: 165 U/L / ALT: 20 U/L / AST: 66 U/L / GGT: x         Type & Screen:   Bilirubin: (04-12-24 @ 07:00)  Direct: x  / Indirect: x  / Total: 0.2    TSH:   T4:              RADIOLOGY & ADDITIONAL STUDIES: reviewed by me    < from: Xray Chest 1 View-PORTABLE IMMEDIATE (Xray Chest 1 View-PORTABLE IMMEDIATE .) (04.08.24 @ 08:38) >  Impression:    Tip of the orogastric tube is seen overlying left abdomen    Stable bilateral lung opacities    < end of copied text >      EKG: reviewed by me    < from: 12 Lead ECG (04.08.24 @ 13:48) >    Ventricular Rate 63 BPM    Atrial Rate 63 BPM    P-R Interval 168 ms    QRS Duration 132 ms    Q-T Interval 442 ms    QTC Calculation(Bazett) 452 ms    P Axis 52 degrees    R Axis 149 degrees    T Axis 56 degrees    Diagnosis Line Normal sinus rhythm  Right bundle branch block  Left posterior fascicular block  *** Bifascicular block ***  Abnormal ECG    < end of copied text >      PROTEIN CALORIE MALNUTRITION PRESENT: [ ]mild [ ]moderate [ ]severe [ ]underweight [ ]morbid obesity  https://www.andeal.org/vault/2440/web/files/ONC/Table_Clinical%20Characteristics%20to%20Document%20Malnutrition-White%20JV%20et%20al%094637.pdf    Height (cm): 154.9 (04-06-24 @ 16:20)  Weight (kg): 40 (04-06-24 @ 16:20)  BMI (kg/m2): 16.7 (04-06-24 @ 16:20)  [ ]PPSV2 < or = to 30% [ ]significant weight loss  [ ]poor nutritional intake  [ ]anasarca      [ ]Artificial Nutrition      Palliative Care Spiritual/Emotional Screening Tool Question  Severity (0-4):                    OR                    [ x] Unable to determine. Will assess at later time if appropriate.  Score of 2 or greater indicates recommendation of Chaplaincy and/or SW referral  Chaplaincy Referral: [ ] Yes [ ] Refused [ ] Following     Caregiver [place]:  [x ] Yes [ ] No    OR    [] Unable to determine. Will assess at later time if appropriate.  Social Work Referral [x ]  Patient and Family Centered Care Referral [ ]    Anticipatory Grief Present: [ ] Yes [ ] No    OR     [ x] Unable to determine. Will assess at later time if appropriate.  Social Work Referral [ ]  Patient and Family Centered Care Referral [ ]    Patient discussed with primary medical team MD  Palliative care education provided to patient and/or family   CC: cardiac arrest    HPI:  74-year-old female with PHMx of pancreatic CA s/p Whipple, CKD stage 3, HTN, COPD, pulmonary fibrosis, PRESS syndrome (hospitalized in 2019), Raynaud's syndrome, ANCA vasculitis, Scleroderma  presents to ED due to cardiac arrest. Pt lives at home with son and daughter, went to use the bathroom. Daughter notes that she heard the patient's walker scratching the floor, went upstairs to see what was going on. Daughter noticed patient was slumped over and struggling to get onto the toilet. Daughter helped patient onto the toilet, but noticed that the patient became less responsive and alert so she called EMS. Patient was found down by EMS, intubated her in the field, and resuscitated her with compressions and 4 rounds of epinephrine. ROSC achieved within a minute of her arrival to the ED. Patient was found to be hypothermic and bradycardic. Of note, patient was hospitalized back in Dec 2023 in [facility] for 18 days for multifocal pneumonia 2/2 human metapneumovirus. Patient also was hospitalized in 2019 for PRESS syndrome. Pt had a negative stress test in Sept 2023. She follows pulmonology for pulmonary fibrosis, had recent PFTs done.   Admitted to MICU for unresponsiveness s/p cardiac arrest  Interval Events:  4/11/24: Neuro follow-up and repeat CT scan, confirmed diffuse anoxic brain injury. Family made aware by neuro team  4/12/24: Per medical team, plan is for SATs today. Family "wanting more time to make any decisions."      PERTINENT PM/SXH:   H/O vasculitis    Pancreatic cancer    History of COPD    Hypertension    H/O Raynaud's syndrome    Stage 3 chronic kidney disease    History of knee replacement    H/O Whipple procedure        FAMILY HISTORY:  None pertinent    ITEMS NOT CHECKED ARE NOT PRESENT    SOCIAL HISTORY:   Significant other/partner[ ]  Children[ ]  Yarsanism/Spirituality:  Substance hx:  [ ]   Tobacco hx:  [ ]   Alcohol hx: [ ]   Living Situation: [x ]Home  [ ]Long term care  [ ]Rehab [ ]Other  Home Services: [ ] HHA [ ] Ck RN [ ] Hospice  Occupation:  Home Opioid hx:  [ ] Y [ ] N [x ] I-Stop Reference No:    Reference #: [id number]      Patient Demographic Information (PDI)       PDI	First Name	Last Name	Birth Date	Gender	Street Address	City	State	Zip Code  MANAV [place]	1950	Female	[address] 	[place]	[address]    Prescription Information      PDI Filter:    PDI	Current Rx	Drug Type	Rx Written	Rx Dispensed	Drug	Quantity	Days Supply	Prescriber Name	Prescriber JACK #	Payment Method	Dispenser  A	N		08/07/2023	11/10/2023	lyrica 150 mg capsule	180	90	PereStephan em MD	[id number]	Medicare	[id number]  A	N		08/07/2023	08/08/2023	lyrica 150 mg capsule	180	90	PerelStephan MD	[id number]	Medicare	[id number]     ADVANCE DIRECTIVES:     [ x] Full Code [ ] DNR  MOLST  [ ]  Living Will  [ ]   DECISION MAKER(s):  [ ] Health Care Proxy(s)  [ x] Surrogate(s)  [ ] Guardian           Name(s): Phone Number(s):   Children      BASELINE (I)ADL(s) (prior to admission):    [place]: [ ]Total  [ ] Moderate [ ]Dependent  Palliative Performance Status Version 2:         %    http://npcrc.org/files/news/palliative_performance_scale_ppsv2.pdf    Allergies    celecoxib (Rash)  Originally Entered as [U UNKNOWN] reaction to [celebrit] (Unknown)    Intolerances    MEDICATIONS  (STANDING):  chlorhexidine 0.12% Liquid 15 milliLiter(s) Oral Mucosa every 12 hours  chlorhexidine 2% Cloths 1 Application(s) Topical <User Schedule>  dextrose 5% + sodium chloride 0.45%. 1000 milliLiter(s) (75 mL/Hr) IV Continuous <Continuous>  fentaNYL   Infusion. 0.5 MICROgram(s)/kG/Hr (2 mL/Hr) IV Continuous <Continuous>  heparin   Injectable 5000 Unit(s) SubCutaneous every 12 hours  labetalol 100 milliGRAM(s) Oral three times a day  levothyroxine 100 MICROGram(s) Oral daily  lisinopril 20 milliGRAM(s) Oral daily  norepinephrine Infusion 0.05 MICROgram(s)/kG/Min (4.69 mL/Hr) IV Continuous <Continuous>  pantoprazole  Injectable 40 milliGRAM(s) IV Push daily  piperacillin/tazobactam IVPB.. 3.375 Gram(s) IV Intermittent every 8 hours    MEDICATIONS  (PRN):    PRESENT SYMPTOMS: [ x]Unable to obtain due to poor mentation   Source if other than patient:  [ ]Family   [ ]Team     Pain: [ ]yes [ ]no  ALL PAIN ASSESSMENT COMPONENTS WERE ASKED ABOUT UNLESS OTHERWISE NOTED  QOL impact -   Location -                    Aggravating factors -  Quality -  Radiation -  Timing-  Severity (0-10 scale):  Minimal acceptable level (0-10 scale):     CPOT:  1  https://www.[facility].org/getattachment/[email or web address]/Critical-Care-Pain-Observation-Tool-(CPOT)    PAIN AD Score:   http://geriatrictoolkit.[facility]/cog/painad.pdf (press ctrl +  left click to view)    Dyspnea:                           [ ]None[ ]Mild [ ]Moderate [ ]Severe     Respiratory Distress Observation Scale (RDOS): 2  A score of 0 to 2 signifies little or no respiratory distress, 3 signifies mild distress, scores 4 to 6 indicate moderate distress, and scores greater than 7 signify severe distress  https://[email or web address]/sites/default/files/PDFS/434959-yocyucuchza-kglfsmch-xpsgtitcbts-ctpbv.pdf    Anxiety:                             [ ]None[ ]Mild [ ]Moderate [ ]Severe   Fatigue:                             [ ]None[ ]Mild [ ]Moderate [ ]Severe   Nausea:                             [ ]None[ ]Mild [ ]Moderate [ ]Severe   Loss of appetite:              [ ]None[ ]Mild [ ]Moderate [ ]Severe   Constipation:                    [ ]None[ ]Mild [ ]Moderate [ ]Severe    Other Symptoms:  [ ]All other review of systems negative     Palliative Performance Status Version 2:         %    http://npcrc.org/files/news/palliative_performance_scale_ppsv2.pdf  ICU Vital Signs Last 24 Hrs  T(C): 35.9 (12 Apr 2024 11:05), Max: 36.9 (11 Apr 2024 19:00)  T(F): 96.7 (12 Apr 2024 11:05), Max: 98.5 (11 Apr 2024 19:00)  HR: 58 (12 Apr 2024 11:00) (49 - 92)  BP: 169/81 (12 Apr 2024 11:00) (97/53 - 192/87)  BP(mean): 116 (12 Apr 2024 11:00) (70 - 132)  ABP: --  ABP(mean): --  RR: 26 (12 Apr 2024 11:05) (25 - 34)  SpO2: 100% (12 Apr 2024 11:05) (97% - 100%)    O2 Parameters below as of 12 Apr 2024 11:00  Patient On (Oxygen Delivery Method): ventilator    O2 Concentration (%): 40    PHYSICAL EXAM:  Vital Signs Last 24 Hrs    GENERAL:  [ ] No acute distress [ ]Lethargic  [x ]Unarousable  [ ]Verbal  [ ]Non-Verbal [ ]Cachexia    BEHAVIORAL/PSYCH:  [ ]Alert and Oriented x  [ ] Anxiety [ ] Delirium [ ] Agitation [x ] Calm   EYES: [ ] No scleral icterus [ ] Scleral icterus [ x] Closed  ENMT:  [ ]Dry mouth  [x ]No external oral lesions [ ] No external ear or nose lesions  CARDIOVASCULAR:  [ ]Regular [ ]Irregular [ ]Tachy [x ]Not Tachy  [ ]Jayy [ ] Edema [ ] No edema  PULMONARY:  [ ]Tachypnea  [ ]Audible excessive secretions [ x] No labored breathing [ ] labored breathing  GASTROINTESTINAL: [ ]Soft  [ ]Distended  [ x]Not distended [ ]Non tender [ ]Tender  MUSCULOSKELETAL: [ ]No clubbing [ ] clubbing  [x ] No cyanosis [ ] cyanosis  NEUROLOGIC: [ ]No focal deficits  [ ]Follows commands  [ x]Does not follow commands  [ ]Cognitive impairment  [ ]Dysphagia  [ ]Dysarthria  [ ]Paresis   SKIN: [ ] Jaundiced [x ] Non-jaundiced [ ]Rash [ ]No Rash [ ] Warm [ ] Dry  MISC/LINES: [ ] ET tube [ ] Trach [ ]NGT/OGT [ ]PEG [ ]Stack    LABS: reviewed by me   CBC:            9.0    7.47  )-----------( 181      ( 04-12-24 @ 07:00 )             27.8       Chem:         ( 04-12-24 @ 07:00 )    135  |  104  |  34<H>  ----------------------------<  125<H>  4.7   |  24  |  1.6<H>      Liver Functions: ( 04-12-24 @ 07:00 )  Alb: 2.7 g/dL / Pro: 5.9 g/dL / ALK PHOS: 165 U/L / ALT: 20 U/L / AST: 66 U/L / GGT: x         Type & Screen:   Bilirubin: (04-12-24 @ 07:00)  Direct: x  / Indirect: x  / Total: 0.2    TSH:   T4:              RADIOLOGY & ADDITIONAL STUDIES: reviewed by me    < from: Xray Chest 1 View-PORTABLE IMMEDIATE (Xray Chest 1 View-PORTABLE IMMEDIATE .) (04.08.24 @ 08:38) >  Impression:    Tip of the orogastric tube is seen overlying left abdomen    Stable bilateral lung opacities    < end of copied text >      EKG: reviewed by me    < from: 12 Lead ECG (04.08.24 @ 13:48) >    Ventricular Rate 63 BPM    Atrial Rate 63 BPM    P-R Interval 168 ms    QRS Duration 132 ms    Q-T Interval 442 ms    QTC Calculation(Bazett) 452 ms    P Axis 52 degrees    R Axis 149 degrees    T Axis 56 degrees    Diagnosis Line Normal sinus rhythm  Right bundle branch block  Left posterior fascicular block  *** Bifascicular block ***  Abnormal ECG    < end of copied text >      PROTEIN CALORIE MALNUTRITION PRESENT: [ ]mild [ ]moderate [ ]severe [ ]underweight [ ]morbid obesity  https://www.andeal.org/vault/2440/web/files/ONC/Table_Clinical%20Characteristics%20to%20Document%20Malnutrition-White%20JV%20et%20al%057207.pdf    Height (cm): 154.9 (04-06-24 @ 16:20)  Weight (kg): 40 (04-06-24 @ 16:20)  BMI (kg/m2): 16.7 (04-06-24 @ 16:20)  [ ]PPSV2 < or = to 30% [ ]significant weight loss  [ ]poor nutritional intake  [ ]anasarca      [ ]Artificial Nutrition      Palliative Care Spiritual/Emotional Screening Tool Question  Severity (0-4):                    OR                    [ x] Unable to determine. Will assess at later time if appropriate.  Score of 2 or greater indicates recommendation of Chaplaincy and/or SW referral  Chaplaincy Referral: [ ] Yes [ ] Refused [ ] Following     Caregiver [place]:  [x ] Yes [ ] No    OR    [] Unable to determine. Will assess at later time if appropriate.  Social Work Referral [x ]  Patient and Family Centered Care Referral [ ]    Anticipatory Grief Present: [ ] Yes [ ] No    OR     [ x] Unable to determine. Will assess at later time if appropriate.  Social Work Referral [ ]  Patient and Family Centered Care Referral [ ]    Patient discussed with primary medical team MD  Palliative care education provided to patient and/or family

## 2024-04-12 NOTE — PROGRESS NOTE ADULT - CONVERSATION DETAILS
Discussed prognosis with medical team, patient with diffuse anoxic brain injury. Per medical team, family "feels rushed" to make decisions on advanced directives. SATs planned for today. Patient examined at bedside, no distress noted. Discussed case with daughter Obdulia ([phone])  who states she is "hopeful" and she was able to verbalize the medical update provided by primary and neurology teams , stating that her mother was not intended to have a "meaningful recovery." Daughter explained that she and her brother want to give "her mother more time, that this  has only been a week." Daughter states that she and her brother want their mother to remain a Full Code, and Trach/PEG was already introduced by primary team. Daughter is open to family meeting sometime next week to discuss next steps. Daughter wants to the weekend to see if her mother has any progress. Discussed prognosis with medical team, patient with diffuse anoxic brain injury. Per medical team, family "feels rushed" to make decisions on advanced directives. SATs planned for today. Patient examined at bedside, no distress noted. Discussed case with daughter Obdulia ([phone])  who states she is "hopeful" and she was able to verbalize the medical update provided by primary and neurology teams , stating that her mother was not intended to have a "meaningful recovery." Daughter explained that she and her brother want to give "her mother more time, that this  has only been a week." Daughter states that she and her brother want their mother to remain a Full Code, and Trach/PEG was already introduced by primary team. Daughter is open to family meeting sometime next week to discuss next steps. Daughter wants to the weekend to see if her mother has any progress. Ongoing GOC as appropriate.

## 2024-04-12 NOTE — PROGRESS NOTE ADULT - SUBJECTIVE AND OBJECTIVE BOX
KIRAN MORFNI 74y Female  MRN#: [id number]   CODE STATUS: full code    Hospital Day: 6d    SUBJECTIVE  Hospital Course:  Patient is a 74y old Female who presents with a chief complaint of cardiac arrest (11 Apr 2024 10:49)  Currently admitted to the ICU with the primary diagnosis of Cardiac arrest    Overnight events: no overnight events     Subjective complaints: unable to obtain due to mental status    Present Today:   PIV, Left Cordis, Stack                                            ----------------------------------------------------------  OBJECTIVE  PAST MEDICAL & SURGICAL HISTORY  H/O vasculitis    Pancreatic cancer    History of COPD    Hypertension    H/O Raynaud's syndrome    Stage 3 chronic kidney disease    History of knee replacement    H/O Whipple procedure                                              -----------------------------------------------------------  ALLERGIES:  celecoxib (Rash)  Originally Entered as [U UNKNOWN] reaction to [celebrit] (Unknown)                                            ------------------------------------------------------------    HOME MEDICATIONS  Home Medications:  calcitriol 0.25 mcg oral capsule: 1 cap(s) orally three times per week (06 Apr 2024 15:09)  folic acid: prn (06 Apr 2024 15:09)  labetalol 100 mg oral tablet: 1 tab(s) orally 3 times a day (06 Apr 2024 15:09)  levothyroxine 100 mcg (0.1 mg) oral tablet: 1 tab(s) orally once a day (at bedtime) (06 Apr 2024 15:09)  lisinopril 20 mg oral tablet: 1 tab(s) orally once a day (06 Apr 2024 15:09)  Lyrica 150 mg oral capsule: 1 cap(s) orally 3 times a day (06 Apr 2024 15:09)  vitamin d3: prn (06 Apr 2024 15:09)                           MEDICATIONS:  STANDING MEDICATIONS  artificial  tears Solution 1 Drop(s) Both EYES three times a day  chlorhexidine 0.12% Liquid 15 milliLiter(s) Oral Mucosa every 12 hours  chlorhexidine 2% Cloths 1 Application(s) Topical <User Schedule>  fentaNYL   Infusion 0.5 MICROgram(s)/kG/Hr IV Continuous <Continuous>  heparin   Injectable 5000 Unit(s) SubCutaneous every 12 hours  labetalol 100 milliGRAM(s) Oral three times a day  levothyroxine 100 MICROGram(s) Oral daily  lisinopril 20 milliGRAM(s) Oral daily  pantoprazole  Injectable 40 milliGRAM(s) IV Push daily  petrolatum Ophthalmic Ointment 1 Application(s) Both EYES daily  polyethylene glycol 3350 17 Gram(s) Oral daily  senna 2 Tablet(s) Oral at bedtime    PRN MEDICATIONS  labetalol Injectable 20 milliGRAM(s) IV Push every 1 hour PRN                                            ------------------------------------------------------------  VITAL SIGNS: Last 24 Hours  T(C): 35.9 (12 Apr 2024 11:05), Max: 36.9 (11 Apr 2024 19:00)  T(F): 96.7 (12 Apr 2024 11:05), Max: 98.5 (11 Apr 2024 19:00)  HR: 58 (12 Apr 2024 11:00) (49 - 92)  BP: 169/81 (12 Apr 2024 11:00) (97/53 - 192/87)  BP(mean): 116 (12 Apr 2024 11:00) (70 - 132)  RR: 26 (12 Apr 2024 11:05) (25 - 34)  SpO2: 100% (12 Apr 2024 11:05) (97% - 100%)      04-11-24 @ 07:01  -  04-12-24 @ 07:00  --------------------------------------------------------  IN: 1538 mL / OUT: 1135 mL / NET: 403 mL    04-12-24 @ 07:01  -  04-12-24 @ 12:22  --------------------------------------------------------  IN: 25 mL / OUT: 245 mL / NET: -220 mL                                             --------------------------------------------------------------  LABS:                        9.0    7.47  )-----------( 181      ( 12 Apr 2024 07:00 )             27.8     04-12    135  |  104  |  34<H>  ----------------------------<  125<H>  4.7   |  24  |  1.6<H>    Ca    8.1<L>      12 Apr 2024 07:00    TPro  5.9<L>  /  Alb  2.7<L>  /  TBili  0.2  /  DBili  x   /  AST  66<H>  /  ALT  20  /  AlkPhos  165<H>  04-12      Urinalysis Basic - ( 12 Apr 2024 07:00 )    Color: x / Appearance: x / SG: x / pH: x  Gluc: 125 mg/dL / Ketone: x  / Bili: x / Urobili: x   Blood: x / Protein: x / Nitrite: x   Leuk Esterase: x / RBC: x / WBC x   Sq Epi: x / Non Sq Epi: x / Bacteria: x      ABG - ( 11 Apr 2024 04:38 )  pH, Arterial: 7.38  pH, Blood: x     /  pCO2: 41    /  pO2: 212   / HCO3: 24    / Base Excess: -0.8  /  SaO2: 99.7                                                                  -------------------------------------------------------------  RADIOLOGY:    cxr 4/12- bl lung opacities  ct head 4/11- Findings compatible with diffuse anoxic injury of the brain.                                          --------------------------------------------------------------    PHYSICAL EXAM:  GENERAL: NAD, well-developed, AAOx3  HEENT:  Atraumatic, Normocephalic. EOMI, PERRLA, conjunctiva and sclera clear, No JVD  PULMONARY: Clear to auscultation bilaterally; No wheeze  CARDIOVASCULAR: Regular rate and rhythm; No murmurs, rubs, or gallops  GASTROINTESTINAL: Soft, Nontender, Nondistended; Bowel sounds present  MUSCULOSKELETAL:  2+ Peripheral Pulses, No clubbing, cyanosis, or edema  NEUROLOGY: non-focal  SKIN: No rashes or lesions                                         --------------------------------------------------------------

## 2024-04-13 LAB
ALBUMIN SERPL ELPH-MCNC: 2.5 G/DL — LOW (ref 3.5–5.2)
ALP SERPL-CCNC: 133 U/L — HIGH (ref 30–115)
ALT FLD-CCNC: 14 U/L — SIGNIFICANT CHANGE UP (ref 0–41)
ANION GAP SERPL CALC-SCNC: 6 MMOL/L — LOW (ref 7–14)
AST SERPL-CCNC: 47 U/L — HIGH (ref 0–41)
BASE EXCESS BLDV CALC-SCNC: 1.7 MMOL/L — SIGNIFICANT CHANGE UP (ref -2–3)
BILIRUB SERPL-MCNC: <0.2 MG/DL — SIGNIFICANT CHANGE UP (ref 0.2–1.2)
BUN SERPL-MCNC: 32 MG/DL — HIGH (ref 10–20)
CALCIUM SERPL-MCNC: 8.2 MG/DL — LOW (ref 8.4–10.5)
CHLORIDE SERPL-SCNC: 105 MMOL/L — SIGNIFICANT CHANGE UP (ref 98–110)
CO2 SERPL-SCNC: 23 MMOL/L — SIGNIFICANT CHANGE UP (ref 17–32)
CREAT SERPL-MCNC: 1.4 MG/DL — SIGNIFICANT CHANGE UP (ref 0.7–1.5)
EGFR: 39 ML/MIN/1.73M2 — LOW
GAS PNL BLDV: 132 MMOL/L — LOW (ref 136–145)
GAS PNL BLDV: SIGNIFICANT CHANGE UP
GAS PNL BLDV: SIGNIFICANT CHANGE UP
GLUCOSE BLDC GLUCOMTR-MCNC: 111 MG/DL — HIGH (ref 70–99)
GLUCOSE BLDC GLUCOMTR-MCNC: 140 MG/DL — HIGH (ref 70–99)
GLUCOSE BLDC GLUCOMTR-MCNC: 81 MG/DL — SIGNIFICANT CHANGE UP (ref 70–99)
GLUCOSE SERPL-MCNC: 117 MG/DL — HIGH (ref 70–99)
HCO3 BLDV-SCNC: 26 MMOL/L — SIGNIFICANT CHANGE UP (ref 22–29)
HCT VFR BLD CALC: 24.9 % — LOW (ref 37–47)
HGB BLD-MCNC: 8.2 G/DL — LOW (ref 12–16)
LACTATE BLDV-MCNC: 0.9 MMOL/L — SIGNIFICANT CHANGE UP (ref 0.5–2)
MCHC RBC-ENTMCNC: 25.9 PG — LOW (ref 27–31)
MCHC RBC-ENTMCNC: 32.9 G/DL — SIGNIFICANT CHANGE UP (ref 32–37)
MCV RBC AUTO: 78.8 FL — LOW (ref 81–99)
NRBC # BLD: 0 /100 WBCS — SIGNIFICANT CHANGE UP (ref 0–0)
PCO2 BLDV: 38 MMHG — LOW (ref 39–42)
PH BLDV: 7.44 — HIGH (ref 7.32–7.43)
PLATELET # BLD AUTO: 192 K/UL — SIGNIFICANT CHANGE UP (ref 130–400)
PMV BLD: 12.5 FL — HIGH (ref 7.4–10.4)
PO2 BLDV: 113 MMHG — HIGH (ref 25–45)
POTASSIUM SERPL-MCNC: 4.9 MMOL/L — SIGNIFICANT CHANGE UP (ref 3.5–5)
POTASSIUM SERPL-SCNC: 4.9 MMOL/L — SIGNIFICANT CHANGE UP (ref 3.5–5)
PROT SERPL-MCNC: 5.9 G/DL — LOW (ref 6–8)
RBC # BLD: 3.16 M/UL — LOW (ref 4.2–5.4)
RBC # FLD: 16.5 % — HIGH (ref 11.5–14.5)
SAO2 % BLDV: 98.8 % — HIGH (ref 67–88)
SODIUM SERPL-SCNC: 134 MMOL/L — LOW (ref 135–146)
WBC # BLD: 7.02 K/UL — SIGNIFICANT CHANGE UP (ref 4.8–10.8)
WBC # FLD AUTO: 7.02 K/UL — SIGNIFICANT CHANGE UP (ref 4.8–10.8)

## 2024-04-13 PROCEDURE — 99232 SBSQ HOSP IP/OBS MODERATE 35: CPT

## 2024-04-13 PROCEDURE — 99291 CRITICAL CARE FIRST HOUR: CPT

## 2024-04-13 RX ORDER — HYDRALAZINE HCL 50 MG
5 TABLET ORAL ONCE
Refills: 0 | Status: COMPLETED | OUTPATIENT
Start: 2024-04-13 | End: 2024-04-13

## 2024-04-13 RX ADMIN — CHLORHEXIDINE GLUCONATE 15 MILLILITER(S): 213 SOLUTION TOPICAL at 05:02

## 2024-04-13 RX ADMIN — Medication 1 DROP(S): at 06:22

## 2024-04-13 RX ADMIN — Medication 1 DROP(S): at 22:53

## 2024-04-13 RX ADMIN — Medication 1 APPLICATION(S): at 11:23

## 2024-04-13 RX ADMIN — CHLORHEXIDINE GLUCONATE 1 APPLICATION(S): 213 SOLUTION TOPICAL at 05:01

## 2024-04-13 RX ADMIN — Medication 100 MILLIGRAM(S): at 21:11

## 2024-04-13 RX ADMIN — SENNA PLUS 2 TABLET(S): 8.6 TABLET ORAL at 21:12

## 2024-04-13 RX ADMIN — HEPARIN SODIUM 5000 UNIT(S): 5000 INJECTION INTRAVENOUS; SUBCUTANEOUS at 18:00

## 2024-04-13 RX ADMIN — FENTANYL CITRATE 2 MICROGRAM(S)/KG/HR: 50 INJECTION INTRAVENOUS at 22:57

## 2024-04-13 RX ADMIN — LISINOPRIL 20 MILLIGRAM(S): 2.5 TABLET ORAL at 05:01

## 2024-04-13 RX ADMIN — Medication 100 MICROGRAM(S): at 05:01

## 2024-04-13 RX ADMIN — PANTOPRAZOLE SODIUM 40 MILLIGRAM(S): 20 TABLET, DELAYED RELEASE ORAL at 11:02

## 2024-04-13 RX ADMIN — Medication 1 DROP(S): at 13:30

## 2024-04-13 RX ADMIN — Medication 5 MILLIGRAM(S): at 13:34

## 2024-04-13 RX ADMIN — HEPARIN SODIUM 5000 UNIT(S): 5000 INJECTION INTRAVENOUS; SUBCUTANEOUS at 05:01

## 2024-04-13 RX ADMIN — FENTANYL CITRATE 2 MICROGRAM(S)/KG/HR: 50 INJECTION INTRAVENOUS at 06:53

## 2024-04-13 RX ADMIN — CHLORHEXIDINE GLUCONATE 15 MILLILITER(S): 213 SOLUTION TOPICAL at 18:00

## 2024-04-13 RX ADMIN — Medication 20 MILLIGRAM(S): at 01:32

## 2024-04-13 NOTE — PROGRESS NOTE ADULT - ASSESSMENT
IMPRESSION:    Anoxic brain damage  Acute Hypoxemic Respiratory Failure on MV  SP CPA downtime 30 minutes   Cardiac bradycardia   Lung Fibrosis   GIUSEPPE, Cr downtrending   HO Raynaud's  HO PRESS    HO Anemia  HO Pancreatic cancer     PLAN:    CNS:  not brain dead, SAT. wean fentanyl, EEG and VEEG noted, Neurology following, repeat CT Head diffuse anoxic injury    HEENT: oral and ET care     PULMONARY: keep pox >92%  monitor peak and plateau  no change in vent settings      CARDIOVASCULAR: trend CE, CK level downtrending, Cardiology seen, 2D-Echo noted, continue labetalol dose     RENAL: follow is and os, trend Cr, Nephrology following, RBUS unremarkable     INFECTIOUS DISEASE: cultures negative to date, procalcitonin elevated, completed  Zosyn course, nasal MRSA, procalcitonin 10,. ID following     GASTRO: GI ppx, tube feeds    HEMATOLOGICAL:  DVT prophylaxis.    ENDOCRINE:  Follow up FS.  Insulin protocol if needed.    MUSCULOSKELETAL: bedrest    Full Code, Palliative     Left Cordis    Stack    Grave prognosis     The patient is critically ill with a high probability of further deterioration.    GOC discussion made by palliative. Family wants more time as they are "hopeful"    ICU

## 2024-04-13 NOTE — PROGRESS NOTE ADULT - ASSESSMENT
#cardiac arrest sp rosc  #acute resp failure - vent support per pulm/crit  #anoxic brain injury - pt is not brain dead, EEG with concern for ECS - pt does move spontanteously and has reflexes  - likley no meaningful recovery  - will need family meeting next week  - family does not want to feel rushed to make decision too soon  - want her to remain full code for now   - will need ongoing GOC discussion     #hx of panc ca sp whipple  #COPD  #CKDIII    Poor prognosis

## 2024-04-13 NOTE — PROGRESS NOTE ADULT - SUBJECTIVE AND OBJECTIVE BOX
Patient is a 74y old  Female who presents with a chief complaint of cardiac arrest (12 Apr 2024 13:00)        HPI:  74-year-old female with PHMx of pancreatic CA s/p Whipple, CKD stage 3, HTN, COPD, pulmonary fibrosis, PRESS syndrome (hospitalized in 2019), Raynaud's syndrome, ANCA vasculitis, Scleroderma  presents to ED due to cardiac arrest. Pt lives at home with son and daughter, went to use the bathroom. Daughter notes that she heard the patient's walker scratching the floor, went upstairs to see what was going on. Daughter noticed patient was slumped over and struggling to get onto the toilet. Daughter helped patient onto the toilet, but noticed that the patient became less responsive and alert so she called EMS. Patient was found down by EMS, intubated her in the field, and resuscitated her with compressions and 4 rounds of epinephrine. ROSC achieved within a minute of her arrival to the ED. Patient was found to be hypothermic and bradycardic. Of note, patient was hospitalized back in Dec 2023 in [facility] for 18 days for multifocal pneumonia 2/2 human metapneumovirus. Patient also was hospitalized in 2019 for PRESS syndrome. Pt had a negative stress test in Sept 2023. She follows pulmonology for pulmonary fibrosis, had recent PFTs done.   .     Admitted to MICU for unresponsiveness s/p cardiac arrest.     (06 Apr 2024 15:43)      Pt evaluated on rounds.  I reviewed the radiology tests and hospital record.    I reviewed previous notes on this patient.    Interval Events: No overnight events.      CAM:+    SAT:+    SBT:n      REVIEW OF SYSTEMS:   see HPI      OBJECTIVE:  ICU Vital Signs Last 24 Hrs  T(C): 37 (13 Apr 2024 03:01), Max: 37.7 (13 Apr 2024 01:45)  T(F): 98.6 (13 Apr 2024 03:01), Max: 99.8 (13 Apr 2024 01:45)  HR: 61 (13 Apr 2024 04:30) (49 - 92)  BP: 170/75 (13 Apr 2024 04:30) (97/53 - 221/97)  BP(mean): 116 (13 Apr 2024 04:30) (70 - 147)    RR: 26 (13 Apr 2024 04:30) (25 - 27)  SpO2: 100% (13 Apr 2024 04:30) (97% - 100%)    O2 Parameters below as of 13 Apr 2024 03:00  Patient On (Oxygen Delivery Method): ventilator    O2 Concentration (%): 40      Mode: AC/ CMV (Assist Control/ Continuous Mandatory Ventilation), RR (machine): 26, TV (machine): 350, FiO2: 40, PEEP: 5, ITime: 0.8, MAP: 15, PIP: 30    04-11 @ 07:01 - 04-12 @ 07:00  --------------------------------------------------------  IN: 1538 mL / OUT: 1135 mL / NET: 403 mL    04-12 @ 07:01 - 04-13 @ 05:51  --------------------------------------------------------  IN: 1380 mL / OUT: 1115 mL / NET: 265 mL      CAPILLARY BLOOD GLUCOSE      POCT Blood Glucose.: 81 mg/dL (13 Apr 2024 04:43)        PHYSICAL EXAM:       · ENMT:   Airway patent,   Nasal mucosa clear.  Mouth with normal mucosa.   No thrush    · EYES:   Clear bilaterally,   pupils equal,   round and reactive to light.    · CARDIAC:   Normal rate,   regular rhythm.    Heart sounds S1, S2.   No murmurs, no rubs or gallops on auscultation  no edema        CAROTID:   normal systolic impulse  no bruits    · RESPIRATORY:   rales  normal chest expansion  no retractions or use of accessory muscles  percussion of chest demonstrates no hyperresonance or dullness    · GASTROINTESTINAL:  Abdomen soft,   non-tender,   + BS  liver/spleen not palpable    · MUSCULOSKELETAL:   no clubbing, cyanosis      · SKIN:   Skin normal color for race,   warm, dry   No evidence of rash.        · HEME LYMPH:   no splenomegaly.  No cervical  lymphadenopathy.  no inguinal lymphadenopathy    HOSPITAL MEDICATIONS:  MEDICATIONS  (STANDING):  artificial  tears Solution 1 Drop(s) Both EYES three times a day  chlorhexidine 0.12% Liquid 15 milliLiter(s) Oral Mucosa every 12 hours  chlorhexidine 2% Cloths 1 Application(s) Topical <User Schedule>  fentaNYL   Infusion 0.5 MICROgram(s)/kG/Hr (2 mL/Hr) IV Continuous <Continuous>  heparin   Injectable 5000 Unit(s) SubCutaneous every 12 hours  labetalol 100 milliGRAM(s) Oral three times a day  levothyroxine 100 MICROGram(s) Oral daily  lisinopril 20 milliGRAM(s) Oral daily  pantoprazole  Injectable 40 milliGRAM(s) IV Push daily  petrolatum Ophthalmic Ointment 1 Application(s) Both EYES daily  polyethylene glycol 3350 17 Gram(s) Oral daily  senna 2 Tablet(s) Oral at bedtime    MEDICATIONS  (PRN):  labetalol Injectable 20 milliGRAM(s) IV Push every 1 hour PRN Systolic blood pressure >    dextrose 5% + sodium chloride 0.45%.: Solution, 1000 milliLiter(s) infuse at 75 mL/Hr  lactated ringers.: Solution, 1000 milliLiter(s) infuse at 70 mL/Hr  sodium chloride 0.9% Bolus:   1000 milliLiter(s), IV Bolus, once, infuse over 60 Minute(s), Stop After 1 Doses  Provider's Contact #: [phone]      LABS:                        9.0    7.47  )-----------( 181      ( 12 Apr 2024 07:00 )             27.8     04-12    135  |  104  |  34<H>  ----------------------------<  125<H>  4.7   |  24  |  1.6<H>    Ca    8.1<L>      12 Apr 2024 07:00    TPro  5.9<L>  /  Alb  2.7<L>  /  TBili  0.2  /  DBili  x   /  AST  66<H>  /  ALT  20  /  AlkPhos  165<H>  04-12      Urinalysis Basic - ( 12 Apr 2024 07:00 )    Color: x / Appearance: x / SG: x / pH: x  Gluc: 125 mg/dL / Ketone: x  / Bili: x / Urobili: x   Blood: x / Protein: x / Nitrite: x   Leuk Esterase: x / RBC: x / WBC x   Sq Epi: x / Non Sq Epi: x / Bacteria: x        Venous Blood Gas:  04-13 @ 04:38  7.44/38/113/26/98.8  VBG Lactate: 0.9  Venous Blood Gas:  04-12 @ 04:42  7.40/40/99/25/99.4  VBG Lactate: 0.9    Mode: AC/ CMV (Assist Control/ Continuous Mandatory Ventilation), RR (machine): 26, TV (machine): 350, FiO2: 40, PEEP: 5, ITime: 0.8, MAP: 15, PIP: 30      SARS-CoV-2: NotDetec (08 Apr 2024 11:00)    Mode: AC/ CMV (Assist Control/ Continuous Mandatory Ventilation)  RR (machine): 26  TV (machine): 350  FiO2: 40  PEEP: 5  ITime: 0.8  MAP: 15  PIP: 30          RADIOLOGY: Today I personally interpreted the latest CXR and other pertinent films.

## 2024-04-13 NOTE — PROGRESS NOTE ADULT - SUBJECTIVE AND OBJECTIVE BOX
SUBJECTIVE:    Patient is a 74y old Female who presents with a chief complaint of cardiac arrest (13 Apr 2024 05:51)    Currently admitted to medicine with the primary diagnosis of Cardiac arrest       Today is hospital day 7d. remains critically ill on MV   PAST MEDICAL & SURGICAL HISTORY  H/O vasculitis    Pancreatic cancer    History of COPD    Hypertension    H/O Raynaud's syndrome    Stage 3 chronic kidney disease    History of knee replacement    H/O Whipple procedure      SOCIAL HISTORY:  Negative for smoking/alcohol/drug use.     ALLERGIES:  celecoxib (Rash)  Originally Entered as [U UNKNOWN] reaction to [celebrit] (Unknown)    MEDICATIONS:  STANDING MEDICATIONS  artificial  tears Solution 1 Drop(s) Both EYES three times a day  chlorhexidine 0.12% Liquid 15 milliLiter(s) Oral Mucosa every 12 hours  chlorhexidine 2% Cloths 1 Application(s) Topical <User Schedule>  fentaNYL   Infusion 0.5 MICROgram(s)/kG/Hr IV Continuous <Continuous>  heparin   Injectable 5000 Unit(s) SubCutaneous every 12 hours  labetalol 100 milliGRAM(s) Oral three times a day  levothyroxine 100 MICROGram(s) Oral daily  lisinopril 20 milliGRAM(s) Oral daily  pantoprazole  Injectable 40 milliGRAM(s) IV Push daily  petrolatum Ophthalmic Ointment 1 Application(s) Both EYES daily  polyethylene glycol 3350 17 Gram(s) Oral daily  senna 2 Tablet(s) Oral at bedtime    PRN MEDICATIONS  labetalol Injectable 20 milliGRAM(s) IV Push every 1 hour PRN    VITALS:   T(F): 96.8  HR: 54  BP: 157/76  RR: 26  SpO2: 100%    PHYSICAL EXAM:  GEN: on on MV  LUNGS: Clear to auscultation bilaterally   HEART: S1/S2 present.    ABD: Soft, non-tender, non-distended. Bowel sounds present         LABS:                        8.2    7.02  )-----------( 192      ( 13 Apr 2024 05:38 )             24.9     04-13    134<L>  |  105  |  32<H>  ----------------------------<  117<H>  4.9   |  23  |  1.4    Ca    8.2<L>      13 Apr 2024 05:38    TPro  5.9<L>  /  Alb  2.5<L>  /  TBili  <0.2  /  DBili  x   /  AST  47<H>  /  ALT  14  /  AlkPhos  133<H>  04-13      Urinalysis Basic - ( 13 Apr 2024 05:38 )    Color: x / Appearance: x / SG: x / pH: x  Gluc: 117 mg/dL / Ketone: x  / Bili: x / Urobili: x   Blood: x / Protein: x / Nitrite: x   Leuk Esterase: x / RBC: x / WBC x   Sq Epi: x / Non Sq Epi: x / Bacteria: x                    RADIOLOGY:

## 2024-04-14 LAB
ALBUMIN SERPL ELPH-MCNC: 2.3 G/DL — LOW (ref 3.5–5.2)
ALP SERPL-CCNC: 130 U/L — HIGH (ref 30–115)
ALT FLD-CCNC: 14 U/L — SIGNIFICANT CHANGE UP (ref 0–41)
ANION GAP SERPL CALC-SCNC: 8 MMOL/L — SIGNIFICANT CHANGE UP (ref 7–14)
AST SERPL-CCNC: 45 U/L — HIGH (ref 0–41)
BASE EXCESS BLDA CALC-SCNC: 1.2 MMOL/L — SIGNIFICANT CHANGE UP (ref -2–3)
BASOPHILS # BLD AUTO: 0.01 K/UL — SIGNIFICANT CHANGE UP (ref 0–0.2)
BASOPHILS NFR BLD AUTO: 0.1 % — SIGNIFICANT CHANGE UP (ref 0–1)
BILIRUB SERPL-MCNC: <0.2 MG/DL — SIGNIFICANT CHANGE UP (ref 0.2–1.2)
BUN SERPL-MCNC: 31 MG/DL — HIGH (ref 10–20)
CALCIUM SERPL-MCNC: 7.9 MG/DL — LOW (ref 8.4–10.5)
CHLORIDE SERPL-SCNC: 102 MMOL/L — SIGNIFICANT CHANGE UP (ref 98–110)
CO2 SERPL-SCNC: 23 MMOL/L — SIGNIFICANT CHANGE UP (ref 17–32)
CREAT SERPL-MCNC: 1.2 MG/DL — SIGNIFICANT CHANGE UP (ref 0.7–1.5)
EGFR: 47 ML/MIN/1.73M2 — LOW
EOSINOPHIL # BLD AUTO: 0.14 K/UL — SIGNIFICANT CHANGE UP (ref 0–0.7)
EOSINOPHIL NFR BLD AUTO: 2 % — SIGNIFICANT CHANGE UP (ref 0–8)
GLUCOSE BLDC GLUCOMTR-MCNC: 128 MG/DL — HIGH (ref 70–99)
GLUCOSE BLDC GLUCOMTR-MCNC: 133 MG/DL — HIGH (ref 70–99)
GLUCOSE BLDC GLUCOMTR-MCNC: 144 MG/DL — HIGH (ref 70–99)
GLUCOSE SERPL-MCNC: 131 MG/DL — HIGH (ref 70–99)
HCO3 BLDA-SCNC: 26 MMOL/L — SIGNIFICANT CHANGE UP (ref 21–28)
HCT VFR BLD CALC: 24.9 % — LOW (ref 37–47)
HGB BLD-MCNC: 8.2 G/DL — LOW (ref 12–16)
IMM GRANULOCYTES NFR BLD AUTO: 0.3 % — SIGNIFICANT CHANGE UP (ref 0.1–0.3)
LYMPHOCYTES # BLD AUTO: 0.91 K/UL — LOW (ref 1.2–3.4)
LYMPHOCYTES # BLD AUTO: 12.8 % — LOW (ref 20.5–51.1)
MAGNESIUM SERPL-MCNC: 2.1 MG/DL — SIGNIFICANT CHANGE UP (ref 1.8–2.4)
MCHC RBC-ENTMCNC: 25.9 PG — LOW (ref 27–31)
MCHC RBC-ENTMCNC: 32.9 G/DL — SIGNIFICANT CHANGE UP (ref 32–37)
MCV RBC AUTO: 78.5 FL — LOW (ref 81–99)
MONOCYTES # BLD AUTO: 0.62 K/UL — HIGH (ref 0.1–0.6)
MONOCYTES NFR BLD AUTO: 8.7 % — SIGNIFICANT CHANGE UP (ref 1.7–9.3)
NEUTROPHILS # BLD AUTO: 5.41 K/UL — SIGNIFICANT CHANGE UP (ref 1.4–6.5)
NEUTROPHILS NFR BLD AUTO: 76.1 % — HIGH (ref 42.2–75.2)
NRBC # BLD: 0 /100 WBCS — SIGNIFICANT CHANGE UP (ref 0–0)
PCO2 BLDA: 41 MMHG — SIGNIFICANT CHANGE UP (ref 32–45)
PH BLDA: 7.41 — SIGNIFICANT CHANGE UP (ref 7.35–7.45)
PLATELET # BLD AUTO: 222 K/UL — SIGNIFICANT CHANGE UP (ref 130–400)
PMV BLD: 11.9 FL — HIGH (ref 7.4–10.4)
PO2 BLDA: 217 MMHG — HIGH (ref 83–108)
POTASSIUM SERPL-MCNC: 5.1 MMOL/L — HIGH (ref 3.5–5)
POTASSIUM SERPL-SCNC: 5.1 MMOL/L — HIGH (ref 3.5–5)
PROT SERPL-MCNC: 6.1 G/DL — SIGNIFICANT CHANGE UP (ref 6–8)
RBC # BLD: 3.17 M/UL — LOW (ref 4.2–5.4)
RBC # FLD: 17 % — HIGH (ref 11.5–14.5)
SAO2 % BLDA: 99.9 % — HIGH (ref 94–98)
SODIUM SERPL-SCNC: 133 MMOL/L — LOW (ref 135–146)
WBC # BLD: 7.11 K/UL — SIGNIFICANT CHANGE UP (ref 4.8–10.8)
WBC # FLD AUTO: 7.11 K/UL — SIGNIFICANT CHANGE UP (ref 4.8–10.8)

## 2024-04-14 PROCEDURE — 99291 CRITICAL CARE FIRST HOUR: CPT

## 2024-04-14 PROCEDURE — 71045 X-RAY EXAM CHEST 1 VIEW: CPT | Mod: 26

## 2024-04-14 PROCEDURE — 99232 SBSQ HOSP IP/OBS MODERATE 35: CPT

## 2024-04-14 RX ORDER — HYDRALAZINE HCL 50 MG
10 TABLET ORAL ONCE
Refills: 0 | Status: COMPLETED | OUTPATIENT
Start: 2024-04-14 | End: 2024-04-14

## 2024-04-14 RX ORDER — HYDRALAZINE HCL 50 MG
5 TABLET ORAL ONCE
Refills: 0 | Status: COMPLETED | OUTPATIENT
Start: 2024-04-14 | End: 2024-04-14

## 2024-04-14 RX ADMIN — CHLORHEXIDINE GLUCONATE 15 MILLILITER(S): 213 SOLUTION TOPICAL at 05:12

## 2024-04-14 RX ADMIN — Medication 1 DROP(S): at 15:06

## 2024-04-14 RX ADMIN — HEPARIN SODIUM 5000 UNIT(S): 5000 INJECTION INTRAVENOUS; SUBCUTANEOUS at 17:04

## 2024-04-14 RX ADMIN — Medication 1 DROP(S): at 22:23

## 2024-04-14 RX ADMIN — PANTOPRAZOLE SODIUM 40 MILLIGRAM(S): 20 TABLET, DELAYED RELEASE ORAL at 12:00

## 2024-04-14 RX ADMIN — LISINOPRIL 20 MILLIGRAM(S): 2.5 TABLET ORAL at 05:10

## 2024-04-14 RX ADMIN — Medication 100 MICROGRAM(S): at 05:10

## 2024-04-14 RX ADMIN — Medication 10 MILLIGRAM(S): at 23:16

## 2024-04-14 RX ADMIN — HEPARIN SODIUM 5000 UNIT(S): 5000 INJECTION INTRAVENOUS; SUBCUTANEOUS at 05:10

## 2024-04-14 RX ADMIN — Medication 1 APPLICATION(S): at 12:01

## 2024-04-14 RX ADMIN — Medication 100 MILLIGRAM(S): at 15:54

## 2024-04-14 RX ADMIN — Medication 5 MILLIGRAM(S): at 12:25

## 2024-04-14 RX ADMIN — CHLORHEXIDINE GLUCONATE 15 MILLILITER(S): 213 SOLUTION TOPICAL at 17:04

## 2024-04-14 RX ADMIN — CHLORHEXIDINE GLUCONATE 1 APPLICATION(S): 213 SOLUTION TOPICAL at 05:10

## 2024-04-14 RX ADMIN — SENNA PLUS 2 TABLET(S): 8.6 TABLET ORAL at 21:05

## 2024-04-14 RX ADMIN — Medication 1 DROP(S): at 06:40

## 2024-04-14 RX ADMIN — Medication 100 MILLIGRAM(S): at 21:04

## 2024-04-14 RX ADMIN — FENTANYL CITRATE 2 MICROGRAM(S)/KG/HR: 50 INJECTION INTRAVENOUS at 14:43

## 2024-04-14 NOTE — PROGRESS NOTE ADULT - ASSESSMENT
#cardiac arrest   - poor prognosis    #acute resp failure   - vent support    #anoxic brain injury  - does not meet brain death, EEG with concern for ECS   - poor porgnosis    #hx of panc ca sp whipple  #COPD  #CKDIII

## 2024-04-14 NOTE — PROGRESS NOTE ADULT - ASSESSMENT
IMPRESSION:    Anoxic brain damage  Acute Hypoxemic Respiratory Failure on MV  SP CPA downtime 30 minutes   Cardiac bradycardia   Lung Fibrosis   GIUSEPPE, Cr downtrending   HO Raynaud's  HO PRESS    HO Anemia  HO Pancreatic cancer     PLAN:    CNS:  not brain dead, SAT. as needed fentanyl for comfort, Neurology following,     HEENT: oral and ET care     PULMONARY: keep pox >92%  monitor peak and plateau  no change in vent settings      CARDIOVASCULAR: trend CE, CK level downtrending, Cardiology seen, 2D-Echo noted, continue labetalol dose     RENAL: follow is and os, trend Cr, Nephrology following,     INFECTIOUS DISEASE: cultures negative to date, procalcitonin elevated, completed  Zosyn course, nasal MRSA, procalcitonin 10,. ID following     GASTRO: GI ppx, tube feeds    HEMATOLOGICAL:  DVT prophylaxis.    ENDOCRINE:  Follow up FS.  Insulin protocol if needed.    MUSCULOSKELETAL: bedrest    Full Code, Palliative     Left Cordis    Stack    Grave prognosis     The patient is critically ill with a high probability of further deterioration.    GOC discussion made by palliative. Family wants more time as they are "hopeful"    ICU

## 2024-04-14 NOTE — PROGRESS NOTE ADULT - SUBJECTIVE AND OBJECTIVE BOX
Patient is a 74y old  Female who presents with a chief complaint of cardiac arrest (13 Apr 2024 11:52)        HPI:  74-year-old female with PHMx of pancreatic CA s/p Whipple, CKD stage 3, HTN, COPD, pulmonary fibrosis, PRESS syndrome (hospitalized in 2019), Raynaud's syndrome, ANCA vasculitis, Scleroderma  presents to ED due to cardiac arrest. Pt lives at home with son and daughter, went to use the bathroom. Daughter notes that she heard the patient's walker scratching the floor, went upstairs to see what was going on. Daughter noticed patient was slumped over and struggling to get onto the toilet. Daughter helped patient onto the toilet, but noticed that the patient became less responsive and alert so she called EMS. Patient was found down by EMS, intubated her in the field, and resuscitated her with compressions and 4 rounds of epinephrine. ROSC achieved within a minute of her arrival to the ED. Patient was found to be hypothermic and bradycardic. Of note, patient was hospitalized back in Dec 2023 in [facility] for 18 days for multifocal pneumonia 2/2 human metapneumovirus. Patient also was hospitalized in 2019 for PRESS syndrome. Pt had a negative stress test in Sept 2023. She follows pulmonology for pulmonary fibrosis, had recent PFTs done.     Admitted to MICU for unresponsiveness s/p cardiac arrest.     (06 Apr 2024 15:43)      Pt evaluated on rounds.  I reviewed the radiology tests and hospital record.    I reviewed previous notes on this patient.    Interval Events: No overnight events.      REVIEW OF SYSTEMS:   see HPI      OBJECTIVE:  ICU Vital Signs Last 24 Hrs  T(C): 36.3 (13 Apr 2024 23:01), Max: 36.6 (13 Apr 2024 19:15)  T(F): 97.3 (13 Apr 2024 23:01), Max: 97.8 (13 Apr 2024 19:15)  HR: 60 (14 Apr 2024 05:00) (51 - 72)  BP: 153/70 (14 Apr 2024 05:00) (122/66 - 187/85)  BP(mean): 100 (14 Apr 2024 05:00) (88 - 123)  ABP: --  ABP(mean): --  RR: 26 (14 Apr 2024 05:00) (25 - 31)  SpO2: 100% (14 Apr 2024 05:00) (99% - 100%)    O2 Parameters below as of 14 Apr 2024 05:00  Patient On (Oxygen Delivery Method): ventilator    O2 Concentration (%): 40      Mode: AC/ CMV (Assist Control/ Continuous Mandatory Ventilation), RR (machine): 26, TV (machine): 350, FiO2: 40, PEEP: 5, ITime: 0.9, MAP: 14, PIP: 32    04-12 @ 07:01  -  04-13 @ 07:00  --------------------------------------------------------  IN: 1410 mL / OUT: 1135 mL / NET: 275 mL    04-13 @ 07:01  -  04-14 @ 05:41  --------------------------------------------------------  IN: 1442 mL / OUT: 975 mL / NET: 467 mL      CAPILLARY BLOOD GLUCOSE      POCT Blood Glucose.: 144 mg/dL (14 Apr 2024 02:25)        PHYSICAL EXAM:   Blinks t sternal rub    · ENMT:   Airway patent,   Nasal mucosa clear.  Mouth with normal mucosa.   No thrush    · EYES:   Clear bilaterally,   pupils equal,   round and reactive to light.    · CARDIAC:   Normal rate,   regular rhythm.    Heart sounds S1, S2.   No murmurs, no rubs or gallops on auscultation  no edema        CAROTID:   normal systolic impulse  no bruits    · RESPIRATORY:   rales  normal chest expansion  no retractions or use of accessory muscles  percussion of chest demonstrates no hyperresonance or dullness    · GASTROINTESTINAL:  Abdomen soft,   non-tender,   + BS  liver/spleen not palpable    · MUSCULOSKELETAL:   no clubbing, cyanosis      · SKIN:   Skin normal color for race,   warm, dry   No evidence of rash.        · HEME LYMPH:   no splenomegaly.  No cervical  lymphadenopathy.  no inguinal lymphadenopathy    HOSPITAL MEDICATIONS:  MEDICATIONS  (STANDING):  artificial  tears Solution 1 Drop(s) Both EYES three times a day  chlorhexidine 0.12% Liquid 15 milliLiter(s) Oral Mucosa every 12 hours  chlorhexidine 2% Cloths 1 Application(s) Topical <User Schedule>  fentaNYL   Infusion 0.5 MICROgram(s)/kG/Hr (2 mL/Hr) IV Continuous <Continuous>  heparin   Injectable 5000 Unit(s) SubCutaneous every 12 hours  labetalol 100 milliGRAM(s) Oral three times a day  levothyroxine 100 MICROGram(s) Oral daily  lisinopril 20 milliGRAM(s) Oral daily  pantoprazole  Injectable 40 milliGRAM(s) IV Push daily  petrolatum Ophthalmic Ointment 1 Application(s) Both EYES daily  polyethylene glycol 3350 17 Gram(s) Oral daily  senna 2 Tablet(s) Oral at bedtime    MEDICATIONS  (PRN):  labetalol Injectable 20 milliGRAM(s) IV Push every 1 hour PRN Systolic blood pressure >    dextrose 5% + sodium chloride 0.45%.: Solution, 1000 milliLiter(s) infuse at 75 mL/Hr  lactated ringers.: Solution, 1000 milliLiter(s) infuse at 70 mL/Hr  sodium chloride 0.9% Bolus:   1000 milliLiter(s), IV Bolus, once, infuse over 60 Minute(s), Stop After 1 Doses  Provider's Contact #: [phone]      LABS:                        8.2    7.02  )-----------( 192      ( 13 Apr 2024 05:38 )             24.9     04-13    134<L>  |  105  |  32<H>  ----------------------------<  117<H>  4.9   |  23  |  1.4    Ca    8.2<L>      13 Apr 2024 05:38    TPro  5.9<L>  /  Alb  2.5<L>  /  TBili  <0.2  /  DBili  x   /  AST  47<H>  /  ALT  14  /  AlkPhos  133<H>  04-13      Urinalysis Basic - ( 13 Apr 2024 05:38 )    Color: x / Appearance: x / SG: x / pH: x  Gluc: 117 mg/dL / Ketone: x  / Bili: x / Urobili: x   Blood: x / Protein: x / Nitrite: x   Leuk Esterase: x / RBC: x / WBC x   Sq Epi: x / Non Sq Epi: x / Bacteria: x      Arterial Blood Gas:  04-14 @ 03:45  7.41/41/217/26/99.9/1.2  ABG lactate: --    Venous Blood Gas:  04-13 @ 04:38  7.44/38/113/26/98.8  VBG Lactate: 0.9    Mode: AC/ CMV (Assist Control/ Continuous Mandatory Ventilation), RR (machine): 26, TV (machine): 350, FiO2: 40, PEEP: 5, ITime: 0.9, MAP: 14, PIP: 32      SARS-CoV-2: NotDetec (08 Apr 2024 11:00)    Mode: AC/ CMV (Assist Control/ Continuous Mandatory Ventilation)  RR (machine): 26  TV (machine): 350  FiO2: 40  PEEP: 5  ITime: 0.9  MAP: 14  PIP: 32      ABG - ( 14 Apr 2024 03:45 )  pH, Arterial: 7.41  pH, Blood: x     /  pCO2: 41    /  pO2: 217   / HCO3: 26    / Base Excess: 1.2   /  SaO2: 99.9                RADIOLOGY: Today I personally interpreted the latest CXR and other pertinent films.

## 2024-04-14 NOTE — PROGRESS NOTE ADULT - SUBJECTIVE AND OBJECTIVE BOX
vented, calm    T(F): , Max: 97.7 (04-14-24 @ 07:00)  HR: 60 (04-14-24 @ 21:45) (53 - 75)  BP: 174/85 (04-14-24 @ 21:45)  RR: 31 (04-14-24 @ 21:45)  SpO2: 100% (04-14-24 @ 21:45)  IN: 1562 mL / OUT: 1135 mL / NET: 427 mL    IN: 833 mL / OUT: 855 mL / NET: -22 mL      General: No apparent distress  Cardiovascular: S1, S2  Gastrointestinal: Soft, Non-tender, Non-distended  Respiratory: vented  Neurologic: sedated  Dermatologic: Skin dry                          8.2    7.11  )-----------( 222      ( 14 Apr 2024 05:41 )             24.9     04-14    133<L>  |  102  |  31<H>  ----------------------------<  131<H>  5.1<H>   |  23  |  1.2    Ca    7.9<L>      14 Apr 2024 05:41  Mg     2.1     04-14    TPro  6.1  /  Alb  2.3<L>  /  TBili  <0.2  /  DBili  x   /  AST  45<H>  /  ALT  14  /  AlkPhos  130<H>  04-14

## 2024-04-15 LAB
ALBUMIN SERPL ELPH-MCNC: 2.5 G/DL — LOW (ref 3.5–5.2)
ALP SERPL-CCNC: 143 U/L — HIGH (ref 30–115)
ALT FLD-CCNC: 11 U/L — SIGNIFICANT CHANGE UP (ref 0–41)
ANION GAP SERPL CALC-SCNC: 7 MMOL/L — SIGNIFICANT CHANGE UP (ref 7–14)
AST SERPL-CCNC: 43 U/L — HIGH (ref 0–41)
BASE EXCESS BLDA CALC-SCNC: 2.1 MMOL/L — SIGNIFICANT CHANGE UP (ref -2–3)
BASOPHILS # BLD AUTO: 0.02 K/UL — SIGNIFICANT CHANGE UP (ref 0–0.2)
BASOPHILS NFR BLD AUTO: 0.2 % — SIGNIFICANT CHANGE UP (ref 0–1)
BILIRUB SERPL-MCNC: <0.2 MG/DL — SIGNIFICANT CHANGE UP (ref 0.2–1.2)
BUN SERPL-MCNC: 31 MG/DL — HIGH (ref 10–20)
CALCIUM SERPL-MCNC: 8.2 MG/DL — LOW (ref 8.4–10.5)
CHLORIDE SERPL-SCNC: 99 MMOL/L — SIGNIFICANT CHANGE UP (ref 98–110)
CO2 SERPL-SCNC: 24 MMOL/L — SIGNIFICANT CHANGE UP (ref 17–32)
CREAT SERPL-MCNC: 1.2 MG/DL — SIGNIFICANT CHANGE UP (ref 0.7–1.5)
EGFR: 47 ML/MIN/1.73M2 — LOW
EOSINOPHIL # BLD AUTO: 0.43 K/UL — SIGNIFICANT CHANGE UP (ref 0–0.7)
EOSINOPHIL NFR BLD AUTO: 4.7 % — SIGNIFICANT CHANGE UP (ref 0–8)
GLUCOSE BLDC GLUCOMTR-MCNC: 118 MG/DL — HIGH (ref 70–99)
GLUCOSE BLDC GLUCOMTR-MCNC: 122 MG/DL — HIGH (ref 70–99)
GLUCOSE BLDC GLUCOMTR-MCNC: 135 MG/DL — HIGH (ref 70–99)
GLUCOSE BLDC GLUCOMTR-MCNC: 169 MG/DL — HIGH (ref 70–99)
GLUCOSE SERPL-MCNC: 138 MG/DL — HIGH (ref 70–99)
HCO3 BLDA-SCNC: 26 MMOL/L — SIGNIFICANT CHANGE UP (ref 21–28)
HCT VFR BLD CALC: 25.3 % — LOW (ref 37–47)
HGB BLD-MCNC: 8.2 G/DL — LOW (ref 12–16)
IMM GRANULOCYTES NFR BLD AUTO: 0.3 % — SIGNIFICANT CHANGE UP (ref 0.1–0.3)
LYMPHOCYTES # BLD AUTO: 1.03 K/UL — LOW (ref 1.2–3.4)
LYMPHOCYTES # BLD AUTO: 11.3 % — LOW (ref 20.5–51.1)
MAGNESIUM SERPL-MCNC: 2.1 MG/DL — SIGNIFICANT CHANGE UP (ref 1.8–2.4)
MCHC RBC-ENTMCNC: 25.5 PG — LOW (ref 27–31)
MCHC RBC-ENTMCNC: 32.4 G/DL — SIGNIFICANT CHANGE UP (ref 32–37)
MCV RBC AUTO: 78.8 FL — LOW (ref 81–99)
MONOCYTES # BLD AUTO: 0.81 K/UL — HIGH (ref 0.1–0.6)
MONOCYTES NFR BLD AUTO: 8.9 % — SIGNIFICANT CHANGE UP (ref 1.7–9.3)
NEUTROPHILS # BLD AUTO: 6.78 K/UL — HIGH (ref 1.4–6.5)
NEUTROPHILS NFR BLD AUTO: 74.6 % — SIGNIFICANT CHANGE UP (ref 42.2–75.2)
NRBC # BLD: 0 /100 WBCS — SIGNIFICANT CHANGE UP (ref 0–0)
PCO2 BLDA: 35 MMHG — SIGNIFICANT CHANGE UP (ref 32–45)
PH BLDA: 7.47 — HIGH (ref 7.35–7.45)
PLATELET # BLD AUTO: 240 K/UL — SIGNIFICANT CHANGE UP (ref 130–400)
PMV BLD: 11.4 FL — HIGH (ref 7.4–10.4)
PO2 BLDA: 197 MMHG — HIGH (ref 83–108)
POTASSIUM SERPL-MCNC: 5.5 MMOL/L — HIGH (ref 3.5–5)
POTASSIUM SERPL-SCNC: 5.5 MMOL/L — HIGH (ref 3.5–5)
PROT SERPL-MCNC: 5.8 G/DL — LOW (ref 6–8)
RBC # BLD: 3.21 M/UL — LOW (ref 4.2–5.4)
RBC # FLD: 16.5 % — HIGH (ref 11.5–14.5)
SAO2 % BLDA: 99.5 % — HIGH (ref 94–98)
SODIUM SERPL-SCNC: 130 MMOL/L — LOW (ref 135–146)
WBC # BLD: 9.1 K/UL — SIGNIFICANT CHANGE UP (ref 4.8–10.8)
WBC # FLD AUTO: 9.1 K/UL — SIGNIFICANT CHANGE UP (ref 4.8–10.8)

## 2024-04-15 PROCEDURE — 71045 X-RAY EXAM CHEST 1 VIEW: CPT | Mod: 26

## 2024-04-15 PROCEDURE — 99233 SBSQ HOSP IP/OBS HIGH 50: CPT

## 2024-04-15 PROCEDURE — 99291 CRITICAL CARE FIRST HOUR: CPT

## 2024-04-15 PROCEDURE — 99223 1ST HOSP IP/OBS HIGH 75: CPT

## 2024-04-15 RX ORDER — CHLORHEXIDINE GLUCONATE 213 G/1000ML
1 SOLUTION TOPICAL DAILY
Refills: 0 | Status: DISCONTINUED | OUTPATIENT
Start: 2024-04-15 | End: 2024-04-25

## 2024-04-15 RX ORDER — LABETALOL HCL 100 MG
20 TABLET ORAL EVERY 4 HOURS
Refills: 0 | Status: DISCONTINUED | OUTPATIENT
Start: 2024-04-15 | End: 2024-04-25

## 2024-04-15 RX ORDER — HYDRALAZINE HCL 50 MG
10 TABLET ORAL ONCE
Refills: 0 | Status: COMPLETED | OUTPATIENT
Start: 2024-04-15 | End: 2024-04-15

## 2024-04-15 RX ADMIN — Medication 1 DROP(S): at 05:44

## 2024-04-15 RX ADMIN — Medication 10 MILLIGRAM(S): at 18:42

## 2024-04-15 RX ADMIN — HEPARIN SODIUM 5000 UNIT(S): 5000 INJECTION INTRAVENOUS; SUBCUTANEOUS at 05:26

## 2024-04-15 RX ADMIN — Medication 100 MILLIGRAM(S): at 05:25

## 2024-04-15 RX ADMIN — HEPARIN SODIUM 5000 UNIT(S): 5000 INJECTION INTRAVENOUS; SUBCUTANEOUS at 17:00

## 2024-04-15 RX ADMIN — PANTOPRAZOLE SODIUM 40 MILLIGRAM(S): 20 TABLET, DELAYED RELEASE ORAL at 11:00

## 2024-04-15 RX ADMIN — CHLORHEXIDINE GLUCONATE 15 MILLILITER(S): 213 SOLUTION TOPICAL at 05:50

## 2024-04-15 RX ADMIN — Medication 1 DROP(S): at 21:28

## 2024-04-15 RX ADMIN — LISINOPRIL 20 MILLIGRAM(S): 2.5 TABLET ORAL at 05:25

## 2024-04-15 RX ADMIN — Medication 1 APPLICATION(S): at 11:00

## 2024-04-15 RX ADMIN — Medication 20 MILLIGRAM(S): at 17:50

## 2024-04-15 RX ADMIN — Medication 100 MILLIGRAM(S): at 14:21

## 2024-04-15 RX ADMIN — Medication 100 MICROGRAM(S): at 05:25

## 2024-04-15 RX ADMIN — FENTANYL CITRATE 2 MICROGRAM(S)/KG/HR: 50 INJECTION INTRAVENOUS at 05:20

## 2024-04-15 RX ADMIN — CHLORHEXIDINE GLUCONATE 1 APPLICATION(S): 213 SOLUTION TOPICAL at 05:25

## 2024-04-15 RX ADMIN — Medication 1 DROP(S): at 14:21

## 2024-04-15 RX ADMIN — Medication 100 MILLIGRAM(S): at 21:28

## 2024-04-15 RX ADMIN — CHLORHEXIDINE GLUCONATE 15 MILLILITER(S): 213 SOLUTION TOPICAL at 17:01

## 2024-04-15 RX ADMIN — Medication 20 MILLIGRAM(S): at 12:32

## 2024-04-15 NOTE — CONSULT NOTE ADULT - NSCONSULTADDITIONALINFOA_GEN_ALL_CORE
I saw and examined the patient.  74-year-old female with likely sudden MI and 30 minutes anoxic time prior to ROSC per chart review.  The patient is minimally responsive.  Fentanyl was turned off today for sedation vacation and we will see how her mental status progresses.  EEG function is difficult to determine in the setting of sedation.  In which case brain function is difficult to determine and if there is little brain function then meaningful recovery is not likely in the setting of decubitus ulcer and cancer history.  The patient has a history of what is described as a Whipple procedure.  I reviewed the CT scan images and note that patient has an upper midline incision with possible laparoscopic port site scars. I cannot determine if there is a gastrostomy scar which would be typical.  It is difficult to determine the exact location of the stomach on CT.  It seems that it is tucked under the ribs in the left hemiabdomen and as such she may not be a good candidate for PEG.  This would require possible open laparotomy for tube placement if transillumination is not possible with the endoscope.  The nurse notes that the patient's family will be at the bedside tomorrow and I will discuss this with them.  It will also give an opportunity to reassess the patient's mental status.  Currently she is on minimal vent settings and may be a good candidate for percutaneous tracheostomy at the bedside in conjunction with Dr. Mao if he is available to assist with laryngoscopy.  I will follow-up tomorrow for further discussion with family and the team.  Continue usual care.  Poor prognosis with unlikely meaningful recovery is my initial impression if she has minimal functional response. I would recommend revisiting GOC discussion. Will follow up tomorrow. Thank you for this interesting consult in a highly complex patient.

## 2024-04-15 NOTE — PROGRESS NOTE ADULT - ASSESSMENT
74y Female w/ pmhx of     NEUROLOGICAL:  #Acute pain/comfort    -Fentanyl frip, wean as tolerated    #Intubated/Anoxic Brain Injury  - SAT Dailyy   -aspiration precautions, HOB 30  - Surgery consulted for possible trach and PEG    RESPIRATORY:   #Mechanical Ventillation    RR (machine): 26, TV (machine): 350, FiO2: 40, PEEP: 5  04-15 @ 03:54--7.47 / 35 / 197 / 26 / Yhm31Hsp 99.5 / Lactate 0.9 /       CARDS:   #Hypertension  labetalol 100 mg oral tablet: 1 tab(s) orally 3 times a day  lisinopril 20 mg oral tablet: 1 tab(s) orally once a day      GASTROINTESTINAL/NUTRITION:   #Diet, NPO with Tube Feed:   Tube Feeding Modality: Orogastric  Osmolite 1.2 Lei (OSMOLITERTH)  Total Volume for 24 Hours (mL): 1080  Continuous  Starting Tube Feed Rate mL per Hour: 10  Until Goal Tube Feed Rate (mL per Hour): 45  Tube Feed Duration (in Hours): 24  Tube Feed Start Time: 12:00  Free Water Flush  Free Water Flush Instructions:  150 Q8hr [Active]      #GI Prophylaxis    pantoprazole  Injectable 40 IV Push daily    #Bowel regimen    -senna/miralax if indicated      /RENAL:   #urine output in critically ill    -indwelling hill     Labs          Electrolytes-(04-15 @ 06:23)Na 130 // K 5.5 // Mg 2.1 // Phos --          HEME/ONC:   #DVT prophylaxis     -Chemical: heparin   Injectable 5000 Unit(s) SubCutaneous every 12 hours     -Mechanical: SCDs    -if DVT prophylaxis to be held, document and place VTE order        Labs: Hb/Hct:  8.2/24.9  (04-14 @ 05:41)  -->,  8.2/25.3  (04-15 @ 06:23)  -->                      Plts:  222  -->,  240  -->           ID:  WBC- 7.02  --->>,  7.11  --->>,  9.10  --->>  Temp trend- 24hrs T(F): 97.3 (04-15 @ 11:00), Max: 97.7 (04-14 @ 21:00)  Culture - Urine (collected 04-06)  Source: Clean Catch Clean Catch (Midstream)  Final Report:    No growth    Culture - Blood (collected 04-06)  Source: .Blood Blood-Venous  Final Report:    No growth at 5 days    Culture - Blood (collected 04-06)  Source: .Blood Blood-Venous  Final Report:    No growth at 5 days        ENDOCRINE:    -FSG q6 if NPO or Tube feeds    -Glucose goal 140-180. if above 180 start ISS    MSK:         LINES/DRAINS:  PIV, Hill; Cordis removed    ADVANCED DIRECTIVES:  Full Code    HCP/Emergency Contact-    - Palliative following patient  - possible family meeting later this week.     INDICATION FOR SICU/SDU:    - Anoxic Brain Injury s/p Cardiac Arrest      DISPO:   ICU

## 2024-04-15 NOTE — CONSULT NOTE ADULT - ASSESSMENT
74-year-old female with PHMx of pancreatic CA s/p Whipple, CKD stage 3, HTN, COPD, pulmonary fibrosis, PRESS syndrome (hospitalized in 2019), Raynaud's syndrome, ANCA vasculitis, Scleroderma  presents to ED due to cardiac arrest.    1. Cardiac arrest  2. Anoxic brain injury    -await family decision for poss trach/PEG  - Dr. Gutierrez aware - will follow

## 2024-04-15 NOTE — PROGRESS NOTE ADULT - SUBJECTIVE AND OBJECTIVE BOX
KIRAN MROFIN, 74y, Female; MRN# [id number]  Hospital Stay: 9d.    Patient is a 74y old Female who presents with a chief complaint of cardiac arrest (15 Apr 2024 07:26)  Currently admitted to the ICU with the primary diagnosis of Cardiac arrest      SUBJECTIVE:  Interval/Overnight events: no acute events overnight. Cordis removed. Surgery consulted for trach and peg placement. patient remains full-code.     REVIEW OF SYSTEMS:  As per HPI  OBJECTIVE:  VITALS:  T(F): 97.3 (04-15-24 @ 11:00), Max: 97.7 (04-14-24 @ 21:00)  HR: 60 (04-15-24 @ 11:01) (54 - 662)  BP: 158/73 (04-15-24 @ 11:01) (112/57 - 208/94)  ABP: --  ABP(mean): --  RR: 27 (04-15-24 @ 11:01) (20 - 31)  SpO2: 100% (04-15-24 @ 11:01) (100% - 100%)    Vent Settings  Device: [place], Mode: AC/ CMV (Assist Control/ Continuous Mandatory Ventilation), RR (machine): 26, TV (machine): 350, FiO2: 40, PEEP: 5, ITime: 0.8, MAP: 17, PIP: 45  Blood Gas  04-15-24 @ 03:54  pH 7.47 | pCO2 35 | pO2 197 | HCO3 26 | O2 Sat 99.5    Drips  fentaNYL   Infusion 0.5 MICROgram(s)/kG/Hr (2 mL/Hr) IV Continuous <Continuous>    I&Os:    04-14-24 @ 07:01  -  04-15-24 @ 07:00  --------------------------------------------------------  IN: 1480.4 mL / OUT: 1305 mL / NET: 175.4 mL    04-15-24 @ 07:01  -  04-15-24 @ 12:13  --------------------------------------------------------  IN: 0 mL / OUT: 180 mL / NET: -180 mL      PHYSICAL EXAM:    VITAL SIGNS: I have reviewed nursing notes and confirm.  CONSTITUTIONAL: Intubated, in NAD  SKIN: Warm dry, normal skin turgor  EYES: Corneal reflex present; non-purposeful eye movements, chemosis noted   ENT: Moist mucous membranes,  NECK: Supple  CARD: RRR,  RESP: intubated on a ventilator;  ABD: Soft, non-distended,   EXT: no pedal edema  NEURO: unresponsvie; gag reflex +, corneal reflex +    ACTIVE MEDICATIONS:  Standing  artificial  tears Solution 1 Drop(s) Both EYES three times a day  chlorhexidine 0.12% Liquid 15 milliLiter(s) Oral Mucosa every 12 hours  chlorhexidine 2% Cloths 1 Application(s) Topical <User Schedule>  fentaNYL   Infusion 0.5 MICROgram(s)/kG/Hr (2 mL/Hr) IV Continuous <Continuous>  heparin   Injectable 5000 Unit(s) SubCutaneous every 12 hours  labetalol 100 milliGRAM(s) Oral three times a day  levothyroxine 100 MICROGram(s) Oral daily  lisinopril 20 milliGRAM(s) Oral daily  pantoprazole  Injectable 40 milliGRAM(s) IV Push daily  petrolatum Ophthalmic Ointment 1 Application(s) Both EYES daily  polyethylene glycol 3350 17 Gram(s) Oral daily  senna 2 Tablet(s) Oral at bedtime    PRN    LABS:  04-15-24 @ 06:23  WBC 9.10, H/H 8.2<L>/25.3<L>, plt 240  Na 130<L>, K 5.5<H>, Cl 99, CO2 24, BUN 31<H>, Cr 1.2, Glu 138<H>    Ca 8.2<L>, Mg 2.1, Phosphorus --    Tot Protein 5.8<L>, Alb 2.5<L>, T. Bili <0.2, D. Bili --  AST 43<H>, ALT 11, Alk phos 143<H>          04-06-24 @ 19:55:  TSH 2.94                  CULTURES:    Culture - Urine (collected 04-06-24 @ 20:15)  Source: Clean Catch Clean Catch (Midstream)  Final Report (04-08-24 @ 16:27):    No growth    Culture - Blood (collected 04-06-24 @ 12:05)  Source: .Blood Blood-Venous  Final Report (04-11-24 @ 20:00):    No growth at 5 days    Culture - Blood (collected 04-06-24 @ 12:05)  Source: .Blood Blood-Venous  Final Report (04-11-24 @ 20:00):    No growth at 5 days      PENDING:    IMAGING:  Latest imaging reviewed.  Xray Chest 1 View- PORTABLE-Routine: AM   Indication: intubated patient  Transport: Portable,  w/ Monitor,  w/ Ventilator,  w/ IV Pole (04-15-24 @ 07:50)  Xray Chest 1 View- PORTABLE-Routine: AM   Indication: intubated patient  Transport: Portable,  w/ Monitor,  w/ Ventilator,  w/ IV Pole (04-15-24 @ 07:50)  Xray Chest 1 View- PORTABLE-Routine: AM   Indication: intubated patient  Transport: Portable,  w/ Monitor,  w/ Ventilator,  w/ IV Pole (04-15-24 @ 07:50)  Xray Chest 1 View- PORTABLE-Routine: AM   Indication: intubated patient  Transport: Portable,  w/ Monitor,  w/ Ventilator,  w/ IV Pole (04-15-24 @ 07:50)  Xray Chest 1 View- PORTABLE-Routine: AM   Indication: intubated patient  Transport: Portable,  w/ Monitor,  w/ Ventilator,  w/ IV Pole (04-15-24 @ 07:50)  Xray Chest 1 View- PORTABLE-Routine: AM   Indication: intubated patient  Transport: Portable,  w/ Monitor,  w/ Ventilator,  w/ IV Pole (04-15-24 @ 07:50)  Xray Chest 1 View-PORTABLE IMMEDIATE: IMMEDIATE   Indication: intubated patient  Transport: Portable,  w/ Monitor,  w/ Ventilator,  w/ IV Pole  Exam Completed (04-15-24 @ 07:50)    ECHO:  TTE Echo Complete w/o Contrast w/ Doppler:   Transport: Stretcher-Crib  Monitor: w/ Monitor,  Transport w/ Ventilator,  Transport w/ IV Pole (04-06-24 @ 16:26)

## 2024-04-15 NOTE — PROGRESS NOTE ADULT - ASSESSMENT
74-year-old female with PHMx of pancreatic CA s/p Whipple, CKD stage 3, HTN, COPD, pulmonary fibrosis, PRESS syndrome (hospitalized in 2019), Raynaud's syndrome, ANCA vasculitis, Scleroderma, hypothyroid  presents to ED due to cardiac arrest. Pt lives at home with son and daughter, went to use the bathroom and became unresponsive.  EMS intubated her and gave her 4 rounds of epinephrine.  ROSC was achieved within a minute of her arrival to the ED.  Patient was found to be hypothermic and bradycardic    acute respiratory failure / cardiac arrest / anoxic brain injury / HTN / possible aspiration pneumonia      - remains minimally responsive    - anoxic brain injury as seen on CT brain as above    - family meeting with palliative tomorrow    - continue IV Zosyn   - Labetalol / lisinopril    - DVT and GI prophylaxis   - I discussed plan with CC     50 minutes total spent today on patient care

## 2024-04-15 NOTE — CHART NOTE - NSCHARTNOTEFT_GEN_A_CORE
Pt was looked comfortable. Pt's daughter and son were at bedside at the time of visit. Pt. is [employer]. I was a empathetic presence. I will follow up for support.

## 2024-04-15 NOTE — PROGRESS NOTE ADULT - SUBJECTIVE AND OBJECTIVE BOX
Patient seen and evaluated this am, remains minimally responsive on ventilator      T(F): 97.5 (04-15-24 @ 19:00), Max: 98.1 (04-15-24 @ 17:01)  HR: 64 (04-15-24 @ 19:49)  BP: 182/81 (04-15-24 @ 18:29)  RR: 20  SpO2: 100% (04-15-24 @ 19:49) (99% - 100%)    PHYSICAL EXAM:  GENERAL: NAD  HEAD:  Atraumatic, Normocephalic  EYES: EOMI, PERRLA, conjunctiva and sclera clear  NERVOUS SYSTEM: positive gag and corneal reflex   CHEST/LUNG:  bilateral rhonchi  HEART: Regular rate and rhythm; No murmurs, rubs, or gallops  ABDOMEN: Soft, Nontender, Nondistended; Bowel sounds present  EXTREMITIES:  2+ Peripheral Pulses, No clubbing, cyanosis, or edema    LABS  04-15    130<L>  |  99  |  31<H>  ----------------------------<  138<H>  5.5<H>   |  24  |  1.2    Ca    8.2<L>      15 Apr 2024 06:23  Mg     2.1     04-15    TPro  5.8<L>  /  Alb  2.5<L>  /  TBili  <0.2  /  DBili  x   /  AST  43<H>  /  ALT  11  /  AlkPhos  143<H>  04-15                          8.2    9.10  )-----------( 240      ( 15 Apr 2024 06:23 )             25.3       Mode: AC/ CMV (Assist Control/ Continuous Mandatory Ventilation)  RR (machine): 26  TV (machine): 350  FiO2: 40  PEEP: 5    Culture Results:   No growth (04-06-24)  Culture Results:   No growth at 5 days (04-06-24)  Culture Results:   No growth at 5 days (04-06-24)    RADIOLOGY  < from: Xray Chest 1 View-PORTABLE IMMEDIATE (Xray Chest 1 View-PORTABLE IMMEDIATE .) (04.15.24 @ 10:21) >  Impression:    Bilateral lung opacities, overall unchanged. Lines and tubes as described.      < end of copied text >    MEDICATIONS  (STANDING):  artificial  tears Solution 1 Drop(s) Both EYES three times a day  chlorhexidine 0.12% Liquid 15 milliLiter(s) Oral Mucosa every 12 hours  chlorhexidine 2% Cloths 1 Application(s) Topical daily  chlorhexidine 2% Cloths 1 Application(s) Topical <User Schedule>  fentaNYL   Infusion 0.5 MICROgram(s)/kG/Hr (2 mL/Hr) IV Continuous <Continuous>  heparin   Injectable 5000 Unit(s) SubCutaneous every 12 hours  labetalol 100 milliGRAM(s) Oral three times a day  levothyroxine 100 MICROGram(s) Oral daily  lisinopril 20 milliGRAM(s) Oral daily  pantoprazole  Injectable 40 milliGRAM(s) IV Push daily  petrolatum Ophthalmic Ointment 1 Application(s) Both EYES daily    MEDICATIONS  (PRN):  labetalol Injectable 20 milliGRAM(s) IV Push every 4 hours PRN Systolic blood pressure >180      Patient seen and evaluated this am, remains minimally responsive on ventilator      T(F): 97.5 (04-15-24 @ 19:00), Max: 98.1 (04-15-24 @ 17:01)  HR: 64 (04-15-24 @ 19:49)  BP: 182/81 (04-15-24 @ 18:29)  RR: 20  SpO2: 100% (04-15-24 @ 19:49) (99% - 100%)    PHYSICAL EXAM:  GENERAL: NAD  HEAD:  Atraumatic, Normocephalic  EYES: EOMI, PERRLA, conjunctiva and sclera clear  NERVOUS SYSTEM: positive gag and corneal reflex   CHEST/LUNG:  bilateral rhonchi  HEART: Regular rate and rhythm; No murmurs, rubs, or gallops  ABDOMEN: Soft, Nontender, Nondistended; Bowel sounds present  EXTREMITIES:  2+ Peripheral Pulses, No clubbing, cyanosis, or edema    LABS  04-15    130<L>  |  99  |  31<H>  ----------------------------<  138<H>  5.5<H>   |  24  |  1.2    Ca    8.2<L>      15 Apr 2024 06:23  Mg     2.1     04-15    TPro  5.8<L>  /  Alb  2.5<L>  /  TBili  <0.2  /  DBili  x   /  AST  43<H>  /  ALT  11  /  AlkPhos  143<H>  04-15                          8.2    9.10  )-----------( 240      ( 15 Apr 2024 06:23 )             25.3       Mode: AC/ CMV (Assist Control/ Continuous Mandatory Ventilation)  RR (machine): 26  TV (machine): 350  FiO2: 40  PEEP: 5    Culture Results:   No growth (04-06-24)  Culture Results:   No growth at 5 days (04-06-24)  Culture Results:   No growth at 5 days (04-06-24)    RADIOLOGY  < from: Xray Chest 1 View-PORTABLE IMMEDIATE (Xray Chest 1 View-PORTABLE IMMEDIATE .) (04.15.24 @ 10:21) >  Impression:    Bilateral lung opacities, overall unchanged. Lines and tubes as described.      < end of copied text >  < from: CT Head No Cont (04.11.24 @ 11:35) >    IMPRESSION:    Findings compatible with diffuse anoxic injury of the brain.    < end of copied text >    MEDICATIONS  (STANDING):  artificial  tears Solution 1 Drop(s) Both EYES three times a day  chlorhexidine 0.12% Liquid 15 milliLiter(s) Oral Mucosa every 12 hours  chlorhexidine 2% Cloths 1 Application(s) Topical daily  chlorhexidine 2% Cloths 1 Application(s) Topical <User Schedule>  fentaNYL   Infusion 0.5 MICROgram(s)/kG/Hr (2 mL/Hr) IV Continuous <Continuous>  heparin   Injectable 5000 Unit(s) SubCutaneous every 12 hours  labetalol 100 milliGRAM(s) Oral three times a day  levothyroxine 100 MICROGram(s) Oral daily  lisinopril 20 milliGRAM(s) Oral daily  pantoprazole  Injectable 40 milliGRAM(s) IV Push daily  petrolatum Ophthalmic Ointment 1 Application(s) Both EYES daily    MEDICATIONS  (PRN):  labetalol Injectable 20 milliGRAM(s) IV Push every 4 hours PRN Systolic blood pressure >180

## 2024-04-15 NOTE — CONSULT NOTE ADULT - SUBJECTIVE AND OBJECTIVE BOX
KIRAN MORFIN [id number]  74y Female  9d    HPI:  74-year-old female with PHMx of pancreatic CA s/p Whipple, CKD stage 3, HTN, COPD, pulmonary fibrosis, PRESS syndrome (hospitalized in 2019), Raynaud's syndrome, ANCA vasculitis, Scleroderma  presents to ED due to cardiac arrest. Pt lives at home with son and daughter, went to use the bathroom. Daughter notes that she heard the patient's walker scratching the floor, went upstairs to see what was going on. Daughter noticed patient was slumped over and struggling to get onto the toilet. Daughter helped patient onto the toilet, but noticed that the patient became less responsive and alert so she called EMS. Patient was found down by EMS, intubated her in the field, and resuscitated her with compressions and 4 rounds of epinephrine. ROSC achieved within a minute of her arrival to the ED. Patient was found to be hypothermic and bradycardic. Of note, patient was hospitalized back in Dec 2023 in [facility] for 18 days for multifocal pneumonia 2/2 human metapneumovirus. Patient also was hospitalized in 2019 for PRESS syndrome. Pt had a negative stress test in Sept 2023. She follows pulmonology for pulmonary fibrosis, had recent PFTs done.     ED vitals and workup:  BP 90/57, HR 88, Temp 94.1F, satting 100% on ventilator   Labs: Hgb 8.7, Trop 56 -->79, K 5.3, Lactate 3.5, Lipase 256, , , ALT 52  VBG: pH 7.12, pCO2 67, Lactate 6.6    CXR bilateral opacities. cardiomegaly  CTH: mild atrophic changes  CT angio chest PE, A/P w/ IV cont: Bilary ductal enhancement s/p CCY. Diffuse fibrotic changes of lungs. Trace bilateral effusions. NO PE.    s/p atropine 1mg x4 doses, calcium gluconate 3g, [id number] x1, insulin 10 units, cefepime 1g in the ED.     Admitted to MICU for unresponsiveness s/p cardiac arrest.     (06 Apr 2024 15:43)      PAST MEDICAL & SURGICAL HISTORY:  H/O vasculitis      Pancreatic cancer      History of COPD      Hypertension      H/O Raynaud's syndrome      Stage 3 chronic kidney disease      History of knee replacement      H/O Whipple procedure            MEDICATIONS  (STANDING):  artificial  tears Solution 1 Drop(s) Both EYES three times a day  chlorhexidine 0.12% Liquid 15 milliLiter(s) Oral Mucosa every 12 hours  chlorhexidine 2% Cloths 1 Application(s) Topical <User Schedule>  fentaNYL   Infusion 0.5 MICROgram(s)/kG/Hr (2 mL/Hr) IV Continuous <Continuous>  heparin   Injectable 5000 Unit(s) SubCutaneous every 12 hours  labetalol 100 milliGRAM(s) Oral three times a day  levothyroxine 100 MICROGram(s) Oral daily  lisinopril 20 milliGRAM(s) Oral daily  pantoprazole  Injectable 40 milliGRAM(s) IV Push daily  petrolatum Ophthalmic Ointment 1 Application(s) Both EYES daily    MEDICATIONS  (PRN):  labetalol Injectable 20 milliGRAM(s) IV Push every 4 hours PRN Systolic blood pressure >180      Allergies    celecoxib (Rash)  Originally Entered as [U UNKNOWN] reaction to [celebrit] (Unknown)    Intolerances        REVIEW OF SYSTEMS    [ ] A ten-point review of systems was otherwise negative except as noted.  [ ] Due to altered mental status/intubation, subjective information were not able to be obtained from the patient. History was obtained, to the extent possible, from review of the chart and collateral sources of information.      Vital Signs Last 24 Hrs  T(C): 36.3 (15 Apr 2024 15:01), Max: 36.5 (14 Apr 2024 21:00)  T(F): 97.3 (15 Apr 2024 15:01), Max: 97.7 (14 Apr 2024 21:00)  HR: 57 (15 Apr 2024 15:00) (54 - 662)  BP: 158/71 (15 Apr 2024 15:00) (117/57 - 208/94)  BP(mean): 102 (15 Apr 2024 15:00) (81 - 135)  RR: 26 (15 Apr 2024 15:01) (20 - 31)  SpO2: 100% (15 Apr 2024 15:00) (99% - 100%)    Parameters below as of 15 Apr 2024 15:01  Patient On (Oxygen Delivery Method): ventilator    Mode: AC/ CMV (Assist Control/ Continuous Mandatory Ventilation)  RR (machine): 26  TV (machine): 350  FiO2: 40  PEEP: 5  MAP: 15  PIP: 34      PHYSICAL EXAM:  GENERAL: intubated      LABS:                        8.2    9.10  )-----------( 240      ( 15 Apr 2024 06:23 )             25.3       Auto Neutrophil %: 74.6 % (04-15-24 @ 06:23)  Auto Immature Granulocyte %: 0.3 % (04-15-24 @ 06:23)    04-15    130<L>  |  99  |  31<H>  ----------------------------<  138<H>  5.5<H>   |  24  |  1.2      Calcium: 8.2 mg/dL (04-15-24 @ 06:23)      LFTs:             5.8  | <0.2 | 43       ------------------[143     ( 15 Apr 2024 06:23 )  2.5  | x    | 11          Lipase:x      Amylase:x         Blood Gas Arterial, Lactate: 0.9 mmol/L (04-15-24 @ 03:54)  Blood Gas Arterial, Lactate: 1.1 mmol/L (04-14-24 @ 03:45)  Blood Gas Venous - Lactate: 0.9 mmol/L (04-13-24 @ 04:38)    ABG - ( 15 Apr 2024 03:54 )  pH: 7.47  /  pCO2: 35    /  pO2: 197   / HCO3: 26    / Base Excess: 2.1   /  SaO2: 99.5      Urinalysis Basic - ( 15 Apr 2024 06:23 )    Color: x / Appearance: x / SG: x / pH: x  Gluc: 138 mg/dL / Ketone: x  / Bili: x / Urobili: x   Blood: x / Protein: x / Nitrite: x   Leuk Esterase: x / RBC: x / WBC x   Sq Epi: x / Non Sq Epi: x / Bacteria: x    RADIOLOGY & ADDITIONAL STUDIES:  Xray Chest 1 View-PORTABLE IMMEDIATE (Xray Chest 1 View-PORTABLE IMMEDIATE .) (04.15.24 @ 10:21) >  Findings:    Support devices: Endotracheal tube and nasogastric tube in satisfactory   position.    Cardiac/mediastinum/hilum: No    Lung parenchyma/Pleura: Bilateral lung opacities most pronounced within   the upper lobes, unchanged    Skeleton/soft tissues: No change    Impression:    Bilateral lung opacities, overall unchanged. Lines and tubes as described.     KIRAN MORFIN [id number]  74y Female  9d    HPI:  74-year-old female with PHMx of pancreatic CA s/p Whipple, CKD stage 3, HTN, COPD, pulmonary fibrosis, PRESS syndrome (hospitalized in 2019), Raynaud's syndrome, ANCA vasculitis, Scleroderma  presents to ED due to cardiac arrest. Pt lives at home with son and daughter, went to use the bathroom. Daughter notes that she heard the patient's walker scratching the floor, went upstairs to see what was going on. Daughter noticed patient was slumped over and struggling to get onto the toilet. Daughter helped patient onto the toilet, but noticed that the patient became less responsive and alert so she called EMS. Patient was found down by EMS, intubated her in the field, and resuscitated her with compressions and 4 rounds of epinephrine. ROSC achieved within a minute of her arrival to the ED. Patient was found to be hypothermic and bradycardic. Of note, patient was hospitalized back in Dec 2023 in [facility] for 18 days for multifocal pneumonia 2/2 human metapneumovirus. Patient also was hospitalized in 2019 for PRESS syndrome. Pt had a negative stress test in Sept 2023. She follows pulmonology for pulmonary fibrosis, had recent PFTs done.     ED vitals and workup:  BP 90/57, HR 88, Temp 94.1F, satting 100% on ventilator   Labs: Hgb 8.7, Trop 56 -->79, K 5.3, Lactate 3.5, Lipase 256, , , ALT 52  VBG: pH 7.12, pCO2 67, Lactate 6.6    CXR bilateral opacities. cardiomegaly  CTH: mild atrophic changes  CT angio chest PE, A/P w/ IV cont: Bilary ductal enhancement s/p CCY. Diffuse fibrotic changes of lungs. Trace bilateral effusions. NO PE.    s/p atropine 1mg x4 doses, calcium gluconate 3g, [id number] x1, insulin 10 units, cefepime 1g in the ED.     Admitted to MICU for unresponsiveness s/p cardiac arrest.     (06 Apr 2024 15:43)      PAST MEDICAL & SURGICAL HISTORY:  H/O vasculitis      Pancreatic cancer      History of COPD      Hypertension      H/O Raynaud's syndrome      Stage 3 chronic kidney disease      History of knee replacement      H/O Whipple procedure            MEDICATIONS  (STANDING):  artificial  tears Solution 1 Drop(s) Both EYES three times a day  chlorhexidine 0.12% Liquid 15 milliLiter(s) Oral Mucosa every 12 hours  chlorhexidine 2% Cloths 1 Application(s) Topical <User Schedule>  fentaNYL   Infusion 0.5 MICROgram(s)/kG/Hr (2 mL/Hr) IV Continuous <Continuous>  heparin   Injectable 5000 Unit(s) SubCutaneous every 12 hours  labetalol 100 milliGRAM(s) Oral three times a day  levothyroxine 100 MICROGram(s) Oral daily  lisinopril 20 milliGRAM(s) Oral daily  pantoprazole  Injectable 40 milliGRAM(s) IV Push daily  petrolatum Ophthalmic Ointment 1 Application(s) Both EYES daily    MEDICATIONS  (PRN):  labetalol Injectable 20 milliGRAM(s) IV Push every 4 hours PRN Systolic blood pressure >180      Allergies    celecoxib (Rash)  Originally Entered as [U UNKNOWN] reaction to [celebrit] (Unknown)    Intolerances        REVIEW OF SYSTEMS    [ ] A ten-point review of systems was otherwise negative except as noted.  [ ] Due to altered mental status/intubation, subjective information were not able to be obtained from the patient. History was obtained, to the extent possible, from review of the chart and collateral sources of information.      Vital Signs Last 24 Hrs  T(C): 36.3 (15 Apr 2024 15:01), Max: 36.5 (14 Apr 2024 21:00)  T(F): 97.3 (15 Apr 2024 15:01), Max: 97.7 (14 Apr 2024 21:00)  HR: 57 (15 Apr 2024 15:00) (54 - 662)  BP: 158/71 (15 Apr 2024 15:00) (117/57 - 208/94)  BP(mean): 102 (15 Apr 2024 15:00) (81 - 135)  RR: 26 (15 Apr 2024 15:01) (20 - 31)  SpO2: 100% (15 Apr 2024 15:00) (99% - 100%)    Parameters below as of 15 Apr 2024 15:01  Patient On (Oxygen Delivery Method): ventilator    Mode: AC/ CMV (Assist Control/ Continuous Mandatory Ventilation)  RR (machine): 26  TV (machine): 350  FiO2: 40  PEEP: 5  MAP: 15  PIP: 34      PHYSICAL EXAM:  GENERAL: intubated, off sedation, +spontaneous eye opening      LABS:                        8.2    9.10  )-----------( 240      ( 15 Apr 2024 06:23 )             25.3       Auto Neutrophil %: 74.6 % (04-15-24 @ 06:23)  Auto Immature Granulocyte %: 0.3 % (04-15-24 @ 06:23)    04-15    130<L>  |  99  |  31<H>  ----------------------------<  138<H>  5.5<H>   |  24  |  1.2      Calcium: 8.2 mg/dL (04-15-24 @ 06:23)      LFTs:             5.8  | <0.2 | 43       ------------------[143     ( 15 Apr 2024 06:23 )  2.5  | x    | 11          Lipase:x      Amylase:x         Blood Gas Arterial, Lactate: 0.9 mmol/L (04-15-24 @ 03:54)  Blood Gas Arterial, Lactate: 1.1 mmol/L (04-14-24 @ 03:45)  Blood Gas Venous - Lactate: 0.9 mmol/L (04-13-24 @ 04:38)    ABG - ( 15 Apr 2024 03:54 )  pH: 7.47  /  pCO2: 35    /  pO2: 197   / HCO3: 26    / Base Excess: 2.1   /  SaO2: 99.5      Urinalysis Basic - ( 15 Apr 2024 06:23 )    Color: x / Appearance: x / SG: x / pH: x  Gluc: 138 mg/dL / Ketone: x  / Bili: x / Urobili: x   Blood: x / Protein: x / Nitrite: x   Leuk Esterase: x / RBC: x / WBC x   Sq Epi: x / Non Sq Epi: x / Bacteria: x    RADIOLOGY & ADDITIONAL STUDIES:  Xray Chest 1 View-PORTABLE IMMEDIATE (Xray Chest 1 View-PORTABLE IMMEDIATE .) (04.15.24 @ 10:21) >  Findings:    Support devices: Endotracheal tube and nasogastric tube in satisfactory   position.    Cardiac/mediastinum/hilum: No    Lung parenchyma/Pleura: Bilateral lung opacities most pronounced within   the upper lobes, unchanged    Skeleton/soft tissues: No change    Impression:    Bilateral lung opacities, overall unchanged. Lines and tubes as described.

## 2024-04-15 NOTE — PROGRESS NOTE ADULT - SUBJECTIVE AND OBJECTIVE BOX
Patient is a 74y old  Female who presents with a chief complaint of cardiac arrest (14 Apr 2024 22:58)      Over Night Events:  Patient seen and examined.   still vented   on fentanyl no pressors     ROS:  See HPI    PHYSICAL EXAM    ICU Vital Signs Last 24 Hrs  T(C): 36.3 (15 Apr 2024 01:00), Max: 36.5 (14 Apr 2024 21:00)  T(F): 97.3 (15 Apr 2024 01:00), Max: 97.7 (14 Apr 2024 21:00)  HR: 55 (15 Apr 2024 06:00) (55 - 662)  BP: 117/57 (15 Apr 2024 06:00) (112/57 - 208/94)  BP(mean): 81 (15 Apr 2024 06:00) (79 - 135)  ABP: --  ABP(mean): --  RR: 26 (15 Apr 2024 06:00) (24 - 31)  SpO2: 100% (15 Apr 2024 06:00) (100% - 100%)    O2 Parameters below as of 15 Apr 2024 06:00  Patient On (Oxygen Delivery Method): ventilator    O2 Concentration (%): 40        General: not awake   HEENT:              et tube   Lymph Nodes: NO cervical LN   Lungs: Bilateral BS  Cardiovascular: Regular   Abdomen: Soft, Positive BS  Extremities: No clubbing   Skin: warm   Neurological: positive gag   Musculoskeletal: move all ext     I&O's Detail    14 Apr 2024 07:01  -  15 Apr 2024 07:00  --------------------------------------------------------  IN:    FentaNYL: 385.4 mL    Free Water: 150 mL    Osmolite 1.2: 945 mL  Total IN: 1480.4 mL    OUT:    Indwelling Catheter - Urethral (mL): 1055 mL    Rectal Tube (mL): 250 mL  Total OUT: 1305 mL    Total NET: 175.4 mL          LABS:                          8.2    9.10  )-----------( 240      ( 15 Apr 2024 06:23 )             25.3         14 Apr 2024 05:41    133    |  102    |  31     ----------------------------<  131    5.1     |  23     |  1.2      Ca    7.9        14 Apr 2024 05:41  Mg     2.1       14 Apr 2024 05:41                                                                                      Urinalysis Basic - ( 14 Apr 2024 05:41 )    Color: x / Appearance: x / SG: x / pH: x  Gluc: 131 mg/dL / Ketone: x  / Bili: x / Urobili: x   Blood: x / Protein: x / Nitrite: x   Leuk Esterase: x / RBC: x / WBC x   Sq Epi: x / Non Sq Epi: x / Bacteria: x                                                                                                             Mode: AC/ CMV (Assist Control/ Continuous Mandatory Ventilation)  RR (machine): 26  TV (machine): 350  FiO2: 40  PEEP: 5  ITime: 0.8  MAP: 12  PIP: 36                                      ABG - ( 15 Apr 2024 03:54 )  pH, Arterial: 7.47  pH, Blood: x     /  pCO2: 35    /  pO2: 197   / HCO3: 26    / Base Excess: 2.1   /  SaO2: 99.5                MEDICATIONS  (STANDING):  artificial  tears Solution 1 Drop(s) Both EYES three times a day  chlorhexidine 0.12% Liquid 15 milliLiter(s) Oral Mucosa every 12 hours  chlorhexidine 2% Cloths 1 Application(s) Topical <User Schedule>  fentaNYL   Infusion 0.5 MICROgram(s)/kG/Hr (2 mL/Hr) IV Continuous <Continuous>  heparin   Injectable 5000 Unit(s) SubCutaneous every 12 hours  labetalol 100 milliGRAM(s) Oral three times a day  levothyroxine 100 MICROGram(s) Oral daily  lisinopril 20 milliGRAM(s) Oral daily  pantoprazole  Injectable 40 milliGRAM(s) IV Push daily  petrolatum Ophthalmic Ointment 1 Application(s) Both EYES daily  polyethylene glycol 3350 17 Gram(s) Oral daily  senna 2 Tablet(s) Oral at bedtime    MEDICATIONS  (PRN):          Xrays:  TLC:  OG:  ET tube:                                                                                    b/l opacity    ECHO:  [facility] ICU:

## 2024-04-15 NOTE — PROGRESS NOTE ADULT - ASSESSMENT
IMPRESSION:    Anoxic brain damage  Acute Hypoxemic Respiratory Failure on MV  SP CPA downtime 30 minutes   Cardiac bradycardia   Lung Fibrosis   GIUSEPPE, Cr downtrending   HO Raynaud's  HO PRESS    HO Anemia  HO Pancreatic cancer     PLAN:    CNS:  not brain dead, SAT. as needed fentanyl for comfort, Neurology following,     HEENT: oral and ET care     PULMONARY: keep pox >92%  monitor peak and plateau  no change in vent settings  SBT if she wake up   pending family decision regarding trach   G sx consult for trach if patient want to proceed     CARDIOVASCULAR: CK level downtrending, Cardiology seen, 2D-Echo noted, continue labetalol dose     RENAL: follow is and os, trend Cr, Nephrology following,     INFECTIOUS DISEASE: cultures negative to date, procalcitonin elevated, completed  Zosyn course, nasal MRSA, procalcitonin 10,. ID following     GASTRO: GI ppx, tube feeds    HEMATOLOGICAL:  DVT prophylaxis.    ENDOCRINE:  Follow up FS.  Insulin protocol if needed.    MUSCULOSKELETAL: bedrest    Full Code, Palliative     Left Cordis    Stack    Grave prognosis     The patient is critically ill with a high probability of further deterioration.    GOC discussion made by palliative. pending family decision this week   follow palliative care   ICU

## 2024-04-16 DIAGNOSIS — Z71.89 OTHER SPECIFIED COUNSELING: ICD-10-CM

## 2024-04-16 LAB
ALBUMIN SERPL ELPH-MCNC: 2.8 G/DL — LOW (ref 3.5–5.2)
ALP SERPL-CCNC: 177 U/L — HIGH (ref 30–115)
ALT FLD-CCNC: 13 U/L — SIGNIFICANT CHANGE UP (ref 0–41)
ANION GAP SERPL CALC-SCNC: 10 MMOL/L — SIGNIFICANT CHANGE UP (ref 7–14)
AST SERPL-CCNC: 50 U/L — HIGH (ref 0–41)
BASE EXCESS BLDA CALC-SCNC: -0.2 MMOL/L — SIGNIFICANT CHANGE UP (ref -2–3)
BILIRUB SERPL-MCNC: <0.2 MG/DL — SIGNIFICANT CHANGE UP (ref 0.2–1.2)
BUN SERPL-MCNC: 31 MG/DL — HIGH (ref 10–20)
CALCIUM SERPL-MCNC: 8.5 MG/DL — SIGNIFICANT CHANGE UP (ref 8.4–10.5)
CHLORIDE SERPL-SCNC: 96 MMOL/L — LOW (ref 98–110)
CO2 SERPL-SCNC: 24 MMOL/L — SIGNIFICANT CHANGE UP (ref 17–32)
CREAT SERPL-MCNC: 1.3 MG/DL — SIGNIFICANT CHANGE UP (ref 0.7–1.5)
EGFR: 43 ML/MIN/1.73M2 — LOW
GAS PNL BLDA: SIGNIFICANT CHANGE UP
GLUCOSE BLDC GLUCOMTR-MCNC: 117 MG/DL — HIGH (ref 70–99)
GLUCOSE BLDC GLUCOMTR-MCNC: 169 MG/DL — HIGH (ref 70–99)
GLUCOSE SERPL-MCNC: 155 MG/DL — HIGH (ref 70–99)
HCO3 BLDA-SCNC: 24 MMOL/L — SIGNIFICANT CHANGE UP (ref 21–28)
HCT VFR BLD CALC: 29.4 % — LOW (ref 37–47)
HGB BLD-MCNC: 9.5 G/DL — LOW (ref 12–16)
HOROWITZ INDEX BLDA+IHG-RTO: 40 — SIGNIFICANT CHANGE UP
MCHC RBC-ENTMCNC: 25.4 PG — LOW (ref 27–31)
MCHC RBC-ENTMCNC: 32.3 G/DL — SIGNIFICANT CHANGE UP (ref 32–37)
MCV RBC AUTO: 78.6 FL — LOW (ref 81–99)
NRBC # BLD: 0 /100 WBCS — SIGNIFICANT CHANGE UP (ref 0–0)
PCO2 BLDA: 37 MMHG — SIGNIFICANT CHANGE UP (ref 32–45)
PH BLDA: 7.42 — SIGNIFICANT CHANGE UP (ref 7.35–7.45)
PLATELET # BLD AUTO: 297 K/UL — SIGNIFICANT CHANGE UP (ref 130–400)
PMV BLD: 10.5 FL — HIGH (ref 7.4–10.4)
PO2 BLDA: 174 MMHG — HIGH (ref 83–108)
POTASSIUM SERPL-MCNC: 5.6 MMOL/L — HIGH (ref 3.5–5)
POTASSIUM SERPL-SCNC: 5.6 MMOL/L — HIGH (ref 3.5–5)
PROT SERPL-MCNC: 6.8 G/DL — SIGNIFICANT CHANGE UP (ref 6–8)
RBC # BLD: 3.74 M/UL — LOW (ref 4.2–5.4)
RBC # FLD: 16.4 % — HIGH (ref 11.5–14.5)
SAO2 % BLDA: 99.8 % — HIGH (ref 94–98)
SODIUM SERPL-SCNC: 130 MMOL/L — LOW (ref 135–146)
WBC # BLD: 9.81 K/UL — SIGNIFICANT CHANGE UP (ref 4.8–10.8)
WBC # FLD AUTO: 9.81 K/UL — SIGNIFICANT CHANGE UP (ref 4.8–10.8)

## 2024-04-16 PROCEDURE — 99497 ADVNCD CARE PLAN 30 MIN: CPT | Mod: 25

## 2024-04-16 PROCEDURE — 99232 SBSQ HOSP IP/OBS MODERATE 35: CPT

## 2024-04-16 PROCEDURE — 99233 SBSQ HOSP IP/OBS HIGH 50: CPT

## 2024-04-16 PROCEDURE — 71045 X-RAY EXAM CHEST 1 VIEW: CPT | Mod: 26

## 2024-04-16 PROCEDURE — 99291 CRITICAL CARE FIRST HOUR: CPT

## 2024-04-16 RX ORDER — HYDRALAZINE HCL 50 MG
10 TABLET ORAL ONCE
Refills: 0 | Status: COMPLETED | OUTPATIENT
Start: 2024-04-16 | End: 2024-04-16

## 2024-04-16 RX ORDER — HYDRALAZINE HCL 50 MG
10 TABLET ORAL EVERY 4 HOURS
Refills: 0 | Status: DISCONTINUED | OUTPATIENT
Start: 2024-04-16 | End: 2024-04-22

## 2024-04-16 RX ADMIN — Medication 1 DROP(S): at 22:26

## 2024-04-16 RX ADMIN — CHLORHEXIDINE GLUCONATE 1 APPLICATION(S): 213 SOLUTION TOPICAL at 22:13

## 2024-04-16 RX ADMIN — Medication 100 MILLIGRAM(S): at 05:58

## 2024-04-16 RX ADMIN — HEPARIN SODIUM 5000 UNIT(S): 5000 INJECTION INTRAVENOUS; SUBCUTANEOUS at 17:31

## 2024-04-16 RX ADMIN — Medication 100 MICROGRAM(S): at 05:59

## 2024-04-16 RX ADMIN — CHLORHEXIDINE GLUCONATE 15 MILLILITER(S): 213 SOLUTION TOPICAL at 17:31

## 2024-04-16 RX ADMIN — Medication 10 MILLIGRAM(S): at 15:10

## 2024-04-16 RX ADMIN — Medication 1 DROP(S): at 13:13

## 2024-04-16 RX ADMIN — PANTOPRAZOLE SODIUM 40 MILLIGRAM(S): 20 TABLET, DELAYED RELEASE ORAL at 11:14

## 2024-04-16 RX ADMIN — Medication 100 MILLIGRAM(S): at 22:16

## 2024-04-16 RX ADMIN — Medication 1 APPLICATION(S): at 11:15

## 2024-04-16 RX ADMIN — HEPARIN SODIUM 5000 UNIT(S): 5000 INJECTION INTRAVENOUS; SUBCUTANEOUS at 05:58

## 2024-04-16 RX ADMIN — Medication 10 MILLIGRAM(S): at 20:10

## 2024-04-16 RX ADMIN — CHLORHEXIDINE GLUCONATE 15 MILLILITER(S): 213 SOLUTION TOPICAL at 05:59

## 2024-04-16 RX ADMIN — Medication 20 MILLIGRAM(S): at 18:10

## 2024-04-16 RX ADMIN — Medication 100 MILLIGRAM(S): at 13:06

## 2024-04-16 RX ADMIN — Medication 20 MILLIGRAM(S): at 11:14

## 2024-04-16 RX ADMIN — LISINOPRIL 20 MILLIGRAM(S): 2.5 TABLET ORAL at 05:58

## 2024-04-16 RX ADMIN — Medication 1 DROP(S): at 06:00

## 2024-04-16 RX ADMIN — CHLORHEXIDINE GLUCONATE 1 APPLICATION(S): 213 SOLUTION TOPICAL at 05:57

## 2024-04-16 NOTE — PROGRESS NOTE ADULT - SUBJECTIVE AND OBJECTIVE BOX
Patient remains minimally responsive on ventilator off sedation      T(F): 98.2 (04-16-24 @ 11:00), Max: 98.8 (04-16-24 @ 03:00)  HR: 62 (04-16-24 @ 11:01)  BP: 188/80 (04-16-24 @ 11:01)  RR: 27 (04-16-24 @ 11:01)  SpO2: 100% (04-16-24 @ 11:01) (99% - 100%)    PHYSICAL EXAM:  GENERAL: NAD  HEAD:  Atraumatic, Normocephalic  EYES: EOMI, PERRLA, conjunctiva and sclera clear  NERVOUS SYSTEM: positive gag and corneal reflex  CHEST/LUNG:  bilateral rhonchi  HEART: Regular rate and rhythm; No murmurs, rubs, or gallops  ABDOMEN: Soft, Nontender, Nondistended; Bowel sounds present  EXTREMITIES:  2+ Peripheral Pulses, No clubbing, cyanosis, or edema    LABS  04-16    130<L>  |  96<L>  |  31<H>  ----------------------------<  155<H>  5.6<H>   |  24  |  1.3    Ca    8.5      16 Apr 2024 05:29  Mg     2.1     04-15    TPro  6.8  /  Alb  2.8<L>  /  TBili  <0.2  /  DBili  x   /  AST  50<H>  /  ALT  13  /  AlkPhos  177<H>  04-16                          9.5    9.81  )-----------( 297      ( 16 Apr 2024 05:29 )             29.4       Mode: AC/ CMV (Assist Control/ Continuous Mandatory Ventilation)  RR (machine): 26  TV (machine): 350  FiO2: 40  PEEP: 5    Culture Results:   No growth (04-06-24)  Culture Results:   No growth at 5 days (04-06-24)  Culture Results:   No growth at 5 days (04-06-24)    RADIOLOGY  < from: Xray Chest 1 View- PORTABLE-Routine (Xray Chest 1 View- PORTABLE-Routine in AM.) (04.16.24 @ 07:40) >    Impression:    Stable bilateral lung opacities    < end of copied text >  < from: CT Head No Cont (04.11.24 @ 11:35) >    IMPRESSION:    Findings compatible with diffuse anoxic injury of the brain.    < end of copied text >    MEDICATIONS  (STANDING):  artificial  tears Solution 1 Drop(s) Both EYES three times a day  chlorhexidine 0.12% Liquid 15 milliLiter(s) Oral Mucosa every 12 hours  chlorhexidine 2% Cloths 1 Application(s) Topical daily  heparin   Injectable 5000 Unit(s) SubCutaneous every 12 hours  labetalol 100 milliGRAM(s) Oral three times a day  levothyroxine 100 MICROGram(s) Oral daily  lisinopril 20 milliGRAM(s) Oral daily  pantoprazole  Injectable 40 milliGRAM(s) IV Push daily  petrolatum Ophthalmic Ointment 1 Application(s) Both EYES daily    MEDICATIONS  (PRN):  labetalol Injectable 20 milliGRAM(s) IV Push every 4 hours PRN Systolic blood pressure >180

## 2024-04-16 NOTE — PROGRESS NOTE ADULT - CONVERSATION DETAILS
Discussion held with patient's daughter Obdulia, along with critical care, surgery, and palliative care. Obdulia states wanting a trial of CPR, and continued care. She is hopeful her mother can be stabilized and transferred from this institution, and states knowing that prognosis for meaningful recovery is suboptimal. She understands need for trach/PEG, but did not clearly state an intention to proceed with trach placement during this meeting.     16 mins spent

## 2024-04-16 NOTE — PROGRESS NOTE ADULT - ASSESSMENT
74-year-old female with PHMx of pancreatic CA s/p Whipple, CKD stage 3, HTN, COPD, pulmonary fibrosis, PRESS syndrome (hospitalized in 2019), Raynaud's syndrome, ANCA vasculitis, Scleroderma  presents to ED due to cardiac arrest with ROSC after 30 minutes. Hospital course complicated by respiratory failure requiring intubation, sepsis, GIUSEPPE. Palliative care consulted for GOC.

## 2024-04-16 NOTE — CHART NOTE - NSCHARTNOTEFT_GEN_A_CORE
called and left  for patient's daughter Obdulia called and left  for patient's daughter Obdulia      1030AM - Spoke with Obdulia and her brother. will meet at bedside with palliative attending and primary team at 1130

## 2024-04-16 NOTE — PROGRESS NOTE ADULT - SUBJECTIVE AND OBJECTIVE BOX
KIRAN MORFIN, 74y, Female; MRN# [id number]  Hospital Stay: 10d.    Patient is a 74y old Female who presents with a chief complaint of cardiac arrest (16 Apr 2024 07:30)  Currently admitted to the ICU with the primary diagnosis of Cardiac arrest      SUBJECTIVE:  Interval/Overnight events: No acute events overnight. Patient remains off of sedation. Family meeting today with palliative. Surgery consulted for trach and PEG.     REVIEW OF SYSTEMS:  As per HPI  OBJECTIVE:  VITALS:  T(F): 97.9 (04-16-24 @ 07:01), Max: 98.8 (04-16-24 @ 03:00)  HR: 62 (04-16-24 @ 07:00) (57 - 69)  BP: 158/72 (04-16-24 @ 07:00) (119/58 - 197/91)  ABP: --  ABP(mean): --  RR: 26 (04-16-24 @ 07:01) (24 - 28)  SpO2: 100% (04-16-24 @ 07:00) (99% - 100%)    Vent Settings  Device: [place], Mode: AC/ CMV (Assist Control/ Continuous Mandatory Ventilation), RR (machine): 26, TV (machine): 350, FiO2: 40, PEEP: 5, MAP: 14, PIP: 30  Blood Gas  04-16-24 @ 04:04  pH 7.42 | pCO2 37 | pO2 174 | HCO3 24 | O2 Sat 99.8    Drips  fentaNYL   Infusion 0.5 MICROgram(s)/kG/Hr (2 mL/Hr) IV Continuous <Continuous>    I&Os:    04-15-24 @ 07:01  -  04-16-24 @ 07:00  --------------------------------------------------------  IN: 1170 mL / OUT: 1350 mL / NET: -180 mL    04-16-24 @ 07:01  -  04-16-24 @ 07:52  --------------------------------------------------------  IN: 0 mL / OUT: 30 mL / NET: -30 mL      PHYSICAL EXAM:  VITAL SIGNS: I have reviewed nursing notes and confirm.  CONSTITUTIONAL: intubated, off sedation  SKIN: Warm dry, normal skin turgor  HEAD: NCAT  EYES: non-reactive pupils, corneal reflex present; No conjunctival injection, scleral anicteric  ENT: Moist mucous membranes,   NECK: Supple;   CARD: RRR, no murmurs, rubs or gallops  RESP: Clear to ausculation bilaterally.   ABD: Soft, non-distended,  NEURO:     ACTIVE MEDICATIONS:  Standing  artificial  tears Solution 1 Drop(s) Both EYES three times a day  chlorhexidine 0.12% Liquid 15 milliLiter(s) Oral Mucosa every 12 hours  chlorhexidine 2% Cloths 1 Application(s) Topical daily  chlorhexidine 2% Cloths 1 Application(s) Topical <User Schedule>  fentaNYL   Infusion 0.5 MICROgram(s)/kG/Hr (2 mL/Hr) IV Continuous <Continuous>  heparin   Injectable 5000 Unit(s) SubCutaneous every 12 hours  labetalol 100 milliGRAM(s) Oral three times a day  levothyroxine 100 MICROGram(s) Oral daily  lisinopril 20 milliGRAM(s) Oral daily  pantoprazole  Injectable 40 milliGRAM(s) IV Push daily  petrolatum Ophthalmic Ointment 1 Application(s) Both EYES daily    PRN  labetalol Injectable 20 milliGRAM(s) IV Push every 4 hours PRN    LABS:  04-16-24 @ 05:29  WBC 9.81, H/H 9.5<L>/29.4<L>, plt 297  Na 130<L>, K 5.6<H>, Cl 96<L>, CO2 24, BUN 31<H>, Cr 1.3, Glu 155<H>    Ca 8.5, Mg --, Phosphorus --    Tot Protein 6.8, Alb 2.8<L>, T. Bili <0.2, D. Bili --  AST 50<H>, ALT 13, Alk phos 177<H>          04-06-24 @ 19:55:  TSH 2.94        CULTURES:    Culture - Urine (collected 04-06-24 @ 20:15)  Source: Clean Catch Clean Catch (Midstream)  Final Report (04-08-24 @ 16:27):    No growth    Culture - Blood (collected 04-06-24 @ 12:05)  Source: .Blood Blood-Venous  Final Report (04-11-24 @ 20:00):    No growth at 5 days    Culture - Blood (collected 04-06-24 @ 12:05)  Source: .Blood Blood-Venous  Final Report (04-11-24 @ 20:00):    No growth at 5 days      PENDING:  Comprehensive Metabolic Panel: AM Sched. Collection: 22-Apr-2024 04:30 (04-15-24 @ 14:55)  Comprehensive Metabolic Panel: AM Sched. Collection: 21-Apr-2024 04:30 (04-15-24 @ 14:55)  Comprehensive Metabolic Panel: AM Sched. Collection: 20-Apr-2024 04:30 (04-15-24 @ 14:55)  Comprehensive Metabolic Panel: AM Sched. Collection: 19-Apr-2024 04:30 (04-15-24 @ 14:55)  Comprehensive Metabolic Panel: AM Sched. Collection: 18-Apr-2024 04:30 (04-15-24 @ 14:55)  Comprehensive Metabolic Panel: AM Sched. Collection: 17-Apr-2024 04:30 (04-15-24 @ 14:55)  Complete Blood Count: AM Sched. Collection: 22-Apr-2024 04:30 (04-15-24 @ 14:55)  Complete Blood Count: AM Sched. Collection: 21-Apr-2024 04:30 (04-15-24 @ 14:55)  Complete Blood Count: AM Sched. Collection: 20-Apr-2024 04:30 (04-15-24 @ 14:55)  Complete Blood Count: AM Sched. Collection: 19-Apr-2024 04:30 (04-15-24 @ 14:55)  Complete Blood Count: AM Sched. Collection: 18-Apr-2024 04:30 (04-15-24 @ 14:55)  Complete Blood Count: AM Sched. Collection: 17-Apr-2024 04:30 (04-15-24 @ 14:55)    IMAGING:  Latest imaging reviewed.    ECHO:  TTE Echo Complete w/o Contrast w/ Doppler:   Transport: Stretcher-Crib  Monitor: w/ Monitor,  Transport w/ Ventilator,  Transport w/ IV Pole (04-06-24 @ 16:26)

## 2024-04-16 NOTE — PROGRESS NOTE ADULT - ASSESSMENT
ASSESSMENT AND PLAN    NEUROLOGICAL:  #Anoxic Brain Injury    -Aspiration precautions    - HOB elevated 30 degrees    - Fentanyl as needed for comfort         RESPIRATORY:   #Intubated   -RR (machine): 26, TV (machine): 350, FiO2: 40, PEEP: 5  04-16 @ 04:04--7.42 / 37 / 174 / 24 / [id number] 99.8 / Lactate 1.4 / iCal --   - Chest XRAY: bilateral opacities, unchanged    CARDS:   #HTN  labetalol 100 mg oral tablet: 1 tab(s) orally 3 times a day  lisinopril 20 mg oral tablet: 1 tab(s) orally once a day  - Labetalol pushes as needed for acute hypertension      GASTROINTESTINAL/NUTRITION:   #Diet, NPO with Tube Feed:   Tube Feeding Modality: Orogastric  Osmolite 1.2 Lei (OSMOLITERTH)  Total Volume for 24 Hours (mL): 1080  Continuous  Starting Tube Feed Rate mL per Hour: 10  Until Goal Tube Feed Rate (mL per Hour): 45  Tube Feed Duration (in Hours): 24  Tube Feed Start Time: 12:00  Free Water Flush  Free Water Flush Instructions:  150 Q8hr [Active]    - NG tube in place  #GI Prophylaxis    pantoprazole  Injectable 40 IV Push daily    #Bowel regimen    -senna/miralax if indicated    -last bowel movement      /RENAL:   #urine output in critically ill    -indwelling hill     Labs          Electrolytes-(04-16 @ 05:29)Na 130 // K 5.6 // Mg -- // Phos --          HEME/ONC:   #DVT prophylaxis     -Chemical: heparin   Injectable 5000 Unit(s) SubCutaneous every 12 hours     -Mechanical: SCDs    -if DVT prophylaxis to be held, document and place VTE order        Labs: Hb/Hct:  8.2/25.3  (04-15 @ 06:23)  -->,  9.5/29.4  (04-16 @ 05:29)  -->                      Plts:  240  -->,  297  -->                 PTT/INR:          ID:  #DTI  WBC- 7.11  --->>,  9.10  --->>,  9.81  --->>  Temp trend- 24hrs T(F): 97.9 (04-16 @ 07:01), Max: 98.8 (04-16 @ 03:00)    Culture - Urine (collected 04-06)  Source: Clean Catch Clean Catch (Midstream)  Final Report: No Growth  Culture - Blood (collected 04-06)  Source: .Blood Blood-Venous  Final Report:    No growth at 5 days    Culture - Blood (collected 04-06)  Source: .Blood Blood-Venous  Final Report:    No growth at 5 days    ENDOCRINE:    -FSG q6 if NPO or Tube feeds    -Glucose goal 140-180. if above 180 start ISS    MSK:         LINES/DRAINS:  Seda CORRALES    ADVANCED DIRECTIVES:  Full Code    HCP/Emergency Contact-    - Palliative following  - Family meeting scheduled for today.       INDICATION FOR SICU/SDU:     Anoxic Brain Injury s/p Cardiac Arrest        DISPO:  ICU

## 2024-04-16 NOTE — PROGRESS NOTE ADULT - ASSESSMENT
74-year-old female with PHMx of pancreatic CA s/p Whipple, CKD stage 3, HTN, COPD, pulmonary fibrosis, PRESS syndrome (hospitalized in 2019), Raynaud's syndrome, ANCA vasculitis, Scleroderma, hypothyroid  presents to ED due to cardiac arrest. Pt lives at home with son and daughter, went to use the bathroom and became unresponsive.  EMS intubated her and gave her 4 rounds of epinephrine.  ROSC was achieved within a minute of her arrival to the ED.  Patient was found to be hypothermic and bradycardic    acute respiratory failure / cardiac arrest / anoxic brain injury / HTN / possible aspiration pneumonia      - remains minimally responsive off sedation    - anoxic brain injury as seen on CT brain as above    - family meeting with palliative today    - completed antibiotic course    - Labetalol / lisinopril    - DVT and GI prophylaxis   - I discussed plan with CC    - very poor prognosis

## 2024-04-16 NOTE — PROGRESS NOTE ADULT - SUBJECTIVE AND OBJECTIVE BOX
Patient is a 74y old  Female who presents with a chief complaint of cardiac arrest (15 Apr 2024 19:56)      Over Night Events:  Patient seen and examined.   still barely open eyes when sedation stop and then start to over breath   does not meet criteria for SBT can not protect her airway     ROS:  See HPI    PHYSICAL EXAM    ICU Vital Signs Last 24 Hrs  T(C): 36.6 (16 Apr 2024 07:01), Max: 37.1 (16 Apr 2024 03:00)  T(F): 97.9 (16 Apr 2024 07:01), Max: 98.8 (16 Apr 2024 03:00)  HR: 66 (16 Apr 2024 06:00) (57 - 69)  BP: 166/73 (16 Apr 2024 06:00) (119/58 - 197/91)  BP(mean): 105 (16 Apr 2024 06:00) (82 - 130)  ABP: --  ABP(mean): --  RR: 26 (16 Apr 2024 07:01) (24 - 28)  SpO2: 100% (16 Apr 2024 06:00) (99% - 100%)    O2 Parameters below as of 16 Apr 2024 06:00  Patient On (Oxygen Delivery Method): ventilator    O2 Concentration (%): 40        General: barley open her eyes   HEENT:        et tube         Lymph Nodes: NO cervical LN   Lungs: Bilateral BS  Cardiovascular: Regular   Abdomen: Soft, Positive BS  Extremities: No clubbing   Skin: warm   Neurological: does not follow command positive gag       I&O's Detail    15 Apr 2024 07:01  -  16 Apr 2024 07:00  --------------------------------------------------------  IN:    Free Water: 450 mL    Osmolite 1.2: 720 mL  Total IN: 1170 mL    OUT:    FentaNYL: 0 mL    Indwelling Catheter - Urethral (mL): 1200 mL    Rectal Tube (mL): 150 mL  Total OUT: 1350 mL    Total NET: -180 mL      16 Apr 2024 07:01  -  16 Apr 2024 07:31  --------------------------------------------------------  IN:  Total IN: 0 mL    OUT:    Indwelling Catheter - Urethral (mL): 30 mL  Total OUT: 30 mL    Total NET: -30 mL          LABS:                          9.5    9.81  )-----------( 297      ( 16 Apr 2024 05:29 )             29.4         16 Apr 2024 05:29    130    |  96     |  31     ----------------------------<  155    5.6     |  24     |  1.3      Ca    8.5        16 Apr 2024 05:29  Mg     2.1       15 Apr 2024 06:23    TPro  6.8    /  Alb  2.8    /  TBili  <0.2   /  DBili  x      /  AST  50     /  ALT  13     /  AlkPhos  177    16 Apr 2024 05:29  Amylase x     lipase x                                                                                        Urinalysis Basic - ( 16 Apr 2024 05:29 )    Color: x / Appearance: x / SG: x / pH: x  Gluc: 155 mg/dL / Ketone: x  / Bili: x / Urobili: x   Blood: x / Protein: x / Nitrite: x   Leuk Esterase: x / RBC: x / WBC x   Sq Epi: x / Non Sq Epi: x / Bacteria: x                                                                                                             Mode: AC/ CMV (Assist Control/ Continuous Mandatory Ventilation)  RR (machine): 26  TV (machine): 350  FiO2: 40  PEEP: 5  MAP: 14  PIP: 30                                      ABG - ( 16 Apr 2024 04:04 )  pH, Arterial: 7.42  pH, Blood: x     /  pCO2: 37    /  pO2: 174   / HCO3: 24    / Base Excess: -0.2  /  SaO2: 99.8                MEDICATIONS  (STANDING):  artificial  tears Solution 1 Drop(s) Both EYES three times a day  chlorhexidine 0.12% Liquid 15 milliLiter(s) Oral Mucosa every 12 hours  chlorhexidine 2% Cloths 1 Application(s) Topical daily  chlorhexidine 2% Cloths 1 Application(s) Topical <User Schedule>  fentaNYL   Infusion 0.5 MICROgram(s)/kG/Hr (2 mL/Hr) IV Continuous <Continuous>  heparin   Injectable 5000 Unit(s) SubCutaneous every 12 hours  labetalol 100 milliGRAM(s) Oral three times a day  levothyroxine 100 MICROGram(s) Oral daily  lisinopril 20 milliGRAM(s) Oral daily  pantoprazole  Injectable 40 milliGRAM(s) IV Push daily  petrolatum Ophthalmic Ointment 1 Application(s) Both EYES daily    MEDICATIONS  (PRN):  labetalol Injectable 20 milliGRAM(s) IV Push every 4 hours PRN Systolic blood pressure >180          Xrays:  TLC:  OG:  ET tube:                                                                                    stable b/l opacity    ECHO:  CAM ICU:           Patient is a 74y old  Female who presents with a chief complaint of cardiac arrest (15 Apr 2024 19:56)      Over Night Events:  Patient seen and examined.   still barely open eyes  off fentanyl since yesterday   does not meet criteria for SBT can not protect her airway     ROS:  See HPI    PHYSICAL EXAM    ICU Vital Signs Last 24 Hrs  T(C): 36.6 (16 Apr 2024 07:01), Max: 37.1 (16 Apr 2024 03:00)  T(F): 97.9 (16 Apr 2024 07:01), Max: 98.8 (16 Apr 2024 03:00)  HR: 66 (16 Apr 2024 06:00) (57 - 69)  BP: 166/73 (16 Apr 2024 06:00) (119/58 - 197/91)  BP(mean): 105 (16 Apr 2024 06:00) (82 - 130)  ABP: --  ABP(mean): --  RR: 26 (16 Apr 2024 07:01) (24 - 28)  SpO2: 100% (16 Apr 2024 06:00) (99% - 100%)    O2 Parameters below as of 16 Apr 2024 06:00  Patient On (Oxygen Delivery Method): ventilator    O2 Concentration (%): 40        General: barley open her eyes   HEENT:        et tube         Lymph Nodes: NO cervical LN   Lungs: Bilateral BS  Cardiovascular: Regular   Abdomen: Soft, Positive BS  Extremities: No clubbing   Skin: warm   Neurological: does not follow command positive gag       I&O's Detail    15 Apr 2024 07:01  -  16 Apr 2024 07:00  --------------------------------------------------------  IN:    Free Water: 450 mL    Osmolite 1.2: 720 mL  Total IN: 1170 mL    OUT:    FentaNYL: 0 mL    Indwelling Catheter - Urethral (mL): 1200 mL    Rectal Tube (mL): 150 mL  Total OUT: 1350 mL    Total NET: -180 mL      16 Apr 2024 07:01  -  16 Apr 2024 07:31  --------------------------------------------------------  IN:  Total IN: 0 mL    OUT:    Indwelling Catheter - Urethral (mL): 30 mL  Total OUT: 30 mL    Total NET: -30 mL          LABS:                          9.5    9.81  )-----------( 297      ( 16 Apr 2024 05:29 )             29.4         16 Apr 2024 05:29    130    |  96     |  31     ----------------------------<  155    5.6     |  24     |  1.3      Ca    8.5        16 Apr 2024 05:29  Mg     2.1       15 Apr 2024 06:23    TPro  6.8    /  Alb  2.8    /  TBili  <0.2   /  DBili  x      /  AST  50     /  ALT  13     /  AlkPhos  177    16 Apr 2024 05:29  Amylase x     lipase x                                                                                        Urinalysis Basic - ( 16 Apr 2024 05:29 )    Color: x / Appearance: x / SG: x / pH: x  Gluc: 155 mg/dL / Ketone: x  / Bili: x / Urobili: x   Blood: x / Protein: x / Nitrite: x   Leuk Esterase: x / RBC: x / WBC x   Sq Epi: x / Non Sq Epi: x / Bacteria: x                                                                                                             Mode: AC/ CMV (Assist Control/ Continuous Mandatory Ventilation)  RR (machine): 26  TV (machine): 350  FiO2: 40  PEEP: 5  MAP: 14  PIP: 30                                      ABG - ( 16 Apr 2024 04:04 )  pH, Arterial: 7.42  pH, Blood: x     /  pCO2: 37    /  pO2: 174   / HCO3: 24    / Base Excess: -0.2  /  SaO2: 99.8                MEDICATIONS  (STANDING):  artificial  tears Solution 1 Drop(s) Both EYES three times a day  chlorhexidine 0.12% Liquid 15 milliLiter(s) Oral Mucosa every 12 hours  chlorhexidine 2% Cloths 1 Application(s) Topical daily  chlorhexidine 2% Cloths 1 Application(s) Topical <User Schedule>  fentaNYL   Infusion 0.5 MICROgram(s)/kG/Hr (2 mL/Hr) IV Continuous <Continuous>  heparin   Injectable 5000 Unit(s) SubCutaneous every 12 hours  labetalol 100 milliGRAM(s) Oral three times a day  levothyroxine 100 MICROGram(s) Oral daily  lisinopril 20 milliGRAM(s) Oral daily  pantoprazole  Injectable 40 milliGRAM(s) IV Push daily  petrolatum Ophthalmic Ointment 1 Application(s) Both EYES daily    MEDICATIONS  (PRN):  labetalol Injectable 20 milliGRAM(s) IV Push every 4 hours PRN Systolic blood pressure >180          Xrays:  TLC:  OG:  ET tube:                                                                                    stable b/l opacity    ECHO:  CAM ICU:

## 2024-04-16 NOTE — PROGRESS NOTE ADULT - ASSESSMENT
IMPRESSION:    Anoxic brain damage  Acute Hypoxemic Respiratory Failure on MV  SP CPA downtime 30 minutes   Cardiac bradycardia   Lung Fibrosis   GIUSEPPE, Cr downtrending   HO Raynaud's  HO PRESS    HO Anemia  HO Pancreatic cancer     PLAN:    CNS:  not brain dead, SAT. as needed fentanyl for comfort, Neurology following,     HEENT: oral and ET care     PULMONARY: keep pox >92%  monitor peak and plateau  no change in vent settings  SBT if she wake up   pending family decision regarding trach   G sx  for trach     CARDIOVASCULAR: CK level downtrending, Cardiology seen, 2D-Echo noted, continue labetalol dose     RENAL: follow is and os, trend Cr, Nephrology following, lokelma for hyperkalemia   dextrose and insulin follow K level target keep less then 5     INFECTIOUS DISEASE: cultures negative to date, procalcitonin elevated, completed  Zosyn course, nasal MRSA,   s/p abx   GASTRO: GI ppx, tube feeds    HEMATOLOGICAL:  DVT prophylaxis.    ENDOCRINE:  Follow up FS.  Insulin protocol if needed.    MUSCULOSKELETAL: bedrest    Full Code, Palliative     Left Cordis    Stack    Grave prognosis     The patient is critically ill with a high probability of further deterioration.    GOC discussion made by palliative. pending family decision this week   follow palliative care   ICU  IMPRESSION:    Anoxic brain damage  Acute Hypoxemic Respiratory Failure on MV  SP CPA downtime 30 minutes   Cardiac bradycardia   Lung Fibrosis   GIUSEPPE, Cr downtrending   HO Raynaud's  HO PRESS    HO Anemia  HO Pancreatic cancer     PLAN:    CNS:  not brain dead, SAT.  off sedation  Neurology following,     HEENT: oral and ET care     PULMONARY: keep pox >92%  monitor peak and plateau  no change in vent settings  SBT if she wake up   pending family decision regarding trach   G sx  for trach     CARDIOVASCULAR: CK level downtrending, Cardiology seen, 2D-Echo noted, continue labetalol dose     RENAL: follow is and os, trend Cr, Nephrology following, lokelma for hyperkalemia   dextrose and insulin follow K level target keep less then 5     INFECTIOUS DISEASE: cultures negative to date, procalcitonin elevated, completed  Zosyn course, nasal MRSA,   s/p abx   GASTRO: GI ppx, tube feeds    HEMATOLOGICAL:  DVT prophylaxis.    ENDOCRINE:  Follow up FS.  Insulin protocol if needed.    MUSCULOSKELETAL: bedrest    Full Code, Palliative     Left Cordis    Stack    Grave prognosis     The patient is critically ill with a high probability of further deterioration.    GOC discussion made by palliative. pending family decision this week   follow palliative care   ICU

## 2024-04-16 NOTE — PROGRESS NOTE ADULT - SUBJECTIVE AND OBJECTIVE BOX
CC: cardiac arrest    HPI:  74-year-old female with PHMx of pancreatic CA s/p Whipple, CKD stage 3, HTN, COPD, pulmonary fibrosis, PRESS syndrome (hospitalized in 2019), Raynaud's syndrome, ANCA vasculitis, Scleroderma  presents to ED due to cardiac arrest. Pt lives at home with son and daughter, went to use the bathroom. Daughter notes that she heard the patient's walker scratching the floor, went upstairs to see what was going on. Daughter noticed patient was slumped over and struggling to get onto the toilet. Daughter helped patient onto the toilet, but noticed that the patient became less responsive and alert so she called EMS. Patient was found down by EMS, intubated her in the field, and resuscitated her with compressions and 4 rounds of epinephrine. ROSC achieved within a minute of her arrival to the ED. Patient was found to be hypothermic and bradycardic. Of note, patient was hospitalized back in Dec 2023 in [facility] for 18 days for multifocal pneumonia 2/2 human metapneumovirus. Patient also was hospitalized in 2019 for PRESS syndrome. Pt had a negative stress test in Sept 2023. She follows pulmonology for pulmonary fibrosis, had recent PFTs done.     ED vitals and workup:  BP 90/57, HR 88, Temp 94.1F, satting 100% on ventilator   Labs: Hgb 8.7, Trop 56 -->79, K 5.3, Lactate 3.5, Lipase 256, , , ALT 52  VBG: pH 7.12, pCO2 67, Lactate 6.6    CXR bilateral opacities. cardiomegaly  CTH: mild atrophic changes  CT angio chest PE, A/P w/ IV cont: Bilary ductal enhancement s/p CCY. Diffuse fibrotic changes of lungs. Trace bilateral effusions. NO PE.    s/p atropine 1mg x4 doses, calcium gluconate 3g, [id number] x1, insulin 10 units, cefepime 1g in the ED.     Admitted to MICU for unresponsiveness s/p cardiac arrest.     (06 Apr 2024 15:43)    INTERVAL EVENTS  4/16/24: patient intubated, off sedation, does nto appear in distress    ADVANCE DIRECTIVES:     [ x] Full Code [ ] DNR  MOLST  [ ]  Living Will  [ ]   DECISION MAKER(s):  [ ] Health Care Proxy(s)  [ x] Surrogate(s)  [ ] Guardian           Name(s): Phone Number(s):   Children      BASELINE (I)ADL(s) (prior to admission):    Estill: [ ]Total  [ ] Moderate [ ]Dependent  Palliative Performance Status Version 2:         %    [email or web address]    Allergies    celecoxib (Rash)  Originally Entered as [U UNKNOWN] reaction to [celebrit] (Unknown)    Intolerances    MEDICATIONS  (STANDING):  artificial  tears Solution 1 Drop(s) Both EYES three times a day  chlorhexidine 0.12% Liquid 15 milliLiter(s) Oral Mucosa every 12 hours  chlorhexidine 2% Cloths 1 Application(s) Topical <User Schedule>  chlorhexidine 2% Cloths 1 Application(s) Topical daily  fentaNYL   Infusion 0.5 MICROgram(s)/kG/Hr (2 mL/Hr) IV Continuous <Continuous>  heparin   Injectable 5000 Unit(s) SubCutaneous every 12 hours  labetalol 100 milliGRAM(s) Oral three times a day  levothyroxine 100 MICROGram(s) Oral daily  lisinopril 20 milliGRAM(s) Oral daily  pantoprazole  Injectable 40 milliGRAM(s) IV Push daily  petrolatum Ophthalmic Ointment 1 Application(s) Both EYES daily    MEDICATIONS  (PRN):  labetalol Injectable 20 milliGRAM(s) IV Push every 4 hours PRN Systolic blood pressure >180    PRESENT SYMPTOMS: [ x]Unable to obtain due to poor mentation   Source if other than patient:  [ ]Family   [ ]Team     Pain: [ ]yes [ ]no  ALL PAIN ASSESSMENT COMPONENTS WERE ASKED ABOUT UNLESS OTHERWISE NOTED  QOL impact -   Location -                    Aggravating factors -  Quality -  Radiation -  Timing-  Severity (0-10 scale):  Minimal acceptable level (0-10 scale):     CPOT:  1  https://www.[facility].org/getattachment/hvs22e61-2q3z-5b2n-4d0x-7277m6022b1p/Critical-Care-Pain-Observation-Tool-(CPOT)    PAIN AD Score:   http://geriatrictoolkit.[email or web address]/painad.pdf (press ctrl +  left click to view)    Dyspnea:                           [ ]None[ ]Mild [ ]Moderate [ ]Severe     Respiratory Distress Observation Scale (RDOS): 2  A score of 0 to 2 signifies little or no respiratory distress, 3 signifies mild distress, scores 4 to 6 indicate moderate distress, and scores greater than 7 signify severe distress  [email or web address]/sites/default/files/PDFS/029436-cpqatlljgca-jythfrvo-mkyktxwhxhz-kacud.pdf    Anxiety:                             [ ]None[ ]Mild [ ]Moderate [ ]Severe   Fatigue:                             [ ]None[ ]Mild [ ]Moderate [ ]Severe   Nausea:                             [ ]None[ ]Mild [ ]Moderate [ ]Severe   Loss of appetite:              [ ]None[ ]Mild [ ]Moderate [ ]Severe   Constipation:                    [ ]None[ ]Mild [ ]Moderate [ ]Severe    Other Symptoms:  [ ]All other review of systems negative     Palliative Performance Status Version 2:         %    http://[facility].org/files/news/palliative_performance_scale_ppsv2.pdf    PHYSICAL EXAM:  Vital Signs Last 24 Hrs  T(C): 36.8 (16 Apr 2024 11:00), Max: 37.1 (16 Apr 2024 03:00)  T(F): 98.2 (16 Apr 2024 11:00), Max: 98.8 (16 Apr 2024 03:00)  HR: 62 (16 Apr 2024 11:01) (57 - 69)  BP: 188/80 (16 Apr 2024 11:01) (119/58 - 197/91)  BP(mean): 115 (16 Apr 2024 11:01) (82 - 130)  RR: 27 (16 Apr 2024 11:01) (24 - 42)  SpO2: 100% (16 Apr 2024 11:01) (99% - 100%)    Parameters below as of 16 Apr 2024 11:01  Patient On (Oxygen Delivery Method): ventilator      GENERAL:  [ ] No acute distress [ ]Lethargic  [x ]Unarousable  [ ]Verbal  [ ]Non-Verbal [ ]Cachexia    BEHAVIORAL/PSYCH:  [ ]Alert and Oriented x  [ ] Anxiety [ ] Delirium [ ] Agitation [x ] Calm   EYES: [ ] No scleral icterus [ ] Scleral icterus [ x] Closed  ENMT:  [ ]Dry mouth  [x ]No external oral lesions [ ] No external ear or nose lesions  CARDIOVASCULAR:  [ ]Regular [ ]Irregular [ ]Tachy [x ]Not Tachy  [ ]Jayy [ ] Edema [ ] No edema  PULMONARY:  [ ]Tachypnea  [ ]Audible excessive secretions [ x] No labored breathing [ ] labored breathing  GASTROINTESTINAL: [ ]Soft  [ ]Distended  [ x]Not distended [ ]Non tender [ ]Tender  MUSCULOSKELETAL: [ ]No clubbing [ ] clubbing  [x ] No cyanosis [ ] cyanosis  NEUROLOGIC: [ ]No focal deficits  [ ]Follows commands  [ x]Does not follow commands  [ ]Cognitive impairment  [ ]Dysphagia  [ ]Dysarthria  [ ]Paresis   SKIN: [ ] Jaundiced [x ] Non-jaundiced [ ]Rash [ ]No Rash [ ] Warm [ ] Dry  MISC/LINES: [X ] ET tube [ ] Trach [ ]NGT/OGT [ ]PEG [ ]Stack    LABS: mildly elevated K                                   9.5    9.81  )-----------( 297      ( 16 Apr 2024 05:29 )             29.4     04-16    130<L>  |  96<L>  |  31<H>  ----------------------------<  155<H>  5.6<H>   |  24  |  1.3    Ca    8.5      16 Apr 2024 05:29  Mg     2.1     04-15        RADIOLOGY & ADDITIONAL STUDIES: reviewed by me    < from: Xray Chest 1 View-PORTABLE IMMEDIATE (Xray Chest 1 View-PORTABLE IMMEDIATE .) (04.08.24 @ 08:38) >  Impression:    Tip of the orogastric tube is seen overlying left abdomen    Stable bilateral lung opacities    < end of copied text >      EKG: reviewed by me    < from: 12 Lead ECG (04.08.24 @ 13:48) >    Ventricular Rate 63 BPM    Atrial Rate 63 BPM    P-R Interval 168 ms    QRS Duration 132 ms    Q-T Interval 442 ms    QTC Calculation(Bazett) 452 ms    P Axis 52 degrees    R Axis 149 degrees    T Axis 56 degrees    Diagnosis Line Normal sinus rhythm  Right bundle branch block  Left posterior fascicular block  *** Bifascicular block ***  Abnormal ECG    < end of copied text >      PROTEIN CALORIE MALNUTRITION PRESENT: [ ]mild [ ]moderate [ ]severe [ ]underweight [ ]morbid obesity  https://www.andeal.org/vault/0336/web/files/ONC/Table_Clinical%20Characteristics%20to%20Document%20Malnutrition-White%20JV%20et%20al%202012.pdf    Height (cm): 154.9 (04-06-24 @ 16:20)  Weight (kg): 40 (04-06-24 @ 16:20)  BMI (kg/m2): 16.7 (04-06-24 @ 16:20)  [ ]PPSV2 < or = to 30% [ ]significant weight loss  [ ]poor nutritional intake  [ ]anasarca      [ ]Artificial Nutrition      Palliative Care Spiritual/Emotional Screening Tool Question  Severity (0-4):                    OR                    [ x] Unable to determine. Will assess at later time if appropriate.  Score of 2 or greater indicates recommendation of Chaplaincy and/or SW referral  Chaplaincy Referral: [ ] Yes [ ] Refused [ ] Following     Caregiver [place]:  [ ] Yes [ ] No    OR    [x ] Unable to determine. Will assess at later time if appropriate.  Social Work Referral [ ]  Patient and Family Centered Care Referral [ ]    Anticipatory Grief Present: [ ] Yes [ ] No    OR     [ x] Unable to determine. Will assess at later time if appropriate.  Social Work Referral [ ]  Patient and Family Centered Care Referral [ ]    Patient discussed with primary medical team MD  Palliative care education provided to patient and/or family

## 2024-04-16 NOTE — CHART NOTE - NSCHARTNOTEFT_GEN_A_CORE
I had a discussion with the patient's daughter and the palliative team today.  I discussed my role as a general surgeon in consultation for tracheostomy and gastrostomy placement.  My concern is that the patient has altered stomach anatomy and would likely require 50% chance of open surgery for gastrostomy placement.  I related this to the daughter.  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  The daughter Obdulia seemed mildly irritated and agitated due to timeliness of the meeting which was delayed.  She reiterated her conviction that her mother should remain full code and should be given all opportunities for survival.  It was a difficult conversation and I am not certain that the family understands the gravity or difficulty of determination of the mental status at this point.  The patient continues to have minimal responsiveness.  She has now been off sedation and opioids for about 24 hours but it may take time for neurological function to be more responsive in this elderly frail patient with multiple comorbidities and significant anoxic time.  As described in my note yesterday the patient may be a candidate for percutaneous bedside tracheostomy procedure with pulmonology if they are available on Thursday and the family clearly decides that invasive measures are their wishes.  Thank you

## 2024-04-17 LAB
ALBUMIN SERPL ELPH-MCNC: 2.4 G/DL — LOW (ref 3.5–5.2)
ALP SERPL-CCNC: 161 U/L — HIGH (ref 30–115)
ALT FLD-CCNC: 13 U/L — SIGNIFICANT CHANGE UP (ref 0–41)
ANION GAP SERPL CALC-SCNC: 10 MMOL/L — SIGNIFICANT CHANGE UP (ref 7–14)
ANION GAP SERPL CALC-SCNC: 12 MMOL/L — SIGNIFICANT CHANGE UP (ref 7–14)
ANION GAP SERPL CALC-SCNC: 13 MMOL/L — SIGNIFICANT CHANGE UP (ref 7–14)
APTT BLD: 32.2 SEC — SIGNIFICANT CHANGE UP (ref 27–39.2)
AST SERPL-CCNC: 43 U/L — HIGH (ref 0–41)
BASE EXCESS BLDA CALC-SCNC: 2.2 MMOL/L — SIGNIFICANT CHANGE UP (ref -2–3)
BILIRUB SERPL-MCNC: <0.2 MG/DL — SIGNIFICANT CHANGE UP (ref 0.2–1.2)
BUN SERPL-MCNC: 30 MG/DL — HIGH (ref 10–20)
CALCIUM SERPL-MCNC: 8 MG/DL — LOW (ref 8.4–10.5)
CALCIUM SERPL-MCNC: 8.1 MG/DL — LOW (ref 8.4–10.5)
CALCIUM SERPL-MCNC: 8.1 MG/DL — LOW (ref 8.4–10.5)
CHLORIDE SERPL-SCNC: 93 MMOL/L — LOW (ref 98–110)
CHLORIDE SERPL-SCNC: 94 MMOL/L — LOW (ref 98–110)
CHLORIDE SERPL-SCNC: 97 MMOL/L — LOW (ref 98–110)
CO2 SERPL-SCNC: 19 MMOL/L — SIGNIFICANT CHANGE UP (ref 17–32)
CO2 SERPL-SCNC: 22 MMOL/L — SIGNIFICANT CHANGE UP (ref 17–32)
CO2 SERPL-SCNC: 23 MMOL/L — SIGNIFICANT CHANGE UP (ref 17–32)
CREAT SERPL-MCNC: 1.2 MG/DL — SIGNIFICANT CHANGE UP (ref 0.7–1.5)
CREAT SERPL-MCNC: 1.3 MG/DL — SIGNIFICANT CHANGE UP (ref 0.7–1.5)
CREAT SERPL-MCNC: 1.3 MG/DL — SIGNIFICANT CHANGE UP (ref 0.7–1.5)
EGFR: 43 ML/MIN/1.73M2 — LOW
EGFR: 43 ML/MIN/1.73M2 — LOW
EGFR: 47 ML/MIN/1.73M2 — LOW
GAS PNL BLDA: SIGNIFICANT CHANGE UP
GLUCOSE BLDC GLUCOMTR-MCNC: 144 MG/DL — HIGH (ref 70–99)
GLUCOSE SERPL-MCNC: 114 MG/DL — HIGH (ref 70–99)
GLUCOSE SERPL-MCNC: 121 MG/DL — HIGH (ref 70–99)
GLUCOSE SERPL-MCNC: 129 MG/DL — HIGH (ref 70–99)
HCO3 BLDA-SCNC: 25 MMOL/L — SIGNIFICANT CHANGE UP (ref 21–28)
HCT VFR BLD CALC: 26.3 % — LOW (ref 37–47)
HGB BLD-MCNC: 8.8 G/DL — LOW (ref 12–16)
INR BLD: 0.96 RATIO — SIGNIFICANT CHANGE UP (ref 0.65–1.3)
MCHC RBC-ENTMCNC: 25.4 PG — LOW (ref 27–31)
MCHC RBC-ENTMCNC: 33.5 G/DL — SIGNIFICANT CHANGE UP (ref 32–37)
MCV RBC AUTO: 76 FL — LOW (ref 81–99)
NRBC # BLD: 0 /100 WBCS — SIGNIFICANT CHANGE UP (ref 0–0)
PCO2 BLDA: 33 MMHG — SIGNIFICANT CHANGE UP (ref 32–45)
PH BLDA: 7.49 — HIGH (ref 7.35–7.45)
PLATELET # BLD AUTO: 319 K/UL — SIGNIFICANT CHANGE UP (ref 130–400)
PMV BLD: 10.7 FL — HIGH (ref 7.4–10.4)
PO2 BLDA: 169 MMHG — HIGH (ref 83–108)
POTASSIUM SERPL-MCNC: 5.3 MMOL/L — HIGH (ref 3.5–5)
POTASSIUM SERPL-MCNC: 5.8 MMOL/L — HIGH (ref 3.5–5)
POTASSIUM SERPL-MCNC: 6.3 MMOL/L — CRITICAL HIGH (ref 3.5–5)
POTASSIUM SERPL-SCNC: 5.3 MMOL/L — HIGH (ref 3.5–5)
POTASSIUM SERPL-SCNC: 5.8 MMOL/L — HIGH (ref 3.5–5)
POTASSIUM SERPL-SCNC: 6.3 MMOL/L — CRITICAL HIGH (ref 3.5–5)
PROT SERPL-MCNC: 6.5 G/DL — SIGNIFICANT CHANGE UP (ref 6–8)
PROTHROM AB SERPL-ACNC: 10.9 SEC — SIGNIFICANT CHANGE UP (ref 9.95–12.87)
RBC # BLD: 3.46 M/UL — LOW (ref 4.2–5.4)
RBC # FLD: 16.2 % — HIGH (ref 11.5–14.5)
SAO2 % BLDA: 100 % — HIGH (ref 94–98)
SODIUM SERPL-SCNC: 126 MMOL/L — LOW (ref 135–146)
SODIUM SERPL-SCNC: 128 MMOL/L — LOW (ref 135–146)
SODIUM SERPL-SCNC: 129 MMOL/L — LOW (ref 135–146)
WBC # BLD: 9.83 K/UL — SIGNIFICANT CHANGE UP (ref 4.8–10.8)
WBC # FLD AUTO: 9.83 K/UL — SIGNIFICANT CHANGE UP (ref 4.8–10.8)

## 2024-04-17 PROCEDURE — 99232 SBSQ HOSP IP/OBS MODERATE 35: CPT | Mod: 57

## 2024-04-17 PROCEDURE — 99291 CRITICAL CARE FIRST HOUR: CPT

## 2024-04-17 PROCEDURE — 93010 ELECTROCARDIOGRAM REPORT: CPT

## 2024-04-17 PROCEDURE — 71045 X-RAY EXAM CHEST 1 VIEW: CPT | Mod: 26

## 2024-04-17 PROCEDURE — 99233 SBSQ HOSP IP/OBS HIGH 50: CPT

## 2024-04-17 RX ORDER — DEXTROSE 50 % IN WATER 50 %
50 SYRINGE (ML) INTRAVENOUS ONCE
Refills: 0 | Status: COMPLETED | OUTPATIENT
Start: 2024-04-17 | End: 2024-04-17

## 2024-04-17 RX ORDER — SODIUM ZIRCONIUM CYCLOSILICATE 10 G/10G
10 POWDER, FOR SUSPENSION ORAL THREE TIMES A DAY
Refills: 0 | Status: DISCONTINUED | OUTPATIENT
Start: 2024-04-17 | End: 2024-04-18

## 2024-04-17 RX ORDER — LIDOCAINE 4 G/100G
10 CREAM TOPICAL ONCE
Refills: 0 | Status: DISCONTINUED | OUTPATIENT
Start: 2024-04-17 | End: 2024-04-25

## 2024-04-17 RX ORDER — SODIUM ZIRCONIUM CYCLOSILICATE 10 G/10G
10 POWDER, FOR SUSPENSION ORAL ONCE
Refills: 0 | Status: COMPLETED | OUTPATIENT
Start: 2024-04-17 | End: 2024-04-17

## 2024-04-17 RX ORDER — INSULIN HUMAN 100 [IU]/ML
10 INJECTION, SOLUTION SUBCUTANEOUS ONCE
Refills: 0 | Status: COMPLETED | OUTPATIENT
Start: 2024-04-17 | End: 2024-04-17

## 2024-04-17 RX ORDER — SODIUM ZIRCONIUM CYCLOSILICATE 10 G/10G
10 POWDER, FOR SUSPENSION ORAL THREE TIMES A DAY
Refills: 0 | Status: DISCONTINUED | OUTPATIENT
Start: 2024-04-17 | End: 2024-04-17

## 2024-04-17 RX ADMIN — Medication 50 MILLILITER(S): at 08:25

## 2024-04-17 RX ADMIN — SODIUM ZIRCONIUM CYCLOSILICATE 10 GRAM(S): 10 POWDER, FOR SUSPENSION ORAL at 08:24

## 2024-04-17 RX ADMIN — Medication 100 MILLIGRAM(S): at 05:37

## 2024-04-17 RX ADMIN — Medication 1 APPLICATION(S): at 12:34

## 2024-04-17 RX ADMIN — HEPARIN SODIUM 5000 UNIT(S): 5000 INJECTION INTRAVENOUS; SUBCUTANEOUS at 05:38

## 2024-04-17 RX ADMIN — CHLORHEXIDINE GLUCONATE 1 APPLICATION(S): 213 SOLUTION TOPICAL at 21:46

## 2024-04-17 RX ADMIN — CHLORHEXIDINE GLUCONATE 15 MILLILITER(S): 213 SOLUTION TOPICAL at 05:38

## 2024-04-17 RX ADMIN — CHLORHEXIDINE GLUCONATE 1 APPLICATION(S): 213 SOLUTION TOPICAL at 05:35

## 2024-04-17 RX ADMIN — Medication 1 DROP(S): at 21:47

## 2024-04-17 RX ADMIN — Medication 20 MILLIGRAM(S): at 04:06

## 2024-04-17 RX ADMIN — SODIUM ZIRCONIUM CYCLOSILICATE 10 GRAM(S): 10 POWDER, FOR SUSPENSION ORAL at 21:50

## 2024-04-17 RX ADMIN — Medication 100 MICROGRAM(S): at 05:37

## 2024-04-17 RX ADMIN — FENTANYL CITRATE 2 MICROGRAM(S)/KG/HR: 50 INJECTION INTRAVENOUS at 19:51

## 2024-04-17 RX ADMIN — SODIUM ZIRCONIUM CYCLOSILICATE 10 GRAM(S): 10 POWDER, FOR SUSPENSION ORAL at 13:06

## 2024-04-17 RX ADMIN — Medication 20 MILLIGRAM(S): at 08:34

## 2024-04-17 RX ADMIN — HEPARIN SODIUM 5000 UNIT(S): 5000 INJECTION INTRAVENOUS; SUBCUTANEOUS at 17:17

## 2024-04-17 RX ADMIN — CHLORHEXIDINE GLUCONATE 15 MILLILITER(S): 213 SOLUTION TOPICAL at 17:17

## 2024-04-17 RX ADMIN — FENTANYL CITRATE 2 MICROGRAM(S)/KG/HR: 50 INJECTION INTRAVENOUS at 11:23

## 2024-04-17 RX ADMIN — Medication 1 DROP(S): at 05:36

## 2024-04-17 RX ADMIN — LISINOPRIL 20 MILLIGRAM(S): 2.5 TABLET ORAL at 05:37

## 2024-04-17 RX ADMIN — INSULIN HUMAN 10 UNIT(S): 100 INJECTION, SOLUTION SUBCUTANEOUS at 08:25

## 2024-04-17 RX ADMIN — Medication 1 DROP(S): at 13:07

## 2024-04-17 RX ADMIN — PANTOPRAZOLE SODIUM 40 MILLIGRAM(S): 20 TABLET, DELAYED RELEASE ORAL at 11:30

## 2024-04-17 RX ADMIN — Medication 100 MILLIGRAM(S): at 13:06

## 2024-04-17 RX ADMIN — Medication 100 MILLIGRAM(S): at 21:48

## 2024-04-17 NOTE — PROGRESS NOTE ADULT - ASSESSMENT
IMPRESSION:    Anoxic brain damage  Acute Hypoxemic Respiratory Failure on MV  SP CPA downtime 30 minutes   Cardiac bradycardia   Lung Fibrosis   GIUSEPPE, Cr downtrending   HO Raynaud's  HO PRESS    HO Anemia  HO Pancreatic cancer     PLAN:    CNS: SAT. Neurology following, MRI Brain    HEENT: oral and ET care     PULMONARY: keep pox >92%  monitor peak and plateau  no change in vent settings  pending family decision regarding trach       CARDIOVASCULAR:  2D-Echo noted, continue labetalol dose     RENAL: follow is and os, trend Cr, Nephrology following, lokelma for hyperkalemia     INFECTIOUS DISEASE: cultures negative to date, procalcitonin elevated, completed  Zosyn course, nasal MRSA,   s/p abx     GASTRO: GI ppx, tube feeds    HEMATOLOGICAL:  DVT prophylaxis.    ENDOCRINE:  Follow up FS.  Insulin protocol if needed.    MUSCULOSKELETAL: bedrest    Full Code, Palliative care following    Grave prognosis     The patient is critically ill with a high probability of further deterioration.    ICU monitoring

## 2024-04-17 NOTE — PROGRESS NOTE ADULT - SUBJECTIVE AND OBJECTIVE BOX
Neurology Follow up note    Name  KIRAN MORFIN    HPI:  74-year-old female with PHMx of pancreatic CA s/p Whipple, CKD stage 3, HTN, COPD, pulmonary fibrosis, PRESS syndrome (hospitalized in 2019), Raynaud's syndrome, ANCA vasculitis, Scleroderma  presents to ED due to cardiac arrest. Pt lives at home with son and daughter, went to use the bathroom. Daughter notes that she heard the patient's walker scratching the floor, went upstairs to see what was going on. Daughter noticed patient was slumped over and struggling to get onto the toilet. Daughter helped patient onto the toilet, but noticed that the patient became less responsive and alert so she called EMS. Patient was found down by EMS, intubated her in the field, and resuscitated her with compressions and 4 rounds of epinephrine. ROSC achieved within a minute of her arrival to the ED. Patient was found to be hypothermic and bradycardic. Of note, patient was hospitalized back in Dec 2023 in [facility] for 18 days for multifocal pneumonia 2/2 human metapneumovirus. Patient also was hospitalized in 2019 for PRESS syndrome. Pt had a negative stress test in Sept 2023. She follows pulmonology for pulmonary fibrosis, had recent PFTs done.     Neurology Interval History - Pt intubated on tube feeds. Off sedation. Yawns at times, opens eyes to noxious stimulus and  moves all extremities.         Vital Signs Last 24 Hrs  T(C): 37 (17 Apr 2024 07:01), Max: 37.3 (17 Apr 2024 03:00)  T(F): 98.6 (17 Apr 2024 07:01), Max: 99.1 (17 Apr 2024 03:00)  HR: 64 (17 Apr 2024 08:00) (59 - 78)  BP: 179/83 (17 Apr 2024 08:00) (157/73 - 203/84)  BP(mean): 119 (17 Apr 2024 08:00) (102 - 136)  RR: 26 (17 Apr 2024 08:00) (6 - 37)  SpO2: 100% (17 Apr 2024 08:00) (100% - 100%)    Parameters below as of 17 Apr 2024 07:00  Patient On (Oxygen Delivery Method): ventilator          GENERAL EXAM:  Constitutional: comatose and intubated. not on sedation      NEUROLOGICAL EXAM:  MS: intubated. Comatose. Not on sedation Does not follow commands. seen  Yawning  at times,  CN: pupils pinpoint and nonreactive b/l.  corneal and  cough/gag intact.   opens eyes to noxious stimulus.    Motor: no spontaneous movement noted.    Sensation:  moves all extremities to noxious stimulus          Medications  artificial  tears Solution 1 Drop(s) Both EYES three times a day  chlorhexidine 0.12% Liquid 15 milliLiter(s) Oral Mucosa every 12 hours  chlorhexidine 2% Cloths 1 Application(s) Topical daily  chlorhexidine 2% Cloths 1 Application(s) Topical <User Schedule>  fentaNYL   Infusion 0.5 MICROgram(s)/kG/Hr IV Continuous <Continuous>  heparin   Injectable 5000 Unit(s) SubCutaneous every 12 hours  hydrALAZINE 10 milliGRAM(s) Oral every 4 hours PRN  labetalol 100 milliGRAM(s) Oral three times a day  labetalol Injectable 20 milliGRAM(s) IV Push every 4 hours PRN  levothyroxine 100 MICROGram(s) Oral daily  lisinopril 20 milliGRAM(s) Oral daily  pantoprazole  Injectable 40 milliGRAM(s) IV Push daily  petrolatum Ophthalmic Ointment 1 Application(s) Both EYES daily  sodium zirconium cyclosilicate 10 Gram(s) Oral three times a day      Lab                        8.8    9.83  )-----------( 319      ( 17 Apr 2024 05:24 )             26.3     04-17    128<L>  |  94<L>  |  30<H>  ----------------------------<  129<H>  5.8<H>   |  22  |  1.2    Ca    8.1<L>      17 Apr 2024 05:24    TPro  6.5  /  Alb  2.4<L>  /  TBili  <0.2  /  DBili  x   /  AST  43<H>  /  ALT  13  /  AlkPhos  161<H>  04-17    CAPILLARY BLOOD GLUCOSE      POCT Blood Glucose.: 144 mg/dL (17 Apr 2024 08:21)    LIVER FUNCTIONS - ( 17 Apr 2024 05:24 )  Alb: 2.4 g/dL / Pro: 6.5 g/dL / ALK PHOS: 161 U/L / ALT: 13 U/L / AST: 43 U/L / GGT: x               Radiology:  < from: CT Head No Cont (04.11.24 @ 11:35) >  IMPRESSION:    Findings compatible with diffuse anoxic injury of the brain.    --- End of Report ---      YOBANI GERARD MD; Attending Radiologist  This document has been electronically signed. Apr 11 2024 11:58AM    < end of copied text >        VEEG in the last 24 hours:    Background - very low amplitude superimposed by very large amount of muscle and noise artifact. In the early portions of the recording it shows burst suppression pattern, with bursts lasting less than 1 second and comprised of 1-2 Hz cortical activity more prominently from the right side. Towards the latter part the background is completely suppressed with no clear cortical activity and no reactivity to external stumulation.    Focal and generalized slowing - severe generalized slowing    Interictal activity - none    Events - none    Seizures - none    Impression: Abnormal VEEG as above. In the right setting, this EEG could be suggestive of ECS.    Plan - per neurology team

## 2024-04-17 NOTE — PROGRESS NOTE ADULT - ASSESSMENT
A 73 y/o  F with PHMx of pancreatic CA s/p Whipple, CKD stage 3, HTN, COPD, pulmonary fibrosis, PRESS syndrome (hospitalized in 2019), Raynaud's syndrome, ANCA vasculitis, Scleroderma  presents to ED due to cardiac arrest, intubated off sedation. On exam: seen Yawning, opens eyes to noxious stimulus and  moves all extremities. Pupils non reactive. Corneal and gag reflex intact. CTH Findings compatible with diffuse anoxic injury of the brain. EEG negative for seizures-  there is concern for Electroclinical silence noted. Anoxic brain injury s/p Cardiac arrest.    Recommendations;    Been off sedation for greater than 24hrs   Please correct all electrolytes as needed   Prognosis is poor- low chance of recovery   palliative care recommendations and GOC appreciated.

## 2024-04-17 NOTE — PROGRESS NOTE ADULT - ASSESSMENT
74-year-old female with PHMx of pancreatic CA s/p Whipple, CKD stage 3, HTN, COPD, pulmonary fibrosis, PRESS syndrome (hospitalized in 2019), Raynaud's syndrome, ANCA vasculitis, Scleroderma, hypothyroid  presents to ED due to cardiac arrest. Pt lives at home with son and daughter, went to use the bathroom and became unresponsive.  EMS intubated her and gave her 4 rounds of epinephrine.  ROSC was achieved within a minute of her arrival to the ED.  Patient was found to be hypothermic and bradycardic    acute respiratory failure / cardiac arrest / anoxic brain injury / HTN / possible aspiration pneumonia      - remains minimally responsive off sedation    - anoxic brain injury as seen on CT brain and EEG   - family requesting trach PEG and NH   - completed antibiotic course    - Labetalol / lisinopril    - DVT and GI prophylaxis   - I discussed plan with CC    - consult surgery and GI for trach and PEG    50 minutes total spent today on patient care

## 2024-04-17 NOTE — PROGRESS NOTE ADULT - SUBJECTIVE AND OBJECTIVE BOX
GENERAL SURGERY PROGRESS NOTE    Patient: KIRAN MORFIN , 74y ([birth year])Female   MRN: [id number]  Location: [address]  Visit: 04-06-24 Inpatient  Date: 04-17-24 @ 15:39    24 Hour Events:  No acute events    Vitals:  T(F): 97.2 (04-17-24 @ 15:01), Max: 99.1 (04-17-24 @ 03:00)  HR: 69 (04-17-24 @ 15:00)  BP: 138/72 (04-17-24 @ 15:00)  RR: 26 (04-17-24 @ 15:01)  SpO2: 100% (04-17-24 @ 15:00)  Mode: AC/ CMV (Assist Control/ Continuous Mandatory Ventilation), RR (machine): 26, TV (machine): 300, FiO2: 40, PEEP: 5, MAP: 10, PIP: 21  Diet, NPO after Midnight:      NPO Start Date: 17-Apr-2024,   NPO Start Time: 23:59  Diet, NPO after Midnight:      NPO Start Date: 17-Apr-2024,   NPO Start Time: 23:59  Diet, NPO with Tube Feed:   Tube Feeding Modality: Orogastric  Osmolite 1.2 Lei (OSMOLITERTH)  Total Volume for 24 Hours (mL): 1080  Continuous  Starting Tube Feed Rate mL per Hour: 10  Until Goal Tube Feed Rate (mL per Hour): 45  Tube Feed Duration (in Hours): 24  Tube Feed Start Time: 12:00  Free Water Flush  Free Water Flush Instructions:  150 Q8hr    Fluids:   I & O's:    04-16-24 @ 07:01  -  04-17-24 @ 07:00  --------------------------------------------------------  IN:    Free Water: 820 mL    Osmolite 1.2: 1080 mL  Total IN: 1900 mL    OUT:    FentaNYL: 0 mL    Indwelling Catheter - Urethral (mL): 1445 mL    Rectal Tube (mL): 300 mL  Total OUT: 1745 mL    Total NET: 155 mL    PHYSICAL EXAM:  General: Currently intubated, on ventilator, GCS 5T  Cardiac: RRR  Respiratory: CTAB, ETT in place  Abdomen: Soft, non-distended, non-tender, no rebound, no guarding.  Musculoskeletal: FROM in b/l UE and LE  Neuro: Withdraws to pain in all extremities  Skin: Warm/dry, normal color, no jaundice    MEDICATIONS  (STANDING):  artificial  tears Solution 1 Drop(s) Both EYES three times a day  chlorhexidine 0.12% Liquid 15 milliLiter(s) Oral Mucosa every 12 hours  chlorhexidine 2% Cloths 1 Application(s) Topical daily  chlorhexidine 2% Cloths 1 Application(s) Topical <User Schedule>  fentaNYL   Infusion 0.5 MICROgram(s)/kG/Hr (2 mL/Hr) IV Continuous <Continuous>  heparin   Injectable 5000 Unit(s) SubCutaneous every 12 hours  labetalol 100 milliGRAM(s) Oral three times a day  levothyroxine 100 MICROGram(s) Oral daily  lisinopril 20 milliGRAM(s) Oral daily  pantoprazole  Injectable 40 milliGRAM(s) IV Push daily  petrolatum Ophthalmic Ointment 1 Application(s) Both EYES daily  sodium zirconium cyclosilicate 10 Gram(s) Oral three times a day    MEDICATIONS  (PRN):  hydrALAZINE 10 milliGRAM(s) Oral every 4 hours PRN Systolic blood pressure >180  labetalol Injectable 20 milliGRAM(s) IV Push every 4 hours PRN Systolic blood pressure >180    DVT PROPHYLAXIS: heparin   Injectable 5000 Unit(s) SubCutaneous every 12 hours    GI PROPHYLAXIS: pantoprazole  Injectable 40 milliGRAM(s) IV Push daily    ANTICOAGULATION:   ANTIBIOTICS:      LAB/STUDIES:  Labs:  CAPILLARY BLOOD GLUCOSE      POCT Blood Glucose.: 144 mg/dL (17 Apr 2024 08:21)                          8.8    9.83  )-----------( 319      ( 17 Apr 2024 05:24 )             26.3         04-17    126<L>  |  93<L>  |  30<H>  ----------------------------<  121<H>  6.3<HH>   |  23  |  1.3      Calcium: 8.1 mg/dL (04-17-24 @ 11:53)      LFTs:             6.5  | <0.2 | 43       ------------------[161     ( 17 Apr 2024 05:24 )  2.4  | x    | 13          Lipase:x      Amylase:x         Blood Gas Arterial, Lactate: 0.9 mmol/L (04-17-24 @ 12:26)  Blood Gas Arterial, Lactate: 1.2 mmol/L (04-17-24 @ 04:20)  Blood Gas Arterial, Lactate: 1.4 mmol/L (04-16-24 @ 04:04)  Blood Gas Arterial, Lactate: 0.9 mmol/L (04-15-24 @ 03:54)    ABG - ( 17 Apr 2024 12:26 )  pH: 7.42  /  pCO2: 42    /  pO2: 191   / HCO3: 27    / Base Excess: 2.5   /  SaO2: 99.7      ABG - ( 17 Apr 2024 04:20 )  pH: 7.49  /  pCO2: 33    /  pO2: 169   / HCO3: 25    / Base Excess: 2.2   /  SaO2: 100.0     ABG - ( 16 Apr 2024 04:04 )  pH: 7.42  /  pCO2: 37    /  pO2: 174   / HCO3: 24    / Base Excess: -0.2  /  SaO2: 99.8      Coags:    Urinalysis Basic - ( 17 Apr 2024 11:53 )    Color: x / Appearance: x / SG: x / pH: x  Gluc: 121 mg/dL / Ketone: x  / Bili: x / Urobili: x   Blood: x / Protein: x / Nitrite: x   Leuk Esterase: x / RBC: x / WBC x   Sq Epi: x / Non Sq Epi: x / Bacteria: x

## 2024-04-17 NOTE — PROGRESS NOTE ADULT - SUBJECTIVE AND OBJECTIVE BOX
Patient seen and evaluated this am, remains minimally responsive on ventilator       T(F): 98.6 (04-17-24 @ 07:01), Max: 99.1 (04-17-24 @ 03:00)  HR: 62 (04-17-24 @ 13:00)  BP: 142/67 (04-17-24 @ 13:00)  RR: 27 (04-17-24 @ 13:00)  SpO2: 99% (04-17-24 @ 13:00) (99% - 100%)    PHYSICAL EXAM:  GENERAL: NAD  HEAD:  Atraumatic, Normocephalic  EYES: EOMI, PERRLA, conjunctiva and sclera clear  NERVOUS SYSTEM:  positive gag and corneal reflex  CHEST/LUNG:  bilateral rhonchi  HEART: Regular rate and rhythm; No murmurs, rubs, or gallops  ABDOMEN: Soft, Nontender, Nondistended; Bowel sounds present  EXTREMITIES:  2+ Peripheral Pulses, No clubbing, cyanosis, or edema    LABS  04-17    126<L>  |  93<L>  |  30<H>  ----------------------------<  121<H>  6.3<HH>   |  23  |  1.3    Ca    8.1<L>      17 Apr 2024 11:53    TPro  6.5  /  Alb  2.4<L>  /  TBili  <0.2  /  DBili  x   /  AST  43<H>  /  ALT  13  /  AlkPhos  161<H>  04-17                          8.8    9.83  )-----------( 319      ( 17 Apr 2024 05:24 )             26.3       Mode: AC/ CMV (Assist Control/ Continuous Mandatory Ventilation)  RR (machine): 26  TV (machine): 300  FiO2: 40  PEEP: 5    Culture Results:   No growth (04-06-24)  Culture Results:   No growth at 5 days (04-06-24)  Culture Results:   No growth at 5 days (04-06-24)    RADIOLOGY  < from: Xray Chest 1 View- PORTABLE-Routine (Xray Chest 1 View- PORTABLE-Routine in AM.) (04.17.24 @ 06:34) >  Impression:    Bilateral opacities without significant change. No pneumothorax.      < end of copied text >  < from: CT Head No Cont (04.11.24 @ 11:35) >  IMPRESSION:    Findings compatible with diffuse anoxic injury of the brain.    < end of copied text >    VEEG in the last 24 hours:    Background - very low amplitude superimposed by very large amount of muscle and noise artifact. In the early portions of the recording it shows burst suppression pattern, with bursts lasting less than 1 second and comprised of 1-2 Hz cortical activity more prominently from the right side. Towards the latter part the background is completely suppressed with no clear cortical activity and no reactivity to external stumulation.    Focal and generalized slowing - severe generalized slowing    Interictal activity - none    Events - none    Seizures - none    Impression: Abnormal VEEG as above. In the right setting, this EEG could be suggestive of ECS.  MEDICATIONS  (STANDING):  artificial  tears Solution 1 Drop(s) Both EYES three times a day  chlorhexidine 0.12% Liquid 15 milliLiter(s) Oral Mucosa every 12 hours  chlorhexidine 2% Cloths 1 Application(s) Topical daily  chlorhexidine 2% Cloths 1 Application(s) Topical <User Schedule>  fentaNYL   Infusion 0.5 MICROgram(s)/kG/Hr (2 mL/Hr) IV Continuous <Continuous>  heparin   Injectable 5000 Unit(s) SubCutaneous every 12 hours  labetalol 100 milliGRAM(s) Oral three times a day  levothyroxine 100 MICROGram(s) Oral daily  lisinopril 20 milliGRAM(s) Oral daily  pantoprazole  Injectable 40 milliGRAM(s) IV Push daily  petrolatum Ophthalmic Ointment 1 Application(s) Both EYES daily  sodium zirconium cyclosilicate 10 Gram(s) Oral three times a day    MEDICATIONS  (PRN):  hydrALAZINE 10 milliGRAM(s) Oral every 4 hours PRN Systolic blood pressure >180  labetalol Injectable 20 milliGRAM(s) IV Push every 4 hours PRN Systolic blood pressure >180

## 2024-04-17 NOTE — PROGRESS NOTE ADULT - SUBJECTIVE AND OBJECTIVE BOX
Patient is a 74y old  Female who presents with a chief complaint of cardiac arrest (16 Apr 2024 12:03)        Over Night Events: no acute events overnight, remains on MV    Vital Signs Last 24 Hrs  T(C): 37 (17 Apr 2024 07:01), Max: 37.3 (17 Apr 2024 03:00)  T(F): 98.6 (17 Apr 2024 07:01), Max: 99.1 (17 Apr 2024 03:00)  HR: 64 (17 Apr 2024 08:00) (59 - 78)  BP: 179/83 (17 Apr 2024 08:00) (157/73 - 203/84)  BP(mean): 119 (17 Apr 2024 08:00) (102 - 136)  RR: 26 (17 Apr 2024 08:00) (6 - 42)  SpO2: 100% (17 Apr 2024 08:00) (100% - 100%)    O2 Parameters below as of 17 Apr 2024 07:00  Patient On (Oxygen Delivery Method): ventilator    O2 Concentration (%): 40        CONSTITUTIONAL:   Ill appearing.      ENT:   intubated   Airway patent    EYES:   Round and reactive to light.    CARDIAC:   Normal rate,   Regular rhythm.      RESPIRATORY:   Decreased bilateral air entry   No wheezing  Bilateral BS  Normal chest expansion  Not tachypneic,  No use of accessory muscles    GASTROINTESTINAL:  Abdomen soft,   Non-tender,   No guarding,   + BS      MUSCULOSKELETAL:   Range of motion is limited    NEUROLOGICAL:   does not follow commands    SKIN:   Skin normal color for race,   Warm and dry        04-16-24 @ 07:01  -  04-17-24 @ 07:00  --------------------------------------------------------  IN:    Free Water: 820 mL    Osmolite 1.2: 1035 mL  Total IN: 1855 mL    OUT:    FentaNYL: 0 mL    Indwelling Catheter - Urethral (mL): 1445 mL    Rectal Tube (mL): 300 mL  Total OUT: 1745 mL    Total NET: 110 mL      04-17-24 @ 07:01  -  04-17-24 @ 08:48  --------------------------------------------------------  IN:  Total IN: 0 mL    OUT:    Indwelling Catheter - Urethral (mL): 30 mL  Total OUT: 30 mL    Total NET: -30 mL          LABS:                            8.8    9.83  )-----------( 319      ( 17 Apr 2024 05:24 )             26.3                                               04-17    128<L>  |  94<L>  |  30<H>  ----------------------------<  129<H>  5.8<H>   |  22  |  1.2    Ca    8.1<L>      17 Apr 2024 05:24    TPro  6.5  /  Alb  2.4<L>  /  TBili  <0.2  /  DBili  x   /  AST  43<H>  /  ALT  13  /  AlkPhos  161<H>  04-17                                             Urinalysis Basic - ( 17 Apr 2024 05:24 )    Color: x / Appearance: x / SG: x / pH: x  Gluc: 129 mg/dL / Ketone: x  / Bili: x / Urobili: x   Blood: x / Protein: x / Nitrite: x   Leuk Esterase: x / RBC: x / WBC x   Sq Epi: x / Non Sq Epi: x / Bacteria: x                                                  LIVER FUNCTIONS - ( 17 Apr 2024 05:24 )  Alb: 2.4 g/dL / Pro: 6.5 g/dL / ALK PHOS: 161 U/L / ALT: 13 U/L / AST: 43 U/L / GGT: x                                                                                               Mode: AC/ CMV (Assist Control/ Continuous Mandatory Ventilation)  RR (machine): 26  TV (machine): 350  FiO2: 40  PEEP: 5  MAP: 15  PIP: 36                                      ABG - ( 17 Apr 2024 04:20 )  pH, Arterial: 7.49  pH, Blood: x     /  pCO2: 33    /  pO2: 169   / HCO3: 25    / Base Excess: 2.2   /  SaO2: 100.0               MEDICATIONS  (STANDING):  artificial  tears Solution 1 Drop(s) Both EYES three times a day  chlorhexidine 0.12% Liquid 15 milliLiter(s) Oral Mucosa every 12 hours  chlorhexidine 2% Cloths 1 Application(s) Topical daily  chlorhexidine 2% Cloths 1 Application(s) Topical <User Schedule>  fentaNYL   Infusion 0.5 MICROgram(s)/kG/Hr (2 mL/Hr) IV Continuous <Continuous>  heparin   Injectable 5000 Unit(s) SubCutaneous every 12 hours  labetalol 100 milliGRAM(s) Oral three times a day  levothyroxine 100 MICROGram(s) Oral daily  lisinopril 20 milliGRAM(s) Oral daily  pantoprazole  Injectable 40 milliGRAM(s) IV Push daily  petrolatum Ophthalmic Ointment 1 Application(s) Both EYES daily    MEDICATIONS  (PRN):  hydrALAZINE 10 milliGRAM(s) Oral every 4 hours PRN Systolic blood pressure >180  labetalol Injectable 20 milliGRAM(s) IV Push every 4 hours PRN Systolic blood pressure >180           Patient is a 74y old  Female who presents with a chief complaint of cardiac arrest (16 Apr 2024 12:03)        Over Night Events: no acute events overnight, remains on MV  off sedation for 3 days not awake sometimes open eyes   Vital Signs Last 24 Hrs  T(C): 37 (17 Apr 2024 07:01), Max: 37.3 (17 Apr 2024 03:00)  T(F): 98.6 (17 Apr 2024 07:01), Max: 99.1 (17 Apr 2024 03:00)  HR: 64 (17 Apr 2024 08:00) (59 - 78)  BP: 179/83 (17 Apr 2024 08:00) (157/73 - 203/84)  BP(mean): 119 (17 Apr 2024 08:00) (102 - 136)  RR: 26 (17 Apr 2024 08:00) (6 - 42)  SpO2: 100% (17 Apr 2024 08:00) (100% - 100%)    O2 Parameters below as of 17 Apr 2024 07:00  Patient On (Oxygen Delivery Method): ventilator    O2 Concentration (%): 40        CONSTITUTIONAL:   Ill appearing.      ENT:   intubated   Airway patent    EYES:   Round and reactive to light.    CARDIAC:   Normal rate,   Regular rhythm.      RESPIRATORY:   Decreased bilateral air entry   No wheezing  Bilateral BS  Normal chest expansion  Not tachypneic,  No use of accessory muscles    GASTROINTESTINAL:  Abdomen soft,   Non-tender,   No guarding,   + BS      MUSCULOSKELETAL:   Range of motion is limited    NEUROLOGICAL:   does not follow commands    SKIN:   Skin normal color for race,   Warm and dry        04-16-24 @ 07:01  -  04-17-24 @ 07:00  --------------------------------------------------------  IN:    Free Water: 820 mL    Osmolite 1.2: 1035 mL  Total IN: 1855 mL    OUT:    FentaNYL: 0 mL    Indwelling Catheter - Urethral (mL): 1445 mL    Rectal Tube (mL): 300 mL  Total OUT: 1745 mL    Total NET: 110 mL      04-17-24 @ 07:01  -  04-17-24 @ 08:48  --------------------------------------------------------  IN:  Total IN: 0 mL    OUT:    Indwelling Catheter - Urethral (mL): 30 mL  Total OUT: 30 mL    Total NET: -30 mL          LABS:                            8.8    9.83  )-----------( 319      ( 17 Apr 2024 05:24 )             26.3                                               04-17    128<L>  |  94<L>  |  30<H>  ----------------------------<  129<H>  5.8<H>   |  22  |  1.2    Ca    8.1<L>      17 Apr 2024 05:24    TPro  6.5  /  Alb  2.4<L>  /  TBili  <0.2  /  DBili  x   /  AST  43<H>  /  ALT  13  /  AlkPhos  161<H>  04-17                                             Urinalysis Basic - ( 17 Apr 2024 05:24 )    Color: x / Appearance: x / SG: x / pH: x  Gluc: 129 mg/dL / Ketone: x  / Bili: x / Urobili: x   Blood: x / Protein: x / Nitrite: x   Leuk Esterase: x / RBC: x / WBC x   Sq Epi: x / Non Sq Epi: x / Bacteria: x                                                  LIVER FUNCTIONS - ( 17 Apr 2024 05:24 )  Alb: 2.4 g/dL / Pro: 6.5 g/dL / ALK PHOS: 161 U/L / ALT: 13 U/L / AST: 43 U/L / GGT: x                                                                                               Mode: AC/ CMV (Assist Control/ Continuous Mandatory Ventilation)  RR (machine): 26  TV (machine): 350  FiO2: 40  PEEP: 5  MAP: 15  PIP: 36                                      ABG - ( 17 Apr 2024 04:20 )  pH, Arterial: 7.49  pH, Blood: x     /  pCO2: 33    /  pO2: 169   / HCO3: 25    / Base Excess: 2.2   /  SaO2: 100.0               MEDICATIONS  (STANDING):  artificial  tears Solution 1 Drop(s) Both EYES three times a day  chlorhexidine 0.12% Liquid 15 milliLiter(s) Oral Mucosa every 12 hours  chlorhexidine 2% Cloths 1 Application(s) Topical daily  chlorhexidine 2% Cloths 1 Application(s) Topical <User Schedule>  fentaNYL   Infusion 0.5 MICROgram(s)/kG/Hr (2 mL/Hr) IV Continuous <Continuous>  heparin   Injectable 5000 Unit(s) SubCutaneous every 12 hours  labetalol 100 milliGRAM(s) Oral three times a day  levothyroxine 100 MICROGram(s) Oral daily  lisinopril 20 milliGRAM(s) Oral daily  pantoprazole  Injectable 40 milliGRAM(s) IV Push daily  petrolatum Ophthalmic Ointment 1 Application(s) Both EYES daily    MEDICATIONS  (PRN):  hydrALAZINE 10 milliGRAM(s) Oral every 4 hours PRN Systolic blood pressure >180  labetalol Injectable 20 milliGRAM(s) IV Push every 4 hours PRN Systolic blood pressure >180

## 2024-04-17 NOTE — PROGRESS NOTE ADULT - ASSESSMENT
74F with PHMx of pancreatic CA s/p Whipple, CKD stage 3, HTN, COPD, pulmonary fibrosis, PRESS syndrome (hospitalized in 2019), Raynaud's syndrome, ANCA vasculitis, Scleroderma presents to ED due to cardiac arrest.    PLAN:  #S/p Cardiac Arrest with Anoxic Brain Injury  #Acute Hypoxic Respiratory Failure   - Planning for bedside percutaneous tracheostomy tomorrow 4/18   - Please obtain pre-op studies tonight (CBC, BMP, Mg, Phos, Coags, Type and Screen)   - NPO after MN   - Will re-evaluate PEG at a later date   - Surgery following    General Surgery  SPECTRA [id number] 74F with PHMx of pancreatic CA s/p Whipple, CKD stage 3, HTN, COPD, pulmonary fibrosis, PRESS syndrome (hospitalized in 2019), Raynaud's syndrome, ANCA vasculitis, Scleroderma presents to ED due to cardiac arrest.    PLAN:  #S/p Cardiac Arrest with Anoxic Brain Injury  #Acute Hypoxic Respiratory Failure   - Planning for bedside percutaneous tracheostomy tomorrow 4/18   - Please obtain pre-op studies tonight (CBC, BMP, Mg, Phos, Coags, Type and Screen)   - NPO after MN with IVF   - Will re-evaluate PEG at a later date   - Surgery following    General Surgery  SPECTRA [id number]

## 2024-04-18 LAB
ALBUMIN SERPL ELPH-MCNC: 2.4 G/DL — LOW (ref 3.5–5.2)
ALP SERPL-CCNC: 156 U/L — HIGH (ref 30–115)
ALT FLD-CCNC: 11 U/L — SIGNIFICANT CHANGE UP (ref 0–41)
ANION GAP SERPL CALC-SCNC: 8 MMOL/L — SIGNIFICANT CHANGE UP (ref 7–14)
AST SERPL-CCNC: 35 U/L — SIGNIFICANT CHANGE UP (ref 0–41)
BASE EXCESS BLDA CALC-SCNC: 3.2 MMOL/L — HIGH (ref -2–3)
BILIRUB SERPL-MCNC: 0.2 MG/DL — SIGNIFICANT CHANGE UP (ref 0.2–1.2)
BLD GP AB SCN SERPL QL: SIGNIFICANT CHANGE UP
BUN SERPL-MCNC: 28 MG/DL — HIGH (ref 10–20)
CALCIUM SERPL-MCNC: 8.3 MG/DL — LOW (ref 8.4–10.5)
CHLORIDE SERPL-SCNC: 97 MMOL/L — LOW (ref 98–110)
CO2 SERPL-SCNC: 27 MMOL/L — SIGNIFICANT CHANGE UP (ref 17–32)
CREAT SERPL-MCNC: 1.2 MG/DL — SIGNIFICANT CHANGE UP (ref 0.7–1.5)
EGFR: 47 ML/MIN/1.73M2 — LOW
GAS PNL BLDA: SIGNIFICANT CHANGE UP
GLUCOSE SERPL-MCNC: 102 MG/DL — HIGH (ref 70–99)
GRAM STN FLD: SIGNIFICANT CHANGE UP
HCO3 BLDA-SCNC: 28 MMOL/L — SIGNIFICANT CHANGE UP (ref 21–28)
HCT VFR BLD CALC: 27.2 % — LOW (ref 37–47)
HGB BLD-MCNC: 9.1 G/DL — LOW (ref 12–16)
HOROWITZ INDEX BLDA+IHG-RTO: 40 — SIGNIFICANT CHANGE UP
MAGNESIUM SERPL-MCNC: 2 MG/DL — SIGNIFICANT CHANGE UP (ref 1.8–2.4)
MCHC RBC-ENTMCNC: 26.5 PG — LOW (ref 27–31)
MCHC RBC-ENTMCNC: 33.5 G/DL — SIGNIFICANT CHANGE UP (ref 32–37)
MCV RBC AUTO: 79.1 FL — LOW (ref 81–99)
NRBC # BLD: 0 /100 WBCS — SIGNIFICANT CHANGE UP (ref 0–0)
PCO2 BLDA: 45 MMHG — SIGNIFICANT CHANGE UP (ref 32–45)
PH BLDA: 7.41 — SIGNIFICANT CHANGE UP (ref 7.35–7.45)
PHOSPHATE SERPL-MCNC: 4.3 MG/DL — SIGNIFICANT CHANGE UP (ref 2.1–4.9)
PLATELET # BLD AUTO: 386 K/UL — SIGNIFICANT CHANGE UP (ref 130–400)
PMV BLD: 10.9 FL — HIGH (ref 7.4–10.4)
PO2 BLDA: 119 MMHG — HIGH (ref 83–108)
POTASSIUM SERPL-MCNC: 4.8 MMOL/L — SIGNIFICANT CHANGE UP (ref 3.5–5)
POTASSIUM SERPL-SCNC: 4.8 MMOL/L — SIGNIFICANT CHANGE UP (ref 3.5–5)
PROT SERPL-MCNC: 6.2 G/DL — SIGNIFICANT CHANGE UP (ref 6–8)
RBC # BLD: 3.44 M/UL — LOW (ref 4.2–5.4)
RBC # FLD: 16.4 % — HIGH (ref 11.5–14.5)
SAO2 % BLDA: 99.7 % — HIGH (ref 94–98)
SODIUM SERPL-SCNC: 132 MMOL/L — LOW (ref 135–146)
SPECIMEN SOURCE: SIGNIFICANT CHANGE UP
WBC # BLD: 14.05 K/UL — HIGH (ref 4.8–10.8)
WBC # FLD AUTO: 14.05 K/UL — HIGH (ref 4.8–10.8)

## 2024-04-18 PROCEDURE — 99233 SBSQ HOSP IP/OBS HIGH 50: CPT | Mod: 25

## 2024-04-18 PROCEDURE — 71045 X-RAY EXAM CHEST 1 VIEW: CPT | Mod: 26,76

## 2024-04-18 PROCEDURE — 31600 PLANNED TRACHEOSTOMY: CPT

## 2024-04-18 PROCEDURE — 99233 SBSQ HOSP IP/OBS HIGH 50: CPT

## 2024-04-18 PROCEDURE — 31624 DX BRONCHOSCOPE/LAVAGE: CPT

## 2024-04-18 RX ORDER — FENTANYL CITRATE 50 UG/ML
50 INJECTION INTRAVENOUS ONCE
Refills: 0 | Status: DISCONTINUED | OUTPATIENT
Start: 2024-04-18 | End: 2024-04-18

## 2024-04-18 RX ORDER — MIDAZOLAM HYDROCHLORIDE 1 MG/ML
2 INJECTION, SOLUTION INTRAMUSCULAR; INTRAVENOUS ONCE
Refills: 0 | Status: DISCONTINUED | OUTPATIENT
Start: 2024-04-18 | End: 2024-04-18

## 2024-04-18 RX ORDER — PROPOFOL 10 MG/ML
10 INJECTION, EMULSION INTRAVENOUS
Qty: 1000 | Refills: 0 | Status: DISCONTINUED | OUTPATIENT
Start: 2024-04-18 | End: 2024-04-18

## 2024-04-18 RX ORDER — LIDOCAINE HYDROCHLORIDE AND EPINEPHRINE 10; 10 MG/ML; UG/ML
20 INJECTION, SOLUTION INFILTRATION; PERINEURAL ONCE
Refills: 0 | Status: DISCONTINUED | OUTPATIENT
Start: 2024-04-18 | End: 2024-04-25

## 2024-04-18 RX ORDER — FENTANYL CITRATE 50 UG/ML
0.5 INJECTION INTRAVENOUS
Qty: 2500 | Refills: 0 | Status: DISCONTINUED | OUTPATIENT
Start: 2024-04-18 | End: 2024-04-21

## 2024-04-18 RX ADMIN — CHLORHEXIDINE GLUCONATE 15 MILLILITER(S): 213 SOLUTION TOPICAL at 17:18

## 2024-04-18 RX ADMIN — SODIUM ZIRCONIUM CYCLOSILICATE 10 GRAM(S): 10 POWDER, FOR SUSPENSION ORAL at 05:38

## 2024-04-18 RX ADMIN — Medication 1 DROP(S): at 05:32

## 2024-04-18 RX ADMIN — Medication 100 MILLIGRAM(S): at 05:33

## 2024-04-18 RX ADMIN — FENTANYL CITRATE 2 MICROGRAM(S)/KG/HR: 50 INJECTION INTRAVENOUS at 21:06

## 2024-04-18 RX ADMIN — MIDAZOLAM HYDROCHLORIDE 2 MILLIGRAM(S): 1 INJECTION, SOLUTION INTRAMUSCULAR; INTRAVENOUS at 11:02

## 2024-04-18 RX ADMIN — HEPARIN SODIUM 5000 UNIT(S): 5000 INJECTION INTRAVENOUS; SUBCUTANEOUS at 05:35

## 2024-04-18 RX ADMIN — CHLORHEXIDINE GLUCONATE 15 MILLILITER(S): 213 SOLUTION TOPICAL at 05:37

## 2024-04-18 RX ADMIN — PROPOFOL 2.4 MICROGRAM(S)/KG/MIN: 10 INJECTION, EMULSION INTRAVENOUS at 11:02

## 2024-04-18 RX ADMIN — PANTOPRAZOLE SODIUM 40 MILLIGRAM(S): 20 TABLET, DELAYED RELEASE ORAL at 11:37

## 2024-04-18 RX ADMIN — Medication 1 DROP(S): at 13:44

## 2024-04-18 RX ADMIN — Medication 100 MILLIGRAM(S): at 13:40

## 2024-04-18 RX ADMIN — Medication 100 MICROGRAM(S): at 05:35

## 2024-04-18 RX ADMIN — CHLORHEXIDINE GLUCONATE 1 APPLICATION(S): 213 SOLUTION TOPICAL at 21:08

## 2024-04-18 RX ADMIN — HEPARIN SODIUM 5000 UNIT(S): 5000 INJECTION INTRAVENOUS; SUBCUTANEOUS at 17:18

## 2024-04-18 RX ADMIN — FENTANYL CITRATE 50 MICROGRAM(S): 50 INJECTION INTRAVENOUS at 11:03

## 2024-04-18 RX ADMIN — Medication 1 APPLICATION(S): at 11:37

## 2024-04-18 RX ADMIN — LISINOPRIL 20 MILLIGRAM(S): 2.5 TABLET ORAL at 05:37

## 2024-04-18 RX ADMIN — Medication 1 DROP(S): at 21:15

## 2024-04-18 RX ADMIN — FENTANYL CITRATE 50 MICROGRAM(S): 50 INJECTION INTRAVENOUS at 11:51

## 2024-04-18 RX ADMIN — CHLORHEXIDINE GLUCONATE 1 APPLICATION(S): 213 SOLUTION TOPICAL at 05:31

## 2024-04-18 NOTE — PROGRESS NOTE ADULT - ASSESSMENT
74F with PHMx of pancreatic CA s/p Whipple, CKD stage 3, HTN, COPD, pulmonary fibrosis, PRESS syndrome (hospitalized in 2019), Raynaud's syndrome, ANCA vasculitis, Scleroderma presents to ED due to cardiac arrest.    PLAN:  #S/p Cardiac Arrest with Anoxic Brain Injury  #Acute Hypoxic Respiratory Failure   - S/p bedside percutaneous tracheostomy (7.0 Portex)   - Trach sutures to be removed after 10 days (4/28)   - Will plan for possible PEG next week   - Remainder of care per ICU   - Surgery following    General Surgery  SPECTRA [id number]

## 2024-04-18 NOTE — PROGRESS NOTE ADULT - ASSESSMENT
IMPRESSION:    Anoxic brain damage  Acute Hypoxemic Respiratory Failure on MV  SP CPA downtime 30 minutes   Cardiac bradycardia   Lung Fibrosis   GIUSEPPE, Cr downtrending   HO Raynaud's  HO PRESS    HO Anemia  HO Pancreatic cancer     PLAN:    CNS: SAT. Neurology following    HEENT: oral and ET care  for trach today     PULMONARY: keep pox >92%  monitor peak and plateau  no change in vent settings        CARDIOVASCULAR:  2D-Echo noted, continue labetalol dose     RENAL: follow is and os, trend Cr, Nephrology following, lokelma for hyperkalemia     INFECTIOUS DISEASE: cultures negative to date, procalcitonin elevated, completed  Zosyn course, nasal MRSA,   s/p abx     GASTRO: GI ppx, tube feeds    HEMATOLOGICAL:  DVT prophylaxis.    ENDOCRINE:  Follow up FS.  Insulin protocol if needed.    MUSCULOSKELETAL: bedrest    Full Code, Palliative care following    Grave prognosis     The patient is critically ill with a high probability of further deterioration.    ICU monitoring

## 2024-04-18 NOTE — PROGRESS NOTE ADULT - SUBJECTIVE AND OBJECTIVE BOX
Patient is a 74y old  Female who presents with a chief complaint of cardiac arrest (17 Apr 2024 15:37)      Over Night Events:  Patient seen and examined.   off sedation not awake     ROS:  See HPI    PHYSICAL EXAM    ICU Vital Signs Last 24 Hrs  T(C): 36.8 (18 Apr 2024 07:01), Max: 36.8 (18 Apr 2024 07:01)  T(F): 98.2 (18 Apr 2024 07:01), Max: 98.2 (18 Apr 2024 07:01)  HR: 63 (18 Apr 2024 07:51) (59 - 73)  BP: 160/71 (18 Apr 2024 07:32) (132/60 - 190/76)  BP(mean): 102 (18 Apr 2024 07:32) (87 - 126)  ABP: --  ABP(mean): --  RR: 26 (18 Apr 2024 07:32) (12 - 30)  SpO2: 100% (18 Apr 2024 07:51) (99% - 100%)    O2 Parameters below as of 18 Apr 2024 07:32  Patient On (Oxygen Delivery Method): ventilator            General: barely open eyes   HEENT:    et tube             Lymph Nodes: NO cervical LN   Lungs: Bilateral BS  Cardiovascular: Regular   Abdomen: Soft, Positive BS  Extremities: No clubbing   Skin: warm   Neurological: no following command   positive gag   Musculoskeletal: move all ext     I&O's Detail    17 Apr 2024 07:01  -  18 Apr 2024 07:00  --------------------------------------------------------  IN:    FentaNYL: 42 mL    Free Water: 300 mL    Osmolite 1.2: 765 mL  Total IN: 1107 mL    OUT:    Indwelling Catheter - Urethral (mL): 1550 mL    Rectal Tube (mL): 250 mL  Total OUT: 1800 mL    Total NET: -693 mL      18 Apr 2024 07:01  -  18 Apr 2024 08:02  --------------------------------------------------------  IN:  Total IN: 0 mL    OUT:    Indwelling Catheter - Urethral (mL): 30 mL  Total OUT: 30 mL    Total NET: -30 mL          LABS:                          9.1    14.05 )-----------( 386      ( 18 Apr 2024 05:32 )             27.2         18 Apr 2024 05:32    132    |  97     |  28     ----------------------------<  102    4.8     |  27     |  1.2      Ca    8.3        18 Apr 2024 05:32    TPro  6.2    /  Alb  2.4    /  TBili  0.2    /  DBili  x      /  AST  35     /  ALT  11     /  AlkPhos  156    18 Apr 2024 05:32  Amylase x     lipase x                                                 PT/INR - ( 17 Apr 2024 15:20 )   PT: 10.90 sec;   INR: 0.96 ratio         PTT - ( 17 Apr 2024 15:20 )  PTT:32.2 sec                                       Urinalysis Basic - ( 18 Apr 2024 05:32 )    Color: x / Appearance: x / SG: x / pH: x  Gluc: 102 mg/dL / Ketone: x  / Bili: x / Urobili: x   Blood: x / Protein: x / Nitrite: x   Leuk Esterase: x / RBC: x / WBC x   Sq Epi: x / Non Sq Epi: x / Bacteria: x                                                                                                             Mode: AC/ CMV (Assist Control/ Continuous Mandatory Ventilation)  RR (machine): 26  TV (machine): 300  FiO2: 40  PEEP: 5  MAP: 13  PIP: 31                                      ABG - ( 18 Apr 2024 04:00 )  pH, Arterial: 7.41  pH, Blood: x     /  pCO2: 45    /  pO2: 119   / HCO3: 28    / Base Excess: 3.2   /  SaO2: 99.7                MEDICATIONS  (STANDING):  artificial  tears Solution 1 Drop(s) Both EYES three times a day  chlorhexidine 0.12% Liquid 15 milliLiter(s) Oral Mucosa every 12 hours  chlorhexidine 2% Cloths 1 Application(s) Topical daily  chlorhexidine 2% Cloths 1 Application(s) Topical <User Schedule>  fentaNYL   Infusion 0.5 MICROgram(s)/kG/Hr (2 mL/Hr) IV Continuous <Continuous>  heparin   Injectable 5000 Unit(s) SubCutaneous every 12 hours  labetalol 100 milliGRAM(s) Oral three times a day  levothyroxine 100 MICROGram(s) Oral daily  lidocaine 2% Viscous 10 milliLiter(s) Oral once  lisinopril 20 milliGRAM(s) Oral daily  pantoprazole  Injectable 40 milliGRAM(s) IV Push daily  petrolatum Ophthalmic Ointment 1 Application(s) Both EYES daily  sodium zirconium cyclosilicate 10 Gram(s) Oral three times a day    MEDICATIONS  (PRN):  hydrALAZINE 10 milliGRAM(s) Oral every 4 hours PRN Systolic blood pressure >180  labetalol Injectable 20 milliGRAM(s) IV Push every 4 hours PRN Systolic blood pressure >180          Xrays:  TLC:  OG:  ET tube:                                                                                    b/l opacity    ECHO:  [facility] ICU:

## 2024-04-18 NOTE — CHART NOTE - NSCHARTNOTEFT_GEN_A_CORE
spoke with Dr Petersno  - who noted patient got trach today.     as goals are clear, palliative team will sign off. please re-consult PRN. [phone]

## 2024-04-18 NOTE — PROGRESS NOTE ADULT - ASSESSMENT
74-year-old female with PHMx of pancreatic CA s/p Whipple, CKD stage 3, HTN, COPD, pulmonary fibrosis, PRESS syndrome (hospitalized in 2019), Raynaud's syndrome, ANCA vasculitis, Scleroderma, admitted after cardiac arrest, being evaluated for PEG placement      #PEG placement   Rec  -plan for bedside PEG tomorrow  -npo aftermidnight  -ICU team to sedate patient tomorrow  -IV antibiotic prophylaxis the day of the Peg, IV Cefazolin 1g on hold, send patient down with Cefazolin to be given 30 minutes prior to procedure  -Please hold anticoagulation/ heparin/Lovenox prior to procedure  -Optimize electrolytes   -AM CBC, AM BMP, AM PT,PTT, INR  -Active type and screen     PEG scheduled for: tomorrow at bedside   -Consent to be obtained from:  -Phone number of Proxy/ relative:    #elevated ALP  Rec  -obtain GGT                   74-year-old female with PHMx of pancreatic CA s/p Whipple, CKD stage 3, HTN, COPD, pulmonary fibrosis, PRESS syndrome (hospitalized in 2019), Raynaud's syndrome, ANCA vasculitis, Scleroderma, admitted after cardiac arrest, being evaluated for PEG placement      #PEG placement   Rec  -plan for bedside PEG tomorrow spoke with Marbella Chavez (daughter) she can be reached at [phone], she is agreeable   -npo aftermidnight  -ICU team to sedate patient tomorrow  -IV antibiotic prophylaxis the day of the Peg, IV Cefazolin 1g on hold, send patient down with Cefazolin to be given 30 minutes prior to procedure  -Please hold anticoagulation/ heparin/Lovenox prior to procedure  -Optimize electrolytes   -AM CBC, AM BMP, AM PT,PTT, INR  -Active type and screen     PEG scheduled for: tomorrow at bedside   -Consent to be obtained from:  -Phone number of Proxy/ relative:    #elevated ALP  Rec  -obtain GGT                   74-year-old female with PHMx of pancreatic CA s/p Whipple, CKD stage 3, HTN, COPD, pulmonary fibrosis, PRESS syndrome (hospitalized in 2019), Raynaud's syndrome, ANCA vasculitis, Scleroderma, admitted after cardiac arrest, being evaluated for PEG placement      #PEG placement   Rec  -plan for bedside PEG tomorrow spoke with Marbella Chavez (daughter) she can be reached at [phone], she is agreeable   -npo aftermidnight  -ICU team to sedate patient tomorrow  -IV antibiotic prophylaxis the day of the Peg, IV Cefazolin 1g on hold, send patient down with Cefazolin to be given 30 minutes prior to procedure  -Please hold anticoagulation/ heparin/Lovenox prior to procedure  -Optimize electrolytes   -AM CBC, AM BMP, AM PT,PTT, INR  -Active type and screen     PEG scheduled for: tomorrow at bedside   -Consent to be obtained from:  Marbella Chavez (daughter)  -Phone number of Proxy/ relative:  [phone]    #elevated ALP  Rec  -obtain GGT                   74-year-old female with PHMx of pancreatic CA s/p Whipple, CKD stage 3, HTN, COPD, pulmonary fibrosis, PRESS syndrome (hospitalized in 2019), Raynaud's syndrome, ANCA vasculitis, Scleroderma, admitted after cardiac arrest, being evaluated for PEG placement      #PEG placement   Rec  -plan for bedside PEG tomorrow spoke with Marbella Scott (daughter) she can be reached at [phone], she is agreeable   -npo aftermidnight  -ICU team to sedate patient tomorrow  -IV antibiotic prophylaxis the day of the Peg, IV Cefazolin 1g on hold, send patient down with Cefazolin to be given 30 minutes prior to procedure  -Please hold anticoagulation/ heparin/Lovenox prior to procedure  -Optimize electrolytes   -AM CBC, AM BMP, AM PT,PTT, INR  -Active type and screen   -case discussed with ICU Team    PEG scheduled for: tomorrow at bedside   -Consent to be obtained from:  Marbella Chavez (daughter)  -Phone number of Proxy/ relative:  [phone]    #elevated ALP  Rec  -obtain GGT

## 2024-04-18 NOTE — PROGRESS NOTE ADULT - SUBJECTIVE AND OBJECTIVE BOX
GENERAL SURGERY PROGRESS NOTE    Patient: KIRAN MORFIN , 74y ([birth year])Female   MRN: [id number]  Location: [address]  Visit: 04-06-24 Inpatient  Date: 04-18-24 @ 12:53    24 Hour Events:  No acute events overnight  S/p Percutaneous tracheostomy    Vitals:  T(F): 98.4 (04-18-24 @ 11:00), Max: 98.4 (04-18-24 @ 11:00)  HR: 66 (04-18-24 @ 12:45)  BP: 149/75 (04-18-24 @ 12:45)  RR: 27 (04-18-24 @ 12:45)  SpO2: 100% (04-18-24 @ 12:45)  Mode: AC/ CMV (Assist Control/ Continuous Mandatory Ventilation), RR (machine): 26, TV (machine): 300, FiO2: 40, PEEP: 5, MAP: 10, PIP: 23  Diet, NPO after Midnight:      NPO Start Date: 17-Apr-2024,   NPO Start Time: 23:59  Diet, NPO after Midnight:      NPO Start Date: 17-Apr-2024,   NPO Start Time: 23:59  Diet, NPO with Tube Feed:   Tube Feeding Modality: Orogastric  Osmolite 1.2 Lei (OSMOLITERTH)  Total Volume for 24 Hours (mL): 1080  Continuous  Starting Tube Feed Rate mL per Hour: 10  Until Goal Tube Feed Rate (mL per Hour): 45  Tube Feed Duration (in Hours): 24  Tube Feed Start Time: 12:00  Free Water Flush  Free Water Flush Instructions:  150 Q8hr    Fluids:   I & O's:    04-17-24 @ 07:01  -  04-18-24 @ 07:00  --------------------------------------------------------  IN:    FentaNYL: 42 mL    Free Water: 300 mL    Osmolite 1.2: 765 mL  Total IN: 1107 mL    OUT:    Indwelling Catheter - Urethral (mL): 1550 mL    Rectal Tube (mL): 250 mL  Total OUT: 1800 mL    Total NET: -693 mL    PHYSICAL EXAM:  General: On ventilator, GCS 5T  Cardiac: RRR  Respiratory: CTAB, 7.0 Portex trach in place, minimal secretions  Abdomen: Soft, non-distended, non-tender, no rebound, no guarding.  Musculoskeletal: FROM in b/l UE and LE  Neuro: Withdraws to pain in all extremities  Skin: Warm/dry, normal color, no jaundice    MEDICATIONS  (STANDING):  artificial  tears Solution 1 Drop(s) Both EYES three times a day  chlorhexidine 0.12% Liquid 15 milliLiter(s) Oral Mucosa every 12 hours  chlorhexidine 2% Cloths 1 Application(s) Topical daily  chlorhexidine 2% Cloths 1 Application(s) Topical <User Schedule>  fentaNYL   Infusion 0.5 MICROgram(s)/kG/Hr (2 mL/Hr) IV Continuous <Continuous>  heparin   Injectable 5000 Unit(s) SubCutaneous every 12 hours  labetalol 100 milliGRAM(s) Oral three times a day  levothyroxine 100 MICROGram(s) Oral daily  lidocaine 1%/epinephrine 1:100,000 Inj 20 milliLiter(s) Local Injection once  lidocaine 2% Viscous 10 milliLiter(s) Oral once  lisinopril 20 milliGRAM(s) Oral daily  pantoprazole  Injectable 40 milliGRAM(s) IV Push daily  petrolatum Ophthalmic Ointment 1 Application(s) Both EYES daily  propofol Infusion 10 MICROgram(s)/kG/Min (2.4 mL/Hr) IV Continuous <Continuous>  sodium zirconium cyclosilicate 10 Gram(s) Oral three times a day    MEDICATIONS  (PRN):  hydrALAZINE 10 milliGRAM(s) Oral every 4 hours PRN Systolic blood pressure >180  labetalol Injectable 20 milliGRAM(s) IV Push every 4 hours PRN Systolic blood pressure >180    DVT PROPHYLAXIS: heparin   Injectable 5000 Unit(s) SubCutaneous every 12 hours    GI PROPHYLAXIS: pantoprazole  Injectable 40 milliGRAM(s) IV Push daily    ANTICOAGULATION:   ANTIBIOTICS:      LAB/STUDIES:  Labs:  CAPILLARY BLOOD GLUCOSE                     9.1    14.05 )-----------( 386      ( 18 Apr 2024 05:32 )             27.2         04-18    132<L>  |  97<L>  |  28<H>  ----------------------------<  102<H>  4.8   |  27  |  1.2      Magnesium: 2.0 mg/dL (04-18-24 @ 08:00)      LFTs:             6.2  | 0.2  | 35       ------------------[156     ( 18 Apr 2024 05:32 )  2.4  | x    | 11          Lipase:x      Amylase:x         Blood Gas Arterial, Lactate: 0.9 mmol/L (04-18-24 @ 04:00)  Blood Gas Arterial, Lactate: 0.9 mmol/L (04-17-24 @ 12:26)  Blood Gas Arterial, Lactate: 1.2 mmol/L (04-17-24 @ 04:20)  Blood Gas Arterial, Lactate: 1.4 mmol/L (04-16-24 @ 04:04)    ABG - ( 18 Apr 2024 04:00 )  pH: 7.41  /  pCO2: 45    /  pO2: 119   / HCO3: 28    / Base Excess: 3.2   /  SaO2: 99.7      ABG - ( 17 Apr 2024 12:26 )  pH: 7.42  /  pCO2: 42    /  pO2: 191   / HCO3: 27    / Base Excess: 2.5   /  SaO2: 99.7      ABG - ( 17 Apr 2024 04:20 )  pH: 7.49  /  pCO2: 33    /  pO2: 169   / HCO3: 25    / Base Excess: 2.2   /  SaO2: 100.0     Coags:     10.90  ----< 0.96    ( 17 Apr 2024 15:20 )     32.2      Urinalysis Basic - ( 18 Apr 2024 05:32 )    Color: x / Appearance: x / SG: x / pH: x  Gluc: 102 mg/dL / Ketone: x  / Bili: x / Urobili: x   Blood: x / Protein: x / Nitrite: x   Leuk Esterase: x / RBC: x / WBC x   Sq Epi: x / Non Sq Epi: x / Bacteria: x

## 2024-04-18 NOTE — PROCEDURE NOTE - NSBRONCHPROCDETAILS_GEN_A_CORE_FT
The patient had been previously intubated and was on mechanical ventilatory support. She was on sedation, which was continued and adjusted during the procedure. Her FiO2 was increased to 100% during the procedure. The  fiberoptic bronchoscope was introduced through an endotracheal tube adaptor and the tip of the endotracheal tube was noted to be in good position above the justin.   The Right tracheobronchial tree was inspected closely to the level of the subsegmental bronchi. All bronchi are patent with no endobronchial lesions and no mucosal lesions noted.   The Left tracheobronchial tree was also patent and intact with the mucosa normal   BAL performed, specimen sent for analysis.   Assisted surgery with direct visualization of needle entrance, see full surgical procedure note.   After adequate clearing of secretions was accomplished, the bronchoscope was removed from the patient and the procedure was ended.   The patient tolerated the procedure well and there were no complications. The patient had been previously intubated and was on mechanical ventilatory support. She was on sedation, which was continued and adjusted during the procedure. Her FiO2 was increased to 100% during the procedure. The  fiberoptic bronchoscope was introduced through an endotracheal tube adaptor and the tip of the endotracheal tube was noted to be in good position above the justin.   The Right tracheobronchial tree was inspected closely to the level of the subsegmental bronchi. All bronchi are patent with no endobronchial lesions and no mucosal lesions noted.   The Left tracheobronchial tree was also patent and intact with the mucosa normal   BAL performed, specimen sent for analysis.   Assisted surgery with direct visualization of needle entrance, see full surgical procedure note.   After adequate clearing of secretions was accomplished, the bronchoscope was removed from the ET tube and bronch was performed via the tracheostomy confirm patent in good position    The patient tolerated the procedure well and there were no complications.

## 2024-04-18 NOTE — PROGRESS NOTE ADULT - SUBJECTIVE AND OBJECTIVE BOX
74yFemale  Being followed for PEG tube placement   Interval history:  No hematemesis, melena, blood in stool reported.       PAST MEDICAL & SURGICAL HISTORY:   H/O vasculitis      Pancreatic cancer      History of COPD      Hypertension      H/O Raynaud's syndrome      Stage 3 chronic kidney disease      History of knee replacement      H/O Whipple procedure                Social History: No smoking. No alcohol. No illegal drug use.            MEDICATIONS  (STANDING):  artificial  tears Solution 1 Drop(s) Both EYES three times a day  chlorhexidine 0.12% Liquid 15 milliLiter(s) Oral Mucosa every 12 hours  chlorhexidine 2% Cloths 1 Application(s) Topical daily  chlorhexidine 2% Cloths 1 Application(s) Topical <User Schedule>  fentaNYL   Infusion 0.5 MICROgram(s)/kG/Hr (2 mL/Hr) IV Continuous <Continuous>  heparin   Injectable 5000 Unit(s) SubCutaneous every 12 hours  labetalol 100 milliGRAM(s) Oral three times a day  levothyroxine 100 MICROGram(s) Oral daily  lidocaine 1%/epinephrine 1:100,000 Inj 20 milliLiter(s) Local Injection once  lidocaine 2% Viscous 10 milliLiter(s) Oral once  lisinopril 20 milliGRAM(s) Oral daily  pantoprazole  Injectable 40 milliGRAM(s) IV Push daily  petrolatum Ophthalmic Ointment 1 Application(s) Both EYES daily    MEDICATIONS  (PRN):  hydrALAZINE 10 milliGRAM(s) Oral every 4 hours PRN Systolic blood pressure >180  labetalol Injectable 20 milliGRAM(s) IV Push every 4 hours PRN Systolic blood pressure >180      Allergies:   celecoxib (Rash)  Originally Entered as [U UNKNOWN] reaction to [celebrit] (Unknown)  Intolerances          REVIEW OF SYSTEMS:  unobtainable       VITAL SIGNS:   T(F): 98.4 (04-18-24 @ 11:00), Max: 98.4 (04-18-24 @ 11:00)  HR: 66 (04-18-24 @ 12:45) (59 - 85)  BP: 149/75 (04-18-24 @ 12:45) (87/52 - 190/82)  RR: 27 (04-18-24 @ 12:45) (11 - 30)  SpO2: 100% (04-18-24 @ 12:45) (100% - 100%)    PHYSICAL EXAM:  GENERAL: +trach  HEAD:  Atraumatic, Normocephalic  EYES: conjunctiva and sclera clear  NECK: Supple, no JVD or thyromegaly  CHEST/LUNG: b/l rhonchi  HEART: Regular rate and rhythm; normal S1, S2, No murmurs.  ABDOMEN: Soft, nontender, nondistended; Bowel sounds present  NEUROLOGY: No asterixis or tremor.   SKIN: Intact, no jaundice            LABS:                        9.1    14.05 )-----------( 386      ( 18 Apr 2024 05:32 )             27.2     04-18    132<L>  |  97<L>  |  28<H>  ----------------------------<  102<H>  4.8   |  27  |  1.2    Ca    8.3<L>      18 Apr 2024 05:32  Phos  4.3     04-18  Mg     2.0     04-18    TPro  6.2  /  Alb  2.4<L>  /  TBili  0.2  /  DBili  x   /  AST  35  /  ALT  11  /  AlkPhos  156<H>  04-18    LIVER FUNCTIONS - ( 18 Apr 2024 05:32 )  Alb: 2.4 g/dL / Pro: 6.2 g/dL / ALK PHOS: 156 U/L / ALT: 11 U/L / AST: 35 U/L / GGT: x           PT/INR - ( 17 Apr 2024 15:20 )   PT: 10.90 sec;   INR: 0.96 ratio         PTT - ( 17 Apr 2024 15:20 )  PTT:32.2 sec    IMAGING:    < from: Xray Chest 1 View-PORTABLE IMMEDIATE (Xray Chest 1 View-PORTABLE IMMEDIATE .) (04.18.24 @ 11:41) >    ACC: [id number] EXAM:  XR CHEST PORTABLE IMMED 1V   ORDERED BY: MERY HART     PROCEDURE DATE:  04/18/2024          INTERPRETATION:  Clinical History / Reason for exam: Post tracheostomy.    Comparison : Chest radiograph 4/18/2024 at 6:02 AM.    Technique/Positioning: Frontal chest radiograph.    Findings:    Support devices: Tracheostomy tube is noted. Enteric tube terminates   below left hemidiaphragm.    Cardiac/mediastinum/hilum: Unchanged.    Lung parenchyma/Pleura: Essentially stablebilateral opacities. No   pneumothorax.    Skeleton/soft tissues: Unchanged.    Impression:    Support devices as described.    Essentially stable bilateral opacities.        --- End of Report ---            KEYLA VILLANUEVA MD; Attending Radiologist  This document has been electronically signed. Apr 18 2024 12:28PM    < end of copied text >    < from: US Abdomen Upper Quadrant Right (04.07.24 @ 12:01) >    ACC: [id number] EXAM:  US ABDOMEN RT UPR QUADRANT   ORDERED BY: LENNY ALCOCER     PROCEDURE DATE:  04/07/2024          INTERPRETATION:  CLINICAL INFORMATION: Elevated liver function enzymes.    COMPARISON: CT scan of the abdomen and pelvis performed the prior day.    TECHNIQUE: Sonography of the right upper quadrant.    FINDINGS:  Liver: Within normal limits.  Bile ducts: Normal caliber. Common bile duct measures 4 mm.  Gallbladder: Cholecystectomy.  Pancreas: Visualized portions are within normal limits.  Right kidney: 8.5 cm. Echogenic in appearance without hydronephrosis or   nephrolithiasis..  Ascites: Trace ascites.  IVC: Visualized portions are within normal limits.    IMPRESSION:  Status post cholecystectomy. No ductal dilatation.    Echogenic kidney consistent with medical renal disease.        --- End of Report ---            LACEY CAMPOS MD; Attending Interventional Radiologist  This document has been electronically signed. Apr 7 2024  5:41PM    < end of copied text >

## 2024-04-18 NOTE — PROGRESS NOTE ADULT - TIME BILLING
I have personally seen and examined this patient.    I have reviewed all pertinent clinical information and reviewed all relevant imaging and diagnostic studies personally.   I discussed recommendations with the primary team.
Coordination of care
I have personally seen and examined this patient.    I have reviewed all pertinent clinical information and reviewed all relevant imaging and diagnostic studies personally.   I discussed recommendations with the primary team.

## 2024-04-18 NOTE — PROGRESS NOTE ADULT - SUBJECTIVE AND OBJECTIVE BOX
Patient is a 74y old  Female who presents with a chief complaint of cardiac arrest (18 Apr 2024 08:13)      T(F): 98.4 (04-18-24 @ 11:00), Max: 98.4 (04-18-24 @ 11:00)  HR: 59 (04-18-24 @ 09:30)  BP: 164/76 (04-18-24 @ 09:30)  RR: 26 (04-18-24 @ 11:00)  SpO2: 100% (04-18-24 @ 09:30) (99% - 100%)    PHYSICAL EXAM:  GENERAL: NAD  HEAD:  Atraumatic, Normocephalic  EYES: EOMI, PERRLA, conjunctiva and sclera clear  NERVOUS SYSTEM:  Alert & Oriented X3, no focal deficits   CHEST/LUNG: Clear to percussion bilaterally; No rales, rhonchi, wheezing, or rubs  HEART: Regular rate and rhythm; No murmurs, rubs, or gallops  ABDOMEN: Soft, Nontender, Nondistended; Bowel sounds present  EXTREMITIES:  2+ Peripheral Pulses, No clubbing, cyanosis, or edema    LABS  04-18    132<L>  |  97<L>  |  28<H>  ----------------------------<  102<H>  4.8   |  27  |  1.2    Ca    8.3<L>      18 Apr 2024 05:32  Phos  4.3     04-18  Mg     2.0     04-18    TPro  6.2  /  Alb  2.4<L>  /  TBili  0.2  /  DBili  x   /  AST  35  /  ALT  11  /  AlkPhos  156<H>  04-18                          9.1    14.05 )-----------( 386      ( 18 Apr 2024 05:32 )             27.2     PT/INR - ( 17 Apr 2024 15:20 )   PT: 10.90 sec;   INR: 0.96 ratio         PTT - ( 17 Apr 2024 15:20 )  PTT:32.2 sec  Mode: AC/ CMV (Assist Control/ Continuous Mandatory Ventilation)  RR (machine): 26  TV (machine): 300  FiO2: 40  PEEP: 5      Culture Results:   No growth (04-06-24)  Culture Results:   No growth at 5 days (04-06-24)  Culture Results:   No growth at 5 days (04-06-24)    RADIOLOGY  < from: Xray Chest 1 View- PORTABLE-Routine (Xray Chest 1 View- PORTABLE-Routine in AM.) (04.17.24 @ 06:34) >  Impression:    Bilateral opacities without significant change. No pneumothorax.    < end of copied text >    MEDICATIONS  (STANDING):  artificial  tears Solution 1 Drop(s) Both EYES three times a day  chlorhexidine 0.12% Liquid 15 milliLiter(s) Oral Mucosa every 12 hours  chlorhexidine 2% Cloths 1 Application(s) Topical daily  chlorhexidine 2% Cloths 1 Application(s) Topical <User Schedule>  fentaNYL   Infusion 0.5 MICROgram(s)/kG/Hr (2 mL/Hr) IV Continuous <Continuous>  heparin   Injectable 5000 Unit(s) SubCutaneous every 12 hours  labetalol 100 milliGRAM(s) Oral three times a day  levothyroxine 100 MICROGram(s) Oral daily  lidocaine 1%/epinephrine 1:100,000 Inj 20 milliLiter(s) Local Injection once  lidocaine 2% Viscous 10 milliLiter(s) Oral once  lisinopril 20 milliGRAM(s) Oral daily  pantoprazole  Injectable 40 milliGRAM(s) IV Push daily  petrolatum Ophthalmic Ointment 1 Application(s) Both EYES daily  propofol Infusion 10 MICROgram(s)/kG/Min (2.4 mL/Hr) IV Continuous <Continuous>  sodium zirconium cyclosilicate 10 Gram(s) Oral three times a day    MEDICATIONS  (PRN):  hydrALAZINE 10 milliGRAM(s) Oral every 4 hours PRN Systolic blood pressure >180  labetalol Injectable 20 milliGRAM(s) IV Push every 4 hours PRN Systolic blood pressure >180      Patient is s/p bedside trach today      T(F): 98.4 (04-18-24 @ 11:00), Max: 98.4 (04-18-24 @ 11:00)  HR: 59 (04-18-24 @ 09:30)  BP: 164/76 (04-18-24 @ 09:30)  RR: 22  SpO2: 100% (04-18-24 @ 09:30) (99% - 100%)    PHYSICAL EXAM:  GENERAL: NAD  HEAD:  Atraumatic, Normocephalic  EYES: EOMI, PERRLA, conjunctiva and sclera clear  NERVOUS SYSTEM:  positive corneal reflex  CHEST/LUNG: Clear to percussion bilaterally; No rales, rhonchi, wheezing, or rubs  HEART: Regular rate and rhythm; No murmurs, rubs, or gallops  ABDOMEN: Soft, Nontender, Nondistended; Bowel sounds present  EXTREMITIES:  2+ Peripheral Pulses, No clubbing, cyanosis, or edema    LABS  04-18    132<L>  |  97<L>  |  28<H>  ----------------------------<  102<H>  4.8   |  27  |  1.2    Ca    8.3<L>      18 Apr 2024 05:32  Phos  4.3     04-18  Mg     2.0     04-18    TPro  6.2  /  Alb  2.4<L>  /  TBili  0.2  /  DBili  x   /  AST  35  /  ALT  11  /  AlkPhos  156<H>  04-18                          9.1    14.05 )-----------( 386      ( 18 Apr 2024 05:32 )             27.2     PT/INR - ( 17 Apr 2024 15:20 )   PT: 10.90 sec;   INR: 0.96 ratio         PTT - ( 17 Apr 2024 15:20 )  PTT:32.2 sec  Mode: AC/ CMV (Assist Control/ Continuous Mandatory Ventilation)  RR (machine): 26  TV (machine): 300  FiO2: 40  PEEP: 5      Culture Results:   No growth (04-06-24)  Culture Results:   No growth at 5 days (04-06-24)  Culture Results:   No growth at 5 days (04-06-24)    RADIOLOGY  < from: Xray Chest 1 View- PORTABLE-Routine (Xray Chest 1 View- PORTABLE-Routine in AM.) (04.17.24 @ 06:34) >  Impression:    Bilateral opacities without significant change. No pneumothorax.    < end of copied text >  < from: CT Head No Cont (04.11.24 @ 11:35) >  IMPRESSION:    Findings compatible with diffuse anoxic injury of the brain.    < end of copied text >    MEDICATIONS  (STANDING):  artificial  tears Solution 1 Drop(s) Both EYES three times a day  chlorhexidine 0.12% Liquid 15 milliLiter(s) Oral Mucosa every 12 hours  chlorhexidine 2% Cloths 1 Application(s) Topical daily  chlorhexidine 2% Cloths 1 Application(s) Topical <User Schedule>  fentaNYL   Infusion 0.5 MICROgram(s)/kG/Hr (2 mL/Hr) IV Continuous <Continuous>  heparin   Injectable 5000 Unit(s) SubCutaneous every 12 hours  labetalol 100 milliGRAM(s) Oral three times a day  levothyroxine 100 MICROGram(s) Oral daily  lidocaine 1%/epinephrine 1:100,000 Inj 20 milliLiter(s) Local Injection once  lidocaine 2% Viscous 10 milliLiter(s) Oral once  lisinopril 20 milliGRAM(s) Oral daily  pantoprazole  Injectable 40 milliGRAM(s) IV Push daily  petrolatum Ophthalmic Ointment 1 Application(s) Both EYES daily  propofol Infusion 10 MICROgram(s)/kG/Min (2.4 mL/Hr) IV Continuous <Continuous>  sodium zirconium cyclosilicate 10 Gram(s) Oral three times a day    MEDICATIONS  (PRN):  hydrALAZINE 10 milliGRAM(s) Oral every 4 hours PRN Systolic blood pressure >180  labetalol Injectable 20 milliGRAM(s) IV Push every 4 hours PRN Systolic blood pressure >180

## 2024-04-18 NOTE — PROGRESS NOTE ADULT - ASSESSMENT
74y Female w/ pmhx of     NEUROLOGICAL:  #Acute pain    -APAP prn, Gabapentin 100mg q8    -if intubated Fentanyl 12.5 mcg q4 prn    -if extubated remove all sedatives, fentanyl gtt/IV pushes  Lyrica 150 mg oral capsule: 1 cap(s) orally 3 times a day      RESPIRATORY:   #Oxygenation    -wean off NC to RA as tolerated  #Activity    -increase as tolerated  #if intubated document date of intubation/ETT size and LL    CARDS:   #  Imaging:   Labs:   labetalol 100 mg oral tablet: 1 tab(s) orally 3 times a day  lisinopril 20 mg oral tablet: 1 tab(s) orally once a day      GASTROINTESTINAL/NUTRITION:   #  Diet, NPO after Midnight:      NPO Start Date: 17-Apr-2024,   NPO Start Time: 23:59 [Active]  Diet, NPO after Midnight:      NPO Start Date: 17-Apr-2024,   NPO Start Time: 23:59 [Active]  Diet, NPO with Tube Feed:   Tube Feeding Modality: Orogastric  Osmolite 1.2 Lei (OSMOLITERTH)  Total Volume for 24 Hours (mL): 1080  Continuous  Starting Tube Feed Rate mL per Hour: 10  Until Goal Tube Feed Rate (mL per Hour): 45  Tube Feed Duration (in Hours): 24  Tube Feed Start Time: 12:00  Free Water Flush  Free Water Flush Instructions:  150 Q8hr [Active]         -if NG tube in place, document nare length and order q4 adherance    -aspiration precautions, HOB 30  #GI Prophylaxis    pantoprazole  Injectable 40 IV Push daily      -not indicated  #Bowel regimen    -senna/miralax if indicated    -last bowel movement      /RENAL:   #urine output in critically ill    -indwelling hill (placed     Labs          Electrolytes-(04-18 @ 05:32)Na 132 // K 4.8 // Mg -- // Phos --          HEME/ONC:   #DVT prophylaxis     -Chemical: heparin   Injectable 5000 Unit(s) SubCutaneous every 12 hours     -Mechanical: SCDs    -if DVT prophylaxis to be held, document and place VTE order        Labs: Hb/Hct:  8.8/26.3  (04-17 @ 05:24)  -->,  9.1/27.2  (04-18 @ 05:32)  -->                      Plts:  319  -->,  386  -->                 PTT/INR:  32.2/0.96  --->          T&S Expires:   Blood Consent-obtain if acute anemia, q6 CBC    ID:  #DTI  WBC- 9.81  --->>,  9.83  --->>,  14.05  --->>  Temp trend- 24hrs T(F): 98.2 (04-18 @ 07:01), Max: 98.2 (04-18 @ 07:01)  - Continue to monitor WBC  Current antibiotics-  Cultures:   Culture - Urine (collected 04-06)  Source: Clean Catch Clean Catch (Midstream)  Final Report:    No growth    Culture - Blood (collected 04-06)  Source: .Blood Blood-Venous  Final Report:    No growth at 5 days    Culture - Blood (collected 04-06)  Source: .Blood Blood-Venous  Final Report:    No growth at 5 days      ENDOCRINE:    -FSG q6 if NPO or Tube feeds    -Glucose goal 140-180. if above 180 start ISS      LINES/DRAINS:  Seda CORRALES    ADVANCED DIRECTIVES:  Full Code    HCP/Emergency Contact-    INDICATION FOR SICU/SDU: Hyperkalemia    Anoxic Brain Injury    DISPO:   ICU ASSESSMENT AND PLAN    NEUROLOGICAL:  #Diffuse anoxic brain injury    -Intubated, off sedation    - Fentanyl Infusion for comfort/pain    - Aspiration precautions    - HOB >30 degrees        RESPIRATORY:   #Intubated   - Plan for bedside percutaneous trach this morning    - Daily CXR    - RR (machine): 26, TV (machine): 300, FiO2: 40, PEEP: 5  04-18 @ 04:00--7.41 / 45 / 119 / 28 / [id number] 99.7 / Lactate 0.9 / iCal --   04-17 @ 12:26--7.42 / 42 / 191 / 27 / [id number] 99.7 / Lactate 0.9 / iCal 1.21       CARDS:   #HTN  labetalol 100 mg oral tablet: 1 tab(s) orally 3 times a day  lisinopril 20 mg oral tablet: 1 tab(s) orally once a day      GASTROINTESTINAL/NUTRITION:   # Diet, NPO with Tube Feed:   Tube Feeding Modality: Orogastric  Osmolite 1.2 Lei (OSMOLITERTH)  Total Volume for 24 Hours (mL): 1080  Continuous  Starting Tube Feed Rate mL per Hour: 10  Until Goal Tube Feed Rate (mL per Hour): 45  Tube Feed Duration (in Hours): 24  Tube Feed Start Time: 12:00  Free Water Flush  Free Water Flush Instructions:  150 Q8hr [Active]    - NG tube in place  #GI Prophylaxis    pantoprazole  Injectable 40 IV Push daily    #Bowel regimen    -senna/miralax if indicated    -last bowel movement      /RENAL:   #urine output in critically ill    -indwelling hill    Labs          Electrolytes-(04-18 @ 05:32)Na 132 // K 4.8 // Mg -- // Phos --          HEME/ONC:   #DVT prophylaxis     -Chemical: heparin   Injectable 5000 Unit(s) SubCutaneous every 12 hours     -Mechanical: SCDs    -if DVT prophylaxis to be held, document and place VTE order        Labs: Hb/Hct:  8.8/26.3  (04-17 @ 05:24)  -->,  9.1/27.2  (04-18 @ 05:32)  -->                      Plts:  319  -->,  386  -->                 PTT/INR:  32.2/0.96  --->       ID:  #DTI  WBC- 9.81  --->>,  9.83  --->>,  14.05  --->>  Temp trend- 24hrs T(F): 98.2 (04-18 @ 07:01), Max: 98.2 (04-18 @ 07:01)  - Continue to monitor WBC  Final Report:    No growth    Culture - Blood (collected 04-06)  Source: .Blood Blood-Venous  Final Report:    No growth at 5 days    Culture - Blood (collected 04-06)  Source: .Blood Blood-Venous  Final Report:    No growth at 5 days    - trend WBC    ENDOCRINE:    -FSG q6 if NPO or Tube feeds    -Glucose goal 140-180. if above 180 start ISS      LINES/DRAINS:  Seda CORRALES    ADVANCED DIRECTIVES:  Full Code      INDICATION FOR SICU/SDU: Hyperkalemia    Anoxic Brain Injury    DISPO:   ICU

## 2024-04-18 NOTE — PROCEDURE NOTE - NSINFORMCONSENT_GEN_A_CORE
obtained from daughterObdulia/Benefits, risks, and possible complications of procedure explained to patient/caregiver who verbalized understanding and gave verbal consent.

## 2024-04-18 NOTE — PROCEDURAL SAFETY CHECKLIST WITH OR WITHOUT SEDATION - NSPREPROCSEDMD_GEN_ALL_CORE
Bone density scheduled 2- @ 9:30 am  
In an effort to ensure that our patients [employer], a clinical Team Member has reviewed your chart and identified an opportunity to provide the best care possible. An Outreach Attempt was made to discuss or schedule overdue Preventative or Disease Management screening.     The Outcome of the Outreach was {[id number]:[id number]}. Care Gaps include {IQMMeasures:[id number]}.  Bone density 2- @ 9:30 am    
done

## 2024-04-18 NOTE — PROGRESS NOTE ADULT - ASSESSMENT
74-year-old female with PHMx of pancreatic CA s/p Whipple, CKD stage 3, HTN, COPD, pulmonary fibrosis, PRESS syndrome (hospitalized in 2019), Raynaud's syndrome, ANCA vasculitis, Scleroderma, hypothyroid  presents to ED due to cardiac arrest. Pt lives at home with son and daughter, went to use the bathroom and became unresponsive.  EMS intubated her and gave her 4 rounds of epinephrine.  ROSC was achieved within a minute of her arrival to the ED.  Patient was found to be hypothermic and bradycardic    acute respiratory failure / cardiac arrest / anoxic brain injury / HTN / possible aspiration pneumonia      - s/p trach today    - GI consult for PEG   - anoxic brain injury as seen on CT brain and EEG   - DC planning to NH   - completed antibiotic course    - Labetalol / lisinopril    - DVT and GI prophylaxis   - I discussed plan with CC        50 minutes total spent today on patient care

## 2024-04-18 NOTE — PROGRESS NOTE ADULT - NS ATTEND AMEND GEN_ALL_CORE FT
Patient seen and examined and agree with above except as noted.  Patients history, notes, labs, imaging, vitals and meds reviewed personally.  Patient opens eyes to noxious stimuli  Moves head and body to noxious stimuli in anon-reproducible way  Does not follow commands    Spoke with daughter at bedside and she would like to pursue Trach/PEG and give her mother 3 months total to see if there are any improvements    Spoke with primary team and they were made aware of discussion with family
agree w/ above - no GI intervention as pt has no signs / symptoms of cholangitis. management per ICU team.   PO pantoprazole 40 mg q24h for GI ppx. we will sign off.   rest of recs per the note above.
I edited the note

## 2024-04-18 NOTE — PROGRESS NOTE ADULT - SUBJECTIVE AND OBJECTIVE BOX
KIRAN MORFIN, 74y, Female; MRN# [id number]  Hospital Stay: 12d.    Patient is a 74y old Female who presents with a chief complaint of cardiac arrest (18 Apr 2024 08:02)  Currently admitted to the ICU with the primary diagnosis of Cardiac arrest      SUBJECTIVE:  Interval/Overnight events: Fentanyl restarted for patient comfort; no other acute events overnight    REVIEW OF SYSTEMS:  As per HPI  OBJECTIVE:  VITALS:  T(F): 98.2 (04-18-24 @ 07:01), Max: 98.2 (04-18-24 @ 07:01)  HR: 63 (04-18-24 @ 07:51) (59 - 73)  BP: 160/71 (04-18-24 @ 07:32) (132/60 - 190/76)  ABP: --  ABP(mean): --  RR: 26 (04-18-24 @ 07:32) (12 - 30)  SpO2: 100% (04-18-24 @ 07:51) (99% - 100%)    Vent Settings  Device: bellavista, Mode: AC/ CMV (Assist Control/ Continuous Mandatory Ventilation), RR (machine): 26, TV (machine): 300, FiO2: 40, PEEP: 5, MAP: 13, PIP: 31  Blood Gas  04-18-24 @ 04:00  pH 7.41 | pCO2 45 | pO2 119 | HCO3 28 | O2 Sat 99.7  04-17-24 @ 12:26  pH 7.42 | pCO2 42 | pO2 191 | HCO3 27 | O2 Sat 99.7    Drips  fentaNYL   Infusion 0.5 MICROgram(s)/kG/Hr (2 mL/Hr) IV Continuous <Continuous>    I&Os:    04-17-24 @ 07:01  -  04-18-24 @ 07:00  --------------------------------------------------------  IN: 1107 mL / OUT: 1800 mL / NET: -693 mL    04-18-24 @ 07:01  -  04-18-24 @ 08:13  --------------------------------------------------------  IN: 0 mL / OUT: 30 mL / NET: -30 mL      PHYSICAL EXAM:  VITAL SIGNS: I have reviewed nursing notes and confirm.  CONSTITUTIONAL: Intubated in NAD  SKIN: Warm dry, normal skin turgor  HEAD: NCAT  EYES: Involuntary eye opening; pupils non-reactive; chemosis noted   ENT: intubated  NECK: Supple;  CARD: RRR, no murmurs, rubs or gallops  RESP: rhonchi bilateral, unchanged.  ABD: Soft, non-distended  NEURO: gag, corneal reflexes present; withdraws to pain, no other purposeful movements noted      ACTIVE MEDICATIONS:  Standing  artificial  tears Solution 1 Drop(s) Both EYES three times a day  chlorhexidine 0.12% Liquid 15 milliLiter(s) Oral Mucosa every 12 hours  chlorhexidine 2% Cloths 1 Application(s) Topical daily  chlorhexidine 2% Cloths 1 Application(s) Topical <User Schedule>  fentaNYL   Infusion 0.5 MICROgram(s)/kG/Hr (2 mL/Hr) IV Continuous <Continuous>  heparin   Injectable 5000 Unit(s) SubCutaneous every 12 hours  labetalol 100 milliGRAM(s) Oral three times a day  levothyroxine 100 MICROGram(s) Oral daily  lidocaine 2% Viscous 10 milliLiter(s) Oral once  lisinopril 20 milliGRAM(s) Oral daily  pantoprazole  Injectable 40 milliGRAM(s) IV Push daily  petrolatum Ophthalmic Ointment 1 Application(s) Both EYES daily  sodium zirconium cyclosilicate 10 Gram(s) Oral three times a day    PRN  hydrALAZINE 10 milliGRAM(s) Oral every 4 hours PRN  labetalol Injectable 20 milliGRAM(s) IV Push every 4 hours PRN    LABS:  04-18-24 @ 05:32  WBC 14.05<H>, H/H 9.1<L>/27.2<L>, plt 386  Na 132<L>, K 4.8, Cl 97<L>, CO2 27, BUN 28<H>, Cr 1.2, Glu 102<H>    Ca 8.3<L>, Mg --, Phosphorus --    Tot Protein 6.2, Alb 2.4<L>, T. Bili 0.2, D. Bili --  AST 35, ALT 11, Alk phos 156<H>    04-17-24 @ 15:20  WBC --, H/H --/--, plt --  Na 129<L>, K 5.3<H>, Cl 97<L>, CO2 19, BUN 30<H>, Cr 1.3, Glu 114<H>    Ca 8.0<L>, Mg --, Phosphorus --    Tot Protein --, Alb --, T. Bili --, D. Bili --  AST --, ALT --, Alk phos --    04-17-24 @ 11:53  WBC --, H/H --/--, plt --  Na 126<L>, K 6.3<HH>, Cl 93<L>, CO2 23, BUN 30<H>, Cr 1.3, Glu 121<H>    Ca 8.1<L>, Mg --, Phosphorus --    Tot Protein --, Alb --, T. Bili --, D. Bili --  AST --, ALT --, Alk phos --      04-17-24 @ 15:20:  PT 10.90, INR 0.96, aPTT 32.2      04-06-24 @ 19:55:  TSH 2.94                  CULTURES:    Culture - Urine (collected 04-06-24 @ 20:15)  Source: Clean Catch Clean Catch (Midstream)  Final Report (04-08-24 @ 16:27):    No growth    Culture - Blood (collected 04-06-24 @ 12:05)  Source: .Blood Blood-Venous  Final Report (04-11-24 @ 20:00):    No growth at 5 days    Culture - Blood (collected 04-06-24 @ 12:05)  Source: .Blood Blood-Venous  Final Report (04-11-24 @ 20:00):    No growth at 5 days      PENDING:  Antibody Screen Interpretation: 08:00 (04-18-24 @ 08:10)  Phosphorus: STAT (04-18-24 @ 07:23)  Magnesium: STAT (04-18-24 @ 07:23)  Type + Screen: STAT (04-18-24 @ 07:23)    IMAGING:  Latest imaging reviewed.    ECHO:  TTE Echo Complete w/o Contrast w/ Doppler:   Transport: [facility]  Monitor: w/ Monitor,  Transport w/ Ventilator,  Transport w/ IV Pole (04-06-24 @ 16:26)   KIRAN MORFIN, 74y, Female; MRN# [id number]  Hospital Stay: 12d.    Patient is a 74y old Female who presents with a chief complaint of cardiac arrest (18 Apr 2024 08:02)  Currently admitted to the ICU with the primary diagnosis of Cardiac arrest      SUBJECTIVE:  Interval/Overnight events: Fentanyl restarted for patient comfort; no other acute events overnight. Bedside percutaneous trach today.     REVIEW OF SYSTEMS:  As per HPI  OBJECTIVE:  VITALS:  T(F): 98.2 (04-18-24 @ 07:01), Max: 98.2 (04-18-24 @ 07:01)  HR: 63 (04-18-24 @ 07:51) (59 - 73)  BP: 160/71 (04-18-24 @ 07:32) (132/60 - 190/76)  ABP: --  ABP(mean): --  RR: 26 (04-18-24 @ 07:32) (12 - 30)  SpO2: 100% (04-18-24 @ 07:51) (99% - 100%)    Vent Settings  Device: bellavista, Mode: AC/ CMV (Assist Control/ Continuous Mandatory Ventilation), RR (machine): 26, TV (machine): 300, FiO2: 40, PEEP: 5, MAP: 13, PIP: 31  Blood Gas  04-18-24 @ 04:00  pH 7.41 | pCO2 45 | pO2 119 | HCO3 28 | O2 Sat 99.7  04-17-24 @ 12:26  pH 7.42 | pCO2 42 | pO2 191 | HCO3 27 | O2 Sat 99.7    Drips  fentaNYL   Infusion 0.5 MICROgram(s)/kG/Hr (2 mL/Hr) IV Continuous <Continuous>    I&Os:    04-17-24 @ 07:01  -  04-18-24 @ 07:00  --------------------------------------------------------  IN: 1107 mL / OUT: 1800 mL / NET: -693 mL    04-18-24 @ 07:01  -  04-18-24 @ 08:13  --------------------------------------------------------  IN: 0 mL / OUT: 30 mL / NET: -30 mL      PHYSICAL EXAM:  VITAL SIGNS: I have reviewed nursing notes and confirm.  CONSTITUTIONAL: Intubated in NAD  SKIN: Warm dry, normal skin turgor  HEAD: NCAT  EYES: Involuntary eye opening; pupils non-reactive; chemosis noted   ENT: intubated  NECK: Supple;  CARD: RRR, no murmurs, rubs or gallops  RESP: rhonchi bilateral, unchanged.  ABD: Soft, non-distended  NEURO: gag, corneal reflexes present; withdraws to pain, no other purposeful movements noted      ACTIVE MEDICATIONS:  Standing  artificial  tears Solution 1 Drop(s) Both EYES three times a day  chlorhexidine 0.12% Liquid 15 milliLiter(s) Oral Mucosa every 12 hours  chlorhexidine 2% Cloths 1 Application(s) Topical daily  chlorhexidine 2% Cloths 1 Application(s) Topical <User Schedule>  fentaNYL   Infusion 0.5 MICROgram(s)/kG/Hr (2 mL/Hr) IV Continuous <Continuous>  heparin   Injectable 5000 Unit(s) SubCutaneous every 12 hours  labetalol 100 milliGRAM(s) Oral three times a day  levothyroxine 100 MICROGram(s) Oral daily  lidocaine 2% Viscous 10 milliLiter(s) Oral once  lisinopril 20 milliGRAM(s) Oral daily  pantoprazole  Injectable 40 milliGRAM(s) IV Push daily  petrolatum Ophthalmic Ointment 1 Application(s) Both EYES daily  sodium zirconium cyclosilicate 10 Gram(s) Oral three times a day    PRN  hydrALAZINE 10 milliGRAM(s) Oral every 4 hours PRN  labetalol Injectable 20 milliGRAM(s) IV Push every 4 hours PRN    LABS:  04-18-24 @ 05:32  WBC 14.05<H>, H/H 9.1<L>/27.2<L>, plt 386  Na 132<L>, K 4.8, Cl 97<L>, CO2 27, BUN 28<H>, Cr 1.2, Glu 102<H>    Ca 8.3<L>, Mg --, Phosphorus --    Tot Protein 6.2, Alb 2.4<L>, T. Bili 0.2, D. Bili --  AST 35, ALT 11, Alk phos 156<H>    04-17-24 @ 15:20  WBC --, H/H --/--, plt --  Na 129<L>, K 5.3<H>, Cl 97<L>, CO2 19, BUN 30<H>, Cr 1.3, Glu 114<H>    Ca 8.0<L>, Mg --, Phosphorus --    Tot Protein --, Alb --, T. Bili --, D. Bili --  AST --, ALT --, Alk phos --    04-17-24 @ 11:53  WBC --, H/H --/--, plt --  Na 126<L>, K 6.3<HH>, Cl 93<L>, CO2 23, BUN 30<H>, Cr 1.3, Glu 121<H>    Ca 8.1<L>, Mg --, Phosphorus --    Tot Protein --, Alb --, T. Bili --, D. Bili --  AST --, ALT --, Alk phos --      04-17-24 @ 15:20:  PT 10.90, INR 0.96, aPTT 32.2      04-06-24 @ 19:55:  TSH 2.94                  CULTURES:    Culture - Urine (collected 04-06-24 @ 20:15)  Source: Clean Catch Clean Catch (Midstream)  Final Report (04-08-24 @ 16:27):    No growth    Culture - Blood (collected 04-06-24 @ 12:05)  Source: .Blood Blood-Venous  Final Report (04-11-24 @ 20:00):    No growth at 5 days    Culture - Blood (collected 04-06-24 @ 12:05)  Source: .Blood Blood-Venous  Final Report (04-11-24 @ 20:00):    No growth at 5 days      PENDING:  Antibody Screen Interpretation: 08:00 (04-18-24 @ 08:10)  Phosphorus: STAT (04-18-24 @ 07:23)  Magnesium: STAT (04-18-24 @ 07:23)  Type + Screen: STAT (04-18-24 @ 07:23)    IMAGING:  Latest imaging reviewed.    ECHO:  TTE Echo Complete w/o Contrast w/ Doppler:   Transport: [facility]  Monitor: w/ Monitor,  Transport w/ Ventilator,  Transport w/ IV Jose (04-06-24 @ 16:26)

## 2024-04-19 ENCOUNTER — TRANSCRIPTION ENCOUNTER (OUTPATIENT)
Age: 74
End: 2024-04-19

## 2024-04-19 LAB
ALBUMIN SERPL ELPH-MCNC: 2.2 G/DL — LOW (ref 3.5–5.2)
ALP SERPL-CCNC: 156 U/L — HIGH (ref 30–115)
ALT FLD-CCNC: 11 U/L — SIGNIFICANT CHANGE UP (ref 0–41)
ANION GAP SERPL CALC-SCNC: 8 MMOL/L — SIGNIFICANT CHANGE UP (ref 7–14)
APTT BLD: 31.2 SEC — SIGNIFICANT CHANGE UP (ref 27–39.2)
AST SERPL-CCNC: 35 U/L — SIGNIFICANT CHANGE UP (ref 0–41)
BASE EXCESS BLDA CALC-SCNC: 5 MMOL/L — HIGH (ref -2–3)
BILIRUB SERPL-MCNC: 0.2 MG/DL — SIGNIFICANT CHANGE UP (ref 0.2–1.2)
BUN SERPL-MCNC: 29 MG/DL — HIGH (ref 10–20)
CALCIUM SERPL-MCNC: 8 MG/DL — LOW (ref 8.4–10.5)
CHLORIDE SERPL-SCNC: 97 MMOL/L — LOW (ref 98–110)
CO2 SERPL-SCNC: 28 MMOL/L — SIGNIFICANT CHANGE UP (ref 17–32)
CREAT SERPL-MCNC: 1.3 MG/DL — SIGNIFICANT CHANGE UP (ref 0.7–1.5)
EGFR: 43 ML/MIN/1.73M2 — LOW
GAS PNL BLDA: SIGNIFICANT CHANGE UP
GLUCOSE BLDC GLUCOMTR-MCNC: 113 MG/DL — HIGH (ref 70–99)
GLUCOSE BLDC GLUCOMTR-MCNC: 127 MG/DL — HIGH (ref 70–99)
GLUCOSE SERPL-MCNC: 109 MG/DL — HIGH (ref 70–99)
HCO3 BLDA-SCNC: 30 MMOL/L — HIGH (ref 21–28)
HCT VFR BLD CALC: 24.5 % — LOW (ref 37–47)
HGB BLD-MCNC: 8.3 G/DL — LOW (ref 12–16)
HOROWITZ INDEX BLDA+IHG-RTO: 40 — SIGNIFICANT CHANGE UP
INR BLD: 1.05 RATIO — SIGNIFICANT CHANGE UP (ref 0.65–1.3)
MCHC RBC-ENTMCNC: 26.5 PG — LOW (ref 27–31)
MCHC RBC-ENTMCNC: 33.9 G/DL — SIGNIFICANT CHANGE UP (ref 32–37)
MCV RBC AUTO: 78.3 FL — LOW (ref 81–99)
NRBC # BLD: 0 /100 WBCS — SIGNIFICANT CHANGE UP (ref 0–0)
PCO2 BLDA: 45 MMHG — SIGNIFICANT CHANGE UP (ref 32–45)
PH BLDA: 7.43 — SIGNIFICANT CHANGE UP (ref 7.35–7.45)
PLATELET # BLD AUTO: 344 K/UL — SIGNIFICANT CHANGE UP (ref 130–400)
PMV BLD: 10.9 FL — HIGH (ref 7.4–10.4)
PO2 BLDA: 92 MMHG — SIGNIFICANT CHANGE UP (ref 83–108)
POTASSIUM SERPL-MCNC: 4.9 MMOL/L — SIGNIFICANT CHANGE UP (ref 3.5–5)
POTASSIUM SERPL-SCNC: 4.9 MMOL/L — SIGNIFICANT CHANGE UP (ref 3.5–5)
PROT SERPL-MCNC: 5.9 G/DL — LOW (ref 6–8)
PROTHROM AB SERPL-ACNC: 12 SEC — SIGNIFICANT CHANGE UP (ref 9.95–12.87)
RBC # BLD: 3.13 M/UL — LOW (ref 4.2–5.4)
RBC # FLD: 16.5 % — HIGH (ref 11.5–14.5)
SAO2 % BLDA: 99 % — HIGH (ref 94–98)
SODIUM SERPL-SCNC: 133 MMOL/L — LOW (ref 135–146)
WBC # BLD: 14.03 K/UL — HIGH (ref 4.8–10.8)
WBC # FLD AUTO: 14.03 K/UL — HIGH (ref 4.8–10.8)

## 2024-04-19 PROCEDURE — 43235 EGD DIAGNOSTIC BRUSH WASH: CPT

## 2024-04-19 PROCEDURE — 71045 X-RAY EXAM CHEST 1 VIEW: CPT | Mod: 26,77

## 2024-04-19 PROCEDURE — 99233 SBSQ HOSP IP/OBS HIGH 50: CPT

## 2024-04-19 PROCEDURE — 71045 X-RAY EXAM CHEST 1 VIEW: CPT | Mod: 26

## 2024-04-19 RX ORDER — PROPOFOL 10 MG/ML
10 INJECTION, EMULSION INTRAVENOUS
Qty: 1000 | Refills: 0 | Status: DISCONTINUED | OUTPATIENT
Start: 2024-04-19 | End: 2024-04-25

## 2024-04-19 RX ORDER — CEFAZOLIN SODIUM 1 G
1000 VIAL (EA) INJECTION ONCE
Refills: 0 | Status: COMPLETED | OUTPATIENT
Start: 2024-04-19 | End: 2024-04-19

## 2024-04-19 RX ORDER — SODIUM CHLORIDE 9 MG/ML
1000 INJECTION, SOLUTION INTRAVENOUS
Refills: 0 | Status: DISCONTINUED | OUTPATIENT
Start: 2024-04-19 | End: 2024-04-24

## 2024-04-19 RX ADMIN — PROPOFOL 2.4 MICROGRAM(S)/KG/MIN: 10 INJECTION, EMULSION INTRAVENOUS at 14:06

## 2024-04-19 RX ADMIN — Medication 1 DROP(S): at 05:27

## 2024-04-19 RX ADMIN — CHLORHEXIDINE GLUCONATE 1 APPLICATION(S): 213 SOLUTION TOPICAL at 05:28

## 2024-04-19 RX ADMIN — PANTOPRAZOLE SODIUM 40 MILLIGRAM(S): 20 TABLET, DELAYED RELEASE ORAL at 12:47

## 2024-04-19 RX ADMIN — Medication 100 MILLIGRAM(S): at 14:01

## 2024-04-19 RX ADMIN — FENTANYL CITRATE 2 MICROGRAM(S)/KG/HR: 50 INJECTION INTRAVENOUS at 12:41

## 2024-04-19 RX ADMIN — HEPARIN SODIUM 5000 UNIT(S): 5000 INJECTION INTRAVENOUS; SUBCUTANEOUS at 17:35

## 2024-04-19 RX ADMIN — CHLORHEXIDINE GLUCONATE 1 APPLICATION(S): 213 SOLUTION TOPICAL at 21:08

## 2024-04-19 RX ADMIN — Medication 1 APPLICATION(S): at 12:41

## 2024-04-19 RX ADMIN — Medication 1 DROP(S): at 21:04

## 2024-04-19 RX ADMIN — Medication 100 MILLIGRAM(S): at 14:35

## 2024-04-19 RX ADMIN — Medication 100 MICROGRAM(S): at 05:27

## 2024-04-19 RX ADMIN — CHLORHEXIDINE GLUCONATE 15 MILLILITER(S): 213 SOLUTION TOPICAL at 05:30

## 2024-04-19 RX ADMIN — SODIUM CHLORIDE 50 MILLILITER(S): 9 INJECTION, SOLUTION INTRAVENOUS at 12:41

## 2024-04-19 RX ADMIN — LISINOPRIL 20 MILLIGRAM(S): 2.5 TABLET ORAL at 05:28

## 2024-04-19 RX ADMIN — SODIUM CHLORIDE 50 MILLILITER(S): 9 INJECTION, SOLUTION INTRAVENOUS at 05:29

## 2024-04-19 RX ADMIN — Medication 100 MILLIGRAM(S): at 21:02

## 2024-04-19 RX ADMIN — Medication 1 DROP(S): at 13:28

## 2024-04-19 RX ADMIN — FENTANYL CITRATE 2 MICROGRAM(S)/KG/HR: 50 INJECTION INTRAVENOUS at 01:22

## 2024-04-19 RX ADMIN — CHLORHEXIDINE GLUCONATE 15 MILLILITER(S): 213 SOLUTION TOPICAL at 17:35

## 2024-04-19 NOTE — PROGRESS NOTE ADULT - SUBJECTIVE AND OBJECTIVE BOX
Patient is a 74y old  Female who presents with a chief complaint of cardiac arrest (18 Apr 2024 13:37)      Over Night Events:  Patient seen and examined.   s/p trach did well   off sedation only open eyes     ROS:  See HPI    PHYSICAL EXAM    ICU Vital Signs Last 24 Hrs  T(C): 37.1 (19 Apr 2024 07:10), Max: 37.3 (18 Apr 2024 21:00)  T(F): 98.8 (19 Apr 2024 07:10), Max: 99.1 (18 Apr 2024 21:00)  HR: 60 (19 Apr 2024 07:05) (59 - 85)  BP: 107/58 (19 Apr 2024 07:05) (87/52 - 190/82)  BP(mean): 78 (19 Apr 2024 07:05) (65 - 118)  ABP: --  ABP(mean): --  RR: 25 (19 Apr 2024 07:10) (11 - 34)  SpO2: 100% (19 Apr 2024 07:05) (92% - 100%)    O2 Parameters below as of 19 Apr 2024 07:05  Patient On (Oxygen Delivery Method): ventilator            General: open eyes   HEENT:              trach   Lymph Nodes: NO cervical LN   Lungs: Bilateral BS  Cardiovascular: Regular   Abdomen: Soft, Positive BS  Extremities: No clubbing   Skin: warm   Neurological: positive gag   Musculoskeletal: move all ext     I&O's Detail    18 Apr 2024 07:01  -  19 Apr 2024 07:00  --------------------------------------------------------  IN:    FentaNYL: 146 mL    FentaNYL: 126 mL    Free Water: 150 mL    Lactated Ringers: 100 mL    Osmolite 1.2: 350 mL    Propofol: 6 mL  Total IN: 878 mL    OUT:    Indwelling Catheter - Urethral (mL): 655 mL    Rectal Tube (mL): 200 mL  Total OUT: 855 mL    Total NET: 23 mL      19 Apr 2024 07:01  -  19 Apr 2024 07:54  --------------------------------------------------------  IN:  Total IN: 0 mL    OUT:    Indwelling Catheter - Urethral (mL): 5 mL  Total OUT: 5 mL    Total NET: -5 mL          LABS:                          8.3    14.03 )-----------( 344      ( 19 Apr 2024 05:45 )             24.5         19 Apr 2024 05:45    133    |  97     |  29     ----------------------------<  109    4.9     |  28     |  1.3      Ca    8.0        19 Apr 2024 05:45  Phos  4.3       18 Apr 2024 08:00  Mg     2.0       18 Apr 2024 08:00    TPro  5.9    /  Alb  2.2    /  TBili  0.2    /  DBili  x      /  AST  35     /  ALT  11     /  AlkPhos  156    19 Apr 2024 05:45  Amylase x     lipase x                                                 PT/INR - ( 19 Apr 2024 05:45 )   PT: 12.00 sec;   INR: 1.05 ratio         PTT - ( 19 Apr 2024 05:45 )  PTT:31.2 sec                                       Urinalysis Basic - ( 19 Apr 2024 05:45 )    Color: x / Appearance: x / SG: x / pH: x  Gluc: 109 mg/dL / Ketone: x  / Bili: x / Urobili: x   Blood: x / Protein: x / Nitrite: x   Leuk Esterase: x / RBC: x / WBC x   Sq Epi: x / Non Sq Epi: x / Bacteria: x                                                                Culture - Bronchial (collected 18 Apr 2024 11:00)  Source: Bronch Wash None  Gram Stain (18 Apr 2024 23:07):    Moderate polymorphonuclear leukocytes per low power field    No Squamous epithelial cells per low power field    No organisms seen per oil power field                                                   Mode: AC/ CMV (Assist Control/ Continuous Mandatory Ventilation)  RR (machine): 26  TV (machine): 300  FiO2: 40  PEEP: 5  MAP: 10  PIP: 25                                      ABG - ( 19 Apr 2024 04:02 )  pH, Arterial: 7.43  pH, Blood: x     /  pCO2: 45    /  pO2: 92    / HCO3: 30    / Base Excess: 5.0   /  SaO2: 99.0                MEDICATIONS  (STANDING):  artificial  tears Solution 1 Drop(s) Both EYES three times a day  chlorhexidine 0.12% Liquid 15 milliLiter(s) Oral Mucosa every 12 hours  chlorhexidine 2% Cloths 1 Application(s) Topical daily  chlorhexidine 2% Cloths 1 Application(s) Topical <User Schedule>  fentaNYL   Infusion 0.5 MICROgram(s)/kG/Hr (2 mL/Hr) IV Continuous <Continuous>  heparin   Injectable 5000 Unit(s) SubCutaneous every 12 hours  labetalol 100 milliGRAM(s) Oral three times a day  lactated ringers. 1000 milliLiter(s) (50 mL/Hr) IV Continuous <Continuous>  levothyroxine 100 MICROGram(s) Oral daily  lidocaine 1%/epinephrine 1:100,000 Inj 20 milliLiter(s) Local Injection once  lidocaine 2% Viscous 10 milliLiter(s) Oral once  lisinopril 20 milliGRAM(s) Oral daily  pantoprazole  Injectable 40 milliGRAM(s) IV Push daily  petrolatum Ophthalmic Ointment 1 Application(s) Both EYES daily    MEDICATIONS  (PRN):  hydrALAZINE 10 milliGRAM(s) Oral every 4 hours PRN Systolic blood pressure >180  labetalol Injectable 20 milliGRAM(s) IV Push every 4 hours PRN Systolic blood pressure >180          Xrays:  TLC:  OG:  ET tube:                                                                                    stable b/l opacity    ECHO:  [facility] ICU:

## 2024-04-19 NOTE — PROVIDER CONTACT NOTE (OTHER) - SITUATION
Notified STEFANI Lujan that patient has had <30ml/hour urine output x2 hours from hill catheter.
Patient's /99 pt seems agitated

## 2024-04-19 NOTE — PROGRESS NOTE ADULT - NSPROGADDITIONALINFOA_GEN_ALL_CORE
I saw and examined the patient.  Agree with above assessment plan.  Tracheostomy went well yesterday.  Apparently GI may consider performing PEG.  Follow-up GI for their impression.  I would recommend consultation with IR to see if she can have a percutaneous gastrostomy placed.  Otherwise I may offer PEG tube attempt on Wednesday or Thursday.  Thank you I saw and examined the patient.  Agree with above assessment plan.  Tracheostomy went well yesterday.  Apparently GI may consider performing PEG.  Follow-up GI for their impression.  I would recommend consultation with IR to see if she can have a percutaneous gastrostomy placed.  Otherwise I may offer PEG tube attempt on Wednesday or Thursday and if unsuccessful, consider open gastrostomy if family is willing to consider that more aggressive approach.  Thank you

## 2024-04-19 NOTE — PROGRESS NOTE ADULT - ASSESSMENT
74-year-old female with PHMx of pancreatic CA s/p Whipple, CKD stage 3, HTN, COPD, pulmonary fibrosis, PRESS syndrome (hospitalized in 2019), Raynaud's syndrome, ANCA vasculitis, Scleroderma, hypothyroid  presents to ED due to cardiac arrest. Pt lives at home with son and daughter, went to use the bathroom and became unresponsive.  EMS intubated her and gave her 4 rounds of epinephrine.  ROSC was achieved within a minute of her arrival to the ED.  Patient was found to be hypothermic and bradycardic    acute respiratory failure / cardiac arrest / anoxic brain injury / HTN / aspiration pneumonia      - s/p trach yesterday   - PEG placement today as per GI   - anoxic brain injury as seen on CT brain and EEG   - DC planning to NH   - completed antibiotic course    - Labetalol / lisinopril    - DVT and GI prophylaxis   - I discussed plan with CC        50 minutes total spent today on patient care

## 2024-04-19 NOTE — PROGRESS NOTE ADULT - SUBJECTIVE AND OBJECTIVE BOX
KIRAN MORFIN, 74y, Female; MRN# [id number]  Hospital Stay: 13d.    Patient is a 74y old Female who presents with a chief complaint of cardiac arrest (19 Apr 2024 11:30)  Currently admitted to the ICU with the primary diagnosis of Cardiac arrest      SUBJECTIVE:  Interval/Overnight events: No acute events overnight; Pending PEG    REVIEW OF SYSTEMS:  As per HPI  OBJECTIVE:  VITALS:  T(F): 100 (04-19-24 @ 10:59), Max: 100 (04-19-24 @ 10:59)  HR: 81 (04-19-24 @ 09:00) (60 - 81)  BP: 137/66 (04-19-24 @ 09:00) (90/55 - 170/77)  ABP: --  ABP(mean): --  RR: 23 (04-19-24 @ 10:59) (23 - 34)  SpO2: 100% (04-19-24 @ 10:59) (92% - 100%)    Vent Settings  Device: Bellavista, Mode: AC/ CMV (Assist Control/ Continuous Mandatory Ventilation), RR (machine): 26, TV (machine): 300, FiO2: 40, PEEP: 5, MAP: 10, PIP: 25  Blood Gas  04-19-24 @ 04:02  pH 7.43 | pCO2 45 | pO2 92 | HCO3 30 | O2 Sat 99.0    Drips  fentaNYL   Infusion 0.5 MICROgram(s)/kG/Hr (2 mL/Hr) IV Continuous <Continuous>  lactated ringers. 1000 milliLiter(s) (50 mL/Hr) IV Continuous <Continuous>    I&Os:    04-18-24 @ 07:01  -  04-19-24 @ 07:00  --------------------------------------------------------  IN: 878 mL / OUT: 855 mL / NET: 23 mL    04-19-24 @ 07:01  -  04-19-24 @ 11:38  --------------------------------------------------------  IN: 0 mL / OUT: 55 mL / NET: -55 mL      PHYSICAL EXAM:    ACTIVE MEDICATIONS:  Standing  artificial  tears Solution 1 Drop(s) Both EYES three times a day  ceFAZolin   IVPB 1000 milliGRAM(s) IV Intermittent once  chlorhexidine 0.12% Liquid 15 milliLiter(s) Oral Mucosa every 12 hours  chlorhexidine 2% Cloths 1 Application(s) Topical daily  chlorhexidine 2% Cloths 1 Application(s) Topical <User Schedule>  fentaNYL   Infusion 0.5 MICROgram(s)/kG/Hr (2 mL/Hr) IV Continuous <Continuous>  heparin   Injectable 5000 Unit(s) SubCutaneous every 12 hours  labetalol 100 milliGRAM(s) Oral three times a day  lactated ringers. 1000 milliLiter(s) (50 mL/Hr) IV Continuous <Continuous>  levothyroxine 100 MICROGram(s) Oral daily  lidocaine 1%/epinephrine 1:100,000 Inj 20 milliLiter(s) Local Injection once  lidocaine 2% Viscous 10 milliLiter(s) Oral once  lisinopril 20 milliGRAM(s) Oral daily  pantoprazole  Injectable 40 milliGRAM(s) IV Push daily  petrolatum Ophthalmic Ointment 1 Application(s) Both EYES daily    PRN  hydrALAZINE 10 milliGRAM(s) Oral every 4 hours PRN  labetalol Injectable 20 milliGRAM(s) IV Push every 4 hours PRN    LABS:  04-19-24 @ 05:45  WBC 14.03<H>, H/H 8.3<L>/24.5<L>, plt 344  Na 133<L>, K 4.9, Cl 97<L>, CO2 28, BUN 29<H>, Cr 1.3, Glu 109<H>    Ca 8.0<L>, Mg --, Phosphorus --    Tot Protein 5.9<L>, Alb 2.2<L>, T. Bili 0.2, D. Bili --  AST 35, ALT 11, Alk phos 156<H>      04-19-24 @ 05:45:  PT 12.00, INR 1.05, aPTT 31.2  04-17-24 @ 15:20:  PT 10.90, INR 0.96, aPTT 32.2      04-06-24 @ 19:55:  TSH 2.94                  CULTURES:    Culture - Bronchial (collected 04-18-24 @ 11:00)  Source: Bronch Wash None  Gram Stain (04-18-24 @ 23:07):    Moderate polymorphonuclear leukocytes per low power field    No Squamous epithelial cells per low power field    No organisms seen per oil power field    Culture - Urine (collected 04-06-24 @ 20:15)  Source: Clean Catch Clean Catch (Midstream)  Final Report (04-08-24 @ 16:27):    No growth    Culture - Blood (collected 04-06-24 @ 12:05)  Source: .Blood Blood-Venous  Final Report (04-11-24 @ 20:00):    No growth at 5 days    Culture - Blood (collected 04-06-24 @ 12:05)  Source: .Blood Blood-Venous  Final Report (04-11-24 @ 20:00):    No growth at 5 days      PENDING:    IMAGING:  Latest imaging reviewed.    ECHO:  TTE Echo Complete w/o Contrast w/ Doppler:   Transport: Stretcher-Crib  Monitor: w/ Monitor,  Transport w/ Ventilator,  Transport w/ IV Pole (04-06-24 @ 16:26)

## 2024-04-19 NOTE — PROGRESS NOTE ADULT - ASSESSMENT
74F with PHMx of pancreatic CA s/p Whipple, CKD stage 3, HTN, COPD, pulmonary fibrosis, PRESS syndrome (hospitalized in 2019), Raynaud's syndrome, ANCA vasculitis, Scleroderma presents to ED due to cardiac arrest.    PLAN:  #S/p Cardiac Arrest with Anoxic Brain Injury  #Acute Hypoxic Respiratory Failure   - S/p bedside percutaneous tracheostomy (7.0 Portex) POD #1   - Trach sutures to be removed after 10 days (4/28)   - Will plan for possible PEG next week if GI unable (supposed to perform bedside today)   - Remainder of care per ICU   - Surgery following    General Surgery  SPECTRA [id number]

## 2024-04-19 NOTE — PROGRESS NOTE ADULT - SUBJECTIVE AND OBJECTIVE BOX
Patient seen and evaluated this am, sedated on ventilator with fentanyl      T(F): 98.8 (04-19-24 @ 07:10), Max: 99.1 (04-18-24 @ 21:00)  HR: 71 (04-19-24 @ 08:00)  BP: 168/81 (04-19-24 @ 08:00)  RR: 30 (04-19-24 @ 08:00)  SpO2: 100% (04-19-24 @ 08:00) (92% - 100%)    PHYSICAL EXAM:  GENERAL: NAD  HEAD:  Atraumatic, Normocephalic  EYES: EOMI, PERRLA, conjunctiva and sclera clear  NERVOUS SYSTEM: positive gag reflex  CHEST/LUNG:  bilateral rhonchi  HEART: Regular rate and rhythm; No murmurs, rubs, or gallops  ABDOMEN: Soft, Nontender, Nondistended; Bowel sounds present  EXTREMITIES:  2+ Peripheral Pulses, No clubbing, cyanosis, or edema    LABS  04-19    133<L>  |  97<L>  |  29<H>  ----------------------------<  109<H>  4.9   |  28  |  1.3    Ca    8.0<L>      19 Apr 2024 05:45  Phos  4.3     04-18  Mg     2.0     04-18    TPro  5.9<L>  /  Alb  2.2<L>  /  TBili  0.2  /  DBili  x   /  AST  35  /  ALT  11  /  AlkPhos  156<H>  04-19                          8.3    14.03 )-----------( 344      ( 19 Apr 2024 05:45 )             24.5     PT/INR - ( 19 Apr 2024 05:45 )   PT: 12.00 sec;   INR: 1.05 ratio         PTT - ( 19 Apr 2024 05:45 )  PTT:31.2 sec    Mode: AC/ CMV (Assist Control/ Continuous Mandatory Ventilation)  RR (machine): 26  TV (machine): 300  FiO2: 40  PEEP: 5      Culture Results:   No growth (04-06-24)  Culture Results:   No growth at 5 days (04-06-24)  Culture Results:   No growth at 5 days (04-06-24)    RADIOLOGY  < from: Xray Chest 1 View-PORTABLE IMMEDIATE (Xray Chest 1 View-PORTABLE IMMEDIATE .) (04.18.24 @ 11:41) >  Essentially stable bilateral opacities.    < end of copied text >    MEDICATIONS  (STANDING):  artificial  tears Solution 1 Drop(s) Both EYES three times a day  ceFAZolin   IVPB 1000 milliGRAM(s) IV Intermittent once  chlorhexidine 0.12% Liquid 15 milliLiter(s) Oral Mucosa every 12 hours  chlorhexidine 2% Cloths 1 Application(s) Topical daily  fentaNYL   Infusion 0.5 MICROgram(s)/kG/Hr (2 mL/Hr) IV Continuous <Continuous>  heparin   Injectable 5000 Unit(s) SubCutaneous every 12 hours  labetalol 100 milliGRAM(s) Oral three times a day  levothyroxine 100 MICROGram(s) Oral daily  lisinopril 20 milliGRAM(s) Oral daily  pantoprazole  Injectable 40 milliGRAM(s) IV Push daily  petrolatum Ophthalmic Ointment 1 Application(s) Both EYES daily    MEDICATIONS  (PRN):  hydrALAZINE 10 milliGRAM(s) Oral every 4 hours PRN Systolic blood pressure >180  labetalol Injectable 20 milliGRAM(s) IV Push every 4 hours PRN Systolic blood pressure >180

## 2024-04-19 NOTE — CHART NOTE - NSCHARTNOTEFT_GEN_A_CORE
patient for PEG today with patient pyloric excision history of whipple, will attempt once, if we are unable surgery team ready to try next week

## 2024-04-19 NOTE — CHART NOTE - NSCHARTNOTEFT_GEN_A_CORE
attempted PEG no safe percutaneous window achieved   -surgical team to follow up  -we placed NG tube for interim  - notified primary team    Recall GI if needed

## 2024-04-19 NOTE — CHART NOTE - NSCHARTNOTEFT_GEN_A_CORE
Registered Dietitian Follow-Up     Patient Profile Reviewed                           Yes [X]   No []     Nutrition History Previously Obtained        Yes []  No []       Pertinent  Information:    pt is 74-year-old female with PMhx of pancreatic CA s/p Whipple, CKD stage 3, HTN, COPD, pulmonary fibrosis, PRESS syndrome (hospitalized in 2019), Raynaud's syndrome, ANCA vasculitis, Scleroderma, patient was hospitalized back in Dec 2023 in [place] for 18 days for multifocal pneumonia 2/2 human metapneumovirus.   presents to ED due to cardiac arrest. As per GI no concern for cholangitis clinically pt has normal bilirubin and has no jaundice to concern for cholangitis mild transaminitis is downtrending and could be due to CPR  , + anoxic brain injury. 4/18 s/p bronch with trach. unsuccessful attempt at bedside PEG Placement today, to be rescheduled for OR next week.        Diet, NPO with Tube Feed:   Tube Feeding Modality: Orogastric  Osmolite 1.2 Lei (OSMOLITERTH)  Total Volume for 24 Hours (mL): 1080  Continuous  Starting Tube Feed Rate {mL per Hour}: 10  Until Goal Tube Feed Rate (mL per Hour): 45  Tube Feed Duration (in Hours): 24  Tube Feed Start Time: 12:00  Free Water Flush  Free Water Flush Instructions:  150 Q8hr (04-09-24 @ 11:48) [Active]    EN to be resumed via NGT, previously held for possible PEG     Anthropometrics:  - Ht. 154.9 cm   - Wt.  42.2 kg today vs 37.9 kgs upon admission   - %wt change    04-19    133<L>  |  97<L>  |  29<H>  ----------------------------<  109<H>  4.9   |  28  |  1.3    Ca    8.0<L>      19 Apr 2024 05:45  Phos  4.3     04-18  Mg     2.0     04-18    TPro  5.9<L>  /  Alb  2.2<L>  /  TBili  0.2  /  DBili  x   /  AST  35  /  ALT  11  /  AlkPhos  156<H>  04-19                          8.3    14.03 )-----------( 344      ( 19 Apr 2024 05:45 )             24.5       MEDICATIONS  (STANDING):  fentaNYL   Infusion 0.5 MICROgram(s)/kG/Hr (2 mL/Hr) IV Continuous <Continuous>  labetalol 100 milliGRAM(s) Oral three times a day  lactated ringers. 1000 milliLiter(s) (50 mL/Hr) IV Continuous <Continuous>  levothyroxine 100 MICROGram(s) Oral daily  lisinopril 20 milliGRAM(s) Oral daily  pantoprazole  Injectable 40 milliGRAM(s) IV Push daily  petrolatum Ophthalmic Ointment 1 Application(s) Both EYES daily    MEDICATIONS  (PRN):  hydrALAZINE 10 milliGRAM(s) Oral every 4 hours PRN Systolic blood pressure >180  labetalol Injectable 20 milliGRAM(s) IV Push every 4 hours PRN Systolic blood pressure >180     Physical Findings:  - Appearance:  trach to vent   - GI function: +BS, + BM  - Tubes: NGT   - Oral/Mouth cavity:  - Skin: as per wound care noted DTPI to coccyx (previously a stage I)     Nutrition Requirements  Weight Used:  37.9 kgs     Estimated Energy Needs:  5841-3267 kcals (35-40 kcal/kg/BW)  45-53g protein (1.2-1.4g/kg/BW)  1;1 kcal for estimated fluid needs 2/2 elevated BUN noted         Nutrient Intake: present EN regimen provides pt with 1080 ml + 450 ml flushes, 1296 kcals and 59g protein meeting >80% of estimated needs       [] Previous Nutrition Diagnosis:            [] Ongoing          X[] Resolved    [X] No active nutrition diagnosis identified at this time     Nutrition Intervention: maintain on present feeds     Goal/Expected Outcome: pt to continue to tolerate EN and meet >80% of estimated needs within 5 days     Indicator/Monitoring: RD to monitor tolerance to EN, labs/meds, NFPF and f/u as needed within 5 days  high risk

## 2024-04-19 NOTE — PROGRESS NOTE ADULT - SUBJECTIVE AND OBJECTIVE BOX
Patient seen and evaluated at bedside.  Intubated/Sedated    Vital Signs Last 24 Hrs  T(C): 37.1 (19 Apr 2024 07:10), Max: 37.3 (18 Apr 2024 21:00)  T(F): 98.8 (19 Apr 2024 07:10), Max: 99.1 (18 Apr 2024 21:00)  HR: 71 (19 Apr 2024 08:00) (59 - 85)  BP: 168/81 (19 Apr 2024 08:00) (87/52 - 190/82)  BP(mean): 116 (19 Apr 2024 08:00) (65 - 118)  RR: 30 (19 Apr 2024 08:00) (11 - 34)  SpO2: 100% (19 Apr 2024 08:00) (92% - 100%)    Parameters below as of 19 Apr 2024 08:00  Patient On (Oxygen Delivery Method): ventilator        General Appearance: NAD  Chest: CTA  CNS: intubated    I&O's Summary    18 Apr 2024 07:01  -  19 Apr 2024 07:00  --------------------------------------------------------  IN: 878 mL / OUT: 855 mL / NET: 23 mL    19 Apr 2024 07:01  -  19 Apr 2024 08:54  --------------------------------------------------------  IN: 0 mL / OUT: 20 mL / NET: -20 mL      I&O's Detail    18 Apr 2024 07:01  -  19 Apr 2024 07:00  --------------------------------------------------------  IN:    FentaNYL: 146 mL    FentaNYL: 126 mL    Free Water: 150 mL    Lactated Ringers: 100 mL    Osmolite 1.2: 350 mL    Propofol: 6 mL  Total IN: 878 mL    OUT:    Indwelling Catheter - Urethral (mL): 655 mL    Rectal Tube (mL): 200 mL  Total OUT: 855 mL    Total NET: 23 mL      19 Apr 2024 07:01  -  19 Apr 2024 08:54  --------------------------------------------------------  IN:  Total IN: 0 mL    OUT:    Indwelling Catheter - Urethral (mL): 20 mL  Total OUT: 20 mL    Total NET: -20 mL          MEDICATIONS  (STANDING):  artificial  tears Solution 1 Drop(s) Both EYES three times a day  ceFAZolin   IVPB 1000 milliGRAM(s) IV Intermittent once  chlorhexidine 0.12% Liquid 15 milliLiter(s) Oral Mucosa every 12 hours  chlorhexidine 2% Cloths 1 Application(s) Topical daily  chlorhexidine 2% Cloths 1 Application(s) Topical <User Schedule>  fentaNYL   Infusion 0.5 MICROgram(s)/kG/Hr (2 mL/Hr) IV Continuous <Continuous>  heparin   Injectable 5000 Unit(s) SubCutaneous every 12 hours  labetalol 100 milliGRAM(s) Oral three times a day  lactated ringers. 1000 milliLiter(s) (50 mL/Hr) IV Continuous <Continuous>  levothyroxine 100 MICROGram(s) Oral daily  lidocaine 1%/epinephrine 1:100,000 Inj 20 milliLiter(s) Local Injection once  lidocaine 2% Viscous 10 milliLiter(s) Oral once  lisinopril 20 milliGRAM(s) Oral daily  pantoprazole  Injectable 40 milliGRAM(s) IV Push daily  petrolatum Ophthalmic Ointment 1 Application(s) Both EYES daily    MEDICATIONS  (PRN):  hydrALAZINE 10 milliGRAM(s) Oral every 4 hours PRN Systolic blood pressure >180  labetalol Injectable 20 milliGRAM(s) IV Push every 4 hours PRN Systolic blood pressure >180      LABS:                        8.3    14.03 )-----------( 344      ( 19 Apr 2024 05:45 )             24.5     04-19    133<L>  |  97<L>  |  29<H>  ----------------------------<  109<H>  4.9   |  28  |  1.3    Ca    8.0<L>      19 Apr 2024 05:45  Phos  4.3     04-18  Mg     2.0     04-18    TPro  5.9<L>  /  Alb  2.2<L>  /  TBili  0.2  /  DBili  x   /  AST  35  /  ALT  11  /  AlkPhos  156<H>  04-19    PT/INR - ( 19 Apr 2024 05:45 )   PT: 12.00 sec;   INR: 1.05 ratio         PTT - ( 19 Apr 2024 05:45 )  PTT:31.2 sec  Urinalysis Basic - ( 19 Apr 2024 05:45 )    Color: x / Appearance: x / SG: x / pH: x  Gluc: 109 mg/dL / Ketone: x  / Bili: x / Urobili: x   Blood: x / Protein: x / Nitrite: x   Leuk Esterase: x / RBC: x / WBC x   Sq Epi: x / Non Sq Epi: x / Bacteria: x        RADIOLOGY & ADDITIONAL STUDIES: Reviewed

## 2024-04-19 NOTE — PROGRESS NOTE ADULT - ASSESSMENT
ASSESSMENT AND PLAN    NEUROLOGICAL:  #Anoxic Brain Injury  - Daily SAT  - Wean sedation  - Fentanyl for comfort, as needed.  - Aspiration precautions  - HOB above 30 degrees      RESPIRATORY:   #Trach/Ventilator  - S/p bedside percutaneous tracheostomy (7.0 Portex) POD #1   - Trach sutures to be removed after 10 days (4/28)  ABG - ( 19 Apr 2024 04:02 )  pH, Arterial: 7.43  pH, Blood: x     /  pCO2: 45    /  pO2: 92    / HCO3: 30    / Base Excess: 5.0   /  SaO2: 99.0    Mode: AC/ CMV (Assist Control/ Continuous Mandatory Ventilation)  RR (machine): 26  TV (machine): 300  FiO2: 40  PEEP: 5  MAP: 10  PIP: 25      CARDS:   #HTN  labetalol 100 mg oral tablet: 1 tab(s) orally 3 times a day  lisinopril 20 mg oral tablet: 1 tab(s) orally once a day      GASTROINTESTINAL/NUTRITION:   # Diet, NPO after Midnight:      NPO Start Date: 18-Apr-2024,   NPO Start Time: 23:59 [Active]  Diet, NPO after Midnight:      NPO Start Date: 18-Apr-2024,   NPO Start Time: 23:59 [Active]  Diet, NPO with Tube Feed:   Tube Feeding Modality: Orogastric  Osmolite 1.2 Lei (OSMOLITERTH)  Total Volume for 24 Hours (mL): 1080  Continuous  Starting Tube Feed Rate mL per Hour: 10  Until Goal Tube Feed Rate (mL per Hour): 45  Tube Feed Duration (in Hours): 24  Tube Feed Start Time: 12:00  Free Water Flush  Free Water Flush Instructions:  150 Q8hr [Active]    #Bowel regimen    -senna/miralax if indicated    -last bowel movement      /RENAL:   #urine output in critically ill    -indwelling hill (placed     Labs          Electrolytes-(04-19 @ 05:45)Na 133 // K 4.9 // Mg -- // Phos --          HEME/ONC:   #DVT prophylaxis     -Chemical: heparin   Injectable 5000 Unit(s) SubCutaneous every 12 hours     -Mechanical: SCDs    -if DVT prophylaxis to be held, document and place VTE order        Labs: Hb/Hct:  9.1/27.2  (04-18 @ 05:32)  -->,  8.3/24.5  (04-19 @ 05:45)  -->                      Plts:  386  -->,  344  -->                 PTT/INR:  31.2/1.05  --->       ID:  #  #Antibiotics Course  #DTI  WBC- 9.83  --->>,  14.05  --->>,  14.03  --->>  Temp trend- 24hrs T(F): 100 (04-19 @ 10:59), Max: 100 (04-19 @ 10:59)  Current antibiotics-ceFAZolin   IVPB 1000 once    Cultures:   Culture - Bronchial (collected 04-18)  Source: Bronch Wash None    Culture - Urine (collected 04-06)  Source: Clean Catch Clean Catch (Midstream)  Final Report:    No growth    Culture - Blood (collected 04-06)  Source: .Blood Blood-Venous  Final Report:    No growth at 5 days    Culture - Blood (collected 04-06)  Source: .Blood Blood-Venous  Final Report:    No growth at 5 days      ENDOCRINE:    -FSG q6 if NPO or Tube feeds    -Glucose goal 140-180. if above 180 start ISS    MSK:         LINES/DRAINS:  Seda CORRALES    ADVANCED DIRECTIVES:  Full Code    HCP/Emergency Contact-    INDICATION FOR SICU/SDU:    Diffuse anoxic brain injury    DISPO:   ICU ASSESSMENT AND PLAN    NEUROLOGICAL:  #Anoxic Brain Injury  - Daily SAT  - Wean sedation  - Fentanyl for comfort, as needed.  - Aspiration precautions  - HOB above 30 degrees      RESPIRATORY:   #Trach/Ventilator  - S/p bedside percutaneous tracheostomy (7.0 Portex) POD #1   - Trach sutures to be removed after 10 days (4/28)  ABG - ( 19 Apr 2024 04:02 )  pH, Arterial: 7.43  pH, Blood: x     /  pCO2: 45    /  pO2: 92    / HCO3: 30    / Base Excess: 5.0   /  SaO2: 99.0    Mode: AC/ CMV (Assist Control/ Continuous Mandatory Ventilation)  RR (machine): 26  TV (machine): 300  FiO2: 40  PEEP: 5  MAP: 10  PIP: 25      CARDS:   #HTN  labetalol 100 mg oral tablet: 1 tab(s) orally 3 times a day  lisinopril 20 mg oral tablet: 1 tab(s) orally once a day      GASTROINTESTINAL/NUTRITION:   # Diet, NPO after Midnight:      NPO Start Date: 18-Apr-2024,   NPO Start Time: 23:59 [Active]  Diet, NPO after Midnight:      NPO Start Date: 18-Apr-2024,   NPO Start Time: 23:59 [Active]  Diet, NPO with Tube Feed:   Tube Feeding Modality: Orogastric  Osmolite 1.2 Lei (OSMOLITERTH)  Total Volume for 24 Hours (mL): 1080  Continuous  Starting Tube Feed Rate mL per Hour: 10  Until Goal Tube Feed Rate (mL per Hour): 45  Tube Feed Duration (in Hours): 24  Tube Feed Start Time: 12:00  Free Water Flush  Free Water Flush Instructions:  150 Q8hr [Active]    #PEG Placement  - Sx plans to place PEG tube next week (Wednesday or Thursday)    #Bowel regimen    -senna/miralax if indicated    -last bowel movement      /RENAL:   #urine output in critically ill    -indwelling hill (placed     Labs          Electrolytes-(04-19 @ 05:45)Na 133 // K 4.9 // Mg -- // Phos --          HEME/ONC:   #DVT prophylaxis     -Chemical: heparin   Injectable 5000 Unit(s) SubCutaneous every 12 hours     -Mechanical: SCDs    -if DVT prophylaxis to be held, document and place VTE order        Labs: Hb/Hct:  9.1/27.2  (04-18 @ 05:32)  -->,  8.3/24.5  (04-19 @ 05:45)  -->                      Plts:  386  -->,  344  -->                 PTT/INR:  31.2/1.05  --->       ID:  #  #Antibiotics Course  #DTI  WBC- 9.83  --->>,  14.05  --->>,  14.03  --->>  Temp trend- 24hrs T(F): 100 (04-19 @ 10:59), Max: 100 (04-19 @ 10:59)  Current antibiotics-ceFAZolin   IVPB 1000 once    Cultures:   Culture - Bronchial (collected 04-18)  Source: Bronch Wash None    Culture - Urine (collected 04-06)  Source: Clean Catch Clean Catch (Midstream)  Final Report:    No growth    Culture - Blood (collected 04-06)  Source: .Blood Blood-Venous  Final Report:    No growth at 5 days    Culture - Blood (collected 04-06)  Source: .Blood Blood-Venous  Final Report:    No growth at 5 days      ENDOCRINE:    -FSG q6 if NPO or Tube feeds    -Glucose goal 140-180. if above 180 start ISS    MSK:         LINES/DRAINS:  Seda CORRALES    ADVANCED DIRECTIVES:  Full Code    HCP/Emergency Contact-    INDICATION FOR SICU/SDU:    Diffuse anoxic brain injury    DISPO:   ICU ASSESSMENT AND PLAN    NEUROLOGICAL:  #Anoxic Brain Injury  - Daily SAT  - Wean sedation  - Fentanyl for comfort, as needed.  - Aspiration precautions  - HOB above 30 degrees      RESPIRATORY:   #Trach/Ventilator  - S/p bedside percutaneous tracheostomy (7.0 Portex) POD #1   - Trach sutures to be removed after 10 days (4/28)  ABG - ( 19 Apr 2024 04:02 )  pH, Arterial: 7.43  pH, Blood: x     /  pCO2: 45    /  pO2: 92    / HCO3: 30    / Base Excess: 5.0   /  SaO2: 99.0    Mode: AC/ CMV (Assist Control/ Continuous Mandatory Ventilation)  RR (machine): 26  TV (machine): 300  FiO2: 40  PEEP: 5  MAP: 10  PIP: 25      CARDS:   #HTN  labetalol 100 mg oral tablet: 1 tab(s) orally 3 times a day  lisinopril 20 mg oral tablet: 1 tab(s) orally once a day      GASTROINTESTINAL/NUTRITION:   # Diet, NPO after Midnight:      NPO Start Date: 18-Apr-2024,   NPO Start Time: 23:59 [Active]  Diet, NPO after Midnight:      NPO Start Date: 18-Apr-2024,   NPO Start Time: 23:59 [Active]  Diet, NPO with Tube Feed:   Tube Feeding Modality: Orogastric  Osmolite 1.2 Lei (OSMOLITERTH)  Total Volume for 24 Hours (mL): 1080  Continuous  Starting Tube Feed Rate mL per Hour: 10  Until Goal Tube Feed Rate (mL per Hour): 45  Tube Feed Duration (in Hours): 24  Tube Feed Start Time: 12:00  Free Water Flush  Free Water Flush Instructions:  150 Q8hr [Active]    #PEG Placement  - Pending PEG Tube    #Bowel regimen    -senna/miralax if indicated    -last bowel movement      /RENAL:   #urine output in critically ill    -indwelling hill (placed     Labs          Electrolytes-(04-19 @ 05:45)Na 133 // K 4.9 // Mg -- // Phos --          HEME/ONC:   #DVT prophylaxis     -Chemical: heparin   Injectable 5000 Unit(s) SubCutaneous every 12 hours     -Mechanical: SCDs    -if DVT prophylaxis to be held, document and place VTE order        Labs: Hb/Hct:  9.1/27.2  (04-18 @ 05:32)  -->,  8.3/24.5  (04-19 @ 05:45)  -->                      Plts:  386  -->,  344  -->                 PTT/INR:  31.2/1.05  --->       ID:  #  #Antibiotics Course  #DTI  WBC- 9.83  --->>,  14.05  --->>,  14.03  --->>  Temp trend- 24hrs T(F): 100 (04-19 @ 10:59), Max: 100 (04-19 @ 10:59)  Current antibiotics-ceFAZolin   IVPB 1000 once    Cultures:   Culture - Bronchial (collected 04-18)  Source: Bronch Wash None    Culture - Urine (collected 04-06)  Source: Clean Catch Clean Catch (Midstream)  Final Report:    No growth    Culture - Blood (collected 04-06)  Source: .Blood Blood-Venous  Final Report:    No growth at 5 days    Culture - Blood (collected 04-06)  Source: .Blood Blood-Venous  Final Report:    No growth at 5 days      ENDOCRINE:    -FSG q6 if NPO or Tube feeds    -Glucose goal 140-180. if above 180 start ISS    MSK:         LINES/DRAINS:  Seda CORRALES    ADVANCED DIRECTIVES:  Full Code    HCP/Emergency Contact-    INDICATION FOR SICU/SDU:    Diffuse anoxic brain injury    DISPO:   ICU

## 2024-04-19 NOTE — PROGRESS NOTE ADULT - ASSESSMENT
IMPRESSION:    Anoxic brain damage  Acute Hypoxemic Respiratory Failure on MV  SP CPA downtime 30 minutes   Cardiac bradycardia   Lung Fibrosis   GIUSEPPE, Cr downtrending   HO Raynaud's  HO PRESS    HO Anemia  HO Pancreatic cancer     PLAN:    CNS: off sedation     HEENT: oral trach care     PULMONARY: keep pox >92%  monitor peak and plateau  no change in vent settings        CARDIOVASCULAR:  2D-Echo noted, continue labetalol dose     RENAL: follow is and os, trend Cr, Nephrology following, lokelma for hyperkalemia     INFECTIOUS DISEASE: cultures negative to date, procalcitonin elevated, completed  Zosyn course, nasal MRSA,   s/p abx     GASTRO: GI ppx, tube feeds follow GSx and GI for peg tube     HEMATOLOGICAL:  DVT prophylaxis.    ENDOCRINE:  Follow up FS.  Insulin protocol if needed.    MUSCULOSKELETAL: bedrest    Full Code, Palliative care following    Grave prognosis     The patient is critically ill with a high probability of further deterioration.    SDU  IMPRESSION:    Anoxic brain damage  Acute Hypoxemic Respiratory Failure on MV  SP CPA downtime 30 minutes   Cardiac bradycardia   Lung Fibrosis   GIUSEPPE, Cr downtrending   HO Raynaud's  HO PRESS    HO Anemia  HO Pancreatic cancer     PLAN:    CNS: off sedation taper off fentanyl    HEENT: oral trach care     PULMONARY: keep pox >92%  monitor peak and plateau  no change in vent settings        CARDIOVASCULAR:  2D-Echo noted, continue labetalol dose     RENAL: follow is and os, trend Cr, Nephrology following, lokelma for hyperkalemia     INFECTIOUS DISEASE: cultures negative to date, procalcitonin elevated, completed  Zosyn course, nasal MRSA,   s/p abx     GASTRO: GI ppx, tube feeds follow GSx and GI for peg tube     HEMATOLOGICAL:  DVT prophylaxis.    ENDOCRINE:  Follow up FS.  Insulin protocol if needed.    MUSCULOSKELETAL: bedrest    Full Code, Palliative care following    Grave prognosis     The patient is critically ill with a high probability of further deterioration.    micu

## 2024-04-20 LAB
ALBUMIN SERPL ELPH-MCNC: 2.4 G/DL — LOW (ref 3.5–5.2)
ALP SERPL-CCNC: 236 U/L — HIGH (ref 30–115)
ALT FLD-CCNC: 10 U/L — SIGNIFICANT CHANGE UP (ref 0–41)
ANION GAP SERPL CALC-SCNC: 10 MMOL/L — SIGNIFICANT CHANGE UP (ref 7–14)
AST SERPL-CCNC: 42 U/L — HIGH (ref 0–41)
BILIRUB SERPL-MCNC: 0.2 MG/DL — SIGNIFICANT CHANGE UP (ref 0.2–1.2)
BUN SERPL-MCNC: 27 MG/DL — HIGH (ref 10–20)
CALCIUM SERPL-MCNC: 8.4 MG/DL — SIGNIFICANT CHANGE UP (ref 8.4–10.5)
CHLORIDE SERPL-SCNC: 97 MMOL/L — LOW (ref 98–110)
CO2 SERPL-SCNC: 26 MMOL/L — SIGNIFICANT CHANGE UP (ref 17–32)
CREAT SERPL-MCNC: 1.2 MG/DL — SIGNIFICANT CHANGE UP (ref 0.7–1.5)
CULTURE RESULTS: SIGNIFICANT CHANGE UP
EGFR: 47 ML/MIN/1.73M2 — LOW
GLUCOSE SERPL-MCNC: 131 MG/DL — HIGH (ref 70–99)
HCT VFR BLD CALC: 26.9 % — LOW (ref 37–47)
HGB BLD-MCNC: 8.7 G/DL — LOW (ref 12–16)
MCHC RBC-ENTMCNC: 25.8 PG — LOW (ref 27–31)
MCHC RBC-ENTMCNC: 32.3 G/DL — SIGNIFICANT CHANGE UP (ref 32–37)
MCV RBC AUTO: 79.8 FL — LOW (ref 81–99)
NRBC # BLD: 0 /100 WBCS — SIGNIFICANT CHANGE UP (ref 0–0)
PLATELET # BLD AUTO: 363 K/UL — SIGNIFICANT CHANGE UP (ref 130–400)
PMV BLD: 11 FL — HIGH (ref 7.4–10.4)
POTASSIUM SERPL-MCNC: 4.9 MMOL/L — SIGNIFICANT CHANGE UP (ref 3.5–5)
POTASSIUM SERPL-SCNC: 4.9 MMOL/L — SIGNIFICANT CHANGE UP (ref 3.5–5)
PROT SERPL-MCNC: 6.4 G/DL — SIGNIFICANT CHANGE UP (ref 6–8)
RBC # BLD: 3.37 M/UL — LOW (ref 4.2–5.4)
RBC # FLD: 16.5 % — HIGH (ref 11.5–14.5)
SODIUM SERPL-SCNC: 133 MMOL/L — LOW (ref 135–146)
SPECIMEN SOURCE: SIGNIFICANT CHANGE UP
WBC # BLD: 11.72 K/UL — HIGH (ref 4.8–10.8)
WBC # FLD AUTO: 11.72 K/UL — HIGH (ref 4.8–10.8)

## 2024-04-20 PROCEDURE — 99233 SBSQ HOSP IP/OBS HIGH 50: CPT

## 2024-04-20 PROCEDURE — 71045 X-RAY EXAM CHEST 1 VIEW: CPT | Mod: 26

## 2024-04-20 RX ADMIN — Medication 1 DROP(S): at 13:24

## 2024-04-20 RX ADMIN — HEPARIN SODIUM 5000 UNIT(S): 5000 INJECTION INTRAVENOUS; SUBCUTANEOUS at 05:03

## 2024-04-20 RX ADMIN — Medication 1 DROP(S): at 05:15

## 2024-04-20 RX ADMIN — PANTOPRAZOLE SODIUM 40 MILLIGRAM(S): 20 TABLET, DELAYED RELEASE ORAL at 12:26

## 2024-04-20 RX ADMIN — LISINOPRIL 20 MILLIGRAM(S): 2.5 TABLET ORAL at 05:03

## 2024-04-20 RX ADMIN — SODIUM CHLORIDE 50 MILLILITER(S): 9 INJECTION, SOLUTION INTRAVENOUS at 01:23

## 2024-04-20 RX ADMIN — CHLORHEXIDINE GLUCONATE 1 APPLICATION(S): 213 SOLUTION TOPICAL at 05:15

## 2024-04-20 RX ADMIN — Medication 1 APPLICATION(S): at 12:26

## 2024-04-20 RX ADMIN — Medication 1 DROP(S): at 22:19

## 2024-04-20 RX ADMIN — HEPARIN SODIUM 5000 UNIT(S): 5000 INJECTION INTRAVENOUS; SUBCUTANEOUS at 17:29

## 2024-04-20 RX ADMIN — CHLORHEXIDINE GLUCONATE 15 MILLILITER(S): 213 SOLUTION TOPICAL at 17:28

## 2024-04-20 RX ADMIN — Medication 100 MILLIGRAM(S): at 13:19

## 2024-04-20 RX ADMIN — FENTANYL CITRATE 2 MICROGRAM(S)/KG/HR: 50 INJECTION INTRAVENOUS at 01:24

## 2024-04-20 RX ADMIN — Medication 100 MICROGRAM(S): at 05:01

## 2024-04-20 RX ADMIN — SODIUM CHLORIDE 50 MILLILITER(S): 9 INJECTION, SOLUTION INTRAVENOUS at 22:23

## 2024-04-20 RX ADMIN — CHLORHEXIDINE GLUCONATE 15 MILLILITER(S): 213 SOLUTION TOPICAL at 05:04

## 2024-04-20 RX ADMIN — Medication 100 MILLIGRAM(S): at 22:10

## 2024-04-20 RX ADMIN — CHLORHEXIDINE GLUCONATE 1 APPLICATION(S): 213 SOLUTION TOPICAL at 22:18

## 2024-04-20 NOTE — PROGRESS NOTE ADULT - SUBJECTIVE AND OBJECTIVE BOX
Patient is a 74y old  Female who presents with a chief complaint of cardiac arrest (19 Apr 2024 11:30)      Over Night Events:  Patient seen and examined.   still on fentanyl     ROS:  See HPI    PHYSICAL EXAM    ICU Vital Signs Last 24 Hrs  T(C): 36.6 (20 Apr 2024 03:00), Max: 37.9 (19 Apr 2024 12:00)  T(F): 97.9 (20 Apr 2024 03:00), Max: 100.2 (19 Apr 2024 12:00)  HR: 62 (20 Apr 2024 06:00) (56 - 883)  BP: 157/69 (20 Apr 2024 06:00) (107/58 - 180/82)  BP(mean): 99 (20 Apr 2024 06:00) (78 - 119)  ABP: --  ABP(mean): --  RR: 26 (20 Apr 2024 06:00) (18 - 35)  SpO2: 100% (20 Apr 2024 06:00) (99% - 100%)    O2 Parameters below as of 19 Apr 2024 20:00  Patient On (Oxygen Delivery Method): ventilator    O2 Concentration (%): 40        General: open eye  HEENT:              tracheostomy   Lymph Nodes: NO cervical LN   Lungs: Bilateral BS  Cardiovascular: Regular   Abdomen: Soft, Positive BS  Extremities: No clubbing   Skin: warm   Neurological: positive gag       I&O's Detail    18 Apr 2024 07:01  -  19 Apr 2024 07:00  --------------------------------------------------------  IN:    FentaNYL: 146 mL    FentaNYL: 126 mL    Free Water: 150 mL    Lactated Ringers: 100 mL    Osmolite 1.2: 350 mL    Propofol: 6 mL  Total IN: 878 mL    OUT:    Indwelling Catheter - Urethral (mL): 655 mL    Rectal Tube (mL): 200 mL  Total OUT: 855 mL    Total NET: 23 mL      19 Apr 2024 07:01  -  20 Apr 2024 06:52  --------------------------------------------------------  IN:    FentaNYL: 80 mL    Free Water: 50 mL    Lactated Ringers: 400 mL    Osmolite 1.2: 315 mL  Total IN: 845 mL    OUT:    Indwelling Catheter - Urethral (mL): 595 mL  Total OUT: 595 mL    Total NET: 250 mL          LABS:                          8.3    14.03 )-----------( 344      ( 19 Apr 2024 05:45 )             24.5         19 Apr 2024 05:45    133    |  97     |  29     ----------------------------<  109    4.9     |  28     |  1.3      Ca    8.0        19 Apr 2024 05:45  Phos  4.3       18 Apr 2024 08:00  Mg     2.0       18 Apr 2024 08:00                                               PT/INR - ( 19 Apr 2024 05:45 )   PT: 12.00 sec;   INR: 1.05 ratio         PTT - ( 19 Apr 2024 05:45 )  PTT:31.2 sec                                       Urinalysis Basic - ( 19 Apr 2024 05:45 )    Color: x / Appearance: x / SG: x / pH: x  Gluc: 109 mg/dL / Ketone: x  / Bili: x / Urobili: x   Blood: x / Protein: x / Nitrite: x   Leuk Esterase: x / RBC: x / WBC x   Sq Epi: x / Non Sq Epi: x / Bacteria: x                                                                Culture - Bronchial (collected 18 Apr 2024 11:00)  Source: Bronch Wash None  Gram Stain (18 Apr 2024 23:07):    Moderate polymorphonuclear leukocytes per low power field    No Squamous epithelial cells per low power field    No organisms seen per oil power field  Preliminary Report (19 Apr 2024 14:54):    Normal Respiratory Dina present                                                   Mode: AC/ CMV (Assist Control/ Continuous Mandatory Ventilation)  RR (machine): 26  TV (machine): 300  FiO2: 40  PEEP: 5  ITime: 0.8  MAP: 11  PIP: 28                                      ABG - ( 19 Apr 2024 04:02 )  pH, Arterial: 7.43  pH, Blood: x     /  pCO2: 45    /  pO2: 92    / HCO3: 30    / Base Excess: 5.0   /  SaO2: 99.0                MEDICATIONS  (STANDING):  artificial  tears Solution 1 Drop(s) Both EYES three times a day  chlorhexidine 0.12% Liquid 15 milliLiter(s) Oral Mucosa every 12 hours  chlorhexidine 2% Cloths 1 Application(s) Topical daily  chlorhexidine 2% Cloths 1 Application(s) Topical <User Schedule>  fentaNYL   Infusion 0.5 MICROgram(s)/kG/Hr (2 mL/Hr) IV Continuous <Continuous>  heparin   Injectable 5000 Unit(s) SubCutaneous every 12 hours  labetalol 100 milliGRAM(s) Oral three times a day  lactated ringers. 1000 milliLiter(s) (50 mL/Hr) IV Continuous <Continuous>  levothyroxine 100 MICROGram(s) Oral daily  lidocaine 1%/epinephrine 1:100,000 Inj 20 milliLiter(s) Local Injection once  lidocaine 2% Viscous 10 milliLiter(s) Oral once  lisinopril 20 milliGRAM(s) Oral daily  pantoprazole  Injectable 40 milliGRAM(s) IV Push daily  petrolatum Ophthalmic Ointment 1 Application(s) Both EYES daily  propofol Infusion 10 MICROgram(s)/kG/Min (2.4 mL/Hr) IV Continuous <Continuous>    MEDICATIONS  (PRN):  hydrALAZINE 10 milliGRAM(s) Oral every 4 hours PRN Systolic blood pressure >180  labetalol Injectable 20 milliGRAM(s) IV Push every 4 hours PRN Systolic blood pressure >180          Xrays:  TLC:  OG:  ET tube:                                                                                       ECHO:  [facility] ICU:

## 2024-04-20 NOTE — PROGRESS NOTE ADULT - ASSESSMENT
74-year-old female with PHMx of pancreatic CA s/p Whipple, CKD stage 3, HTN, COPD, pulmonary fibrosis, PRESS syndrome (hospitalized in 2019), Raynaud's syndrome, ANCA vasculitis, Scleroderma, hypothyroid  presents to ED due to cardiac arrest. Pt lives at home with son and daughter, went to use the bathroom and became unresponsive.  EMS intubated her and gave her 4 rounds of epinephrine.  ROSC was achieved within a minute of her arrival to the ED.  Patient was found to be hypothermic and bradycardic    acute respiratory failure / cardiac arrest / anoxic brain injury / HTN / aspiration pneumonia      - s/p trach 4/18/24   - GI unable to place PEG tube, Surgery will place PEG next week    - anoxic brain injury as seen on CT brain and EEG   - DC planning to NH   - completed antibiotic course    - Labetalol / lisinopril    - DVT and GI prophylaxis   - I discussed plan with CC

## 2024-04-20 NOTE — PROGRESS NOTE ADULT - SUBJECTIVE AND OBJECTIVE BOX
Patient seen and examined.  Intubated, sedated, on vent     T(F): 99.1 (04-20-24 @ 11:06), Max: 100.2 (04-19-24 @ 12:00)  HR: 67 (04-20-24 @ 10:00)  BP: 147/70 (04-20-24 @ 10:00)  RR: 26 (04-20-24 @ 11:06)  SpO2: 100% (04-20-24 @ 10:00) (99% - 100%)    PHYSICAL EXAM:  GENERAL: NAD  HEAD:  Atraumatic, Normocephalic  EYES: EOMI, PERRLA, conjunctiva and sclera clear  NERVOUS SYSTEM: positive gag reflex  CHEST/LUNG:  bilateral rhonchi  HEART: Regular rate and rhythm; No murmurs, rubs, or gallops  ABDOMEN: Soft, Nontender, Nondistended; Bowel sounds present  EXTREMITIES:  2+ Peripheral Pulses, No clubbing, cyanosis, or edema    LABS  04-20    133<L>  |  97<L>  |  27<H>  ----------------------------<  131<H>  4.9   |  26  |  1.2    Ca    8.4      20 Apr 2024 06:29    TPro  6.4  /  Alb  2.4<L>  /  TBili  0.2  /  DBili  x   /  AST  42<H>  /  ALT  10  /  AlkPhos  236<H>  04-20                          8.7    11.72 )-----------( 363      ( 20 Apr 2024 06:29 )             26.9     PT/INR - ( 19 Apr 2024 05:45 )   PT: 12.00 sec;   INR: 1.05 ratio         PTT - ( 19 Apr 2024 05:45 )  PTT:31.2 sec  Mode: AC/ CMV (Assist Control/ Continuous Mandatory Ventilation)  RR (machine): 26  TV (machine): 300  FiO2: 40  PEEP: 5  ITime: 0.8  MAP: 13  PIP: 28     Culture Results:   Normal Respiratory Dina present (04-18-24)  Culture Results:   No growth (04-06-24)  Culture Results:   No growth at 5 days (04-06-24)  Culture Results:   No growth at 5 days (04-06-24)      MEDICATIONS  (STANDING):  artificial  tears Solution 1 Drop(s) Both EYES three times a day  chlorhexidine 0.12% Liquid 15 milliLiter(s) Oral Mucosa every 12 hours  chlorhexidine 2% Cloths 1 Application(s) Topical daily  chlorhexidine 2% Cloths 1 Application(s) Topical <User Schedule>  fentaNYL   Infusion 0.5 MICROgram(s)/kG/Hr (2 mL/Hr) IV Continuous <Continuous>  heparin   Injectable 5000 Unit(s) SubCutaneous every 12 hours  labetalol 100 milliGRAM(s) Oral three times a day  lactated ringers. 1000 milliLiter(s) (50 mL/Hr) IV Continuous <Continuous>  levothyroxine 100 MICROGram(s) Oral daily  lidocaine 1%/epinephrine 1:100,000 Inj 20 milliLiter(s) Local Injection once  lidocaine 2% Viscous 10 milliLiter(s) Oral once  lisinopril 20 milliGRAM(s) Oral daily  pantoprazole  Injectable 40 milliGRAM(s) IV Push daily  petrolatum Ophthalmic Ointment 1 Application(s) Both EYES daily  propofol Infusion 10 MICROgram(s)/kG/Min (2.4 mL/Hr) IV Continuous <Continuous>    MEDICATIONS  (PRN):  hydrALAZINE 10 milliGRAM(s) Oral every 4 hours PRN Systolic blood pressure >180  labetalol Injectable 20 milliGRAM(s) IV Push every 4 hours PRN Systolic blood pressure >180

## 2024-04-20 NOTE — PROGRESS NOTE ADULT - ASSESSMENT
IMPRESSION:    Anoxic brain damage  Acute Hypoxemic Respiratory Failure on MV  SP CPA downtime 30 minutes   Cardiac bradycardia   Lung Fibrosis   GIUSEPPE, Cr downtrending   HO Raynaud's  HO PRESS    HO Anemia  HO Pancreatic cancer     PLAN:    CNS: off sedation taper off fentanyl    HEENT: oral trach care     PULMONARY: keep pox >92%  monitor peak and plateau  no change in vent settings        CARDIOVASCULAR:  2D-Echo noted, continue labetalol dose     RENAL: follow is and os, trend Cr, Nephrology following, lokelma for hyperkalemia     INFECTIOUS DISEASE: cultures negative to date, procalcitonin elevated, completed  Zosyn course, nasal MRSA,   s/p abx     GASTRO: GI ppx, tube feeds follow GSx and GI for peg tube     HEMATOLOGICAL:  DVT prophylaxis.    ENDOCRINE:  Follow up FS.  Insulin protocol if needed.    MUSCULOSKELETAL: bedrest    Full Code, Palliative care following    Grave prognosis     The patient is critically ill with a high probability of further deterioration.    micu

## 2024-04-21 LAB
ALBUMIN SERPL ELPH-MCNC: 2.2 G/DL — LOW (ref 3.5–5.2)
ALP SERPL-CCNC: 262 U/L — HIGH (ref 30–115)
ALT FLD-CCNC: 10 U/L — SIGNIFICANT CHANGE UP (ref 0–41)
ANION GAP SERPL CALC-SCNC: 6 MMOL/L — LOW (ref 7–14)
ANION GAP SERPL CALC-SCNC: 8 MMOL/L — SIGNIFICANT CHANGE UP (ref 7–14)
AST SERPL-CCNC: 42 U/L — HIGH (ref 0–41)
BILIRUB SERPL-MCNC: <0.2 MG/DL — SIGNIFICANT CHANGE UP (ref 0.2–1.2)
BUN SERPL-MCNC: 27 MG/DL — HIGH (ref 10–20)
BUN SERPL-MCNC: 28 MG/DL — HIGH (ref 10–20)
CALCIUM SERPL-MCNC: 7.8 MG/DL — LOW (ref 8.4–10.5)
CALCIUM SERPL-MCNC: 8 MG/DL — LOW (ref 8.4–10.5)
CHLORIDE SERPL-SCNC: 93 MMOL/L — LOW (ref 98–110)
CHLORIDE SERPL-SCNC: 95 MMOL/L — LOW (ref 98–110)
CO2 SERPL-SCNC: 26 MMOL/L — SIGNIFICANT CHANGE UP (ref 17–32)
CO2 SERPL-SCNC: 27 MMOL/L — SIGNIFICANT CHANGE UP (ref 17–32)
CREAT SERPL-MCNC: 1 MG/DL — SIGNIFICANT CHANGE UP (ref 0.7–1.5)
CREAT SERPL-MCNC: 1.2 MG/DL — SIGNIFICANT CHANGE UP (ref 0.7–1.5)
EGFR: 47 ML/MIN/1.73M2 — LOW
EGFR: 59 ML/MIN/1.73M2 — LOW
GLUCOSE SERPL-MCNC: 118 MG/DL — HIGH (ref 70–99)
GLUCOSE SERPL-MCNC: 132 MG/DL — HIGH (ref 70–99)
HCT VFR BLD CALC: 27.5 % — LOW (ref 37–47)
HGB BLD-MCNC: 8.7 G/DL — LOW (ref 12–16)
MCHC RBC-ENTMCNC: 25.5 PG — LOW (ref 27–31)
MCHC RBC-ENTMCNC: 31.6 G/DL — LOW (ref 32–37)
MCV RBC AUTO: 80.6 FL — LOW (ref 81–99)
NRBC # BLD: 0 /100 WBCS — SIGNIFICANT CHANGE UP (ref 0–0)
PLATELET # BLD AUTO: 350 K/UL — SIGNIFICANT CHANGE UP (ref 130–400)
PMV BLD: 10.5 FL — HIGH (ref 7.4–10.4)
POTASSIUM SERPL-MCNC: 5.2 MMOL/L — HIGH (ref 3.5–5)
POTASSIUM SERPL-MCNC: 5.7 MMOL/L — HIGH (ref 3.5–5)
POTASSIUM SERPL-SCNC: 5.2 MMOL/L — HIGH (ref 3.5–5)
POTASSIUM SERPL-SCNC: 5.7 MMOL/L — HIGH (ref 3.5–5)
PROT SERPL-MCNC: 6.4 G/DL — SIGNIFICANT CHANGE UP (ref 6–8)
RBC # BLD: 3.41 M/UL — LOW (ref 4.2–5.4)
RBC # FLD: 16.5 % — HIGH (ref 11.5–14.5)
SODIUM SERPL-SCNC: 127 MMOL/L — LOW (ref 135–146)
SODIUM SERPL-SCNC: 128 MMOL/L — LOW (ref 135–146)
WBC # BLD: 9.14 K/UL — SIGNIFICANT CHANGE UP (ref 4.8–10.8)
WBC # FLD AUTO: 9.14 K/UL — SIGNIFICANT CHANGE UP (ref 4.8–10.8)

## 2024-04-21 PROCEDURE — 93010 ELECTROCARDIOGRAM REPORT: CPT

## 2024-04-21 PROCEDURE — 99233 SBSQ HOSP IP/OBS HIGH 50: CPT

## 2024-04-21 PROCEDURE — 71045 X-RAY EXAM CHEST 1 VIEW: CPT | Mod: 26,76

## 2024-04-21 RX ORDER — SODIUM ZIRCONIUM CYCLOSILICATE 10 G/10G
10 POWDER, FOR SUSPENSION ORAL ONCE
Refills: 0 | Status: COMPLETED | OUTPATIENT
Start: 2024-04-21 | End: 2024-04-21

## 2024-04-21 RX ORDER — FENTANYL CITRATE 50 UG/ML
0.5 INJECTION INTRAVENOUS
Qty: 2500 | Refills: 0 | Status: DISCONTINUED | OUTPATIENT
Start: 2024-04-21 | End: 2024-04-22

## 2024-04-21 RX ORDER — METHADONE HYDROCHLORIDE 40 MG/1
10 TABLET ORAL
Refills: 0 | Status: DISCONTINUED | OUTPATIENT
Start: 2024-04-21 | End: 2024-04-25

## 2024-04-21 RX ADMIN — CHLORHEXIDINE GLUCONATE 1 APPLICATION(S): 213 SOLUTION TOPICAL at 21:09

## 2024-04-21 RX ADMIN — Medication 100 MICROGRAM(S): at 05:59

## 2024-04-21 RX ADMIN — Medication 100 MILLIGRAM(S): at 06:00

## 2024-04-21 RX ADMIN — PANTOPRAZOLE SODIUM 40 MILLIGRAM(S): 20 TABLET, DELAYED RELEASE ORAL at 11:12

## 2024-04-21 RX ADMIN — CHLORHEXIDINE GLUCONATE 1 APPLICATION(S): 213 SOLUTION TOPICAL at 06:12

## 2024-04-21 RX ADMIN — SODIUM CHLORIDE 50 MILLILITER(S): 9 INJECTION, SOLUTION INTRAVENOUS at 20:48

## 2024-04-21 RX ADMIN — METHADONE HYDROCHLORIDE 10 MILLIGRAM(S): 40 TABLET ORAL at 17:06

## 2024-04-21 RX ADMIN — Medication 1 DROP(S): at 13:27

## 2024-04-21 RX ADMIN — HEPARIN SODIUM 5000 UNIT(S): 5000 INJECTION INTRAVENOUS; SUBCUTANEOUS at 17:06

## 2024-04-21 RX ADMIN — Medication 100 MILLIGRAM(S): at 21:08

## 2024-04-21 RX ADMIN — Medication 1 DROP(S): at 21:08

## 2024-04-21 RX ADMIN — CHLORHEXIDINE GLUCONATE 15 MILLILITER(S): 213 SOLUTION TOPICAL at 05:59

## 2024-04-21 RX ADMIN — FENTANYL CITRATE 2 MICROGRAM(S)/KG/HR: 50 INJECTION INTRAVENOUS at 20:16

## 2024-04-21 RX ADMIN — SODIUM ZIRCONIUM CYCLOSILICATE 10 GRAM(S): 10 POWDER, FOR SUSPENSION ORAL at 11:12

## 2024-04-21 RX ADMIN — FENTANYL CITRATE 2 MICROGRAM(S)/KG/HR: 50 INJECTION INTRAVENOUS at 06:36

## 2024-04-21 RX ADMIN — Medication 1 APPLICATION(S): at 11:12

## 2024-04-21 RX ADMIN — Medication 100 MILLIGRAM(S): at 13:26

## 2024-04-21 RX ADMIN — LISINOPRIL 20 MILLIGRAM(S): 2.5 TABLET ORAL at 05:59

## 2024-04-21 RX ADMIN — Medication 1 DROP(S): at 06:01

## 2024-04-21 RX ADMIN — HEPARIN SODIUM 5000 UNIT(S): 5000 INJECTION INTRAVENOUS; SUBCUTANEOUS at 05:59

## 2024-04-21 RX ADMIN — CHLORHEXIDINE GLUCONATE 15 MILLILITER(S): 213 SOLUTION TOPICAL at 17:06

## 2024-04-21 NOTE — PROGRESS NOTE ADULT - SUBJECTIVE AND OBJECTIVE BOX
Patient is a 74y old  Female who presents with a chief complaint of cardiac arrest (20 Apr 2024 11:19)      Over Night Events:  Patient seen and examined.   when off fentanyl increase in heart rate and over breath   on vent   ROS:  See HPI    PHYSICAL EXAM    ICU Vital Signs Last 24 Hrs  T(C): 36.3 (21 Apr 2024 04:00), Max: 37.8 (20 Apr 2024 19:00)  T(F): 97.3 (21 Apr 2024 04:00), Max: 100 (20 Apr 2024 19:00)  HR: 57 (21 Apr 2024 05:00) (57 - 80)  BP: 186/84 (21 Apr 2024 05:00) (95/55 - 198/90)  BP(mean): 121 (21 Apr 2024 05:00) (72 - 129)  ABP: --  ABP(mean): --  RR: 24 (21 Apr 2024 05:00) (22 - 30)  SpO2: 100% (21 Apr 2024 05:00) (100% - 100%)    O2 Parameters below as of 21 Apr 2024 05:00  Patient On (Oxygen Delivery Method): ventilator            General: open eyes   HEENT:     tracheostomy tube            Lymph Nodes: NO cervical LN   Lungs: Bilateral BS  Cardiovascular: Regular   Abdomen: Soft, Positive BS  Extremities: No clubbing   Skin: warm   Neurological: positive gag  Musculoskeletal: move all ext     I&O's Detail    19 Apr 2024 07:01  -  20 Apr 2024 07:00  --------------------------------------------------------  IN:    FentaNYL: 190 mL    Free Water: 500 mL    Lactated Ringers: 1250 mL    Osmolite 1.2: 720 mL  Total IN: 2660 mL    OUT:    Indwelling Catheter - Urethral (mL): 595 mL    Rectal Tube (mL): 50 mL  Total OUT: 645 mL    Total NET: 2015 mL      20 Apr 2024 07:01  -  21 Apr 2024 06:34  --------------------------------------------------------  IN:    FentaNYL: 200 mL    Free Water: 300 mL    Lactated Ringers: 1150 mL    Osmolite 1.2: 990 mL  Total IN: 2640 mL    OUT:    Indwelling Catheter - Urethral (mL): 640 mL    Rectal Tube (mL): 605 mL  Total OUT: 1245 mL    Total NET: 1395 mL          LABS:                          8.7    11.72 )-----------( 363      ( 20 Apr 2024 06:29 )             26.9         20 Apr 2024 06:29    133    |  97     |  27     ----------------------------<  131    4.9     |  26     |  1.2      Ca    8.4        20 Apr 2024 06:29                                                                                      Urinalysis Basic - ( 20 Apr 2024 06:29 )    Color: x / Appearance: x / SG: x / pH: x  Gluc: 131 mg/dL / Ketone: x  / Bili: x / Urobili: x   Blood: x / Protein: x / Nitrite: x   Leuk Esterase: x / RBC: x / WBC x   Sq Epi: x / Non Sq Epi: x / Bacteria: x                                                                Culture - Bronchial (collected 18 Apr 2024 11:00)  Source: Bronch Wash None  Gram Stain (18 Apr 2024 23:07):    Moderate polymorphonuclear leukocytes per low power field    No Squamous epithelial cells per low power field    No organisms seen per oil power field  Final Report (20 Apr 2024 15:46):    Normal Respiratory Dina present                                                   Mode: AC/ CMV (Assist Control/ Continuous Mandatory Ventilation)  RR (machine): 26  TV (machine): 300  FiO2: 40  PEEP: 5  MAP: 13  PIP: 30                                          MEDICATIONS  (STANDING):  artificial  tears Solution 1 Drop(s) Both EYES three times a day  chlorhexidine 0.12% Liquid 15 milliLiter(s) Oral Mucosa every 12 hours  chlorhexidine 2% Cloths 1 Application(s) Topical daily  chlorhexidine 2% Cloths 1 Application(s) Topical <User Schedule>  fentaNYL   Infusion 0.5 MICROgram(s)/kG/Hr (2 mL/Hr) IV Continuous <Continuous>  heparin   Injectable 5000 Unit(s) SubCutaneous every 12 hours  labetalol 100 milliGRAM(s) Oral three times a day  lactated ringers. 1000 milliLiter(s) (50 mL/Hr) IV Continuous <Continuous>  levothyroxine 100 MICROGram(s) Oral daily  lidocaine 1%/epinephrine 1:100,000 Inj 20 milliLiter(s) Local Injection once  lidocaine 2% Viscous 10 milliLiter(s) Oral once  lisinopril 20 milliGRAM(s) Oral daily  pantoprazole  Injectable 40 milliGRAM(s) IV Push daily  petrolatum Ophthalmic Ointment 1 Application(s) Both EYES daily  propofol Infusion 10 MICROgram(s)/kG/Min (2.4 mL/Hr) IV Continuous <Continuous>    MEDICATIONS  (PRN):  hydrALAZINE 10 milliGRAM(s) Oral every 4 hours PRN Systolic blood pressure >180  labetalol Injectable 20 milliGRAM(s) IV Push every 4 hours PRN Systolic blood pressure >180          Xrays:  TLC:  OG:  ET tube:                                                                                    stable b/l opacity    ECHO:  [facility] ICU:

## 2024-04-21 NOTE — PROGRESS NOTE ADULT - ASSESSMENT
74F with PHMx of pancreatic CA s/p Whipple, CKD stage 3, HTN, COPD, pulmonary fibrosis, PRESS syndrome (hospitalized in 2019), Raynaud's syndrome, ANCA vasculitis, Scleroderma presents to ED due to cardiac arrest.    PLAN:  #S/p Cardiac Arrest with Anoxic Brain Injury  #Acute Hypoxic Respiratory Failure   - S/p bedside percutaneous tracheostomy (7.0 Portex) 4/18   - Trach sutures to be removed after 10 days (4/28)   - Will plan for possible PEG this week, exact date pending   - Remainder of care per ICU   - Surgery following    General Surgery  [id number]

## 2024-04-21 NOTE — PROGRESS NOTE ADULT - SUBJECTIVE AND OBJECTIVE BOX
*** SURGERY PROGRESS NOTE    Patient: KIRAN MORFIN , 74y ([birth year])Female   MRN: [id number]  Location: [address]  Visit: 04-06-24 Inpatient  Date: 04-21-24 @ 10:07      Admission: Hyperkalemia      Hospital Day:  Post operative day:  Acute respiratory failure with hypoxia     s/p Bronchoscopy with percutaneous tracheostomy        24 Hour Events:    Vitals:  T(F): 98.2 (04-21-24 @ 07:10), Max: 100 (04-20-24 @ 19:00)  HR: 59 (04-21-24 @ 07:55)  BP: 146/71 (04-21-24 @ 07:00)  RR: 21 (04-21-24 @ 09:00)  SpO2: 100% (04-21-24 @ 07:55)  Mode: AC/ CMV (Assist Control/ Continuous Mandatory Ventilation), RR (machine): 26, TV (machine): 300, FiO2: 40, PEEP: 5, MAP: 10, PIP: 29  Diet, NPO with Tube Feed:   Tube Feeding Modality: Orogastric  Osmolite 1.2 Lei (OSMOLITERTH)  Total Volume for 24 Hours (mL): 1080  Continuous  Starting Tube Feed Rate mL per Hour: 10  Until Goal Tube Feed Rate (mL per Hour): 45  Tube Feed Duration (in Hours): 24  Tube Feed Start Time: 12:00  Free Water Flush  Free Water Flush Instructions:  150 Q8hr    Fluids:   I & O's:    04-20-24 @ 07:01  -  04-21-24 @ 07:00  --------------------------------------------------------  IN:    FentaNYL: 220 mL    Free Water: 300 mL    Lactated Ringers: 1250 mL    Osmolite 1.2: 990 mL  Total IN: 2760 mL    OUT:    Indwelling Catheter - Urethral (mL): 640 mL    Rectal Tube (mL): 605 mL  Total OUT: 1245 mL    Total NET: 1515 mL    PHYSICAL EXAM:  General: NAD  Cardiac: RRR  Respiratory: tracheostomy in place, minimal secretions, site clean, dry, and intact  Abdomen: Soft, non-distended, non-tender, no rebound, no guarding.  Musculoskeletal: FROM in b/l UE and LE  Neuro: Sensation grossly intact and equal throughout, no focal deficits  Skin: Warm/dry, normal color, no jaundice    MEDICATIONS  (STANDING):  artificial  tears Solution 1 Drop(s) Both EYES three times a day  chlorhexidine 0.12% Liquid 15 milliLiter(s) Oral Mucosa every 12 hours  chlorhexidine 2% Cloths 1 Application(s) Topical <User Schedule>  chlorhexidine 2% Cloths 1 Application(s) Topical daily  heparin   Injectable 5000 Unit(s) SubCutaneous every 12 hours  labetalol 100 milliGRAM(s) Oral three times a day  lactated ringers. 1000 milliLiter(s) (50 mL/Hr) IV Continuous <Continuous>  levothyroxine 100 MICROGram(s) Oral daily  lidocaine 1%/epinephrine 1:100,000 Inj 20 milliLiter(s) Local Injection once  lidocaine 2% Viscous 10 milliLiter(s) Oral once  lisinopril 20 milliGRAM(s) Oral daily  methadone    Tablet 10 milliGRAM(s) Oral two times a day  pantoprazole  Injectable 40 milliGRAM(s) IV Push daily  petrolatum Ophthalmic Ointment 1 Application(s) Both EYES daily  propofol Infusion 10 MICROgram(s)/kG/Min (2.4 mL/Hr) IV Continuous <Continuous>  sodium zirconium cyclosilicate 10 Gram(s) Oral once    MEDICATIONS  (PRN):  hydrALAZINE 10 milliGRAM(s) Oral every 4 hours PRN Systolic blood pressure >180  labetalol Injectable 20 milliGRAM(s) IV Push every 4 hours PRN Systolic blood pressure >180    DVT PROPHYLAXIS: heparin   Injectable 5000 Unit(s) SubCutaneous every 12 hours    GI PROPHYLAXIS: pantoprazole  Injectable 40 milliGRAM(s) IV Push daily    ANTICOAGULATION:   ANTIBIOTICS:      LAB/STUDIES:  Labs:  CAPILLARY BLOOD GLUCOSE                        8.7    9.14  )-----------( 350      ( 21 Apr 2024 06:28 )             27.5         04-21    128<L>  |  95<L>  |  28<H>  ----------------------------<  132<H>  5.7<H>   |  27  |  1.2      Calcium: 8.0 mg/dL (04-21-24 @ 06:28)      LFTs:             6.4  | <0.2 | 42       ------------------[262     ( 21 Apr 2024 06:28 )  2.2  | x    | 10          Lipase:x      Amylase:x         Blood Gas Arterial, Lactate: 0.7 mmol/L (04-19-24 @ 04:02)    ABG - ( 19 Apr 2024 04:02 )  pH: 7.43  /  pCO2: 45    /  pO2: 92    / HCO3: 30    / Base Excess: 5.0   /  SaO2: 99.0      ABG - ( 18 Apr 2024 04:00 )  pH: 7.41  /  pCO2: 45    /  pO2: 119   / HCO3: 28    / Base Excess: 3.2   /  SaO2: 99.7      ABG - ( 17 Apr 2024 12:26 )  pH: 7.42  /  pCO2: 42    /  pO2: 191   / HCO3: 27    / Base Excess: 2.5   /  SaO2: 99.7        Coags:    Urinalysis Basic - ( 21 Apr 2024 06:28 )    Color: x / Appearance: x / SG: x / pH: x  Gluc: 132 mg/dL / Ketone: x  / Bili: x / Urobili: x   Blood: x / Protein: x / Nitrite: x   Leuk Esterase: x / RBC: x / WBC x   Sq Epi: x / Non Sq Epi: x / Bacteria: x        Culture - Bronchial (collected 18 Apr 2024 11:00)  Source: Bronch Wash None  Gram Stain (18 Apr 2024 23:07):    Moderate polymorphonuclear leukocytes per low power field    No Squamous epithelial cells per low power field    No organisms seen per oil power field  Final Report (20 Apr 2024 15:46):    Normal Respiratory Dina present

## 2024-04-21 NOTE — PROGRESS NOTE ADULT - SUBJECTIVE AND OBJECTIVE BOX
Patient seen and examined.  Intubated, sedated, on vent     T(F): 98.2 (04-21-24 @ 07:10), Max: 100 (04-20-24 @ 19:00)  HR: 60 (04-21-24 @ 10:00)  BP: 151/69 (04-21-24 @ 10:00)  RR: 24 (04-21-24 @ 10:00)  SpO2: 100% (04-21-24 @ 10:00) (100% - 100%)    PHYSICAL EXAM:  GENERAL: NAD, thin appearing   HEAD:  Atraumatic, Normocephalic  EYES: EOMI, PERRLA, conjunctiva and sclera clear  ENT: Intubated, on vent  NERVOUS SYSTEM: positive gag reflex  CHEST/LUNG:  bilateral rhonchi  HEART: Regular rate and rhythm; No murmurs, rubs, or gallops  ABDOMEN: Soft, Nontender, Nondistended; Bowel sounds present  EXTREMITIES:  2+ Peripheral Pulses, No clubbing, cyanosis, or edema    LABS  04-21    128<L>  |  95<L>  |  28<H>  ----------------------------<  132<H>  5.7<H>   |  27  |  1.2    Ca    8.0<L>      21 Apr 2024 06:28    TPro  6.4  /  Alb  2.2<L>  /  TBili  <0.2  /  DBili  x   /  AST  42<H>  /  ALT  10  /  AlkPhos  262<H>  04-21                          8.7    9.14  )-----------( 350      ( 21 Apr 2024 06:28 )             27.5       Mode: AC/ CMV (Assist Control/ Continuous Mandatory Ventilation)  RR (machine): 26  TV (machine): 300  FiO2: 40  PEEP: 5  MAP: 10  PIP: 29    CARDIAC ENZYMES          Culture Results:   Normal Respiratory Dina present (04-18-24)  Culture Results:   No growth (04-06-24)  Culture Results:   No growth at 5 days (04-06-24)  Culture Results:   No growth at 5 days (04-06-24)    RADIOLOGY    MEDICATIONS  (STANDING):  artificial  tears Solution 1 Drop(s) Both EYES three times a day  chlorhexidine 0.12% Liquid 15 milliLiter(s) Oral Mucosa every 12 hours  chlorhexidine 2% Cloths 1 Application(s) Topical daily  chlorhexidine 2% Cloths 1 Application(s) Topical <User Schedule>  heparin   Injectable 5000 Unit(s) SubCutaneous every 12 hours  labetalol 100 milliGRAM(s) Oral three times a day  lactated ringers. 1000 milliLiter(s) (50 mL/Hr) IV Continuous <Continuous>  levothyroxine 100 MICROGram(s) Oral daily  lidocaine 1%/epinephrine 1:100,000 Inj 20 milliLiter(s) Local Injection once  lidocaine 2% Viscous 10 milliLiter(s) Oral once  lisinopril 20 milliGRAM(s) Oral daily  methadone    Tablet 10 milliGRAM(s) Oral two times a day  pantoprazole  Injectable 40 milliGRAM(s) IV Push daily  petrolatum Ophthalmic Ointment 1 Application(s) Both EYES daily  propofol Infusion 10 MICROgram(s)/kG/Min (2.4 mL/Hr) IV Continuous <Continuous>  sodium zirconium cyclosilicate 10 Gram(s) Oral once    MEDICATIONS  (PRN):  hydrALAZINE 10 milliGRAM(s) Oral every 4 hours PRN Systolic blood pressure >180  labetalol Injectable 20 milliGRAM(s) IV Push every 4 hours PRN Systolic blood pressure >180

## 2024-04-21 NOTE — PROGRESS NOTE ADULT - ASSESSMENT
74-year-old female with PHMx of pancreatic CA s/p Whipple, CKD stage 3, HTN, COPD, pulmonary fibrosis, PRESS syndrome (hospitalized in 2019), Raynaud's syndrome, ANCA vasculitis, Scleroderma, hypothyroid  presents to ED due to cardiac arrest. Pt lives at home with son and daughter, went to use the bathroom and became unresponsive.  EMS intubated her and gave her 4 rounds of epinephrine.  ROSC was achieved within a minute of her arrival to the ED.  Patient was found to be hypothermic and bradycardic    acute respiratory failure / cardiac arrest / anoxic brain injury / HTN / aspiration pneumonia      - s/p trach 4/18/24   - GI unable to place PEG tube, Surgery will place PEG this week    - anoxic brain injury as seen on CT brain and EEG   - DC planning to NH   - completed antibiotic course    - Labetalol / lisinopril    - DVT and GI prophylaxis   - I discussed plan with CC

## 2024-04-21 NOTE — PROGRESS NOTE ADULT - ASSESSMENT
IMPRESSION:    Anoxic brain damage  Acute Hypoxemic Respiratory Failure on MV  SP CPA downtime 30 minutes   Cardiac bradycardia   Lung Fibrosis   GIUSEPPE, Cr downtrending   HO Raynaud's  HO PRESS    HO Anemia  HO Pancreatic cancer     PLAN:    CNS: off sedation taper off fentanyl  if QTC stable   start methadone 10 mg Q12 hrs     HEENT: oral trach care     PULMONARY: keep pox >92%  monitor peak and plateau  no change in vent settings        CARDIOVASCULAR:  2D-Echo noted, continue labetalol dose     RENAL: follow is and os, trend Cr, Nephrology following, lokelma for hyperkalemia     INFECTIOUS DISEASE: cultures negative to date, procalcitonin elevated, completed  Zosyn course, nasal MRSA,   s/p abx     GASTRO: GI ppx, tube feeds follow GSx  for peg tube coming week     HEMATOLOGICAL:  DVT prophylaxis.    ENDOCRINE:  Follow up FS.  Insulin protocol if needed.    MUSCULOSKELETAL: bedrest    Full Code, Palliative care following    Grave prognosis     The patient is critically ill with a high probability of further deterioration.    micu

## 2024-04-22 LAB
ALBUMIN SERPL ELPH-MCNC: 2.2 G/DL — LOW (ref 3.5–5.2)
ALP SERPL-CCNC: 244 U/L — HIGH (ref 30–115)
ALT FLD-CCNC: 9 U/L — SIGNIFICANT CHANGE UP (ref 0–41)
ANION GAP SERPL CALC-SCNC: 7 MMOL/L — SIGNIFICANT CHANGE UP (ref 7–14)
ANION GAP SERPL CALC-SCNC: 7 MMOL/L — SIGNIFICANT CHANGE UP (ref 7–14)
AST SERPL-CCNC: 39 U/L — SIGNIFICANT CHANGE UP (ref 0–41)
BASOPHILS # BLD AUTO: 0.03 K/UL — SIGNIFICANT CHANGE UP (ref 0–0.2)
BASOPHILS NFR BLD AUTO: 0.3 % — SIGNIFICANT CHANGE UP (ref 0–1)
BILIRUB SERPL-MCNC: 0.2 MG/DL — SIGNIFICANT CHANGE UP (ref 0.2–1.2)
BUN SERPL-MCNC: 26 MG/DL — HIGH (ref 10–20)
BUN SERPL-MCNC: 26 MG/DL — HIGH (ref 10–20)
CALCIUM SERPL-MCNC: 8 MG/DL — LOW (ref 8.4–10.5)
CALCIUM SERPL-MCNC: 8.2 MG/DL — LOW (ref 8.4–10.5)
CHLORIDE SERPL-SCNC: 95 MMOL/L — LOW (ref 98–110)
CHLORIDE SERPL-SCNC: 96 MMOL/L — LOW (ref 98–110)
CO2 SERPL-SCNC: 27 MMOL/L — SIGNIFICANT CHANGE UP (ref 17–32)
CO2 SERPL-SCNC: 27 MMOL/L — SIGNIFICANT CHANGE UP (ref 17–32)
CREAT SERPL-MCNC: 1.1 MG/DL — SIGNIFICANT CHANGE UP (ref 0.7–1.5)
CREAT SERPL-MCNC: 1.1 MG/DL — SIGNIFICANT CHANGE UP (ref 0.7–1.5)
EGFR: 52 ML/MIN/1.73M2 — LOW
EGFR: 52 ML/MIN/1.73M2 — LOW
EOSINOPHIL # BLD AUTO: 0.44 K/UL — SIGNIFICANT CHANGE UP (ref 0–0.7)
EOSINOPHIL NFR BLD AUTO: 4.6 % — SIGNIFICANT CHANGE UP (ref 0–8)
GLUCOSE SERPL-MCNC: 111 MG/DL — HIGH (ref 70–99)
GLUCOSE SERPL-MCNC: 117 MG/DL — HIGH (ref 70–99)
HCT VFR BLD CALC: 25.7 % — LOW (ref 37–47)
HGB BLD-MCNC: 8.6 G/DL — LOW (ref 12–16)
IMM GRANULOCYTES NFR BLD AUTO: 0.4 % — HIGH (ref 0.1–0.3)
LYMPHOCYTES # BLD AUTO: 1.26 K/UL — SIGNIFICANT CHANGE UP (ref 1.2–3.4)
LYMPHOCYTES # BLD AUTO: 13.1 % — LOW (ref 20.5–51.1)
MAGNESIUM SERPL-MCNC: 1.9 MG/DL — SIGNIFICANT CHANGE UP (ref 1.8–2.4)
MCHC RBC-ENTMCNC: 25.9 PG — LOW (ref 27–31)
MCHC RBC-ENTMCNC: 33.5 G/DL — SIGNIFICANT CHANGE UP (ref 32–37)
MCV RBC AUTO: 77.4 FL — LOW (ref 81–99)
MONOCYTES # BLD AUTO: 1.12 K/UL — HIGH (ref 0.1–0.6)
MONOCYTES NFR BLD AUTO: 11.6 % — HIGH (ref 1.7–9.3)
NEUTROPHILS # BLD AUTO: 6.73 K/UL — HIGH (ref 1.4–6.5)
NEUTROPHILS NFR BLD AUTO: 70 % — SIGNIFICANT CHANGE UP (ref 42.2–75.2)
NRBC # BLD: 0 /100 WBCS — SIGNIFICANT CHANGE UP (ref 0–0)
PHOSPHATE SERPL-MCNC: 3.9 MG/DL — SIGNIFICANT CHANGE UP (ref 2.1–4.9)
PLATELET # BLD AUTO: 418 K/UL — HIGH (ref 130–400)
PMV BLD: 10.8 FL — HIGH (ref 7.4–10.4)
POTASSIUM SERPL-MCNC: 5.4 MMOL/L — HIGH (ref 3.5–5)
POTASSIUM SERPL-MCNC: 5.6 MMOL/L — HIGH (ref 3.5–5)
POTASSIUM SERPL-SCNC: 5.4 MMOL/L — HIGH (ref 3.5–5)
POTASSIUM SERPL-SCNC: 5.6 MMOL/L — HIGH (ref 3.5–5)
PROT SERPL-MCNC: 6.2 G/DL — SIGNIFICANT CHANGE UP (ref 6–8)
RBC # BLD: 3.32 M/UL — LOW (ref 4.2–5.4)
RBC # FLD: 16.5 % — HIGH (ref 11.5–14.5)
SODIUM SERPL-SCNC: 129 MMOL/L — LOW (ref 135–146)
SODIUM SERPL-SCNC: 130 MMOL/L — LOW (ref 135–146)
WBC # BLD: 9.62 K/UL — SIGNIFICANT CHANGE UP (ref 4.8–10.8)
WBC # FLD AUTO: 9.62 K/UL — SIGNIFICANT CHANGE UP (ref 4.8–10.8)

## 2024-04-22 PROCEDURE — 99233 SBSQ HOSP IP/OBS HIGH 50: CPT

## 2024-04-22 PROCEDURE — 71045 X-RAY EXAM CHEST 1 VIEW: CPT | Mod: 26

## 2024-04-22 RX ORDER — HYDRALAZINE HCL 50 MG
25 TABLET ORAL EVERY 4 HOURS
Refills: 0 | Status: DISCONTINUED | OUTPATIENT
Start: 2024-04-22 | End: 2024-04-23

## 2024-04-22 RX ORDER — FENTANYL CITRATE 50 UG/ML
50 INJECTION INTRAVENOUS ONCE
Refills: 0 | Status: DISCONTINUED | OUTPATIENT
Start: 2024-04-22 | End: 2024-04-22

## 2024-04-22 RX ORDER — FENTANYL CITRATE 50 UG/ML
25 INJECTION INTRAVENOUS ONCE
Refills: 0 | Status: DISCONTINUED | OUTPATIENT
Start: 2024-04-22 | End: 2024-04-22

## 2024-04-22 RX ORDER — SODIUM ZIRCONIUM CYCLOSILICATE 10 G/10G
10 POWDER, FOR SUSPENSION ORAL EVERY 12 HOURS
Refills: 0 | Status: COMPLETED | OUTPATIENT
Start: 2024-04-22 | End: 2024-04-23

## 2024-04-22 RX ADMIN — Medication 100 MILLIGRAM(S): at 13:34

## 2024-04-22 RX ADMIN — LISINOPRIL 20 MILLIGRAM(S): 2.5 TABLET ORAL at 05:53

## 2024-04-22 RX ADMIN — Medication 1 APPLICATION(S): at 11:35

## 2024-04-22 RX ADMIN — Medication 100 MICROGRAM(S): at 05:52

## 2024-04-22 RX ADMIN — Medication 25 MILLIGRAM(S): at 11:35

## 2024-04-22 RX ADMIN — CHLORHEXIDINE GLUCONATE 15 MILLILITER(S): 213 SOLUTION TOPICAL at 17:31

## 2024-04-22 RX ADMIN — METHADONE HYDROCHLORIDE 10 MILLIGRAM(S): 40 TABLET ORAL at 17:30

## 2024-04-22 RX ADMIN — Medication 25 MILLIGRAM(S): at 22:11

## 2024-04-22 RX ADMIN — METHADONE HYDROCHLORIDE 10 MILLIGRAM(S): 40 TABLET ORAL at 05:50

## 2024-04-22 RX ADMIN — CHLORHEXIDINE GLUCONATE 1 APPLICATION(S): 213 SOLUTION TOPICAL at 05:51

## 2024-04-22 RX ADMIN — PANTOPRAZOLE SODIUM 40 MILLIGRAM(S): 20 TABLET, DELAYED RELEASE ORAL at 11:35

## 2024-04-22 RX ADMIN — Medication 25 MILLIGRAM(S): at 17:31

## 2024-04-22 RX ADMIN — CHLORHEXIDINE GLUCONATE 1 APPLICATION(S): 213 SOLUTION TOPICAL at 22:12

## 2024-04-22 RX ADMIN — Medication 100 MILLIGRAM(S): at 22:11

## 2024-04-22 RX ADMIN — Medication 1 DROP(S): at 22:12

## 2024-04-22 RX ADMIN — FENTANYL CITRATE 50 MICROGRAM(S): 50 INJECTION INTRAVENOUS at 11:58

## 2024-04-22 RX ADMIN — Medication 25 MILLIGRAM(S): at 13:34

## 2024-04-22 RX ADMIN — SODIUM ZIRCONIUM CYCLOSILICATE 10 GRAM(S): 10 POWDER, FOR SUSPENSION ORAL at 08:30

## 2024-04-22 RX ADMIN — Medication 1 DROP(S): at 13:41

## 2024-04-22 RX ADMIN — SODIUM ZIRCONIUM CYCLOSILICATE 10 GRAM(S): 10 POWDER, FOR SUSPENSION ORAL at 17:31

## 2024-04-22 RX ADMIN — Medication 100 MILLIGRAM(S): at 05:53

## 2024-04-22 RX ADMIN — CHLORHEXIDINE GLUCONATE 15 MILLILITER(S): 213 SOLUTION TOPICAL at 05:51

## 2024-04-22 RX ADMIN — SODIUM CHLORIDE 50 MILLILITER(S): 9 INJECTION, SOLUTION INTRAVENOUS at 09:32

## 2024-04-22 RX ADMIN — HEPARIN SODIUM 5000 UNIT(S): 5000 INJECTION INTRAVENOUS; SUBCUTANEOUS at 05:51

## 2024-04-22 RX ADMIN — FENTANYL CITRATE 50 MICROGRAM(S): 50 INJECTION INTRAVENOUS at 14:15

## 2024-04-22 RX ADMIN — HEPARIN SODIUM 5000 UNIT(S): 5000 INJECTION INTRAVENOUS; SUBCUTANEOUS at 17:31

## 2024-04-22 RX ADMIN — Medication 1 DROP(S): at 05:53

## 2024-04-22 RX ADMIN — FENTANYL CITRATE 2 MICROGRAM(S)/KG/HR: 50 INJECTION INTRAVENOUS at 09:32

## 2024-04-22 NOTE — PROGRESS NOTE ADULT - SUBJECTIVE AND OBJECTIVE BOX
Patient seen and examined.  Sedated, not following commands       T(F): 97.7 (04-22-24 @ 07:05), Max: 98.5 (04-21-24 @ 19:28)  HR: 58 (04-22-24 @ 08:00)  BP: 162/77 (04-22-24 @ 08:00)  RR: 25 (04-22-24 @ 08:00)  SpO2: 100% (04-22-24 @ 08:00) (100% - 100%)    PHYSICAL EXAM:  GENERAL: NAD, thin appearing   HEAD:  Atraumatic, Normocephalic  EYES: EOMI, PERRLA, conjunctiva and sclera clear  ENT: Intubated, on vent  NERVOUS SYSTEM: positive gag reflex  CHEST/LUNG:  bilateral rhonchi  HEART: Regular rate and rhythm; No murmurs, rubs, or gallops  ABDOMEN: Soft, Nontender, Nondistended; Bowel sounds present  EXTREMITIES:  2+ Peripheral Pulses, No clubbing, cyanosis, or edema    LABS  04-22    129<L>  |  95<L>  |  26<H>  ----------------------------<  111<H>  5.6<H>   |  27  |  1.1    Ca    8.2<L>      22 Apr 2024 05:42  Phos  3.9     04-22  Mg     1.9     04-22    TPro  6.2  /  Alb  2.2<L>  /  TBili  0.2  /  DBili  x   /  AST  39  /  ALT  9   /  AlkPhos  244<H>  04-22                          8.6    9.62  )-----------( 418      ( 22 Apr 2024 05:42 )             25.7       Mode: AC/ CMV (Assist Control/ Continuous Mandatory Ventilation)  RR (machine): 26  TV (machine): 300  FiO2: 40  PEEP: 5  MAP: 17  PIP: 23      Culture Results:   Normal Respiratory Dina present (04-18-24)  Culture Results:   No growth (04-06-24)  Culture Results:   No growth at 5 days (04-06-24)  Culture Results:   No growth at 5 days (04-06-24)      MEDICATIONS  (STANDING):  artificial  tears Solution 1 Drop(s) Both EYES three times a day  chlorhexidine 0.12% Liquid 15 milliLiter(s) Oral Mucosa every 12 hours  chlorhexidine 2% Cloths 1 Application(s) Topical <User Schedule>  chlorhexidine 2% Cloths 1 Application(s) Topical daily  fentaNYL   Infusion 0.5 MICROgram(s)/kG/Hr (2 mL/Hr) IV Continuous <Continuous>  heparin   Injectable 5000 Unit(s) SubCutaneous every 12 hours  hydrALAZINE 25 milliGRAM(s) Oral every 4 hours  labetalol 100 milliGRAM(s) Oral three times a day  lactated ringers. 1000 milliLiter(s) (50 mL/Hr) IV Continuous <Continuous>  levothyroxine 100 MICROGram(s) Oral daily  lidocaine 1%/epinephrine 1:100,000 Inj 20 milliLiter(s) Local Injection once  lidocaine 2% Viscous 10 milliLiter(s) Oral once  lisinopril 20 milliGRAM(s) Oral daily  methadone    Tablet 10 milliGRAM(s) Oral two times a day  pantoprazole  Injectable 40 milliGRAM(s) IV Push daily  petrolatum Ophthalmic Ointment 1 Application(s) Both EYES daily  propofol Infusion 10 MICROgram(s)/kG/Min (2.4 mL/Hr) IV Continuous <Continuous>  sodium zirconium cyclosilicate 10 Gram(s) Oral every 12 hours    MEDICATIONS  (PRN):  labetalol Injectable 20 milliGRAM(s) IV Push every 4 hours PRN Systolic blood pressure >180

## 2024-04-22 NOTE — PROGRESS NOTE ADULT - ASSESSMENT
· Assessment	  74-year-old female with PHMx of pancreatic CA s/p Whipple, CKD stage 3, HTN, COPD, pulmonary fibrosis, PRESS syndrome (hospitalized in 2019), Raynaud's syndrome, ANCA vasculitis, Scleroderma, hypothyroid  presents to ED due to cardiac arrest. Pt lives at home with son and daughter, went to use the bathroom and became unresponsive.  EMS intubated her and gave her 4 rounds of epinephrine.  ROSC was achieved within a minute of her arrival to the ED.  Patient was found to be hypothermic and bradycardic    acute respiratory failure / cardiac arrest / anoxic brain injury / HTN / aspiration pneumonia      - s/p trach 4/18/24   - GI unable to place PEG tube, Surgery will place PEG this week    - anoxic brain injury as seen on CT brain and EEG   - DC planning to NH   - completed antibiotic course    - Labetalol / lisinopril    - DVT and GI prophylaxis

## 2024-04-22 NOTE — PROGRESS NOTE ADULT - SUBJECTIVE AND OBJECTIVE BOX
CC: cardiac arrest    HPI:  74-year-old female with PHMx of pancreatic CA s/p Whipple, CKD stage 3, HTN, COPD, pulmonary fibrosis, PRESS syndrome (hospitalized in 2019), Raynaud's syndrome, ANCA vasculitis, Scleroderma  presents to ED due to cardiac arrest. Pt lives at home with son and daughter, went to use the bathroom. Daughter notes that she heard the patient's walker scratching the floor, went upstairs to see what was going on. Daughter noticed patient was slumped over and struggling to get onto the toilet. Daughter helped patient onto the toilet, but noticed that the patient became less responsive and alert so she called EMS. Patient was found down by EMS, intubated her in the field, and resuscitated her with compressions and 4 rounds of epinephrine. ROSC achieved within a minute of her arrival to the ED. Patient was found to be hypothermic and bradycardic. Of note, patient was hospitalized back in Dec 2023 in [facility] for 18 days for multifocal pneumonia 2/2 human metapneumovirus. Patient also was hospitalized in 2019 for PRESS syndrome. Pt had a negative stress test in Sept 2023. She follows pulmonology for pulmonary fibrosis, had recent PFTs done.     ED vitals and workup:  BP 90/57, HR 88, Temp 94.1F, satting 100% on ventilator   Labs: Hgb 8.7, Trop 56 -->79, K 5.3, Lactate 3.5, Lipase 256, , , ALT 52  VBG: pH 7.12, pCO2 67, Lactate 6.6    CXR bilateral opacities. cardiomegaly  CTH: mild atrophic changes  CT angio chest PE, A/P w/ IV cont: Bilary ductal enhancement s/p CCY. Diffuse fibrotic changes of lungs. Trace bilateral effusions. NO PE.    s/p atropine 1mg x4 doses, calcium gluconate 3g, [id number] x1, insulin 10 units, cefepime 1g in the ED.     Admitted to MICU for unresponsiveness s/p cardiac arrest.     (06 Apr 2024 15:43)    INTERVAL EVENTS  4/16/24: patient intubated, off sedation, does nto appear in distress  4/22/24: s/p trach    ADVANCE DIRECTIVES:     [ x] Full Code [ ] DNR  MOLST  [ ]  Living Will  [ ]   DECISION MAKER(s):  [ ] Health Care Proxy(s)  [ x] Surrogate(s)  [ ] Guardian           Name(s): Phone Number(s):   Children      BASELINE (I)ADL(s) (prior to admission):    [place]: [ ]Total  [ ] Moderate [ ]Dependent  Palliative Performance Status Version 2:         %    [email or web address]    Allergies    celecoxib (Rash)  Originally Entered as [U UNKNOWN] reaction to [celebrit] (Unknown)    Intolerances    MEDICATIONS  (STANDING):  artificial  tears Solution 1 Drop(s) Both EYES three times a day  chlorhexidine 0.12% Liquid 15 milliLiter(s) Oral Mucosa every 12 hours  chlorhexidine 2% Cloths 1 Application(s) Topical <User Schedule>  chlorhexidine 2% Cloths 1 Application(s) Topical daily  heparin   Injectable 5000 Unit(s) SubCutaneous every 12 hours  hydrALAZINE 25 milliGRAM(s) Oral every 4 hours  labetalol 100 milliGRAM(s) Oral three times a day  lactated ringers. 1000 milliLiter(s) (50 mL/Hr) IV Continuous <Continuous>  levothyroxine 100 MICROGram(s) Oral daily  lidocaine 1%/epinephrine 1:100,000 Inj 20 milliLiter(s) Local Injection once  lidocaine 2% Viscous 10 milliLiter(s) Oral once  lisinopril 20 milliGRAM(s) Oral daily  methadone    Tablet 10 milliGRAM(s) Oral two times a day  pantoprazole  Injectable 40 milliGRAM(s) IV Push daily  petrolatum Ophthalmic Ointment 1 Application(s) Both EYES daily  propofol Infusion 10 MICROgram(s)/kG/Min (2.4 mL/Hr) IV Continuous <Continuous>  sodium zirconium cyclosilicate 10 Gram(s) Oral every 12 hours    MEDICATIONS  (PRN):  labetalol Injectable 20 milliGRAM(s) IV Push every 4 hours PRN Systolic blood pressure >180      PRESENT SYMPTOMS: [ x]Unable to obtain due to poor mentation   Source if other than patient:  [ ]Family   [ ]Team     Pain: [ ]yes [ ]no  ALL PAIN ASSESSMENT COMPONENTS WERE ASKED ABOUT UNLESS OTHERWISE NOTED  QOL impact -   Location -                    Aggravating factors -  Quality -  Radiation -  Timing-  Severity (0-10 scale):  Minimal acceptable level (0-10 scale):     CPOT:  1  https://[email or web address]/[email or web address]/Critical-Care-Pain-Observation-Tool-(CPOT)    PAIN AD Score:   [email or web address]/painad.pdf (press ctrl +  left click to view)    Dyspnea:                           [ ]None[ ]Mild [ ]Moderate [ ]Severe     Respiratory Distress Observation Scale (RDOS): 1  A score of 0 to 2 signifies little or no respiratory distress, 3 signifies mild distress, scores 4 to 6 indicate moderate distress, and scores greater than 7 signify severe distress  [email or web address]-[email or web address]    Anxiety:                             [ ]None[ ]Mild [ ]Moderate [ ]Severe   Fatigue:                             [ ]None[ ]Mild [ ]Moderate [ ]Severe   Nausea:                             [ ]None[ ]Mild [ ]Moderate [ ]Severe   Loss of appetite:              [ ]None[ ]Mild [ ]Moderate [ ]Severe   Constipation:                    [ ]None[ ]Mild [ ]Moderate [ ]Severe    Other Symptoms:  [ ]All other review of systems negative     Palliative Performance Status Version 2:         %    [email or web address]_[email or web address]    PHYSICAL EXAM:  Vital Signs Last 24 Hrs  T(C): 36.7 (22 Apr 2024 15:05), Max: 36.9 (21 Apr 2024 19:28)  T(F): 98.1 (22 Apr 2024 15:05), Max: 98.5 (21 Apr 2024 19:28)  HR: 72 (22 Apr 2024 15:00) (55 - 79)  BP: 125/62 (22 Apr 2024 15:00) (120/60 - 207/91)  BP(mean): 87 (22 Apr 2024 15:00) (82 - 130)  RR: 22 (22 Apr 2024 15:05) (20 - 39)  SpO2: 100% (22 Apr 2024 15:00) (100% - 100%)    Parameters below as of 22 Apr 2024 07:00  Patient On (Oxygen Delivery Method): ventilator    O2 Concentration (%): 40  GENERAL:  [ ] No acute distress [ ]Lethargic  [x ]Unarousable  [ ]Verbal  [ ]Non-Verbal [ ]Cachexia    BEHAVIORAL/PSYCH:  [ ]Alert and Oriented x  [ ] Anxiety [ ] Delirium [ ] Agitation [x ] Calm   EYES: [ ] No scleral icterus [ ] Scleral icterus [ x] Closed  ENMT:  [ ]Dry mouth  [x ]No external oral lesions [ ] No external ear or nose lesions  CARDIOVASCULAR:  [ ]Regular [ ]Irregular [ ]Tachy [x ]Not Tachy  [ ]Jayy [ ] Edema [ ] No edema  PULMONARY:  [ ]Tachypnea  [ ]Audible excessive secretions [ x] No labored breathing [ ] labored breathing  GASTROINTESTINAL: [ ]Soft  [ ]Distended  [ x]Not distended [ ]Non tender [ ]Tender  MUSCULOSKELETAL: [ ]No clubbing [ ] clubbing  [x ] No cyanosis [ ] cyanosis  NEUROLOGIC: [ ]No focal deficits  [ ]Follows commands  [ x]Does not follow commands  [ ]Cognitive impairment  [ ]Dysphagia  [ ]Dysarthria  [ ]Paresis   SKIN: [ ] Jaundiced [x ] Non-jaundiced [ ]Rash [ ]No Rash [ ] Warm [ ] Dry  MISC/LINES: [ ] ET tube [X] Trach [ ]NGT/OGT [ ]PEG [ ]Stack    LABS: hyponatremia, reviewed                                   8.6    9.62  )-----------( 418      ( 22 Apr 2024 05:42 )             25.7     04-22    129<L>  |  95<L>  |  26<H>  ----------------------------<  111<H>  5.6<H>   |  27  |  1.1    Ca    8.2<L>      22 Apr 2024 05:42  Phos  3.9     04-22  Mg     1.9     04-22          RADIOLOGY & ADDITIONAL STUDIES: reviewed by me    < from: Xray Chest 1 View-PORTABLE IMMEDIATE (Xray Chest 1 View-PORTABLE IMMEDIATE .) (04.08.24 @ 08:38) >  Impression:    Tip of the orogastric tube is seen overlying left abdomen    Stable bilateral lung opacities    < end of copied text >      EKG: reviewed by me    < from: 12 Lead ECG (04.08.24 @ 13:48) >    Ventricular Rate 63 BPM    Atrial Rate 63 BPM    P-R Interval 168 ms    QRS Duration 132 ms    Q-T Interval 442 ms    QTC Calculation(Bazett) 452 ms    P Axis 52 degrees    R Axis 149 degrees    T Axis 56 degrees    Diagnosis Line Normal sinus rhythm  Right bundle branch block  Left posterior fascicular block  *** Bifascicular block ***  Abnormal ECG    < end of copied text >      PROTEIN CALORIE MALNUTRITION PRESENT: [ ]mild [ ]moderate [ ]severe [ ]underweight [ ]morbid obesity  https://www.[facility].org/vault/2440/web/files/ONC/Table_Clinical%20Characteristics%20to%20Document%20Malnutrition-White%20JV%20et%20al%202012.pdf    Height (cm): 154.9 (04-06-24 @ 16:20)  Weight (kg): 40 (04-06-24 @ 16:20)  BMI (kg/m2): 16.7 (04-06-24 @ 16:20)  [ ]PPSV2 < or = to 30% [ ]significant weight loss  [ ]poor nutritional intake  [ ]anasarca      [ ]Artificial Nutrition      Palliative Care Spiritual/Emotional Screening Tool Question  Severity (0-4):                    OR                    [ x] Unable to determine. Will assess at later time if appropriate.  Score of 2 or greater indicates recommendation of Chaplaincy and/or SW referral  Chaplaincy Referral: [ ] Yes [ ] Refused [ ] Following     Caregiver [place]:  [ ] Yes [ ] No    OR    [x ] Unable to determine. Will assess at later time if appropriate.  Social Work Referral [ ]  Patient and Family Centered Care Referral [ ]    Anticipatory Grief Present: [ ] Yes [ ] No    OR     [ x] Unable to determine. Will assess at later time if appropriate.  Social Work Referral [ ]  Patient and Family Centered Care Referral [ ]    Patient discussed with primary medical team MD  Palliative care education provided to patient and/or family

## 2024-04-22 NOTE — PROGRESS NOTE ADULT - ASSESSMENT
IMPRESSION:    Anoxic brain damage  Acute Hypoxemic Respiratory Failure on MV SP trach  SP CPA downtime 30 minutes   Cardiac bradycardia   Lung Fibrosis   GIUSEPPE, resolved   HO Raynaud's  HO PRESS    HO Anemia  HO Pancreatic cancer     PLAN:    CNS: taper off fentanyl   started on methadone  monitor Qtc    HEENT: oral and trach care     PULMONARY: keep pox >92%  monitor peak and plateau  no change in vent settings    CARDIOVASCULAR:  2D-Echo noted, continue labetalol dose, increase hydralazine to 25mg Q6H     RENAL: follow is and os, trend Cr, Nephrology follow up today, continue lokelma monitor BMP Q6H     INFECTIOUS DISEASE: cultures negative to date, procalcitonin elevated, completed  Zosyn course    GASTRO: GI ppx, tube feeds follow GSx planned for PEG this week     HEMATOLOGICAL:  DVT prophylaxis.    ENDOCRINE:  Follow up FS.  Insulin protocol if needed.    MUSCULOSKELETAL: bedrest    Full Code, Palliative care following    Grave prognosis     The patient is critically ill with a high probability of further deterioration.    MICU monitoring

## 2024-04-22 NOTE — PROGRESS NOTE ADULT - SUBJECTIVE AND OBJECTIVE BOX
Hospital day # : 16d  Events Overnight:     Subjective:     Physical Exam:  General: no acute distress  Head: atraumatic, normocephalic  Eyes: EOMI, no icterus  Lungs/Chest: Clear to auscultation bilaterally, no wheeze  Heart: regular rate, regular rhythm, S1/S2  Abdomen: BS audible, non-distended, soft, non-tender  Extremities: no clubbing, no cyanosis, no edema  Neuro: alert, oriented, speech clear and fluent, moving all extremities    Recent data:  T(C): 36.5 (04-22-24 @ 07:05), Max: 36.9 (04-21-24 @ 15:37)  HR: 79 (04-22-24 @ 05:05) (55 - 79)  BP: 179/89 (04-22-24 @ 05:05) (115/59 - 207/91)  RR: 23 (04-22-24 @ 07:05) (21 - 39)  SpO2: 100% (04-22-24 @ 05:05) (100% - 100%)    I&O's Summary    21 Apr 2024 07:01  -  22 Apr 2024 07:00  --------------------------------------------------------  IN: 2569 mL / OUT: 1290 mL / NET: 1279 mL    22 Apr 2024 07:01  -  22 Apr 2024 07:48  --------------------------------------------------------  IN: 0 mL / OUT: 45 mL / NET: -45 mL                              8.6    9.62  )-----------( 418      ( 22 Apr 2024 05:42 )             25.7                         8.7    9.14  )-----------( 350      ( 21 Apr 2024 06:28 )             27.5       22 Apr 2024 05:42    129    |  95     |  26     ----------------------------<  111    5.6     |  27     |  1.1    21 Apr 2024 15:52    127    |  93     |  27     ----------------------------<  118    5.2     |  26     |  1.0    21 Apr 2024 06:28    128    |  95     |  28     ----------------------------<  132    5.7     |  27     |  1.2       Ca    8.2        22 Apr 2024 05:42  Ca    7.8        21 Apr 2024 15:52  Ca    8.0        21 Apr 2024 06:28  Phos  3.9       22 Apr 2024 05:42  Mg     1.9       22 Apr 2024 05:42        Creatine Kinase    200 (04-09-24)  340 (04-08-24)      Assessment/Plan:  74-year-old female with PHMx of pancreatic CA s/p Whipple, CKD stage 3, HTN, COPD, pulmonary fibrosis, PRESS syndrome (hospitalized in 2019), Raynaud's syndrome, ANCA vasculitis, Scleroderma, hypothyroid  presents to ED due to cardiac arrest. Pt lives at home with son and daughter, went to use the bathroom and became unresponsive.  EMS intubated her and gave her 4 rounds of epinephrine.  ROSC was achieved within a minute of her arrival to the ED.  Patient was found to be hypothermic and bradycardic      #S/p Cardiac arrest  #Anoxic brain injury  #Aspiration Pneumonia  #Acute respiratory failure  #HTN   - s/p trach 4/18/24   - GI unable to place PEG tube, Surgery will place PEG this week    - anoxic brain injury as seen on CT brain and EEG   - completed antibiotic course    - Labetalol / lisinopril for bp control   - DVT and GI prophylaxis   - DC planning to NH - Case management aware Hospital day # : 16d  Events Overnight: none    Subjective: trach to vent, unresponsive    Physical Exam:  General: crit ill appearing, trach to vent  Neuro: unresponsive, nonverbal  Head: atraumatic, normocephalic  Eyes: EOMI, no icterus  Lungs/Chest: Clear to auscultation bilaterally, no wheeze  Heart: regular rate, regular rhythm, S1/S2  Abdomen: BS audible, non-distended, soft, non-tender  Extremities: no clubbing, no cyanosis, no edema      Recent data:  T(C): 36.5 (04-22-24 @ 07:05), Max: 36.9 (04-21-24 @ 15:37)  HR: 79 (04-22-24 @ 05:05) (55 - 79)  BP: 179/89 (04-22-24 @ 05:05) (115/59 - 207/91)  RR: 23 (04-22-24 @ 07:05) (21 - 39)  SpO2: 100% (04-22-24 @ 05:05) (100% - 100%)    I&O's Summary    21 Apr 2024 07:01  -  22 Apr 2024 07:00  --------------------------------------------------------  IN: 2569 mL / OUT: 1290 mL / NET: 1279 mL    22 Apr 2024 07:01  -  22 Apr 2024 07:48  --------------------------------------------------------  IN: 0 mL / OUT: 45 mL / NET: -45 mL                              8.6    9.62  )-----------( 418      ( 22 Apr 2024 05:42 )             25.7                         8.7    9.14  )-----------( 350      ( 21 Apr 2024 06:28 )             27.5       22 Apr 2024 05:42    129    |  95     |  26     ----------------------------<  111    5.6     |  27     |  1.1    21 Apr 2024 15:52    127    |  93     |  27     ----------------------------<  118    5.2     |  26     |  1.0    21 Apr 2024 06:28    128    |  95     |  28     ----------------------------<  132    5.7     |  27     |  1.2       Ca    8.2        22 Apr 2024 05:42  Ca    7.8        21 Apr 2024 15:52  Ca    8.0        21 Apr 2024 06:28  Phos  3.9       22 Apr 2024 05:42  Mg     1.9       22 Apr 2024 05:42        Creatine Kinase    200 (04-09-24)  340 (04-08-24)      Assessment/Plan:  74-year-old female with PHMx of pancreatic CA s/p Whipple, CKD stage 3, HTN, COPD, pulmonary fibrosis, PRESS syndrome (hospitalized in 2019), Raynaud's syndrome, ANCA vasculitis, Scleroderma, hypothyroid  presents to ED due to cardiac arrest. Pt lives at home with son and daughter, went to use the bathroom and became unresponsive.  EMS intubated her and gave her 4 rounds of epinephrine.  ROSC was achieved within a minute of her arrival to the ED.  Patient was found to be hypothermic and bradycardic      #S/p Cardiac arrest  #Anoxic brain injury on CT (4/11)   #Aspiration Pneumonia  #Acute hypoxic respiratory failure s/p trach  #HTN   - has + cough/ gag reflex    - s/p trach 4/18/24 - vented to FIO2 40%   - GI unable to place PEG tube, Surgery will place PEG this week    - anoxic brain injury as seen on CT brain and EEG   - completed antibiotic course    - Labetalol / lisinopril / hydralazine for bp control - inc hydralazine today for better control  - DC fentanyl drip and reassess mental status - pushes fentanyl if needed    - DVT and GI prophylaxis   - DC planning to NH - Case management aware    #Persistent Hyperkalemia  -c/w Lokelma 10mg  -BMP at 3pm  -cr at baseline 1.1

## 2024-04-22 NOTE — PROGRESS NOTE ADULT - SUBJECTIVE AND OBJECTIVE BOX
Patient is a 74y old  Female who presents with a chief complaint of cardiac arrest (22 Apr 2024 07:48)        Over Night Events: remains on MV, sedated on fentanyl     Vital Signs Last 24 Hrs  T(C): 36.5 (22 Apr 2024 07:05), Max: 36.9 (21 Apr 2024 15:37)  T(F): 97.7 (22 Apr 2024 07:05), Max: 98.5 (21 Apr 2024 19:28)  HR: 58 (22 Apr 2024 08:00) (55 - 79)  BP: 162/77 (22 Apr 2024 08:00) (120/60 - 207/91)  BP(mean): 111 (22 Apr 2024 08:00) (83 - 130)  RR: 25 (22 Apr 2024 08:00) (23 - 39)  SpO2: 100% (22 Apr 2024 08:00) (100% - 100%)    O2 Parameters below as of 22 Apr 2024 07:00  Patient On (Oxygen Delivery Method): ventilator    O2 Concentration (%): 40      CONSTITUTIONAL:   Ill appearing.     ENT:   trached     EYES:   right fixed pupil sluggish left       CARDIAC:   Normal rate,   Regular rhythm.      RESPIRATORY:   No wheezing  Bilateral BS  Normal chest expansion  Not tachypneic,  No use of accessory muscles    GASTROINTESTINAL:  Abdomen soft,   Non-tender,   No guarding,   + BS      MUSCULOSKELETAL:   Range of motion is limited    NEUROLOGICAL:   does not follow commands positive cough and gag       SKIN:   Skin normal color for race,   Warm and dry  sacral DTI       04-21-24 @ 07:01  -  04-22-24 @ 07:00  --------------------------------------------------------  IN:    FentaNYL: 139 mL    Free Water: 150 mL    Lactated Ringers: 1200 mL    Osmolite 1.2: 1080 mL  Total IN: 2569 mL    OUT:    FentaNYL: 0 mL    Indwelling Catheter - Urethral (mL): 1290 mL  Total OUT: 1290 mL    Total NET: 1279 mL      04-22-24 @ 07:01  -  04-22-24 @ 09:21  --------------------------------------------------------  IN:    FentaNYL: 12 mL    Lactated Ringers: 100 mL    Osmolite 1.2: 90 mL  Total IN: 202 mL    OUT:    Indwelling Catheter - Urethral (mL): 45 mL  Total OUT: 45 mL    Total NET: 157 mL          LABS:                            8.6    9.62  )-----------( 418      ( 22 Apr 2024 05:42 )             25.7                                               04-22    129<L>  |  95<L>  |  26<H>  ----------------------------<  111<H>  5.6<H>   |  27  |  1.1    Ca    8.2<L>      22 Apr 2024 05:42  Phos  3.9     04-22  Mg     1.9     04-22    TPro  6.2  /  Alb  2.2<L>  /  TBili  0.2  /  DBili  x   /  AST  39  /  ALT  9   /  AlkPhos  244<H>  04-22                                             Urinalysis Basic - ( 22 Apr 2024 05:42 )    Color: x / Appearance: x / SG: x / pH: x  Gluc: 111 mg/dL / Ketone: x  / Bili: x / Urobili: x   Blood: x / Protein: x / Nitrite: x   Leuk Esterase: x / RBC: x / WBC x   Sq Epi: x / Non Sq Epi: x / Bacteria: x                                                  LIVER FUNCTIONS - ( 22 Apr 2024 05:42 )  Alb: 2.2 g/dL / Pro: 6.2 g/dL / ALK PHOS: 244 U/L / ALT: 9 U/L / AST: 39 U/L / GGT: x                                                                                               Mode: AC/ CMV (Assist Control/ Continuous Mandatory Ventilation)  RR (machine): 26  TV (machine): 300  FiO2: 40  PEEP: 5  MAP: 17  PIP: 23                                          MEDICATIONS  (STANDING):  artificial  tears Solution 1 Drop(s) Both EYES three times a day  chlorhexidine 0.12% Liquid 15 milliLiter(s) Oral Mucosa every 12 hours  chlorhexidine 2% Cloths 1 Application(s) Topical daily  chlorhexidine 2% Cloths 1 Application(s) Topical <User Schedule>  fentaNYL   Infusion 0.5 MICROgram(s)/kG/Hr (2 mL/Hr) IV Continuous <Continuous>  heparin   Injectable 5000 Unit(s) SubCutaneous every 12 hours  labetalol 100 milliGRAM(s) Oral three times a day  lactated ringers. 1000 milliLiter(s) (50 mL/Hr) IV Continuous <Continuous>  levothyroxine 100 MICROGram(s) Oral daily  lidocaine 1%/epinephrine 1:100,000 Inj 20 milliLiter(s) Local Injection once  lidocaine 2% Viscous 10 milliLiter(s) Oral once  lisinopril 20 milliGRAM(s) Oral daily  methadone    Tablet 10 milliGRAM(s) Oral two times a day  pantoprazole  Injectable 40 milliGRAM(s) IV Push daily  petrolatum Ophthalmic Ointment 1 Application(s) Both EYES daily  propofol Infusion 10 MICROgram(s)/kG/Min (2.4 mL/Hr) IV Continuous <Continuous>  sodium zirconium cyclosilicate 10 Gram(s) Oral every 12 hours    MEDICATIONS  (PRN):  hydrALAZINE 10 milliGRAM(s) Oral every 4 hours PRN Systolic blood pressure >180  labetalol Injectable 20 milliGRAM(s) IV Push every 4 hours PRN Systolic blood pressure >180

## 2024-04-23 LAB
ALBUMIN SERPL ELPH-MCNC: 2.2 G/DL — LOW (ref 3.5–5.2)
ALP SERPL-CCNC: 201 U/L — HIGH (ref 30–115)
ALT FLD-CCNC: 7 U/L — SIGNIFICANT CHANGE UP (ref 0–41)
ANION GAP SERPL CALC-SCNC: 5 MMOL/L — LOW (ref 7–14)
AST SERPL-CCNC: 34 U/L — SIGNIFICANT CHANGE UP (ref 0–41)
BASE EXCESS BLDA CALC-SCNC: 5.5 MMOL/L — HIGH (ref -2–3)
BASOPHILS # BLD AUTO: 0.04 K/UL — SIGNIFICANT CHANGE UP (ref 0–0.2)
BASOPHILS NFR BLD AUTO: 0.4 % — SIGNIFICANT CHANGE UP (ref 0–1)
BILIRUB SERPL-MCNC: <0.2 MG/DL — SIGNIFICANT CHANGE UP (ref 0.2–1.2)
BUN SERPL-MCNC: 26 MG/DL — HIGH (ref 10–20)
CALCIUM SERPL-MCNC: 8 MG/DL — LOW (ref 8.4–10.5)
CHLORIDE SERPL-SCNC: 97 MMOL/L — LOW (ref 98–110)
CO2 SERPL-SCNC: 29 MMOL/L — SIGNIFICANT CHANGE UP (ref 17–32)
CREAT SERPL-MCNC: 1 MG/DL — SIGNIFICANT CHANGE UP (ref 0.7–1.5)
EGFR: 59 ML/MIN/1.73M2 — LOW
EOSINOPHIL # BLD AUTO: 0.35 K/UL — SIGNIFICANT CHANGE UP (ref 0–0.7)
EOSINOPHIL NFR BLD AUTO: 3.4 % — SIGNIFICANT CHANGE UP (ref 0–8)
GAS PNL BLDA: SIGNIFICANT CHANGE UP
GLUCOSE SERPL-MCNC: 115 MG/DL — HIGH (ref 70–99)
HCO3 BLDA-SCNC: 32 MMOL/L — HIGH (ref 21–28)
HCT VFR BLD CALC: 25.7 % — LOW (ref 37–47)
HGB BLD-MCNC: 8.3 G/DL — LOW (ref 12–16)
IMM GRANULOCYTES NFR BLD AUTO: 0.4 % — HIGH (ref 0.1–0.3)
LYMPHOCYTES # BLD AUTO: 0.82 K/UL — LOW (ref 1.2–3.4)
LYMPHOCYTES # BLD AUTO: 7.9 % — LOW (ref 20.5–51.1)
MAGNESIUM SERPL-MCNC: 1.9 MG/DL — SIGNIFICANT CHANGE UP (ref 1.8–2.4)
MCHC RBC-ENTMCNC: 25.5 PG — LOW (ref 27–31)
MCHC RBC-ENTMCNC: 32.3 G/DL — SIGNIFICANT CHANGE UP (ref 32–37)
MCV RBC AUTO: 79.1 FL — LOW (ref 81–99)
MONOCYTES # BLD AUTO: 0.95 K/UL — HIGH (ref 0.1–0.6)
MONOCYTES NFR BLD AUTO: 9.1 % — SIGNIFICANT CHANGE UP (ref 1.7–9.3)
NEUTROPHILS # BLD AUTO: 8.22 K/UL — HIGH (ref 1.4–6.5)
NEUTROPHILS NFR BLD AUTO: 78.8 % — HIGH (ref 42.2–75.2)
NRBC # BLD: 0 /100 WBCS — SIGNIFICANT CHANGE UP (ref 0–0)
PCO2 BLDA: 51 MMHG — HIGH (ref 32–45)
PH BLDA: 7.4 — SIGNIFICANT CHANGE UP (ref 7.35–7.45)
PLATELET # BLD AUTO: 477 K/UL — HIGH (ref 130–400)
PMV BLD: 9.9 FL — SIGNIFICANT CHANGE UP (ref 7.4–10.4)
PO2 BLDA: 209 MMHG — HIGH (ref 83–108)
POTASSIUM SERPL-MCNC: 5.3 MMOL/L — HIGH (ref 3.5–5)
POTASSIUM SERPL-SCNC: 5.3 MMOL/L — HIGH (ref 3.5–5)
PROT SERPL-MCNC: 6.1 G/DL — SIGNIFICANT CHANGE UP (ref 6–8)
RBC # BLD: 3.25 M/UL — LOW (ref 4.2–5.4)
RBC # FLD: 16.6 % — HIGH (ref 11.5–14.5)
SAO2 % BLDA: 100 % — HIGH (ref 94–98)
SODIUM SERPL-SCNC: 131 MMOL/L — LOW (ref 135–146)
WBC # BLD: 10.42 K/UL — SIGNIFICANT CHANGE UP (ref 4.8–10.8)
WBC # FLD AUTO: 10.42 K/UL — SIGNIFICANT CHANGE UP (ref 4.8–10.8)

## 2024-04-23 PROCEDURE — 99232 SBSQ HOSP IP/OBS MODERATE 35: CPT

## 2024-04-23 PROCEDURE — 99233 SBSQ HOSP IP/OBS HIGH 50: CPT

## 2024-04-23 RX ORDER — SCOPALAMINE 1 MG/3D
1 PATCH, EXTENDED RELEASE TRANSDERMAL
Refills: 0 | Status: DISCONTINUED | OUTPATIENT
Start: 2024-04-23 | End: 2024-04-25

## 2024-04-23 RX ADMIN — Medication 1 DROP(S): at 05:46

## 2024-04-23 RX ADMIN — Medication 1 DROP(S): at 14:21

## 2024-04-23 RX ADMIN — HEPARIN SODIUM 5000 UNIT(S): 5000 INJECTION INTRAVENOUS; SUBCUTANEOUS at 16:51

## 2024-04-23 RX ADMIN — Medication 100 MICROGRAM(S): at 05:45

## 2024-04-23 RX ADMIN — HEPARIN SODIUM 5000 UNIT(S): 5000 INJECTION INTRAVENOUS; SUBCUTANEOUS at 05:46

## 2024-04-23 RX ADMIN — CHLORHEXIDINE GLUCONATE 1 APPLICATION(S): 213 SOLUTION TOPICAL at 21:36

## 2024-04-23 RX ADMIN — Medication 1 DROP(S): at 21:34

## 2024-04-23 RX ADMIN — Medication 100 MILLIGRAM(S): at 05:45

## 2024-04-23 RX ADMIN — METHADONE HYDROCHLORIDE 10 MILLIGRAM(S): 40 TABLET ORAL at 16:52

## 2024-04-23 RX ADMIN — Medication 25 MILLIGRAM(S): at 05:45

## 2024-04-23 RX ADMIN — METHADONE HYDROCHLORIDE 10 MILLIGRAM(S): 40 TABLET ORAL at 05:46

## 2024-04-23 RX ADMIN — SODIUM CHLORIDE 50 MILLILITER(S): 9 INJECTION, SOLUTION INTRAVENOUS at 07:02

## 2024-04-23 RX ADMIN — Medication 1 APPLICATION(S): at 11:14

## 2024-04-23 RX ADMIN — CHLORHEXIDINE GLUCONATE 15 MILLILITER(S): 213 SOLUTION TOPICAL at 16:47

## 2024-04-23 RX ADMIN — SCOPALAMINE 1 PATCH: 1 PATCH, EXTENDED RELEASE TRANSDERMAL at 19:29

## 2024-04-23 RX ADMIN — SODIUM ZIRCONIUM CYCLOSILICATE 10 GRAM(S): 10 POWDER, FOR SUSPENSION ORAL at 05:46

## 2024-04-23 RX ADMIN — Medication 100 MILLIGRAM(S): at 21:33

## 2024-04-23 RX ADMIN — CHLORHEXIDINE GLUCONATE 1 APPLICATION(S): 213 SOLUTION TOPICAL at 05:47

## 2024-04-23 RX ADMIN — SODIUM CHLORIDE 50 MILLILITER(S): 9 INJECTION, SOLUTION INTRAVENOUS at 19:33

## 2024-04-23 RX ADMIN — Medication 25 MILLIGRAM(S): at 02:11

## 2024-04-23 RX ADMIN — SCOPALAMINE 1 PATCH: 1 PATCH, EXTENDED RELEASE TRANSDERMAL at 16:46

## 2024-04-23 RX ADMIN — PANTOPRAZOLE SODIUM 40 MILLIGRAM(S): 20 TABLET, DELAYED RELEASE ORAL at 11:14

## 2024-04-23 RX ADMIN — Medication 100 MILLIGRAM(S): at 14:23

## 2024-04-23 RX ADMIN — LISINOPRIL 20 MILLIGRAM(S): 2.5 TABLET ORAL at 05:45

## 2024-04-23 RX ADMIN — CHLORHEXIDINE GLUCONATE 15 MILLILITER(S): 213 SOLUTION TOPICAL at 05:45

## 2024-04-23 NOTE — PROGRESS NOTE ADULT - SUBJECTIVE AND OBJECTIVE BOX
Patient is a 74y old  Female who presents with a chief complaint of cardiac arrest (23 Apr 2024 06:27)        Over Night Events: no acute events overnight       ICU Vital Signs Last 24 Hrs  T(C): 36.5 (23 Apr 2024 07:05), Max: 36.7 (22 Apr 2024 11:05)  T(F): 97.7 (23 Apr 2024 07:05), Max: 98.1 (22 Apr 2024 11:05)  HR: 69 (23 Apr 2024 08:00) (57 - 78)  BP: 128/61 (23 Apr 2024 08:00) (104/58 - 171/79)  BP(mean): 88 (23 Apr 2024 08:00) (76 - 114)  RR: 30 (23 Apr 2024 08:00) (20 - 31)  SpO2: 100% (23 Apr 2024 08:00) (99% - 100%)    O2 Parameters below as of 23 Apr 2024 07:00  Patient On (Oxygen Delivery Method): ventilator          CONSTITUTIONAL:   Ill appearing.     ENT:   trached     EYES:   right fixed pupil sluggish left       CARDIAC:   Normal rate,   Regular rhythm.      RESPIRATORY:   No wheezing  Bilateral BS  Normal chest expansion  Not tachypneic,  No use of accessory muscles    GASTROINTESTINAL:  Abdomen soft,   Non-tender,   No guarding,   + BS      MUSCULOSKELETAL:   Range of motion is limited    NEUROLOGICAL:   does not follow commands positive cough and gag       SKIN:   Skin normal color for race,   Warm and dry  sacral DTI       04-22-24 @ 07:01  -  04-23-24 @ 07:00  --------------------------------------------------------  IN:    FentaNYL: 6 mL    Lactated Ringers: 1200 mL    Osmolite 1.2: 1080 mL  Total IN: 2286 mL    OUT:    Indwelling Catheter - Urethral (mL): 1745 mL    Rectal Tube (mL): 0 mL  Total OUT: 1745 mL    Total NET: 541 mL      04-23-24 @ 07:01  -  04-23-24 @ 08:55  --------------------------------------------------------  IN:    Lactated Ringers: 100 mL    Osmolite 1.2: 90 mL  Total IN: 190 mL    OUT:    Indwelling Catheter - Urethral (mL): 45 mL  Total OUT: 45 mL    Total NET: 145 mL          LABS:                            8.3    10.42 )-----------( 477      ( 23 Apr 2024 05:38 )             25.7                                               04-23    131<L>  |  97<L>  |  26<H>  ----------------------------<  115<H>  5.3<H>   |  29  |  1.0    Ca    8.0<L>      23 Apr 2024 05:38  Phos  3.9     04-22  Mg     1.9     04-23    TPro  6.1  /  Alb  2.2<L>  /  TBili  <0.2  /  DBili  x   /  AST  34  /  ALT  7   /  AlkPhos  201<H>  04-23                                             Urinalysis Basic - ( 23 Apr 2024 05:38 )    Color: x / Appearance: x / SG: x / pH: x  Gluc: 115 mg/dL / Ketone: x  / Bili: x / Urobili: x   Blood: x / Protein: x / Nitrite: x   Leuk Esterase: x / RBC: x / WBC x   Sq Epi: x / Non Sq Epi: x / Bacteria: x                                                  LIVER FUNCTIONS - ( 23 Apr 2024 05:38 )  Alb: 2.2 g/dL / Pro: 6.1 g/dL / ALK PHOS: 201 U/L / ALT: 7 U/L / AST: 34 U/L / GGT: x                                                                                               Mode: AC/ CMV (Assist Control/ Continuous Mandatory Ventilation)  RR (machine): 26  TV (machine): 300  FiO2: 40  PEEP: 5  MAP: 10  PIP: 23                                      ABG - ( 23 Apr 2024 04:12 )  pH, Arterial: 7.40  pH, Blood: x     /  pCO2: 51    /  pO2: 209   / HCO3: 32    / Base Excess: 5.5   /  SaO2: 100.0               MEDICATIONS  (STANDING):  artificial  tears Solution 1 Drop(s) Both EYES three times a day  chlorhexidine 0.12% Liquid 15 milliLiter(s) Oral Mucosa every 12 hours  chlorhexidine 2% Cloths 1 Application(s) Topical daily  chlorhexidine 2% Cloths 1 Application(s) Topical <User Schedule>  heparin   Injectable 5000 Unit(s) SubCutaneous every 12 hours  hydrALAZINE 25 milliGRAM(s) Oral every 4 hours  labetalol 100 milliGRAM(s) Oral three times a day  lactated ringers. 1000 milliLiter(s) (50 mL/Hr) IV Continuous <Continuous>  levothyroxine 100 MICROGram(s) Oral daily  lidocaine 1%/epinephrine 1:100,000 Inj 20 milliLiter(s) Local Injection once  lidocaine 2% Viscous 10 milliLiter(s) Oral once  lisinopril 20 milliGRAM(s) Oral daily  methadone    Tablet 10 milliGRAM(s) Oral two times a day  pantoprazole  Injectable 40 milliGRAM(s) IV Push daily  petrolatum Ophthalmic Ointment 1 Application(s) Both EYES daily  propofol Infusion 10 MICROgram(s)/kG/Min (2.4 mL/Hr) IV Continuous <Continuous>    MEDICATIONS  (PRN):  labetalol Injectable 20 milliGRAM(s) IV Push every 4 hours PRN Systolic blood pressure >180     Patient is a 74y old  Female who presents with a chief complaint of cardiac arrest (23 Apr 2024 06:27)        Over Night Events: no acute events overnight, off sedation       ICU Vital Signs Last 24 Hrs  T(C): 36.5 (23 Apr 2024 07:05), Max: 36.7 (22 Apr 2024 11:05)  T(F): 97.7 (23 Apr 2024 07:05), Max: 98.1 (22 Apr 2024 11:05)  HR: 69 (23 Apr 2024 08:00) (57 - 78)  BP: 128/61 (23 Apr 2024 08:00) (104/58 - 171/79)  BP(mean): 88 (23 Apr 2024 08:00) (76 - 114)  RR: 30 (23 Apr 2024 08:00) (20 - 31)  SpO2: 100% (23 Apr 2024 08:00) (99% - 100%)    O2 Parameters below as of 23 Apr 2024 07:00  Patient On (Oxygen Delivery Method): ventilator          CONSTITUTIONAL:   Ill appearing.     ENT:   trached     EYES:   right fixed pupil sluggish left       CARDIAC:   Normal rate,   Regular rhythm.      RESPIRATORY:   No wheezing  Bilateral BS  Normal chest expansion  Not tachypneic,  No use of accessory muscles    GASTROINTESTINAL:  Abdomen soft,   Non-tender,   No guarding,   + BS      MUSCULOSKELETAL:   Range of motion is limited    NEUROLOGICAL:   does not follow commands positive cough and gag       SKIN:   Skin normal color for race,   Warm and dry  sacral DTI       04-22-24 @ 07:01  -  04-23-24 @ 07:00  --------------------------------------------------------  IN:    FentaNYL: 6 mL    Lactated Ringers: 1200 mL    Osmolite 1.2: 1080 mL  Total IN: 2286 mL    OUT:    Indwelling Catheter - Urethral (mL): 1745 mL    Rectal Tube (mL): 0 mL  Total OUT: 1745 mL    Total NET: 541 mL      04-23-24 @ 07:01  -  04-23-24 @ 08:55  --------------------------------------------------------  IN:    Lactated Ringers: 100 mL    Osmolite 1.2: 90 mL  Total IN: 190 mL    OUT:    Indwelling Catheter - Urethral (mL): 45 mL  Total OUT: 45 mL    Total NET: 145 mL          LABS:                            8.3    10.42 )-----------( 477      ( 23 Apr 2024 05:38 )             25.7                                               04-23    131<L>  |  97<L>  |  26<H>  ----------------------------<  115<H>  5.3<H>   |  29  |  1.0    Ca    8.0<L>      23 Apr 2024 05:38  Phos  3.9     04-22  Mg     1.9     04-23    TPro  6.1  /  Alb  2.2<L>  /  TBili  <0.2  /  DBili  x   /  AST  34  /  ALT  7   /  AlkPhos  201<H>  04-23                                             Urinalysis Basic - ( 23 Apr 2024 05:38 )    Color: x / Appearance: x / SG: x / pH: x  Gluc: 115 mg/dL / Ketone: x  / Bili: x / Urobili: x   Blood: x / Protein: x / Nitrite: x   Leuk Esterase: x / RBC: x / WBC x   Sq Epi: x / Non Sq Epi: x / Bacteria: x                                                  LIVER FUNCTIONS - ( 23 Apr 2024 05:38 )  Alb: 2.2 g/dL / Pro: 6.1 g/dL / ALK PHOS: 201 U/L / ALT: 7 U/L / AST: 34 U/L / GGT: x                                                                                               Mode: AC/ CMV (Assist Control/ Continuous Mandatory Ventilation)  RR (machine): 26  TV (machine): 300  FiO2: 40  PEEP: 5  MAP: 10  PIP: 23                                      ABG - ( 23 Apr 2024 04:12 )  pH, Arterial: 7.40  pH, Blood: x     /  pCO2: 51    /  pO2: 209   / HCO3: 32    / Base Excess: 5.5   /  SaO2: 100.0               MEDICATIONS  (STANDING):  artificial  tears Solution 1 Drop(s) Both EYES three times a day  chlorhexidine 0.12% Liquid 15 milliLiter(s) Oral Mucosa every 12 hours  chlorhexidine 2% Cloths 1 Application(s) Topical daily  chlorhexidine 2% Cloths 1 Application(s) Topical <User Schedule>  heparin   Injectable 5000 Unit(s) SubCutaneous every 12 hours  hydrALAZINE 25 milliGRAM(s) Oral every 4 hours  labetalol 100 milliGRAM(s) Oral three times a day  lactated ringers. 1000 milliLiter(s) (50 mL/Hr) IV Continuous <Continuous>  levothyroxine 100 MICROGram(s) Oral daily  lidocaine 1%/epinephrine 1:100,000 Inj 20 milliLiter(s) Local Injection once  lidocaine 2% Viscous 10 milliLiter(s) Oral once  lisinopril 20 milliGRAM(s) Oral daily  methadone    Tablet 10 milliGRAM(s) Oral two times a day  pantoprazole  Injectable 40 milliGRAM(s) IV Push daily  petrolatum Ophthalmic Ointment 1 Application(s) Both EYES daily  propofol Infusion 10 MICROgram(s)/kG/Min (2.4 mL/Hr) IV Continuous <Continuous>    MEDICATIONS  (PRN):  labetalol Injectable 20 milliGRAM(s) IV Push every 4 hours PRN Systolic blood pressure >180

## 2024-04-23 NOTE — CONSULT NOTE ADULT - ASSESSMENT
74-year-old female with PHMx of pancreatic CA s/p Whipple, CKD stage 3, HTN, COPD, pulmonary fibrosis, PRESS syndrome (hospitalized in 2019), Raynaud's syndrome, ANCA vasculitis, Scleroderma  presents to ED due to cardiac arrest.  now intubated on MV     renal called for hyponatremia hyperkalemia     # Hyponatremia/ hyperkalemia  # resp failure on MV  # sp code blue     - need to rule out adrenal insufficiency ( sepsis / severe illness/ UFH)  - serum sodium better / K acceptable  - check cortisol AM levels   - follow BMP   - lokelma 10 g po q8h if k> 5.5    prognosis guarded  will follow

## 2024-04-23 NOTE — PROGRESS NOTE ADULT - ASSESSMENT
74-year-old female with PHMx of pancreatic CA s/p Whipple, CKD stage 3, HTN, COPD, pulmonary fibrosis, PRESS syndrome (hospitalized in 2019), Raynaud's syndrome, ANCA vasculitis, Scleroderma, hypothyroid  presents to ED due to cardiac arrest. Pt lives at home with son and daughter, went to use the bathroom and became unresponsive.  EMS intubated her and gave her 4 rounds of epinephrine.  ROSC was achieved within a minute of her arrival to the ED.  Patient was found to be hypothermic and bradycardic    acute respiratory failure / cardiac arrest / anoxic brain injury / HTN / aspiration pneumonia      - s/p trach 4/18/24   - GI unable to place PEG tube, Surgery will place PEG this week    - anoxic brain injury as seen on CT brain and EEG   - DC planning to NH   - completed antibiotic course    - Labetalol / lisinopril    - DVT and GI prophylaxis

## 2024-04-23 NOTE — CONSULT NOTE ADULT - CONSULT REQUESTED DATE/TIME
07-Apr-2024 05:16
08-Apr-2024 16:13
11-Apr-2024 10:50
08-Apr-2024 10:43
23-Apr-2024 06:28
07-Apr-2024 20:02
15-Apr-2024 16:33
07-Apr-2024 07:48
09-Apr-2024 15:09

## 2024-04-23 NOTE — PROGRESS NOTE ADULT - ASSESSMENT
74F with PHMx of pancreatic CA s/p Whipple, CKD stage 3, HTN, COPD, pulmonary fibrosis, PRESS syndrome (hospitalized in 2019), Raynaud's syndrome, ANCA vasculitis, Scleroderma presents to ED due to cardiac arrest.    PLAN:  #S/p Cardiac Arrest with Anoxic Brain Injury  #Acute Hypoxic Respiratory Failure   - S/p bedside percutaneous tracheostomy (7.0 Portex) 4/18   - Trach sutures to be removed after 10 days (4/28)   - Will plan for possible PEG this week, exact date pending   - Remainder of care per ICU   - Surgery following    General Surgery  [id number]

## 2024-04-23 NOTE — CONSULT NOTE ADULT - SUBJECTIVE AND OBJECTIVE BOX
NEPHROLOGY CONSULTATION NOTE    THIS CONSULT IS INCOMPLETE / FULL CONSULT TO FOLLOW    Patient is a 74y Female whom presented to the hospital with     PAST MEDICAL & SURGICAL HISTORY:  H/O vasculitis      Pancreatic cancer      History of COPD      Hypertension      H/O Raynaud's syndrome      Stage 3 chronic kidney disease      History of knee replacement      H/O Whipple procedure        Allergies:  celecoxib (Rash)  Originally Entered as [U UNKNOWN] reaction to [celebrit] (Unknown)    Home Medications Reviewed  Hospital Medications:   MEDICATIONS  (STANDING):  artificial  tears Solution 1 Drop(s) Both EYES three times a day  chlorhexidine 0.12% Liquid 15 milliLiter(s) Oral Mucosa every 12 hours  chlorhexidine 2% Cloths 1 Application(s) Topical daily  chlorhexidine 2% Cloths 1 Application(s) Topical <User Schedule>  heparin   Injectable 5000 Unit(s) SubCutaneous every 12 hours  hydrALAZINE 25 milliGRAM(s) Oral every 4 hours  labetalol 100 milliGRAM(s) Oral three times a day  lactated ringers. 1000 milliLiter(s) (50 mL/Hr) IV Continuous <Continuous>  levothyroxine 100 MICROGram(s) Oral daily  lidocaine 1%/epinephrine 1:100,000 Inj 20 milliLiter(s) Local Injection once  lidocaine 2% Viscous 10 milliLiter(s) Oral once  lisinopril 20 milliGRAM(s) Oral daily  methadone    Tablet 10 milliGRAM(s) Oral two times a day  pantoprazole  Injectable 40 milliGRAM(s) IV Push daily  petrolatum Ophthalmic Ointment 1 Application(s) Both EYES daily  propofol Infusion 10 MICROgram(s)/kG/Min (2.4 mL/Hr) IV Continuous <Continuous>      SOCIAL HISTORY:  Denies ETOH,Smoking,   FAMILY HISTORY:        REVIEW OF SYSTEMS:  CONSTITUTIONAL: No weakness, fevers or chills  EYES/ENT: No visual changes;  No vertigo or throat pain   NECK: No pain or stiffness  RESPIRATORY: No cough, wheezing, hemoptysis; No shortness of breath  CARDIOVASCULAR: No chest pain or palpitations.  GASTROINTESTINAL: No abdominal or epigastric pain. No nausea, vomiting, or hematemesis; No diarrhea or constipation. No melena or hematochezia.  GENITOURINARY: No dysuria, frequency, foamy urine, urinary urgency, incontinence or hematuria  NEUROLOGICAL: No numbness or weakness  SKIN: No itching, burning, rashes, or lesions   VASCULAR: No bilateral lower extremity edema.   All other review of systems is negative unless indicated above.    VITALS:  T(F): 97.5 (04-23-24 @ 03:01), Max: 98.1 (04-22-24 @ 11:05)  HR: 67 (04-23-24 @ 05:00)  BP: 125/65 (04-23-24 @ 05:00)  RR: 27 (04-23-24 @ 05:00)  SpO2: 100% (04-23-24 @ 05:00)    04-21 @ 07:01  -  04-22 @ 07:00  --------------------------------------------------------  IN: 2569 mL / OUT: 1290 mL / NET: 1279 mL    04-22 @ 07:01  -  04-23 @ 06:28  --------------------------------------------------------  IN: 2191 mL / OUT: 1745 mL / NET: 446 mL          04-21-24 @ 07:01  -  04-22-24 @ 07:00  --------------------------------------------------------  IN: 0 mL / OUT: 1290 mL / NET: -1290 mL    04-22-24 @ 07:01  -  04-23-24 @ 06:28  --------------------------------------------------------  IN: 0 mL / OUT: 1745 mL / NET: -1745 mL      I&O's Detail    21 Apr 2024 07:01  -  22 Apr 2024 07:00  --------------------------------------------------------  IN:    FentaNYL: 139 mL    Free Water: 150 mL    Lactated Ringers: 1200 mL    Osmolite 1.2: 1080 mL  Total IN: 2569 mL    OUT:    FentaNYL: 0 mL    Indwelling Catheter - Urethral (mL): 1290 mL  Total OUT: 1290 mL    Total NET: 1279 mL      22 Apr 2024 07:01  -  23 Apr 2024 06:28  --------------------------------------------------------  IN:    FentaNYL: 6 mL    Lactated Ringers: 1150 mL    Osmolite 1.2: 1035 mL  Total IN: 2191 mL    OUT:    Indwelling Catheter - Urethral (mL): 1745 mL    Rectal Tube (mL): 0 mL  Total OUT: 1745 mL    Total NET: 446 mL            PHYSICAL EXAM:  Constitutional: NAD  HEENT: anicteric sclera, oropharynx clear, MMM  Neck: No JVD  Respiratory: CTAB, no wheezes, rales or rhonchi  Cardiovascular: S1, S2, RRR  Gastrointestinal: BS+, soft, NT/ND  Extremities: No cyanosis or clubbing. No peripheral edema  Neurological: A/O x 3, no focal deficits  Psychiatric: Normal mood, normal affect  : No CVA tenderness. No hill.   Skin: No rashes  Vascular Access:    LABS:  04-22    130<L>  |  96<L>  |  26<H>  ----------------------------<  117<H>  5.4<H>   |  27  |  1.1    Ca    8.0<L>      22 Apr 2024 15:30  Phos  3.9     04-22  Mg     1.9     04-22    TPro  6.2  /  Alb  2.2<L>  /  TBili  0.2  /  DBili      /  AST  39  /  ALT  9   /  AlkPhos  244<H>  04-22    Creatinine Trend: 1.1 <--, 1.1 <--, 1.0 <--, 1.2 <--, 1.2 <--, 1.3 <--, 1.2 <--, 1.3 <--, 1.3 <--, 1.2 <--, 1.3 <--, 1.2 <--, 1.2 <--, 1.4 <--, 1.6 <--, 1.7 <--, 1.9 <--, 2.1 <--, 2.0 <--, 2.0 <--, 1.8 <--, 1.5 <--                        8.6    9.62  )-----------( 418      ( 22 Apr 2024 05:42 )             25.7     Urine Studies:  Urinalysis Basic - ( 22 Apr 2024 15:30 )    Color:  / Appearance:  / SG:  / pH:   Gluc: 117 mg/dL / Ketone:   / Bili:  / Urobili:    Blood:  / Protein:  / Nitrite:    Leuk Esterase:  / RBC:  / WBC    Sq Epi:  / Non Sq Epi:  / Bacteria:             Iron 14, TIBC 314, %sat 4      [04-06-24 @ 19:55]  Ferritin 42      [04-06-24 @ 19:55]  TSH 2.94      [04-06-24 @ 19:55]      AMEYA: titer 1:320, pattern Speckled      [04-07-24 @ 06:05]  Rheumatoid Factor 13      [04-07-24 @ 06:05]  ANCA: cANCA Positive, pANCA Negative, atypical ANCA Negative      [04-07-24 @ 06:05]  MPO-ANCA <5.0, interpretation: Negative      [04-07-24 @ 06:05]  PR3-ANCA <5.0, interpretation: Negative      [04-07-24 @ 06:05]      RADIOLOGY & ADDITIONAL STUDIES:                 NEPHROLOGY CONSULTATION NOTE    History obtained from chart     74-year-old female with PHMx of pancreatic CA s/p Whipple, CKD stage 3, HTN, COPD, pulmonary fibrosis, PRESS syndrome (hospitalized in 2019), Raynaud's syndrome, ANCA vasculitis, Scleroderma  presents to ED due to cardiac arrest. on 4/7    Pt lives at home with son and daughter, went to use the bathroom. Daughter notes that she heard the patient's walker scratching the floor, went upstairs to see what was going on. Daughter noticed patient was slumped over and struggling to get onto the toilet. Daughter helped patient onto the toilet, but noticed that the patient became less responsive and alert so she called EMS. Patient was found down by EMS, intubated her in the field, and resuscitated her with compressions and 4 rounds of epinephrine. ROSC achieved within a minute of her arrival to the ED. Patient was found to be hypothermic and bradycardic.   Of note, patient was hospitalized back in Dec 2023 in [facility] for 18 days for multifocal pneumonia 2/2 human metapneumovirus. Patient also was hospitalized in 2019 for PRESS syndrome. Pt had a negative stress test in Sept 2023. She follows pulmonology for pulmonary fibrosis, had recent PFTs done.      Renal called for hyponatremia / hyperkalemia     PAST MEDICAL & SURGICAL HISTORY:  H/O vasculitis  Pancreatic cancer  History of COPD  Hypertension  H/O Raynaud's syndrome  Stage 3 chronic kidney disease  History of knee replacement  H/O Whipple procedure        Allergies:  celecoxib (Rash)  Originally Entered as [U UNKNOWN] reaction to [celebrit] (Unknown)    Home Medications Reviewed  Hospital Medications:   MEDICATIONS  (STANDING):  artificial  tears Solution 1 Drop(s) Both EYES three times a day  heparin   Injectable 5000 Unit(s) SubCutaneous every 12 hours  hydrALAZINE 25 milliGRAM(s) Oral every 4 hours  labetalol 100 milliGRAM(s) Oral three times a day  lactated ringers. 1000 milliLiter(s) (50 mL/Hr) IV Continuous <Continuous>  levothyroxine 100 MICROGram(s) Oral daily  lidocaine 1%/epinephrine 1:100,000 Inj 20 milliLiter(s) Local Injection once  lidocaine 2% Viscous 10 milliLiter(s) Oral once  lisinopril 20 milliGRAM(s) Oral daily  methadone    Tablet 10 milliGRAM(s) Oral two times a day  pantoprazole  Injectable 40 milliGRAM(s) IV Push daily  petrolatum Ophthalmic Ointment 1 Application(s) Both EYES daily  propofol Infusion 10 MICROgram(s)/kG/Min (2.4 mL/Hr) IV Continuous <Continuous>      SOCIAL HISTORY:  Denies ETOH,Smoking,   FAMILY HISTORY:        REVIEW OF SYSTEMS:  All other review of systems is negative unless indicated above.    VITALS:  T(F): 97.5 (04-23-24 @ 03:01), Max: 98.1 (04-22-24 @ 11:05)  HR: 67 (04-23-24 @ 05:00)  BP: 125/65 (04-23-24 @ 05:00)  RR: 27 (04-23-24 @ 05:00)  SpO2: 100% (04-23-24 @ 05:00)    04-21 @ 07:01  -  04-22 @ 07:00  --------------------------------------------------------  IN: 2569 mL / OUT: 1290 mL / NET: 1279 mL    04-22 @ 07:01  -  04-23 @ 06:28  --------------------------------------------------------  IN: 2191 mL / OUT: 1745 mL / NET: 446 mL          04-21-24 @ 07:01  -  04-22-24 @ 07:00  --------------------------------------------------------  IN: 0 mL / OUT: 1290 mL / NET: -1290 mL    04-22-24 @ 07:01  -  04-23-24 @ 06:28  --------------------------------------------------------  IN: 0 mL / OUT: 1745 mL / NET: -1745 mL      I&O's Detail    21 Apr 2024 07:01  -  22 Apr 2024 07:00  --------------------------------------------------------  IN:    FentaNYL: 139 mL    Free Water: 150 mL    Lactated Ringers: 1200 mL    Osmolite 1.2: 1080 mL  Total IN: 2569 mL    OUT:    FentaNYL: 0 mL    Indwelling Catheter - Urethral (mL): 1290 mL  Total OUT: 1290 mL    Total NET: 1279 mL      22 Apr 2024 07:01  -  23 Apr 2024 06:28  --------------------------------------------------------  IN:    FentaNYL: 6 mL    Lactated Ringers: 1150 mL    Osmolite 1.2: 1035 mL  Total IN: 2191 mL    OUT:    Indwelling Catheter - Urethral (mL): 1745 mL    Rectal Tube (mL): 0 mL  Total OUT: 1745 mL    Total NET: 446 mL            PHYSICAL EXAM:  Constitutional: on MV/ contracted   Respiratory: CTAB,   Cardiovascular: S1, S2, RRR  Gastrointestinal: BS+, soft, NT/ND  Extremities: No cyanosis or clubbing. No peripheral edema  : No CVA tenderness. No hill.   Skin: No rashes  Vascular Access:    LABS:  04-23    131<L>  |  97<L>  |  26<H>  ----------------------------<  115<H>  5.3<H>   |  29  |  1.0    Ca    8.0<L>      23 Apr 2024 05:38  Phos  3.9     04-22  Mg     1.9     04-23    TPro  6.1  /  Alb  2.2<L>  /  TBili  <0.2  /  DBili  x   /  AST  34  /  ALT  7   /  AlkPhos  201<H>  04-23 04-22    130<L>  |  96<L>  |  26<H>  ----------------------------<  117<H>  5.4<H>   |  27  |  1.1      SODIUM TREND:  Sodium 131 [04-23 @ 05:38]  Sodium 130 [04-22 @ 15:30]  Sodium 129 [04-22 @ 05:42]  Sodium 127 [04-21 @ 15:52]  Sodium 128 [04-21 @ 06:28]  Sodium 133 [04-20 @ 06:29]  Sodium 133 [04-19 @ 05:45]  Sodium 132 [04-18 @ 05:32]  Sodium 129 [04-17 @ 15:20]  Sodium 126 [04-17 @ 11:53]    Ca    8.0<L>      22 Apr 2024 15:30  Phos  3.9     04-22  Mg     1.9     04-22    TPro  6.2  /  Alb  2.2<L>  /  TBili  0.2  /  DBili      /  AST  39  /  ALT  9   /  AlkPhos  244<H>  04-22    Creatinine Trend: 1.1 <--, 1.1 <--, 1.0 <--, 1.2 <--, 1.2 <--, 1.3 <--, 1.2 <--, 1.3 <--, 1.3 <--, 1.2 <--, 1.3 <--, 1.2 <--, 1.2 <--, 1.4 <--, 1.6 <--, 1.7 <--, 1.9 <--, 2.1 <--, 2.0 <--, 2.0 <--, 1.8 <--, 1.5 <--                        8.6    9.62  )-----------( 418      ( 22 Apr 2024 05:42 )             25.7     Urine Studies:  Urinalysis Basic - ( 22 Apr 2024 15:30 )    Color:  / Appearance:  / SG:  / pH:   Gluc: 117 mg/dL / Ketone:   / Bili:  / Urobili:    Blood:  / Protein:  / Nitrite:    Leuk Esterase:  / RBC:  / WBC    Sq Epi:  / Non Sq Epi:  / Bacteria:             Iron 14, TIBC 314, %sat 4      [04-06-24 @ 19:55]  Ferritin 42      [04-06-24 @ 19:55]  TSH 2.94      [04-06-24 @ 19:55]      AMEYA: titer 1:320, pattern Speckled      [04-07-24 @ 06:05]  Rheumatoid Factor 13      [04-07-24 @ 06:05]  ANCA: cANCA Positive, pANCA Negative, atypical ANCA Negative      [04-07-24 @ 06:05]  MPO-ANCA <5.0, interpretation: Negative      [04-07-24 @ 06:05]  PR3-ANCA <5.0, interpretation: Negative      [04-07-24 @ 06:05]      RADIOLOGY & ADDITIONAL STUDIES:

## 2024-04-23 NOTE — PROGRESS NOTE ADULT - ASSESSMENT
IMPRESSION:    Anoxic brain damage  Acute Hypoxemic Respiratory Failure on MV SP trach  SP CPA downtime 30 minutes   Cardiac bradycardia   Lung Fibrosis   GIUSEPPE, resolved   Hyperkalemia   HO Raynaud's  HO PRESS    HO Anemia  HO Pancreatic cancer     PLAN:    CNS: taper off fentanyl   started on methadone  monitor Qtc    HEENT: oral and trach care     PULMONARY: keep pox >92%  monitor peak and plateau  no change in vent settings    CARDIOVASCULAR:  2D-Echo noted, continue labetalol dose, increase hydralazine to 25mg Q6H     RENAL: follow is and os, trend Cr, Nephrology follow up today, continue lokelma monitor BMP Q6H     INFECTIOUS DISEASE: cultures negative to date, procalcitonin elevated, completed  Zosyn course    GASTRO: GI ppx, tube feeds follow GSx planned for PEG this week     HEMATOLOGICAL:  DVT prophylaxis.    ENDOCRINE:  Follow up FS.  Insulin protocol if needed.    MUSCULOSKELETAL: bedrest    Full Code, Palliative care following    Grave prognosis     The patient is critically ill with a high probability of further deterioration.    MICU monitoring    IMPRESSION:    Anoxic brain damage  Acute Hypoxemic Respiratory Failure on MV SP trach  SP CPA downtime 30 minutes   Cardiac bradycardia   Lung Fibrosis   GIUSEPPE, resolved   Hyperkalemia   HO Raynaud's  HO PRESS    HO Anemia  HO Pancreatic cancer     PLAN:    CNS: off sedation   continue on methadone  monitor Qtc    HEENT: oral and trach care     PULMONARY: keep pox >92%  monitor peak and plateau  RR increased to 28     CARDIOVASCULAR:  2D-Echo noted, continue labetalol and hydralazine to 25mg Q6H     RENAL: follow is and os, trend Cr, Nephrology follow up, continue lokelma monitor BMP Q6H     INFECTIOUS DISEASE: cultures negative to date, procalcitonin elevated, completed Zosyn course    GASTRO: GI ppx, tube feeds follow GSx planned for PEG this week     HEMATOLOGICAL:  DVT prophylaxis.    ENDOCRINE:  Follow up FS.  Insulin protocol if needed. check cortisol level    MUSCULOSKELETAL: bedrest    Full Code, Palliative care following    Grave prognosis     The patient is critically ill with a high probability of further deterioration.    MICU monitoring    IMPRESSION:    Anoxic brain damage  Acute Hypoxemic Respiratory Failure on MV SP trach  SP CPA downtime 30 minutes   Cardiac bradycardia   Lung Fibrosis   GIUSEPPE, resolved   Hyperkalemia   HO Raynaud's  HO PRESS    HO Anemia  HO Pancreatic cancer     PLAN:    CNS: off sedation   continue on methadone  monitor Qtc    HEENT: oral and trach care     PULMONARY: keep pox >92%  monitor peak and plateau  RR increased to 28     CARDIOVASCULAR:  2D-Echo noted, continue labetalol and hydralazine to 25mg Q6H     RENAL: follow is and os, trend Cr, Nephrology follow up, continue lokelma monitor BMP Q6H     INFECTIOUS DISEASE: cultures negative to date, procalcitonin elevated, completed Zosyn course    GASTRO: GI ppx, tube feeds follow GSx planned for PEG this week     HEMATOLOGICAL:  DVT prophylaxis.    ENDOCRINE:  Follow up FS.  Insulin protocol if needed. check cortisol level    MUSCULOSKELETAL: bedrest    Full Code, Palliative care following    Grave prognosis     The patient has a high probability of further deterioration.    MICU monitoring

## 2024-04-23 NOTE — PROGRESS NOTE ADULT - SUBJECTIVE AND OBJECTIVE BOX
GENERAL SURGERY PROGRESS NOTE    Patient: KIRAN MORFIN , 74y ([birth year])Female   MRN: [id number]  Location: [address]  Visit: 04-06-24 Inpatient  Date: 04-23-24 @ 14:20    24 Hour Events:  No acute events  Remains on ventilator, TF  SBT aborted after a few minutes 2/2 desaturation    Vitals:  T(F): 97.5 (04-23-24 @ 11:01), Max: 98.1 (04-22-24 @ 15:05)  HR: 65 (04-23-24 @ 13:00)  BP: 119/57 (04-23-24 @ 13:00)  RR: 32 (04-23-24 @ 13:00)  SpO2: 100% (04-23-24 @ 13:00)  Mode: AC/ CMV (Assist Control/ Continuous Mandatory Ventilation), RR (machine): 26, TV (machine): 300, FiO2: 40, PEEP: 5  Diet, NPO with Tube Feed:   Tube Feeding Modality: Orogastric  Osmolite 1.2 Lei (OSMOLITERTH)  Total Volume for 24 Hours (mL): 1080  Continuous  Starting Tube Feed Rate mL per Hour: 10  Until Goal Tube Feed Rate (mL per Hour): 45  Tube Feed Duration (in Hours): 24  Tube Feed Start Time: 12:00  Free Water Flush  Free Water Flush Instructions:  150 Q8hr    Fluids:   I & O's:    04-22-24 @ 07:01  -  04-23-24 @ 07:00  --------------------------------------------------------  IN:    FentaNYL: 6 mL    Lactated Ringers: 1200 mL    Osmolite 1.2: 1080 mL  Total IN: 2286 mL    OUT:    Indwelling Catheter - Urethral (mL): 1745 mL    Rectal Tube (mL): 0 mL  Total OUT: 1745 mL    Total NET: 541 mL    PHYSICAL EXAM:  General: NAD  Cardiac: RRR  Respiratory: tracheostomy in place, minimal secretions, site clean, dry, and intact  Abdomen: Soft, non-distended, non-tender, no rebound, no guarding.  Musculoskeletal: FROM in b/l UE and LE  Neuro: Sensation grossly intact and equal throughout, no focal deficits  Skin: Warm/dry, normal color, no jaundice    MEDICATIONS  (STANDING):  artificial  tears Solution 1 Drop(s) Both EYES three times a day  chlorhexidine 0.12% Liquid 15 milliLiter(s) Oral Mucosa every 12 hours  chlorhexidine 2% Cloths 1 Application(s) Topical daily  chlorhexidine 2% Cloths 1 Application(s) Topical <User Schedule>  heparin   Injectable 5000 Unit(s) SubCutaneous every 12 hours  hydrALAZINE 25 milliGRAM(s) Oral every 4 hours  labetalol 100 milliGRAM(s) Oral three times a day  lactated ringers. 1000 milliLiter(s) (50 mL/Hr) IV Continuous <Continuous>  levothyroxine 100 MICROGram(s) Oral daily  lidocaine 1%/epinephrine 1:100,000 Inj 20 milliLiter(s) Local Injection once  lidocaine 2% Viscous 10 milliLiter(s) Oral once  lisinopril 20 milliGRAM(s) Oral daily  methadone    Tablet 10 milliGRAM(s) Oral two times a day  pantoprazole  Injectable 40 milliGRAM(s) IV Push daily  petrolatum Ophthalmic Ointment 1 Application(s) Both EYES daily  propofol Infusion 10 MICROgram(s)/kG/Min (2.4 mL/Hr) IV Continuous <Continuous>    MEDICATIONS  (PRN):  labetalol Injectable 20 milliGRAM(s) IV Push every 4 hours PRN Systolic blood pressure >180    DVT PROPHYLAXIS: heparin   Injectable 5000 Unit(s) SubCutaneous every 12 hours    GI PROPHYLAXIS: pantoprazole  Injectable 40 milliGRAM(s) IV Push daily    ANTICOAGULATION:   ANTIBIOTICS:      LAB/STUDIES:  Labs:  CAPILLARY BLOOD GLUCOSE                        8.3    10.42 )-----------( 477      ( 23 Apr 2024 05:38 )             25.7       Auto Neutrophil %: 78.8 % (04-23-24 @ 05:38)  Auto Immature Granulocyte %: 0.4 % (04-23-24 @ 05:38)    04-23    131<L>  |  97<L>  |  26<H>  ----------------------------<  115<H>  5.3<H>   |  29  |  1.0      Calcium: 8.0 mg/dL (04-23-24 @ 05:38)      LFTs:             6.1  | <0.2 | 34       ------------------[201     ( 23 Apr 2024 05:38 )  2.2  | x    | 7           Lipase:x      Amylase:x         Blood Gas Arterial, Lactate: 1.0 mmol/L (04-23-24 @ 04:12)    ABG - ( 23 Apr 2024 04:12 )  pH: 7.40  /  pCO2: 51    /  pO2: 209   / HCO3: 32    / Base Excess: 5.5   /  SaO2: 100.0     ABG - ( 19 Apr 2024 04:02 )  pH: 7.43  /  pCO2: 45    /  pO2: 92    / HCO3: 30    / Base Excess: 5.0   /  SaO2: 99.0      ABG - ( 18 Apr 2024 04:00 )  pH: 7.41  /  pCO2: 45    /  pO2: 119   / HCO3: 28    / Base Excess: 3.2   /  SaO2: 99.7      Coags:    Urinalysis Basic - ( 23 Apr 2024 05:38 )    Color: x / Appearance: x / SG: x / pH: x  Gluc: 115 mg/dL / Ketone: x  / Bili: x / Urobili: x   Blood: x / Protein: x / Nitrite: x   Leuk Esterase: x / RBC: x / WBC x   Sq Epi: x / Non Sq Epi: x / Bacteria: x

## 2024-04-23 NOTE — PROGRESS NOTE ADULT - SUBJECTIVE AND OBJECTIVE BOX
Patient seen and examined.  Sedated, not following commands       T(F): 97.7 (04-23-24 @ 07:05), Max: 98.1 (04-22-24 @ 15:05)  HR: 80 (04-23-24 @ 11:01)  BP: 105/55 (04-23-24 @ 11:01)  RR: 38 (04-23-24 @ 11:01)  SpO2: 100% (04-23-24 @ 11:01) (99% - 100%)    PHYSICAL EXAM:  GENERAL: NAD, thin appearing   HEAD:  Atraumatic, Normocephalic  EYES: EOMI, PERRLA, conjunctiva and sclera clear  ENT: Intubated, on vent  NERVOUS SYSTEM: positive gag reflex  CHEST/LUNG:  bilateral rhonchi  HEART: Regular rate and rhythm; No murmurs, rubs, or gallops  ABDOMEN: Soft, Nontender, Nondistended; Bowel sounds present  EXTREMITIES:  2+ Peripheral Pulses, No clubbing, cyanosis, or edema    LABS  04-23    131<L>  |  97<L>  |  26<H>  ----------------------------<  115<H>  5.3<H>   |  29  |  1.0    Ca    8.0<L>      23 Apr 2024 05:38  Phos  3.9     04-22  Mg     1.9     04-23    TPro  6.1  /  Alb  2.2<L>  /  TBili  <0.2  /  DBili  x   /  AST  34  /  ALT  7   /  AlkPhos  201<H>  04-23                          8.3    10.42 )-----------( 477      ( 23 Apr 2024 05:38 )             25.7       Mode: AC/ CMV (Assist Control/ Continuous Mandatory Ventilation)  RR (machine): 26  TV (machine): 300  FiO2: 40  PEEP: 5      Culture Results:   Normal Respiratory Dina present (04-18-24)  Culture Results:   No growth (04-06-24)  Culture Results:   No growth at 5 days (04-06-24)  Culture Results:   No growth at 5 days (04-06-24)        MEDICATIONS  (STANDING):  artificial  tears Solution 1 Drop(s) Both EYES three times a day  chlorhexidine 0.12% Liquid 15 milliLiter(s) Oral Mucosa every 12 hours  chlorhexidine 2% Cloths 1 Application(s) Topical <User Schedule>  chlorhexidine 2% Cloths 1 Application(s) Topical daily  heparin   Injectable 5000 Unit(s) SubCutaneous every 12 hours  hydrALAZINE 25 milliGRAM(s) Oral every 4 hours  labetalol 100 milliGRAM(s) Oral three times a day  lactated ringers. 1000 milliLiter(s) (50 mL/Hr) IV Continuous <Continuous>  levothyroxine 100 MICROGram(s) Oral daily  lidocaine 1%/epinephrine 1:100,000 Inj 20 milliLiter(s) Local Injection once  lidocaine 2% Viscous 10 milliLiter(s) Oral once  lisinopril 20 milliGRAM(s) Oral daily  methadone    Tablet 10 milliGRAM(s) Oral two times a day  pantoprazole  Injectable 40 milliGRAM(s) IV Push daily  petrolatum Ophthalmic Ointment 1 Application(s) Both EYES daily  propofol Infusion 10 MICROgram(s)/kG/Min (2.4 mL/Hr) IV Continuous <Continuous>    MEDICATIONS  (PRN):  labetalol Injectable 20 milliGRAM(s) IV Push every 4 hours PRN Systolic blood pressure >180

## 2024-04-23 NOTE — CONSULT NOTE ADULT - PROVIDER SPECIALTY LIST ADULT
Gastroenterology
Nephrology
Surgery
Cardiology
Neurology
Wound Care
Pulmonology
Infectious Disease
Palliative Care

## 2024-04-24 LAB
ALBUMIN SERPL ELPH-MCNC: 2.1 G/DL — LOW (ref 3.5–5.2)
ALP SERPL-CCNC: 184 U/L — HIGH (ref 30–115)
ALT FLD-CCNC: 8 U/L — SIGNIFICANT CHANGE UP (ref 0–41)
ANION GAP SERPL CALC-SCNC: 5 MMOL/L — LOW (ref 7–14)
AST SERPL-CCNC: 37 U/L — SIGNIFICANT CHANGE UP (ref 0–41)
BASE EXCESS BLDA CALC-SCNC: 5.6 MMOL/L — HIGH (ref -2–3)
BASOPHILS # BLD AUTO: 0.05 K/UL — SIGNIFICANT CHANGE UP (ref 0–0.2)
BASOPHILS NFR BLD AUTO: 0.6 % — SIGNIFICANT CHANGE UP (ref 0–1)
BILIRUB SERPL-MCNC: <0.2 MG/DL — SIGNIFICANT CHANGE UP (ref 0.2–1.2)
BLD GP AB SCN SERPL QL: SIGNIFICANT CHANGE UP
BUN SERPL-MCNC: 29 MG/DL — HIGH (ref 10–20)
CALCIUM SERPL-MCNC: 7.9 MG/DL — LOW (ref 8.4–10.5)
CHLORIDE SERPL-SCNC: 95 MMOL/L — LOW (ref 98–110)
CO2 SERPL-SCNC: 29 MMOL/L — SIGNIFICANT CHANGE UP (ref 17–32)
CREAT SERPL-MCNC: 1.2 MG/DL — SIGNIFICANT CHANGE UP (ref 0.7–1.5)
EGFR: 47 ML/MIN/1.73M2 — LOW
EOSINOPHIL # BLD AUTO: 0.21 K/UL — SIGNIFICANT CHANGE UP (ref 0–0.7)
EOSINOPHIL NFR BLD AUTO: 2.4 % — SIGNIFICANT CHANGE UP (ref 0–8)
GLUCOSE SERPL-MCNC: 125 MG/DL — HIGH (ref 70–99)
HCO3 BLDA-SCNC: 31 MMOL/L — HIGH (ref 21–28)
HCT VFR BLD CALC: 22.9 % — LOW (ref 37–47)
HCT VFR BLD CALC: 23.2 % — LOW (ref 37–47)
HGB BLD-MCNC: 7.4 G/DL — LOW (ref 12–16)
HGB BLD-MCNC: 7.5 G/DL — LOW (ref 12–16)
HOROWITZ INDEX BLDA+IHG-RTO: 40 — SIGNIFICANT CHANGE UP
IMM GRANULOCYTES NFR BLD AUTO: 0.3 % — SIGNIFICANT CHANGE UP (ref 0.1–0.3)
LYMPHOCYTES # BLD AUTO: 1.12 K/UL — LOW (ref 1.2–3.4)
LYMPHOCYTES # BLD AUTO: 12.6 % — LOW (ref 20.5–51.1)
MAGNESIUM SERPL-MCNC: 2 MG/DL — SIGNIFICANT CHANGE UP (ref 1.8–2.4)
MCHC RBC-ENTMCNC: 25.5 PG — LOW (ref 27–31)
MCHC RBC-ENTMCNC: 26.5 PG — LOW (ref 27–31)
MCHC RBC-ENTMCNC: 32.3 G/DL — SIGNIFICANT CHANGE UP (ref 32–37)
MCHC RBC-ENTMCNC: 32.3 G/DL — SIGNIFICANT CHANGE UP (ref 32–37)
MCV RBC AUTO: 79 FL — LOW (ref 81–99)
MCV RBC AUTO: 82 FL — SIGNIFICANT CHANGE UP (ref 81–99)
MONOCYTES # BLD AUTO: 0.88 K/UL — HIGH (ref 0.1–0.6)
MONOCYTES NFR BLD AUTO: 9.9 % — HIGH (ref 1.7–9.3)
NEUTROPHILS # BLD AUTO: 6.61 K/UL — HIGH (ref 1.4–6.5)
NEUTROPHILS NFR BLD AUTO: 74.2 % — SIGNIFICANT CHANGE UP (ref 42.2–75.2)
NRBC # BLD: 0 /100 WBCS — SIGNIFICANT CHANGE UP (ref 0–0)
NRBC # BLD: 0 /100 WBCS — SIGNIFICANT CHANGE UP (ref 0–0)
PCO2 BLDA: 45 MMHG — SIGNIFICANT CHANGE UP (ref 32–45)
PH BLDA: 7.44 — SIGNIFICANT CHANGE UP (ref 7.35–7.45)
PLATELET # BLD AUTO: 406 K/UL — HIGH (ref 130–400)
PLATELET # BLD AUTO: 434 K/UL — HIGH (ref 130–400)
PMV BLD: 10.3 FL — SIGNIFICANT CHANGE UP (ref 7.4–10.4)
PMV BLD: 9.8 FL — SIGNIFICANT CHANGE UP (ref 7.4–10.4)
PO2 BLDA: 159 MMHG — HIGH (ref 83–108)
POTASSIUM SERPL-MCNC: 5.1 MMOL/L — HIGH (ref 3.5–5)
POTASSIUM SERPL-SCNC: 5.1 MMOL/L — HIGH (ref 3.5–5)
PROT SERPL-MCNC: 5.9 G/DL — LOW (ref 6–8)
RBC # BLD: 2.83 M/UL — LOW (ref 4.2–5.4)
RBC # BLD: 2.9 M/UL — LOW (ref 4.2–5.4)
RBC # FLD: 16.8 % — HIGH (ref 11.5–14.5)
RBC # FLD: 16.8 % — HIGH (ref 11.5–14.5)
SAO2 % BLDA: 100 % — HIGH (ref 94–98)
SODIUM SERPL-SCNC: 129 MMOL/L — LOW (ref 135–146)
WBC # BLD: 7.71 K/UL — SIGNIFICANT CHANGE UP (ref 4.8–10.8)
WBC # BLD: 8.9 K/UL — SIGNIFICANT CHANGE UP (ref 4.8–10.8)
WBC # FLD AUTO: 7.71 K/UL — SIGNIFICANT CHANGE UP (ref 4.8–10.8)
WBC # FLD AUTO: 8.9 K/UL — SIGNIFICANT CHANGE UP (ref 4.8–10.8)

## 2024-04-24 PROCEDURE — 99233 SBSQ HOSP IP/OBS HIGH 50: CPT

## 2024-04-24 PROCEDURE — 99232 SBSQ HOSP IP/OBS MODERATE 35: CPT

## 2024-04-24 RX ORDER — HEPARIN SODIUM 5000 [USP'U]/ML
5000 INJECTION INTRAVENOUS; SUBCUTANEOUS EVERY 8 HOURS
Refills: 0 | Status: DISCONTINUED | OUTPATIENT
Start: 2024-04-24 | End: 2024-04-25

## 2024-04-24 RX ORDER — SODIUM ZIRCONIUM CYCLOSILICATE 10 G/10G
10 POWDER, FOR SUSPENSION ORAL ONCE
Refills: 0 | Status: COMPLETED | OUTPATIENT
Start: 2024-04-24 | End: 2024-04-24

## 2024-04-24 RX ADMIN — SCOPALAMINE 1 PATCH: 1 PATCH, EXTENDED RELEASE TRANSDERMAL at 07:49

## 2024-04-24 RX ADMIN — SODIUM ZIRCONIUM CYCLOSILICATE 10 GRAM(S): 10 POWDER, FOR SUSPENSION ORAL at 10:47

## 2024-04-24 RX ADMIN — CHLORHEXIDINE GLUCONATE 15 MILLILITER(S): 213 SOLUTION TOPICAL at 17:06

## 2024-04-24 RX ADMIN — HEPARIN SODIUM 5000 UNIT(S): 5000 INJECTION INTRAVENOUS; SUBCUTANEOUS at 05:06

## 2024-04-24 RX ADMIN — METHADONE HYDROCHLORIDE 10 MILLIGRAM(S): 40 TABLET ORAL at 05:07

## 2024-04-24 RX ADMIN — HEPARIN SODIUM 5000 UNIT(S): 5000 INJECTION INTRAVENOUS; SUBCUTANEOUS at 21:15

## 2024-04-24 RX ADMIN — PANTOPRAZOLE SODIUM 40 MILLIGRAM(S): 20 TABLET, DELAYED RELEASE ORAL at 12:37

## 2024-04-24 RX ADMIN — Medication 1 DROP(S): at 05:11

## 2024-04-24 RX ADMIN — Medication 100 MILLIGRAM(S): at 21:15

## 2024-04-24 RX ADMIN — SCOPALAMINE 1 PATCH: 1 PATCH, EXTENDED RELEASE TRANSDERMAL at 21:12

## 2024-04-24 RX ADMIN — CHLORHEXIDINE GLUCONATE 15 MILLILITER(S): 213 SOLUTION TOPICAL at 05:06

## 2024-04-24 RX ADMIN — Medication 100 MILLIGRAM(S): at 17:03

## 2024-04-24 RX ADMIN — METHADONE HYDROCHLORIDE 10 MILLIGRAM(S): 40 TABLET ORAL at 17:06

## 2024-04-24 RX ADMIN — CHLORHEXIDINE GLUCONATE 1 APPLICATION(S): 213 SOLUTION TOPICAL at 05:07

## 2024-04-24 RX ADMIN — LISINOPRIL 20 MILLIGRAM(S): 2.5 TABLET ORAL at 05:07

## 2024-04-24 RX ADMIN — HEPARIN SODIUM 5000 UNIT(S): 5000 INJECTION INTRAVENOUS; SUBCUTANEOUS at 17:03

## 2024-04-24 RX ADMIN — Medication 100 MILLIGRAM(S): at 05:07

## 2024-04-24 RX ADMIN — Medication 1 DROP(S): at 17:08

## 2024-04-24 RX ADMIN — Medication 100 MICROGRAM(S): at 05:06

## 2024-04-24 RX ADMIN — CHLORHEXIDINE GLUCONATE 1 APPLICATION(S): 213 SOLUTION TOPICAL at 21:15

## 2024-04-24 RX ADMIN — Medication 1 APPLICATION(S): at 12:37

## 2024-04-24 RX ADMIN — Medication 1 DROP(S): at 21:15

## 2024-04-24 RX ADMIN — SODIUM CHLORIDE 50 MILLILITER(S): 9 INJECTION, SOLUTION INTRAVENOUS at 05:08

## 2024-04-24 NOTE — PROGRESS NOTE ADULT - ASSESSMENT
74-year-old female with PHMx of pancreatic CA s/p Whipple, CKD stage 3, HTN, COPD, pulmonary fibrosis, PRESS syndrome (hospitalized in 2019), Raynaud's syndrome, ANCA vasculitis, Scleroderma  presents to ED due to cardiac arrest.  now intubated on MV     renal called for hyponatremia hyperkalemia     # Hyponatremia/ hyperkalemia  # resp failure on MV  # sp code blue     - need to rule out adrenal insufficiency ( sepsis / severe illness/ UFH)  - serum sodium better / K acceptable  - check cortisol AM levels   - follow BMP   - lokelma 10 g po q8h if k> 5.5    prognosis guarded  will follow  74-year-old female with PHMx of pancreatic CA s/p Whipple, CKD stage 3, HTN, COPD, pulmonary fibrosis, PRESS syndrome (hospitalized in 2019), Raynaud's syndrome, ANCA vasculitis, Scleroderma  presents to ED due to cardiac arrest.  now intubated on MV     renal called for hyponatremia hyperkalemia mild GIUSEPPE    # Hyponatremia/ hyperkalemia  # resp failure on MV  # sp code blue     - need to rule out adrenal insufficiency ( sepsis / severe illness/ UFH)  - serum sodium better / K acceptable  - check cortisol AM levels   - follow BMP   - lokelma 10 g po q8h if k> 5.5    prognosis guarded  will follow

## 2024-04-24 NOTE — PROGRESS NOTE ADULT - SUBJECTIVE AND OBJECTIVE BOX
CC: cardiac arrest    HPI:  74-year-old female with PHMx of pancreatic CA s/p Whipple, CKD stage 3, HTN, COPD, pulmonary fibrosis, PRESS syndrome (hospitalized in 2019), Raynaud's syndrome, ANCA vasculitis, Scleroderma  presents to ED due to cardiac arrest. Pt lives at home with son and daughter, went to use the bathroom. Daughter notes that she heard the patient's walker scratching the floor, went upstairs to see what was going on. Daughter noticed patient was slumped over and struggling to get onto the toilet. Daughter helped patient onto the toilet, but noticed that the patient became less responsive and alert so she called EMS. Patient was found down by EMS, intubated her in the field, and resuscitated her with compressions and 4 rounds of epinephrine. ROSC achieved within a minute of her arrival to the ED. Patient was found to be hypothermic and bradycardic. Of note, patient was hospitalized back in Dec 2023 in [facility] for 18 days for multifocal pneumonia 2/2 human metapneumovirus. Patient also was hospitalized in 2019 for PRESS syndrome. Pt had a negative stress test in Sept 2023. She follows pulmonology for pulmonary fibrosis, had recent PFTs done.     ED vitals and workup:  BP 90/57, HR 88, Temp 94.1F, satting 100% on ventilator   Labs: Hgb 8.7, Trop 56 -->79, K 5.3, Lactate 3.5, Lipase 256, , , ALT 52  VBG: pH 7.12, pCO2 67, Lactate 6.6    CXR bilateral opacities. cardiomegaly  CTH: mild atrophic changes  CT angio chest PE, A/P w/ IV cont: Bilary ductal enhancement s/p CCY. Diffuse fibrotic changes of lungs. Trace bilateral effusions. NO PE.    s/p atropine 1mg x4 doses, calcium gluconate 3g, [id number] x1, insulin 10 units, cefepime 1g in the ED.     Admitted to MICU for unresponsiveness s/p cardiac arrest.     (06 Apr 2024 15:43)    INTERVAL EVENTS  4/16/24: patient intubated, off sedation, does nto appear in distress  4/22/24: s/p trach  4/24: appears comfortable, but not alert off sedation, on trach and AC vent    ADVANCE DIRECTIVES:     [ x] Full Code [ ] DNR  MOLST  [ ]  Living Will  [ ]   DECISION MAKER(s):  [ ] Health Care Proxy(s)  [ x] Surrogate(s)  [ ] Guardian           Name(s): Phone Number(s):   Children      BASELINE (I)ADL(s) (prior to admission):    Ponder: [ ]Total  [ ] Moderate [ ]Dependent  Palliative Performance Status Version 2:         %    http://[email or web address]/files/[email or web address]_performance_scale_[email or web address].pdf    Allergies    celecoxib (Rash)  Originally Entered as [U UNKNOWN] reaction to [celebrit] (Unknown)    Intolerances    MEDICATIONS  (STANDING):  artificial  tears Solution 1 Drop(s) Both EYES three times a day  chlorhexidine 0.12% Liquid 15 milliLiter(s) Oral Mucosa every 12 hours  chlorhexidine 2% Cloths 1 Application(s) Topical daily  chlorhexidine 2% Cloths 1 Application(s) Topical <User Schedule>  heparin   Injectable 5000 Unit(s) SubCutaneous every 8 hours  labetalol 100 milliGRAM(s) Oral three times a day  levothyroxine 100 MICROGram(s) Oral daily  lidocaine 1%/epinephrine 1:100,000 Inj 20 milliLiter(s) Local Injection once  lidocaine 2% Viscous 10 milliLiter(s) Oral once  methadone    Tablet 10 milliGRAM(s) Oral two times a day  pantoprazole  Injectable 40 milliGRAM(s) IV Push daily  petrolatum Ophthalmic Ointment 1 Application(s) Both EYES daily  propofol Infusion 10 MICROgram(s)/kG/Min (2.4 mL/Hr) IV Continuous <Continuous>    MEDICATIONS  (PRN):  labetalol Injectable 20 milliGRAM(s) IV Push every 4 hours PRN Systolic blood pressure >180  scopolamine 1 mG/72 Hr(s) Patch 1 Patch Transdermal every 72 hours PRN for secretions        PRESENT SYMPTOMS: [ x]Unable to obtain due to poor mentation   Source if other than patient:  [ ]Family   [ ]Team     Pain: [ ]yes [ ]no  ALL PAIN ASSESSMENT COMPONENTS WERE ASKED ABOUT UNLESS OTHERWISE NOTED  QOL impact -   Location -                    Aggravating factors -  Quality -  Radiation -  Timing-  Severity (0-10 scale):  Minimal acceptable level (0-10 scale):     CPOT:  1  https://www.s[facility]m.org/getattachment/[email or web address]-[email or web address]-1v5z-9221i3210s6p/Critical-Care-Pain-Observation-Tool-(CPOT)    PAIN AD Score:   http://geriatrictoolkit.[email or web address]/painad.pdf (press ctrl +  left click to view)    Dyspnea:                           [ ]None[ ]Mild [ ]Moderate [ ]Severe     Respiratory Distress Observation Scale (RDOS): 1  A score of 0 to 2 signifies little or no respiratory distress, 3 signifies mild distress, scores 4 to 6 indicate moderate distress, and scores greater than 7 signify severe distress  [email or web address]://[email or web address]/sites/default/files/PDFS/546186-dfigdqzywme-xswglmam-qaeggjeyjew-qbaju.pdf    Anxiety:                             [ ]None[ ]Mild [ ]Moderate [ ]Severe   Fatigue:                             [ ]None[ ]Mild [ ]Moderate [ ]Severe   Nausea:                             [ ]None[ ]Mild [ ]Moderate [ ]Severe   Loss of appetite:              [ ]None[ ]Mild [ ]Moderate [ ]Severe   Constipation:                    [ ]None[ ]Mild [ ]Moderate [ ]Severe    Other Symptoms:  [ ]All other review of systems negative     Palliative Performance Status Version 2:         %    http://npcrc.org/files/news/palliative_performance_scale_ppsv2.pdf    PHYSICAL EXAM:  Vital Signs Last 24 Hrs  T(C): 36.2 (24 Apr 2024 15:00), Max: 37.1 (24 Apr 2024 07:30)  T(F): 97.2 (24 Apr 2024 15:00), Max: 98.8 (24 Apr 2024 07:30)  HR: 60 (24 Apr 2024 08:00) (60 - 81)  BP: 147/70 (24 Apr 2024 15:00) (106/57 - 173/78)  BP(mean): 101 (24 Apr 2024 15:00) (77 - 116)  RR: 28 (24 Apr 2024 15:00) (25 - 31)  SpO2: 100% (24 Apr 2024 08:00) (98% - 100%)    Parameters below as of 24 Apr 2024 08:00  Patient On (Oxygen Delivery Method): ventilator        O2 Concentration (%): 40  GENERAL:  [ ] No acute distress [ ]Lethargic  [x ]Unarousable  [ ]Verbal  [ ]Non-Verbal [ ]Cachexia    BEHAVIORAL/PSYCH:  [ ]Alert and Oriented x  [ ] Anxiety [ ] Delirium [ ] Agitation [x ] Calm   EYES: [ ] No scleral icterus [ ] Scleral icterus [ x] Closed  ENMT:  [ ]Dry mouth  [x ]No external oral lesions [ ] No external ear or nose lesions  CARDIOVASCULAR:  [ ]Regular [ ]Irregular [ ]Tachy [x ]Not Tachy  [ ]Jayy [ ] Edema [ ] No edema  PULMONARY:  [ ]Tachypnea  [ ]Audible excessive secretions [ x] No labored breathing [ ] labored breathing  GASTROINTESTINAL: [ ]Soft  [ ]Distended  [ x]Not distended [ ]Non tender [ ]Tender  MUSCULOSKELETAL: [ ]No clubbing [ ] clubbing  [x ] No cyanosis [ ] cyanosis  NEUROLOGIC: [ ]No focal deficits  [ ]Follows commands  [ x]Does not follow commands  [ ]Cognitive impairment  [ ]Dysphagia  [ ]Dysarthria  [ ]Paresis   SKIN: [ ] Jaundiced [x ] Non-jaundiced [ ]Rash [ ]No Rash [ ] Warm [ ] Dry  MISC/LINES: [ ] ET tube [X] Trach [ ]NGT/OGT [ ]PEG [ ]Stack    LABS: hyponatremia, reviewed                                   7.5    8.90  )-----------( 406      ( 24 Apr 2024 05:30 )             23.2     04-24    129<L>  |  95<L>  |  29<H>  ----------------------------<  125<H>  5.1<H>   |  29  |  1.2    Ca    7.9<L>      24 Apr 2024 05:30  Mg     2.0     04-24          RADIOLOGY & ADDITIONAL STUDIES: reviewed by me    < from: Xray Chest 1 View-PORTABLE IMMEDIATE (Xray Chest 1 View-PORTABLE IMMEDIATE .) (04.08.24 @ 08:38) >  Impression:    Tip of the orogastric tube is seen overlying left abdomen    Stable bilateral lung opacities    < end of copied text >      EKG: reviewed by me    < from: 12 Lead ECG (04.08.24 @ 13:48) >    Ventricular Rate 63 BPM    Atrial Rate 63 BPM    P-R Interval 168 ms    QRS Duration 132 ms    Q-T Interval 442 ms    QTC Calculation(Bazett) 452 ms    P Axis 52 degrees    R Axis 149 degrees    T Axis 56 degrees    Diagnosis Line Normal sinus rhythm  Right bundle branch block  Left posterior fascicular block  *** Bifascicular block ***  Abnormal ECG    < end of copied text >      PROTEIN CALORIE MALNUTRITION PRESENT: [ ]mild [ ]moderate [ ]severe [ ]underweight [ ]morbid obesity  https://www.andeal.org/vault/2440/web/files/ONC/Table_Clinical%20Characteristics%20to%20Document%20Malnutrition-White%20JV%20et%20al%664861.pdf    Height (cm): 154.9 (04-06-24 @ 16:20)  Weight (kg): 40 (04-06-24 @ 16:20)  BMI (kg/m2): 16.7 (04-06-24 @ 16:20)  [ ]PPSV2 < or = to 30% [ ]significant weight loss  [ ]poor nutritional intake  [ ]anasarca      [ ]Artificial Nutrition      Palliative Care Spiritual/Emotional Screening Tool Question  Severity (0-4):                    OR                    [ x] Unable to determine. Will assess at later time if appropriate.  Score of 2 or greater indicates recommendation of Chaplaincy and/or SW referral  Chaplaincy Referral: [ ] Yes [ ] Refused [ ] Following     Caregiver [place]:  [ ] Yes [ ] No    OR    [x ] Unable to determine. Will assess at later time if appropriate.  Social Work Referral [ ]  Patient and Family Centered Care Referral [ ]    Anticipatory Grief Present: [ ] Yes [ ] No    OR     [ x] Unable to determine. Will assess at later time if appropriate.  Social Work Referral [ ]  Patient and Family Centered Care Referral [ ]    Patient discussed with primary medical team MD  Palliative care education provided to patient and/or family

## 2024-04-24 NOTE — PROGRESS NOTE ADULT - ASSESSMENT
74-year-old female with PHMx of pancreatic CA s/p Whipple, CKD stage 3, HTN, COPD, pulmonary fibrosis, PRESS syndrome (hospitalized in 2019), Raynaud's syndrome, ANCA vasculitis, Scleroderma, hypothyroid  presents to ED due to cardiac arrest. Pt lives at home with son and daughter, went to use the bathroom and became unresponsive.  EMS intubated her and gave her 4 rounds of epinephrine.  ROSC was achieved within a minute of her arrival to the ED.  Patient was found to be hypothermic and bradycardic    acute respiratory failure / cardiac arrest / anoxic brain injury / HTN / aspiration pneumonia      - s/p trach 4/18/24   - GI unable to place PEG tube, Surgery will place PEG this week    - anoxic brain injury as seen on CT brain and EEG   - DC planning to NH   - completed antibiotic course    - Labetalol / lisinopril   - will need TOV    - DVT and GI prophylaxis   74-year-old female with PHMx of pancreatic CA s/p Whipple, CKD stage 3, HTN, COPD, pulmonary fibrosis, PRESS syndrome (hospitalized in 2019), Raynaud's syndrome, ANCA vasculitis, Scleroderma, hypothyroid  presents to ED due to cardiac arrest. Pt lives at home with son and daughter, went to use the bathroom and became unresponsive.  EMS intubated her and gave her 4 rounds of epinephrine.  ROSC was achieved within a minute of her arrival to the ED.  Patient was found to be hypothermic and bradycardic    acute respiratory failure / cardiac arrest / anoxic brain injury / HTN / aspiration pneumonia      - s/p trach 4/18/24, Trach sutures to be removed after 10 days (4/28)   - GI unable to place PEG tube, Surgery will place PEG this week    - anoxic brain injury as seen on CT brain and EEG   - DC planning to NH   - completed antibiotic course    - Labetalol, hold lisinopril for Hyper K   - will need TOV    - DVT and GI prophylaxis

## 2024-04-24 NOTE — PROGRESS NOTE ADULT - PROBLEM SELECTOR PLAN 1
- c/w vent via trach  - on methadone 10mg BID for pain, QTc WNL, but would consider lower dose to start and uptitrate as needed (ex: 2.5mg TID)  - can use fentanyl 25mcg IV Q1 PRN for severe pain or dyspnea PRN, per daughter fentanyl pushes have been working adequately
- c/w vent via trach, continues to be higher risk in critical care unit  - on methadone 10mg BID for pain, QTc WNL, appears stable on this dose; check QTc before any dosage increases or additional QTc-prolonging agents, and recommend conservative uptitration  - can use fentanyl 25mcg IV Q1 PRN for severe pain or dyspnea if needed, currently appears comfortable
- c/w vent  - off sedation currently  - critically ill in ICU, monitor hemodynamics closely

## 2024-04-24 NOTE — PROGRESS NOTE ADULT - SUBJECTIVE AND OBJECTIVE BOX
*** SURGERY PROGRESS NOTE    Patient: KIRAN MORFIN , 74y ([birth year])Female   MRN: [id number]  Location: [address]  Visit: 04-06-24 Inpatient  Date: 04-24-24 @ 09:52    24 Hour Events:  No acute events  Remains on ventilator, TF, off sedation  Withdrawing to pain    Vitals:  T(F): 98.8 (04-24-24 @ 07:30), Max: 98.8 (04-23-24 @ 15:00)  HR: 60 (04-24-24 @ 08:00)  BP: 166/79 (04-24-24 @ 08:00)  RR: 27 (04-24-24 @ 08:00)  SpO2: 100% (04-24-24 @ 08:00)  Mode: AC/ CMV (Assist Control/ Continuous Mandatory Ventilation), RR (machine): 26, TV (machine): 300, FiO2: 40, PEEP: 5, ITime: 1, MAP: 11, PIP: 24  Diet, NPO with Tube Feed:   Tube Feeding Modality: Orogastric  Osmolite 1.2 Lei (OSMOLITERTH)  Total Volume for 24 Hours (mL): 1080  Continuous  Starting Tube Feed Rate mL per Hour: 10  Until Goal Tube Feed Rate (mL per Hour): 45  Tube Feed Duration (in Hours): 24  Tube Feed Start Time: 12:00  Free Water Flush  Free Water Flush Instructions:  150 Q8hr    Fluids:   I & O's:    04-23-24 @ 07:01  -  04-24-24 @ 07:00  --------------------------------------------------------  IN:    Lactated Ringers: 1250 mL    Osmolite 1.2: 990 mL  Total IN: 2240 mL    OUT:    Indwelling Catheter - Urethral (mL): 754 mL    Rectal Tube (mL): 50 mL  Total OUT: 804 mL    Total NET: 1436 mL    PHYSICAL EXAM:  General: NAD  Cardiac: RRR  Respiratory: tracheostomy in place, minimal secretions, site clean, dry, and intact  Abdomen: Soft, non-distended, non-tender, no rebound, no guarding.  Musculoskeletal: FROM in b/l UE and LE  Neuro: Sensation grossly intact and equal throughout, no focal deficits  Skin: Warm/dry, normal color, no jaundice    MEDICATIONS  (STANDING):  artificial  tears Solution 1 Drop(s) Both EYES three times a day  chlorhexidine 0.12% Liquid 15 milliLiter(s) Oral Mucosa every 12 hours  chlorhexidine 2% Cloths 1 Application(s) Topical daily  chlorhexidine 2% Cloths 1 Application(s) Topical <User Schedule>  heparin   Injectable 5000 Unit(s) SubCutaneous every 12 hours  labetalol 100 milliGRAM(s) Oral three times a day  levothyroxine 100 MICROGram(s) Oral daily  lidocaine 1%/epinephrine 1:100,000 Inj 20 milliLiter(s) Local Injection once  lidocaine 2% Viscous 10 milliLiter(s) Oral once  lisinopril 20 milliGRAM(s) Oral daily  methadone    Tablet 10 milliGRAM(s) Oral two times a day  pantoprazole  Injectable 40 milliGRAM(s) IV Push daily  petrolatum Ophthalmic Ointment 1 Application(s) Both EYES daily  propofol Infusion 10 MICROgram(s)/kG/Min (2.4 mL/Hr) IV Continuous <Continuous>  sodium zirconium cyclosilicate 10 Gram(s) Oral once    MEDICATIONS  (PRN):  labetalol Injectable 20 milliGRAM(s) IV Push every 4 hours PRN Systolic blood pressure >180  scopolamine 1 mG/72 Hr(s) Patch 1 Patch Transdermal every 72 hours PRN for secretions    DVT PROPHYLAXIS: heparin   Injectable 5000 Unit(s) SubCutaneous every 12 hours    GI PROPHYLAXIS: pantoprazole  Injectable 40 milliGRAM(s) IV Push daily    ANTICOAGULATION:   ANTIBIOTICS:        LAB/STUDIES:  Labs:  CAPILLARY BLOOD GLUCOSE                        7.5    8.90  )-----------( 406      ( 24 Apr 2024 05:30 )             23.2       Auto Neutrophil %: 74.2 % (04-24-24 @ 05:30)  Auto Immature Granulocyte %: 0.3 % (04-24-24 @ 05:30)    04-24    129<L>  |  95<L>  |  29<H>  ----------------------------<  125<H>  5.1<H>   |  29  |  1.2      Calcium: 7.9 mg/dL (04-24-24 @ 05:30)      LFTs:             5.9  | <0.2 | 37       ------------------[184     ( 24 Apr 2024 05:30 )  2.1  | x    | 8           Lipase:x      Amylase:x         Blood Gas Arterial, Lactate: 1.0 mmol/L (04-23-24 @ 04:12)  Blood Gas Arterial, Lactate: 1.1 mmol/L (04-22-24 @ 05:05)    ABG - ( 23 Apr 2024 04:12 )  pH: 7.40  /  pCO2: 51    /  pO2: 209   / HCO3: 32    / Base Excess: 5.5   /  SaO2: 100.0     ABG - ( 22 Apr 2024 05:05 )  pH: 7.44  /  pCO2: 45    /  pO2: 159   / HCO3: 31    / Base Excess: 5.6   /  SaO2: 100.0     ABG - ( 19 Apr 2024 04:02 )  pH: 7.43  /  pCO2: 45    /  pO2: 92    / HCO3: 30    / Base Excess: 5.0   /  SaO2: 99.0      Coags:    Urinalysis Basic - ( 24 Apr 2024 05:30 )    Color: x / Appearance: x / SG: x / pH: x  Gluc: 125 mg/dL / Ketone: x  / Bili: x / Urobili: x   Blood: x / Protein: x / Nitrite: x   Leuk Esterase: x / RBC: x / WBC x   Sq Epi: x / Non Sq Epi: x / Bacteria: x GENERAL SURGERY PROGRESS NOTE    Patient: KIRAN MORFIN , 74y ([birth year])Female   MRN: [id number]  Location: [address]  Visit: 04-06-24 Inpatient  Date: 04-24-24 @ 09:52    24 Hour Events:  No acute events  Remains on ventilator, TF, off sedation  Withdrawing to pain    Vitals:  T(F): 98.8 (04-24-24 @ 07:30), Max: 98.8 (04-23-24 @ 15:00)  HR: 60 (04-24-24 @ 08:00)  BP: 166/79 (04-24-24 @ 08:00)  RR: 27 (04-24-24 @ 08:00)  SpO2: 100% (04-24-24 @ 08:00)  Mode: AC/ CMV (Assist Control/ Continuous Mandatory Ventilation), RR (machine): 26, TV (machine): 300, FiO2: 40, PEEP: 5, ITime: 1, MAP: 11, PIP: 24  Diet, NPO with Tube Feed:   Tube Feeding Modality: Orogastric  Osmolite 1.2 Lei (OSMOLITERTH)  Total Volume for 24 Hours (mL): 1080  Continuous  Starting Tube Feed Rate mL per Hour: 10  Until Goal Tube Feed Rate (mL per Hour): 45  Tube Feed Duration (in Hours): 24  Tube Feed Start Time: 12:00  Free Water Flush  Free Water Flush Instructions:  150 Q8hr    Fluids:   I & O's:    04-23-24 @ 07:01  -  04-24-24 @ 07:00  --------------------------------------------------------  IN:    Lactated Ringers: 1250 mL    Osmolite 1.2: 990 mL  Total IN: 2240 mL    OUT:    Indwelling Catheter - Urethral (mL): 754 mL    Rectal Tube (mL): 50 mL  Total OUT: 804 mL    Total NET: 1436 mL    PHYSICAL EXAM:  General: NAD  Cardiac: RRR  Respiratory: tracheostomy in place, minimal secretions, site clean, dry, and intact  Abdomen: Soft, non-distended, non-tender, no rebound, no guarding.  Musculoskeletal: FROM in b/l UE and LE  Neuro: Sensation grossly intact and equal throughout, no focal deficits  Skin: Warm/dry, normal color, no jaundice    MEDICATIONS  (STANDING):  artificial  tears Solution 1 Drop(s) Both EYES three times a day  chlorhexidine 0.12% Liquid 15 milliLiter(s) Oral Mucosa every 12 hours  chlorhexidine 2% Cloths 1 Application(s) Topical daily  chlorhexidine 2% Cloths 1 Application(s) Topical <User Schedule>  heparin   Injectable 5000 Unit(s) SubCutaneous every 12 hours  labetalol 100 milliGRAM(s) Oral three times a day  levothyroxine 100 MICROGram(s) Oral daily  lidocaine 1%/epinephrine 1:100,000 Inj 20 milliLiter(s) Local Injection once  lidocaine 2% Viscous 10 milliLiter(s) Oral once  lisinopril 20 milliGRAM(s) Oral daily  methadone    Tablet 10 milliGRAM(s) Oral two times a day  pantoprazole  Injectable 40 milliGRAM(s) IV Push daily  petrolatum Ophthalmic Ointment 1 Application(s) Both EYES daily  propofol Infusion 10 MICROgram(s)/kG/Min (2.4 mL/Hr) IV Continuous <Continuous>  sodium zirconium cyclosilicate 10 Gram(s) Oral once    MEDICATIONS  (PRN):  labetalol Injectable 20 milliGRAM(s) IV Push every 4 hours PRN Systolic blood pressure >180  scopolamine 1 mG/72 Hr(s) Patch 1 Patch Transdermal every 72 hours PRN for secretions    DVT PROPHYLAXIS: heparin   Injectable 5000 Unit(s) SubCutaneous every 12 hours    GI PROPHYLAXIS: pantoprazole  Injectable 40 milliGRAM(s) IV Push daily    ANTICOAGULATION:   ANTIBIOTICS:        LAB/STUDIES:  Labs:  CAPILLARY BLOOD GLUCOSE                        7.5    8.90  )-----------( 406      ( 24 Apr 2024 05:30 )             23.2       Auto Neutrophil %: 74.2 % (04-24-24 @ 05:30)  Auto Immature Granulocyte %: 0.3 % (04-24-24 @ 05:30)    04-24    129<L>  |  95<L>  |  29<H>  ----------------------------<  125<H>  5.1<H>   |  29  |  1.2      Calcium: 7.9 mg/dL (04-24-24 @ 05:30)      LFTs:             5.9  | <0.2 | 37       ------------------[184     ( 24 Apr 2024 05:30 )  2.1  | x    | 8           Lipase:x      Amylase:x         Blood Gas Arterial, Lactate: 1.0 mmol/L (04-23-24 @ 04:12)  Blood Gas Arterial, Lactate: 1.1 mmol/L (04-22-24 @ 05:05)    ABG - ( 23 Apr 2024 04:12 )  pH: 7.40  /  pCO2: 51    /  pO2: 209   / HCO3: 32    / Base Excess: 5.5   /  SaO2: 100.0     ABG - ( 22 Apr 2024 05:05 )  pH: 7.44  /  pCO2: 45    /  pO2: 159   / HCO3: 31    / Base Excess: 5.6   /  SaO2: 100.0     ABG - ( 19 Apr 2024 04:02 )  pH: 7.43  /  pCO2: 45    /  pO2: 92    / HCO3: 30    / Base Excess: 5.0   /  SaO2: 99.0      Coags:    Urinalysis Basic - ( 24 Apr 2024 05:30 )    Color: x / Appearance: x / SG: x / pH: x  Gluc: 125 mg/dL / Ketone: x  / Bili: x / Urobili: x   Blood: x / Protein: x / Nitrite: x   Leuk Esterase: x / RBC: x / WBC x   Sq Epi: x / Non Sq Epi: x / Bacteria: x

## 2024-04-24 NOTE — PROGRESS NOTE ADULT - CRITICAL CARE SERVICES PROVIDED
Patient is critically ill, requiring critical care services.
Patient is critically ill, requiring critical care services.
clothing
Patient is critically ill, requiring critical care services.

## 2024-04-24 NOTE — PROGRESS NOTE ADULT - PROBLEM SELECTOR PLAN 5
- will follow  ______________  Abdirahman Hernández MD  Palliative Medicine  [facility]   of Geriatric and Palliative Medicine  [phone]
- will follow  ______________  Abdirahman Hernández MD  Palliative Medicine  [facility]   of Geriatric and Palliative Medicine  [phone]
- will sign off  - reconsult PRN  ______________  Abdirahman Hernández MD  Palliative Medicine  [facility]   of Geriatric and Palliative Medicine  [phone]

## 2024-04-24 NOTE — PROGRESS NOTE ADULT - ASSESSMENT
74F with PHMx of pancreatic CA s/p Whipple, CKD stage 3, HTN, COPD, pulmonary fibrosis, PRESS syndrome (hospitalized in 2019), Raynaud's syndrome, ANCA vasculitis, Scleroderma presents to ED due to cardiac arrest.    PLAN:  #S/p Cardiac Arrest with Anoxic Brain Injury  #Acute Hypoxic Respiratory Failure   - S/p bedside percutaneous tracheostomy (7.0 Portex) 4/18   - Trach sutures to be removed after 10 days (4/28)   - Will plan for possible PEG this week, exact date pending   - Remainder of care per ICU   - Surgery following    General Surgery  [facility] [id number] 74F with PHMx of pancreatic CA s/p Whipple, CKD stage 3, HTN, COPD, pulmonary fibrosis, PRESS syndrome (hospitalized in 2019), Raynaud's syndrome, ANCA vasculitis, Scleroderma presents to ED due to cardiac arrest.    PLAN:  #S/p Cardiac Arrest with Anoxic Brain Injury  #Acute Hypoxic Respiratory Failure   - Planning PEG, possible open gastrostomy tomorrow 4/25 in the OR   - NPO w IVF after midnight   - Please obtain pre-op studies prior to procedure (CBC, BMP, Mg, Phos, Coags, Type and screen)   - S/p bedside percutaneous tracheostomy (7.0 Portex) 4/18   - Trach sutures to be removed after 10 days (4/28)   - Remainder of care per ICU   - Surgery following    General Surgery  [facility] [id number]

## 2024-04-24 NOTE — PROGRESS NOTE ADULT - PROBLEM SELECTOR PLAN 2
- s/p ROSC  - s/p trach  - supportive care  - CT head with diffuse anoxic injury
- s/p ROSC  - s/p trach  - supportive care  - CT head with diffuse anoxic injury
- s/p ROSC  - f/u neuro  - CT head with diffuse anoxic injury

## 2024-04-24 NOTE — PROGRESS NOTE ADULT - CRITICAL CARE SERVICES
34
left elbow pain after arm was pulled. no fall, no other trauma, was not using arm at the elbow. on exam patient here has elbow adducted flexed but he is moving it, shoulder with nml rom, finger and wrist nml, no swelling, cap refill nml. imp: nursemaids elbow will reduce.

## 2024-04-24 NOTE — PROGRESS NOTE ADULT - SUBJECTIVE AND OBJECTIVE BOX
Hospital day # : 18d  Events Overnight:     Subjective: trach to vent, unresponsive    Physical Exam:  General: crit ill appearing, trach to vent  Neuro: unresponsive to deep tactile stimuli  Head: atraumatic, normocephalic  Eyes: EOMI, no icterus  Lungs/Chest: Clear to auscultation bilaterally, no wheeze  Heart: regular rate, regular rhythm, S1/S2  Abdomen: BS audible, non-distended, soft, non-tender  Extremities: no clubbing, no cyanosis, no edema    Recent data:  T(C): 37.1 (04-24-24 @ 07:30), Max: 37.1 (04-23-24 @ 15:00)  HR: 72 (04-24-24 @ 07:30) (60 - 81)  BP: 152/73 (04-24-24 @ 06:15) (105/55 - 173/78)  RR: 28 (04-24-24 @ 07:05) (25 - 38)  SpO2: 98% (04-24-24 @ 07:30) (98% - 100%)    I&O's Summary    23 Apr 2024 07:01  -  24 Apr 2024 07:00  --------------------------------------------------------  IN: 2240 mL / OUT: 804 mL / NET: 1436 mL    24 Apr 2024 07:01  -  24 Apr 2024 08:06  --------------------------------------------------------  IN: 0 mL / OUT: 40 mL / NET: -40 mL                              7.5    8.90  )-----------( 406      ( 24 Apr 2024 05:30 )             23.2                         8.3    10.42 )-----------( 477      ( 23 Apr 2024 05:38 )             25.7       24 Apr 2024 05:30    129    |  95     |  29     ----------------------------<  125    5.1     |  29     |  1.2    23 Apr 2024 05:38    131    |  97     |  26     ----------------------------<  115    5.3     |  29     |  1.0    22 Apr 2024 15:30    130    |  96     |  26     ----------------------------<  117    5.4     |  27     |  1.1       Ca    7.9        24 Apr 2024 05:30  Ca    8.0        23 Apr 2024 05:38  Ca    8.0        22 Apr 2024 15:30  Mg     2.0       24 Apr 2024 05:30  Mg     1.9       23 Apr 2024 05:38        Creatine Kinase    200 (04-09-24)  340 (04-08-24)      Assessment/Plan:  74-year-old female with PHMx of pancreatic CA s/p Whipple, CKD stage 3, HTN, COPD, pulmonary fibrosis, PRESS syndrome (hospitalized in 2019), Raynaud's syndrome, ANCA vasculitis, Scleroderma, hypothyroid  presents to ED due to cardiac arrest. Pt lives at home with son and daughter, went to use the bathroom and became unresponsive.  EMS intubated her and gave her 4 rounds of epinephrine.  ROSC was achieved within a minute of her arrival to the ED.  Patient was found to be hypothermic and bradycardic      #S/p Cardiac arrest  #Anoxic brain injury on CT (4/11)   #Aspiration Pneumonia  #Acute hypoxic respiratory failure s/p trach  #HTN   - has + cough/ gag reflex ; anoxic brain injury as seen on CT brain and EEG;  neurology appreciated poor prognosis; palliative on board, family want full code   - s/p trach 4/18/24 - vented to FIO2 40%   - GI unable to place PEG tube, Surgery will place PEG this week    - completed antibiotic course    - Labetalol / lisinopril for bp control   - fentanyl pushes if needed for pain  - Scopalamine patch for secretions monitor for urinary retention   - DVT and GI prophylaxis   - DC planning to NH - Case management aware    #Persistent Hyperkalemia- improving  - K 6.3 (max)> 5.1, cr at baseline 1.1   - s/p Lokelma  - monitor BMP, if K > 5.5 Lokelma

## 2024-04-24 NOTE — PROGRESS NOTE ADULT - ASSESSMENT
IMPRESSION:    Anoxic brain damage  Acute Hypoxemic Respiratory Failure on MV SP trach  SP CPA downtime 30 minutes   Cardiac bradycardia   Lung Fibrosis   GIUSEPPE, resolved   Hyperkalemia   HO Raynaud's  HO PRESS    HO Anemia  HO Pancreatic cancer     PLAN:    CNS: off sedation   continue on methadone  monitor Qtc    HEENT: oral and trach care     PULMONARY: keep pox >92%  monitor peak and plateau  RR increased to 28     CARDIOVASCULAR:  2D-Echo noted, continue labetalol and hydralazine to 25mg Q6H     RENAL: follow is and os, trend Cr, Nephrology follow up, continue lokelma monitor BMP Q6H     INFECTIOUS DISEASE: cultures negative to date, procalcitonin elevated, completed Zosyn course    GASTRO: GI ppx, tube feeds follow GSx planned for PEG this week     HEMATOLOGICAL:  DVT prophylaxis.    ENDOCRINE:  Follow up FS.  Insulin protocol if needed. check cortisol level    MUSCULOSKELETAL: bedrest    Full Code, Palliative care following    Grave prognosis     The patient has a high probability of further deterioration.    MICU monitoring  IMPRESSION:    Anoxic brain damage  Acute Hypoxemic Respiratory Failure on MV SP trach  SP CPA downtime 30 minutes   Cardiac bradycardia   Lung Fibrosis   GIUSEPPE, resolved   Hyperkalemia   HO Raynaud's  HO PRESS    HO Anemia  HO Pancreatic cancer     PLAN:    CNS: off sedation   continue on methadone  monitor Qtc, 432 today    HEENT: oral and trach care     PULMONARY: keep pox >92%  monitor peak and plateau  no vent changes, pressure support as tolerated     CARDIOVASCULAR:  2D-Echo noted, continue labetalol and hydralazine to 25mg Q6H     RENAL: follow is and os, trend Cr, Nephrology follow up, continue lokelma monitor BMP Q6H     INFECTIOUS DISEASE: cultures negative to date, procalcitonin elevated, completed Zosyn course    GASTRO: GI ppx, tube feeds follow GSx planned for PEG     HEMATOLOGICAL:  DVT prophylaxis.    ENDOCRINE:  Follow up FS.  Insulin protocol if needed. check cortisol level    MUSCULOSKELETAL: bedrest    Full Code, Palliative care following    Grave prognosis     The patient has a high probability of further deterioration.    Discussed with daughter yesterday, daughter does not wish to be contacted unless emergencies or in need of consent for procedures.    /case mgmt for LTAC placement/disposition    MICU monitoring

## 2024-04-24 NOTE — CHART NOTE - NSCHARTNOTEFT_GEN_A_CORE
Pt is booked for OR tomorrow Thurs 4/25 for PEG placement @ 10:30 am  Please make NPO ([facility] resident aware)  Please optimize medically

## 2024-04-24 NOTE — PROGRESS NOTE ADULT - SUBJECTIVE AND OBJECTIVE BOX
Patient is a 74y old  Female who presents with a chief complaint of cardiac arrest (24 Apr 2024 09:52)      OVERNIGHT EVENTS: no major events reported     SUBJECTIVE / INTERVAL HPI: Patient seen and examined at bedside.     VITAL SIGNS:  Vital Signs Last 24 Hrs  T(C): 37.1 (24 Apr 2024 07:30), Max: 37.1 (23 Apr 2024 15:00)  T(F): 98.8 (24 Apr 2024 07:30), Max: 98.8 (23 Apr 2024 15:00)  HR: 60 (24 Apr 2024 08:00) (60 - 81)  BP: 166/79 (24 Apr 2024 08:00) (105/55 - 173/78)  BP(mean): 113 (24 Apr 2024 08:00) (76 - 116)  RR: 27 (24 Apr 2024 08:00) (25 - 38)  SpO2: 100% (24 Apr 2024 08:00) (98% - 100%)    Parameters below as of 24 Apr 2024 08:00  Patient On (Oxygen Delivery Method): ventilator        PHYSICAL EXAM:    General: old thin male chronically looks ill   HEENT: NC/AT; PERRL, clear conjunctiva  Neck: supple, + trach   Cardiovascular: +S1/S2; RRR  Respiratory: CTA b/l; no W/R/R  Gastrointestinal: soft, NT/ND; +BSx4  +ve hill   Extremities: WWP; 2+ peripheral pulses; no edema   Neurological: lethargic, not following commands     MEDICATIONS:  MEDICATIONS  (STANDING):  artificial  tears Solution 1 Drop(s) Both EYES three times a day  chlorhexidine 0.12% Liquid 15 milliLiter(s) Oral Mucosa every 12 hours  chlorhexidine 2% Cloths 1 Application(s) Topical <User Schedule>  chlorhexidine 2% Cloths 1 Application(s) Topical daily  heparin   Injectable 5000 Unit(s) SubCutaneous every 12 hours  labetalol 100 milliGRAM(s) Oral three times a day  levothyroxine 100 MICROGram(s) Oral daily  lidocaine 1%/epinephrine 1:100,000 Inj 20 milliLiter(s) Local Injection once  lidocaine 2% Viscous 10 milliLiter(s) Oral once  lisinopril 20 milliGRAM(s) Oral daily  methadone    Tablet 10 milliGRAM(s) Oral two times a day  pantoprazole  Injectable 40 milliGRAM(s) IV Push daily  petrolatum Ophthalmic Ointment 1 Application(s) Both EYES daily  propofol Infusion 10 MICROgram(s)/kG/Min (2.4 mL/Hr) IV Continuous <Continuous>    MEDICATIONS  (PRN):  labetalol Injectable 20 milliGRAM(s) IV Push every 4 hours PRN Systolic blood pressure >180  scopolamine 1 mG/72 Hr(s) Patch 1 Patch Transdermal every 72 hours PRN for secretions      ALLERGIES:  Allergies    celecoxib (Rash)  Originally Entered as [U UNKNOWN] reaction to [celebrit] (Unknown)    Intolerances        LABS:                        7.5    8.90  )-----------( 406      ( 24 Apr 2024 05:30 )             23.2     04-24    129<L>  |  95<L>  |  29<H>  ----------------------------<  125<H>  5.1<H>   |  29  |  1.2    Ca    7.9<L>      24 Apr 2024 05:30  Mg     2.0     04-24    TPro  5.9<L>  /  Alb  2.1<L>  /  TBili  <0.2  /  DBili  x   /  AST  37  /  ALT  8   /  AlkPhos  184<H>  04-24      Urinalysis Basic - ( 24 Apr 2024 05:30 )    Color: x / Appearance: x / SG: x / pH: x  Gluc: 125 mg/dL / Ketone: x  / Bili: x / Urobili: x   Blood: x / Protein: x / Nitrite: x   Leuk Esterase: x / RBC: x / WBC x   Sq Epi: x / Non Sq Epi: x / Bacteria: x      CAPILLARY BLOOD GLUCOSE          RADIOLOGY & ADDITIONAL TESTS: Reviewed.    ASSESSMENT:    PLAN:

## 2024-04-24 NOTE — PROGRESS NOTE ADULT - SUBJECTIVE AND OBJECTIVE BOX
Nephrology progress note    Patient is seen and examined, events over the last 24 h noted .    Allergies:  celecoxib (Rash)  Originally Entered as [U UNKNOWN] reaction to [celebrit] (Unknown)    Hospital Medications:   MEDICATIONS  (STANDING):  artificial  tears Solution 1 Drop(s) Both EYES three times a day  chlorhexidine 0.12% Liquid 15 milliLiter(s) Oral Mucosa every 12 hours  chlorhexidine 2% Cloths 1 Application(s) Topical <User Schedule>  chlorhexidine 2% Cloths 1 Application(s) Topical daily  heparin   Injectable 5000 Unit(s) SubCutaneous every 12 hours  labetalol 100 milliGRAM(s) Oral three times a day  lactated ringers. 1000 milliLiter(s) (50 mL/Hr) IV Continuous <Continuous>  levothyroxine 100 MICROGram(s) Oral daily  lidocaine 1%/epinephrine 1:100,000 Inj 20 milliLiter(s) Local Injection once  lidocaine 2% Viscous 10 milliLiter(s) Oral once  lisinopril 20 milliGRAM(s) Oral daily  methadone    Tablet 10 milliGRAM(s) Oral two times a day  pantoprazole  Injectable 40 milliGRAM(s) IV Push daily  petrolatum Ophthalmic Ointment 1 Application(s) Both EYES daily  propofol Infusion 10 MICROgram(s)/kG/Min (2.4 mL/Hr) IV Continuous <Continuous>  sodium zirconium cyclosilicate 10 Gram(s) Oral once        VITALS:  T(F): 98.8 (04-24-24 @ 07:30), Max: 98.8 (04-23-24 @ 15:00)  HR: 60 (04-24-24 @ 08:00)  BP: 166/79 (04-24-24 @ 08:00)  RR: 27 (04-24-24 @ 08:00)  SpO2: 100% (04-24-24 @ 08:00)  Wt(kg): --    04-22 @ 07:01  -  04-23 @ 07:00  --------------------------------------------------------  IN: 2286 mL / OUT: 1745 mL / NET: 541 mL    04-23 @ 07:01  -  04-24 @ 07:00  --------------------------------------------------------  IN: 2240 mL / OUT: 804 mL / NET: 1436 mL    04-24 @ 07:01  -  04-24 @ 08:17  --------------------------------------------------------  IN: 0 mL / OUT: 40 mL / NET: -40 mL          PHYSICAL EXAM:  Constitutional: NAD  HEENT: anicteric sclera, oropharynx clear, MMM  Neck: No JVD  Respiratory: CTAB, no wheezes, rales or rhonchi  Cardiovascular: S1, S2, RRR  Gastrointestinal: BS+, soft, NT/ND  Extremities: No cyanosis or clubbing. No peripheral edema  Neurological: A/O x 3, no focal deficits  : No CVA tenderness. No hill.   Skin: No rashes  Vascular Access:    LABS:  04-24    129<L>  |  95<L>  |  29<H>  ----------------------------<  125<H>  5.1<H>   |  29  |  1.2  SODIUM TREND:  Sodium 129 [04-24 @ 05:30]  Sodium 131 [04-23 @ 05:38]  Sodium 130 [04-22 @ 15:30]  Sodium 129 [04-22 @ 05:42]  Sodium 127 [04-21 @ 15:52]  Sodium 128 [04-21 @ 06:28]  Sodium 133 [04-20 @ 06:29]  Sodium 133 [04-19 @ 05:45]  Sodium 132 [04-18 @ 05:32]  Sodium 129 [04-17 @ 15:20]    Ca    7.9<L>      24 Apr 2024 05:30  Mg     2.0     04-24    TPro  5.9<L>  /  Alb  2.1<L>  /  TBili  <0.2  /  DBili      /  AST  37  /  ALT  8   /  AlkPhos  184<H>  04-24  Potassium Trend: 5.1<--, 5.3<--, 5.4<--, 5.6<--, 5.2<--  Potassium Trend: 5.1<--, 5.3<--, 5.4<--, 5.6<--, 5.2<--                      7.5    8.90  )-----------( 406      ( 24 Apr 2024 05:30 )             23.2       Urine Studies:  Urinalysis Basic - ( 24 Apr 2024 05:30 )    Color:  / Appearance:  / SG:  / pH:   Gluc: 125 mg/dL / Ketone:   / Bili:  / Urobili:    Blood:  / Protein:  / Nitrite:    Leuk Esterase:  / RBC:  / WBC    Sq Epi:  / Non Sq Epi:  / Bacteria:         RADIOLOGY & ADDITIONAL STUDIES:   Nephrology progress note    Patient is seen and examined, events over the last 24 h noted .    Allergies:  celecoxib (Rash)  Originally Entered as [U UNKNOWN] reaction to [celebrit] (Unknown)    Hospital Medications:   MEDICATIONS  (STANDING):  artificial  tears Solution 1 Drop(s) Both EYES three times a day  chlorhexidine 0.12% Liquid 15 milliLiter(s) Oral Mucosa every 12 hours  chlorhexidine 2% Cloths 1 Application(s) Topical <User Schedule>  chlorhexidine 2% Cloths 1 Application(s) Topical daily  heparin   Injectable 5000 Unit(s) SubCutaneous every 12 hours  labetalol 100 milliGRAM(s) Oral three times a day  lactated ringers. 1000 milliLiter(s) (50 mL/Hr) IV Continuous <Continuous>  levothyroxine 100 MICROGram(s) Oral daily  lidocaine 1%/epinephrine 1:100,000 Inj 20 milliLiter(s) Local Injection once  lidocaine 2% Viscous 10 milliLiter(s) Oral once  lisinopril 20 milliGRAM(s) Oral daily  methadone    Tablet 10 milliGRAM(s) Oral two times a day  pantoprazole  Injectable 40 milliGRAM(s) IV Push daily  petrolatum Ophthalmic Ointment 1 Application(s) Both EYES daily  propofol Infusion 10 MICROgram(s)/kG/Min (2.4 mL/Hr) IV Continuous <Continuous>  sodium zirconium cyclosilicate 10 Gram(s) Oral once        VITALS:  T(F): 98.8 (04-24-24 @ 07:30), Max: 98.8 (04-23-24 @ 15:00)  HR: 60 (04-24-24 @ 08:00)  BP: 166/79 (04-24-24 @ 08:00)  RR: 27 (04-24-24 @ 08:00)  SpO2: 100% (04-24-24 @ 08:00)  Wt(kg): --    04-22 @ 07:01  -  04-23 @ 07:00  --------------------------------------------------------  IN: 2286 mL / OUT: 1745 mL / NET: 541 mL    04-23 @ 07:01  -  04-24 @ 07:00  --------------------------------------------------------  IN: 2240 mL / OUT: 804 mL / NET: 1436 mL    04-24 @ 07:01  -  04-24 @ 08:17  --------------------------------------------------------  IN: 0 mL / OUT: 40 mL / NET: -40 mL          PHYSICAL EXAM:  Constitutional: NAD. On vent  Neck: No JVD  Respiratory: CTA  Cardiovascular: S1, S2, RRR  Gastrointestinal: BS+, soft, NT/ND  Extremities: No peripheral edema  Neurological: Unresponsive  : + hill.   Vascular Access:    LABS:  04-24    129<L>  |  95<L>  |  29<H>  ----------------------------<  125<H>  5.1<H>   |  29  |  1.2  Creatinine Trend: 1.2<--, 1.0<--, 1.1<--, 1.1<--, 1.0<--, 1.2<--  SODIUM TREND:  Sodium 129 [04-24 @ 05:30]  Sodium 131 [04-23 @ 05:38]  Sodium 130 [04-22 @ 15:30]  Sodium 129 [04-22 @ 05:42]  Sodium 127 [04-21 @ 15:52]  Sodium 128 [04-21 @ 06:28]  Sodium 133 [04-20 @ 06:29]  Sodium 133 [04-19 @ 05:45]  Sodium 132 [04-18 @ 05:32]  Sodium 129 [04-17 @ 15:20]    Ca    7.9<L>      24 Apr 2024 05:30  Mg     2.0     04-24    TPro  5.9<L>  /  Alb  2.1<L>  /  TBili  <0.2  /  DBili      /  AST  37  /  ALT  8   /  AlkPhos  184<H>  04-24  Potassium Trend: 5.1<--, 5.3<--, 5.4<--, 5.6<--, 5.2<--  Potassium Trend: 5.1<--, 5.3<--, 5.4<--, 5.6<--, 5.2<--                      7.5    8.90  )-----------( 406      ( 24 Apr 2024 05:30 )             23.2       Urine Studies:  Urinalysis Basic - ( 24 Apr 2024 05:30 )    Color:  / Appearance:  / SG:  / pH:   Gluc: 125 mg/dL / Ketone:   / Bili:  / Urobili:    Blood:  / Protein:  / Nitrite:    Leuk Esterase:  / RBC:  / WBC    Sq Epi:  / Non Sq Epi:  / Bacteria:         RADIOLOGY & ADDITIONAL STUDIES:

## 2024-04-24 NOTE — PROGRESS NOTE ADULT - SUBJECTIVE AND OBJECTIVE BOX
Patient is a 74y old  Female who presents with a chief complaint of cardiac arrest (24 Apr 2024 08:17)        Over Night Events:      ICU Vital Signs Last 24 Hrs  T(C): 37.1 (24 Apr 2024 07:30), Max: 37.1 (23 Apr 2024 15:00)  T(F): 98.8 (24 Apr 2024 07:30), Max: 98.8 (23 Apr 2024 15:00)  HR: 60 (24 Apr 2024 08:00) (60 - 81)  BP: 166/79 (24 Apr 2024 08:00) (105/55 - 173/78)  BP(mean): 113 (24 Apr 2024 08:00) (76 - 116)  RR: 27 (24 Apr 2024 08:00) (25 - 38)  SpO2: 100% (24 Apr 2024 08:00) (98% - 100%)    O2 Parameters below as of 24 Apr 2024 08:00  Patient On (Oxygen Delivery Method): ventilator          CONSTITUTIONAL:   Ill appearing.     ENT:   trached     EYES:   right fixed pupil sluggish left       CARDIAC:   Normal rate,   Regular rhythm.      RESPIRATORY:   No wheezing  Bilateral BS  Normal chest expansion  Not tachypneic,  No use of accessory muscles    GASTROINTESTINAL:  Abdomen soft,   Non-tender,   No guarding,   + BS      MUSCULOSKELETAL:   Range of motion is limited    NEUROLOGICAL:   does not follow commands positive cough and gag       SKIN:   Skin normal color for race,   Warm and dry  sacral DTI       04-23-24 @ 07:01  -  04-24-24 @ 07:00  --------------------------------------------------------  IN:    Lactated Ringers: 1250 mL    Osmolite 1.2: 990 mL  Total IN: 2240 mL    OUT:    Indwelling Catheter - Urethral (mL): 754 mL    Rectal Tube (mL): 50 mL  Total OUT: 804 mL    Total NET: 1436 mL      04-24-24 @ 07:01  -  04-24-24 @ 08:43  --------------------------------------------------------  IN:  Total IN: 0 mL    OUT:    Indwelling Catheter - Urethral (mL): 40 mL  Total OUT: 40 mL    Total NET: -40 mL          LABS:                            7.5    8.90  )-----------( 406      ( 24 Apr 2024 05:30 )             23.2                                               04-24    129<L>  |  95<L>  |  29<H>  ----------------------------<  125<H>  5.1<H>   |  29  |  1.2    Ca    7.9<L>      24 Apr 2024 05:30  Mg     2.0     04-24    TPro  5.9<L>  /  Alb  2.1<L>  /  TBili  <0.2  /  DBili  x   /  AST  37  /  ALT  8   /  AlkPhos  184<H>  04-24                                             Urinalysis Basic - ( 24 Apr 2024 05:30 )    Color: x / Appearance: x / SG: x / pH: x  Gluc: 125 mg/dL / Ketone: x  / Bili: x / Urobili: x   Blood: x / Protein: x / Nitrite: x   Leuk Esterase: x / RBC: x / WBC x   Sq Epi: x / Non Sq Epi: x / Bacteria: x                                                  LIVER FUNCTIONS - ( 24 Apr 2024 05:30 )  Alb: 2.1 g/dL / Pro: 5.9 g/dL / ALK PHOS: 184 U/L / ALT: 8 U/L / AST: 37 U/L / GGT: x                                                                                               Mode: AC/ CMV (Assist Control/ Continuous Mandatory Ventilation)  RR (machine): 26  TV (machine): 300  FiO2: 40  PEEP: 5  ITime: 1  MAP: 11  PIP: 24                                      ABG - ( 23 Apr 2024 04:12 )  pH, Arterial: 7.40  pH, Blood: x     /  pCO2: 51    /  pO2: 209   / HCO3: 32    / Base Excess: 5.5   /  SaO2: 100.0               MEDICATIONS  (STANDING):  artificial  tears Solution 1 Drop(s) Both EYES three times a day  chlorhexidine 0.12% Liquid 15 milliLiter(s) Oral Mucosa every 12 hours  chlorhexidine 2% Cloths 1 Application(s) Topical daily  chlorhexidine 2% Cloths 1 Application(s) Topical <User Schedule>  heparin   Injectable 5000 Unit(s) SubCutaneous every 12 hours  labetalol 100 milliGRAM(s) Oral three times a day  lactated ringers. 1000 milliLiter(s) (50 mL/Hr) IV Continuous <Continuous>  levothyroxine 100 MICROGram(s) Oral daily  lidocaine 1%/epinephrine 1:100,000 Inj 20 milliLiter(s) Local Injection once  lidocaine 2% Viscous 10 milliLiter(s) Oral once  lisinopril 20 milliGRAM(s) Oral daily  methadone    Tablet 10 milliGRAM(s) Oral two times a day  pantoprazole  Injectable 40 milliGRAM(s) IV Push daily  petrolatum Ophthalmic Ointment 1 Application(s) Both EYES daily  propofol Infusion 10 MICROgram(s)/kG/Min (2.4 mL/Hr) IV Continuous <Continuous>  sodium zirconium cyclosilicate 10 Gram(s) Oral once    MEDICATIONS  (PRN):  labetalol Injectable 20 milliGRAM(s) IV Push every 4 hours PRN Systolic blood pressure >180  scopolamine 1 mG/72 Hr(s) Patch 1 Patch Transdermal every 72 hours PRN for secretions         Patient is a 74y old  Female who presents with a chief complaint of cardiac arrest (24 Apr 2024 08:17)        Over Night Events: no acute events overnight       ICU Vital Signs Last 24 Hrs  T(C): 37.1 (24 Apr 2024 07:30), Max: 37.1 (23 Apr 2024 15:00)  T(F): 98.8 (24 Apr 2024 07:30), Max: 98.8 (23 Apr 2024 15:00)  HR: 60 (24 Apr 2024 08:00) (60 - 81)  BP: 166/79 (24 Apr 2024 08:00) (105/55 - 173/78)  BP(mean): 113 (24 Apr 2024 08:00) (76 - 116)  RR: 27 (24 Apr 2024 08:00) (25 - 38)  SpO2: 100% (24 Apr 2024 08:00) (98% - 100%)    O2 Parameters below as of 24 Apr 2024 08:00  Patient On (Oxygen Delivery Method): ventilator          CONSTITUTIONAL:   Ill appearing.     ENT:   trached     EYES:   right fixed pupil sluggish left       CARDIAC:   Normal rate,   Regular rhythm.      RESPIRATORY:   No wheezing  Bilateral BS  Normal chest expansion  Not tachypneic,  No use of accessory muscles    GASTROINTESTINAL:  Abdomen soft,   Non-tender,   No guarding,   + BS      MUSCULOSKELETAL:   Range of motion is limited    NEUROLOGICAL:   does not follow commands positive cough and gag       SKIN:   Skin normal color for race,   Warm and dry  sacral DTI       04-23-24 @ 07:01  -  04-24-24 @ 07:00  --------------------------------------------------------  IN:    Lactated Ringers: 1250 mL    Osmolite 1.2: 990 mL  Total IN: 2240 mL    OUT:    Indwelling Catheter - Urethral (mL): 754 mL    Rectal Tube (mL): 50 mL  Total OUT: 804 mL    Total NET: 1436 mL      04-24-24 @ 07:01  -  04-24-24 @ 08:43  --------------------------------------------------------  IN:  Total IN: 0 mL    OUT:    Indwelling Catheter - Urethral (mL): 40 mL  Total OUT: 40 mL    Total NET: -40 mL          LABS:                            7.5    8.90  )-----------( 406      ( 24 Apr 2024 05:30 )             23.2                                               04-24    129<L>  |  95<L>  |  29<H>  ----------------------------<  125<H>  5.1<H>   |  29  |  1.2    Ca    7.9<L>      24 Apr 2024 05:30  Mg     2.0     04-24    TPro  5.9<L>  /  Alb  2.1<L>  /  TBili  <0.2  /  DBili  x   /  AST  37  /  ALT  8   /  AlkPhos  184<H>  04-24                                             Urinalysis Basic - ( 24 Apr 2024 05:30 )    Color: x / Appearance: x / SG: x / pH: x  Gluc: 125 mg/dL / Ketone: x  / Bili: x / Urobili: x   Blood: x / Protein: x / Nitrite: x   Leuk Esterase: x / RBC: x / WBC x   Sq Epi: x / Non Sq Epi: x / Bacteria: x                                                  LIVER FUNCTIONS - ( 24 Apr 2024 05:30 )  Alb: 2.1 g/dL / Pro: 5.9 g/dL / ALK PHOS: 184 U/L / ALT: 8 U/L / AST: 37 U/L / GGT: x                                                                                               Mode: AC/ CMV (Assist Control/ Continuous Mandatory Ventilation)  RR (machine): 26  TV (machine): 300  FiO2: 40  PEEP: 5  ITime: 1  MAP: 11  PIP: 24                                      ABG - ( 23 Apr 2024 04:12 )  pH, Arterial: 7.40  pH, Blood: x     /  pCO2: 51    /  pO2: 209   / HCO3: 32    / Base Excess: 5.5   /  SaO2: 100.0               MEDICATIONS  (STANDING):  artificial  tears Solution 1 Drop(s) Both EYES three times a day  chlorhexidine 0.12% Liquid 15 milliLiter(s) Oral Mucosa every 12 hours  chlorhexidine 2% Cloths 1 Application(s) Topical daily  chlorhexidine 2% Cloths 1 Application(s) Topical <User Schedule>  heparin   Injectable 5000 Unit(s) SubCutaneous every 12 hours  labetalol 100 milliGRAM(s) Oral three times a day  lactated ringers. 1000 milliLiter(s) (50 mL/Hr) IV Continuous <Continuous>  levothyroxine 100 MICROGram(s) Oral daily  lidocaine 1%/epinephrine 1:100,000 Inj 20 milliLiter(s) Local Injection once  lidocaine 2% Viscous 10 milliLiter(s) Oral once  lisinopril 20 milliGRAM(s) Oral daily  methadone    Tablet 10 milliGRAM(s) Oral two times a day  pantoprazole  Injectable 40 milliGRAM(s) IV Push daily  petrolatum Ophthalmic Ointment 1 Application(s) Both EYES daily  propofol Infusion 10 MICROgram(s)/kG/Min (2.4 mL/Hr) IV Continuous <Continuous>  sodium zirconium cyclosilicate 10 Gram(s) Oral once    MEDICATIONS  (PRN):  labetalol Injectable 20 milliGRAM(s) IV Push every 4 hours PRN Systolic blood pressure >180  scopolamine 1 mG/72 Hr(s) Patch 1 Patch Transdermal every 72 hours PRN for secretions

## 2024-04-25 LAB
ALBUMIN SERPL ELPH-MCNC: 2 G/DL — LOW (ref 3.5–5.2)
ALP SERPL-CCNC: 167 U/L — HIGH (ref 30–115)
ALT FLD-CCNC: 8 U/L — SIGNIFICANT CHANGE UP (ref 0–41)
ANION GAP SERPL CALC-SCNC: 5 MMOL/L — LOW (ref 7–14)
ANION GAP SERPL CALC-SCNC: 7 MMOL/L — SIGNIFICANT CHANGE UP (ref 7–14)
ANION GAP SERPL CALC-SCNC: 7 MMOL/L — SIGNIFICANT CHANGE UP (ref 7–14)
APTT BLD: 32.4 SEC — SIGNIFICANT CHANGE UP (ref 27–39.2)
APTT BLD: SIGNIFICANT CHANGE UP SEC (ref 27–39.2)
AST SERPL-CCNC: 42 U/L — HIGH (ref 0–41)
BASOPHILS # BLD AUTO: 0.05 K/UL — SIGNIFICANT CHANGE UP (ref 0–0.2)
BASOPHILS NFR BLD AUTO: 0.6 % — SIGNIFICANT CHANGE UP (ref 0–1)
BILIRUB SERPL-MCNC: <0.2 MG/DL — SIGNIFICANT CHANGE UP (ref 0.2–1.2)
BUN SERPL-MCNC: 30 MG/DL — HIGH (ref 10–20)
BUN SERPL-MCNC: 30 MG/DL — HIGH (ref 10–20)
BUN SERPL-MCNC: 32 MG/DL — HIGH (ref 10–20)
CALCIUM SERPL-MCNC: 7.9 MG/DL — LOW (ref 8.4–10.5)
CALCIUM SERPL-MCNC: 7.9 MG/DL — LOW (ref 8.4–10.5)
CALCIUM SERPL-MCNC: 8 MG/DL — LOW (ref 8.4–10.5)
CHLORIDE SERPL-SCNC: 92 MMOL/L — LOW (ref 98–110)
CHLORIDE SERPL-SCNC: 93 MMOL/L — LOW (ref 98–110)
CHLORIDE SERPL-SCNC: 95 MMOL/L — LOW (ref 98–110)
CK SERPL-CCNC: 55 U/L — SIGNIFICANT CHANGE UP (ref 0–225)
CO2 SERPL-SCNC: 27 MMOL/L — SIGNIFICANT CHANGE UP (ref 17–32)
CO2 SERPL-SCNC: 29 MMOL/L — SIGNIFICANT CHANGE UP (ref 17–32)
CO2 SERPL-SCNC: 30 MMOL/L — SIGNIFICANT CHANGE UP (ref 17–32)
CORTIS AM PEAK SERPL-MCNC: 11.7 UG/DL — SIGNIFICANT CHANGE UP (ref 6–18.4)
CREAT SERPL-MCNC: 1.2 MG/DL — SIGNIFICANT CHANGE UP (ref 0.7–1.5)
EGFR: 47 ML/MIN/1.73M2 — LOW
EOSINOPHIL # BLD AUTO: 0.35 K/UL — SIGNIFICANT CHANGE UP (ref 0–0.7)
EOSINOPHIL NFR BLD AUTO: 4.1 % — SIGNIFICANT CHANGE UP (ref 0–8)
GAS PNL BLDA: SIGNIFICANT CHANGE UP
GLUCOSE BLDC GLUCOMTR-MCNC: 142 MG/DL — HIGH (ref 70–99)
GLUCOSE BLDC GLUCOMTR-MCNC: 88 MG/DL — SIGNIFICANT CHANGE UP (ref 70–99)
GLUCOSE SERPL-MCNC: 104 MG/DL — HIGH (ref 70–99)
GLUCOSE SERPL-MCNC: 118 MG/DL — HIGH (ref 70–99)
GLUCOSE SERPL-MCNC: 89 MG/DL — SIGNIFICANT CHANGE UP (ref 70–99)
HCT VFR BLD CALC: 25.3 % — LOW (ref 37–47)
HGB BLD-MCNC: 8.2 G/DL — LOW (ref 12–16)
IMM GRANULOCYTES NFR BLD AUTO: 0.4 % — HIGH (ref 0.1–0.3)
INR BLD: 0.95 RATIO — SIGNIFICANT CHANGE UP (ref 0.65–1.3)
INR BLD: 1.01 RATIO — SIGNIFICANT CHANGE UP (ref 0.65–1.3)
LYMPHOCYTES # BLD AUTO: 0.99 K/UL — LOW (ref 1.2–3.4)
LYMPHOCYTES # BLD AUTO: 11.6 % — LOW (ref 20.5–51.1)
MAGNESIUM SERPL-MCNC: 2 MG/DL — SIGNIFICANT CHANGE UP (ref 1.8–2.4)
MCHC RBC-ENTMCNC: 25.8 PG — LOW (ref 27–31)
MCHC RBC-ENTMCNC: 32.4 G/DL — SIGNIFICANT CHANGE UP (ref 32–37)
MCV RBC AUTO: 79.6 FL — LOW (ref 81–99)
MONOCYTES # BLD AUTO: 0.94 K/UL — HIGH (ref 0.1–0.6)
MONOCYTES NFR BLD AUTO: 11 % — HIGH (ref 1.7–9.3)
NEUTROPHILS # BLD AUTO: 6.17 K/UL — SIGNIFICANT CHANGE UP (ref 1.4–6.5)
NEUTROPHILS NFR BLD AUTO: 72.3 % — SIGNIFICANT CHANGE UP (ref 42.2–75.2)
NRBC # BLD: 0 /100 WBCS — SIGNIFICANT CHANGE UP (ref 0–0)
PLATELET # BLD AUTO: 394 K/UL — SIGNIFICANT CHANGE UP (ref 130–400)
PMV BLD: 9.6 FL — SIGNIFICANT CHANGE UP (ref 7.4–10.4)
POTASSIUM SERPL-MCNC: 5.3 MMOL/L — HIGH (ref 3.5–5)
POTASSIUM SERPL-MCNC: 5.3 MMOL/L — HIGH (ref 3.5–5)
POTASSIUM SERPL-MCNC: 5.6 MMOL/L — HIGH (ref 3.5–5)
POTASSIUM SERPL-SCNC: 5.3 MMOL/L — HIGH (ref 3.5–5)
POTASSIUM SERPL-SCNC: 5.3 MMOL/L — HIGH (ref 3.5–5)
POTASSIUM SERPL-SCNC: 5.6 MMOL/L — HIGH (ref 3.5–5)
PROT SERPL-MCNC: 6 G/DL — SIGNIFICANT CHANGE UP (ref 6–8)
PROTHROM AB SERPL-ACNC: 10.8 SEC — SIGNIFICANT CHANGE UP (ref 9.95–12.87)
PROTHROM AB SERPL-ACNC: 11.5 SEC — SIGNIFICANT CHANGE UP (ref 9.95–12.87)
RBC # BLD: 3.18 M/UL — LOW (ref 4.2–5.4)
RBC # FLD: 17 % — HIGH (ref 11.5–14.5)
SODIUM SERPL-SCNC: 128 MMOL/L — LOW (ref 135–146)
SODIUM SERPL-SCNC: 128 MMOL/L — LOW (ref 135–146)
SODIUM SERPL-SCNC: 129 MMOL/L — LOW (ref 135–146)
WBC # BLD: 8.53 K/UL — SIGNIFICANT CHANGE UP (ref 4.8–10.8)
WBC # FLD AUTO: 8.53 K/UL — SIGNIFICANT CHANGE UP (ref 4.8–10.8)

## 2024-04-25 PROCEDURE — 99233 SBSQ HOSP IP/OBS HIGH 50: CPT

## 2024-04-25 PROCEDURE — 99232 SBSQ HOSP IP/OBS MODERATE 35: CPT | Mod: 57,25

## 2024-04-25 PROCEDURE — 99232 SBSQ HOSP IP/OBS MODERATE 35: CPT

## 2024-04-25 PROCEDURE — 43246 EGD PLACE GASTROSTOMY TUBE: CPT

## 2024-04-25 RX ORDER — PANTOPRAZOLE SODIUM 20 MG/1
40 TABLET, DELAYED RELEASE ORAL DAILY
Refills: 0 | Status: DISCONTINUED | OUTPATIENT
Start: 2024-04-25 | End: 2024-04-27

## 2024-04-25 RX ORDER — LIDOCAINE 4 G/100G
10 CREAM TOPICAL ONCE
Refills: 0 | Status: DISCONTINUED | OUTPATIENT
Start: 2024-04-25 | End: 2024-05-07

## 2024-04-25 RX ORDER — SCOPALAMINE 1 MG/3D
1 PATCH, EXTENDED RELEASE TRANSDERMAL
Refills: 0 | Status: DISCONTINUED | OUTPATIENT
Start: 2024-04-25 | End: 2024-05-02

## 2024-04-25 RX ORDER — SENNA PLUS 8.6 MG/1
2 TABLET ORAL AT BEDTIME
Refills: 0 | Status: DISCONTINUED | OUTPATIENT
Start: 2024-04-25 | End: 2024-05-07

## 2024-04-25 RX ORDER — POLYETHYLENE GLYCOL 3350 17 G/17G
17 POWDER, FOR SOLUTION ORAL DAILY
Refills: 0 | Status: DISCONTINUED | OUTPATIENT
Start: 2024-04-25 | End: 2024-04-25

## 2024-04-25 RX ORDER — SODIUM ZIRCONIUM CYCLOSILICATE 10 G/10G
10 POWDER, FOR SUSPENSION ORAL ONCE
Refills: 0 | Status: COMPLETED | OUTPATIENT
Start: 2024-04-25 | End: 2024-04-25

## 2024-04-25 RX ORDER — FENTANYL CITRATE 50 UG/ML
20 INJECTION INTRAVENOUS ONCE
Refills: 0 | Status: DISCONTINUED | OUTPATIENT
Start: 2024-04-25 | End: 2024-04-25

## 2024-04-25 RX ORDER — SODIUM ZIRCONIUM CYCLOSILICATE 10 G/10G
10 POWDER, FOR SUSPENSION ORAL EVERY 8 HOURS
Refills: 0 | Status: COMPLETED | OUTPATIENT
Start: 2024-04-25 | End: 2024-04-26

## 2024-04-25 RX ORDER — METHADONE HYDROCHLORIDE 40 MG/1
10 TABLET ORAL
Refills: 0 | Status: COMPLETED | OUTPATIENT
Start: 2024-04-25 | End: 2024-05-02

## 2024-04-25 RX ORDER — LIDOCAINE HYDROCHLORIDE AND EPINEPHRINE 10; 10 MG/ML; UG/ML
20 INJECTION, SOLUTION INFILTRATION; PERINEURAL ONCE
Refills: 0 | Status: DISCONTINUED | OUTPATIENT
Start: 2024-04-25 | End: 2024-05-07

## 2024-04-25 RX ORDER — SODIUM ZIRCONIUM CYCLOSILICATE 10 G/10G
10 POWDER, FOR SUSPENSION ORAL EVERY 8 HOURS
Refills: 0 | Status: DISCONTINUED | OUTPATIENT
Start: 2024-04-25 | End: 2024-04-25

## 2024-04-25 RX ORDER — INSULIN HUMAN 100 [IU]/ML
5 INJECTION, SOLUTION SUBCUTANEOUS ONCE
Refills: 0 | Status: COMPLETED | OUTPATIENT
Start: 2024-04-25 | End: 2024-04-25

## 2024-04-25 RX ORDER — POLYETHYLENE GLYCOL 3350 17 G/17G
17 POWDER, FOR SOLUTION ORAL DAILY
Refills: 0 | Status: DISCONTINUED | OUTPATIENT
Start: 2024-04-25 | End: 2024-05-07

## 2024-04-25 RX ORDER — PROPOFOL 10 MG/ML
10 INJECTION, EMULSION INTRAVENOUS
Qty: 1000 | Refills: 0 | Status: DISCONTINUED | OUTPATIENT
Start: 2024-04-25 | End: 2024-04-27

## 2024-04-25 RX ORDER — CHLORHEXIDINE GLUCONATE 213 G/1000ML
15 SOLUTION TOPICAL EVERY 12 HOURS
Refills: 0 | Status: DISCONTINUED | OUTPATIENT
Start: 2024-04-25 | End: 2024-05-07

## 2024-04-25 RX ORDER — SENNA PLUS 8.6 MG/1
2 TABLET ORAL AT BEDTIME
Refills: 0 | Status: DISCONTINUED | OUTPATIENT
Start: 2024-04-25 | End: 2024-04-25

## 2024-04-25 RX ORDER — DEXTROSE 50 % IN WATER 50 %
25 SYRINGE (ML) INTRAVENOUS ONCE
Refills: 0 | Status: COMPLETED | OUTPATIENT
Start: 2024-04-25 | End: 2024-04-25

## 2024-04-25 RX ORDER — CHLORHEXIDINE GLUCONATE 213 G/1000ML
1 SOLUTION TOPICAL
Refills: 0 | Status: DISCONTINUED | OUTPATIENT
Start: 2024-04-25 | End: 2024-05-07

## 2024-04-25 RX ORDER — CHLORHEXIDINE GLUCONATE 213 G/1000ML
1 SOLUTION TOPICAL DAILY
Refills: 0 | Status: DISCONTINUED | OUTPATIENT
Start: 2024-04-25 | End: 2024-05-07

## 2024-04-25 RX ORDER — HYDRALAZINE HCL 50 MG
25 TABLET ORAL ONCE
Refills: 0 | Status: COMPLETED | OUTPATIENT
Start: 2024-04-25 | End: 2024-04-25

## 2024-04-25 RX ORDER — LABETALOL HCL 100 MG
100 TABLET ORAL THREE TIMES A DAY
Refills: 0 | Status: DISCONTINUED | OUTPATIENT
Start: 2024-04-25 | End: 2024-04-27

## 2024-04-25 RX ORDER — LEVOTHYROXINE SODIUM 125 MCG
100 TABLET ORAL DAILY
Refills: 0 | Status: DISCONTINUED | OUTPATIENT
Start: 2024-04-25 | End: 2024-05-07

## 2024-04-25 RX ORDER — LABETALOL HCL 100 MG
20 TABLET ORAL EVERY 4 HOURS
Refills: 0 | Status: DISCONTINUED | OUTPATIENT
Start: 2024-04-25 | End: 2024-04-27

## 2024-04-25 RX ORDER — HEPARIN SODIUM 5000 [USP'U]/ML
5000 INJECTION INTRAVENOUS; SUBCUTANEOUS EVERY 8 HOURS
Refills: 0 | Status: DISCONTINUED | OUTPATIENT
Start: 2024-04-25 | End: 2024-05-04

## 2024-04-25 RX ADMIN — Medication 25 MILLIGRAM(S): at 04:21

## 2024-04-25 RX ADMIN — Medication 25 MILLILITER(S): at 09:05

## 2024-04-25 RX ADMIN — SCOPALAMINE 1 PATCH: 1 PATCH, EXTENDED RELEASE TRANSDERMAL at 21:06

## 2024-04-25 RX ADMIN — Medication 100 MILLIGRAM(S): at 05:07

## 2024-04-25 RX ADMIN — Medication 100 MILLIGRAM(S): at 21:17

## 2024-04-25 RX ADMIN — SODIUM ZIRCONIUM CYCLOSILICATE 10 GRAM(S): 10 POWDER, FOR SUSPENSION ORAL at 15:27

## 2024-04-25 RX ADMIN — CHLORHEXIDINE GLUCONATE 15 MILLILITER(S): 213 SOLUTION TOPICAL at 17:15

## 2024-04-25 RX ADMIN — Medication 1 DROP(S): at 15:28

## 2024-04-25 RX ADMIN — Medication 100 MILLIGRAM(S): at 15:27

## 2024-04-25 RX ADMIN — INSULIN HUMAN 5 UNIT(S): 100 INJECTION, SOLUTION SUBCUTANEOUS at 09:06

## 2024-04-25 RX ADMIN — HEPARIN SODIUM 5000 UNIT(S): 5000 INJECTION INTRAVENOUS; SUBCUTANEOUS at 21:17

## 2024-04-25 RX ADMIN — PANTOPRAZOLE SODIUM 40 MILLIGRAM(S): 20 TABLET, DELAYED RELEASE ORAL at 14:03

## 2024-04-25 RX ADMIN — SODIUM ZIRCONIUM CYCLOSILICATE 10 GRAM(S): 10 POWDER, FOR SUSPENSION ORAL at 21:17

## 2024-04-25 RX ADMIN — Medication 1 APPLICATION(S): at 17:16

## 2024-04-25 RX ADMIN — FENTANYL CITRATE 20 MICROGRAM(S): 50 INJECTION INTRAVENOUS at 04:16

## 2024-04-25 RX ADMIN — Medication 100 MICROGRAM(S): at 05:07

## 2024-04-25 RX ADMIN — CHLORHEXIDINE GLUCONATE 1 APPLICATION(S): 213 SOLUTION TOPICAL at 05:08

## 2024-04-25 RX ADMIN — METHADONE HYDROCHLORIDE 10 MILLIGRAM(S): 40 TABLET ORAL at 17:15

## 2024-04-25 RX ADMIN — Medication 1 DROP(S): at 21:18

## 2024-04-25 RX ADMIN — Medication 1 DROP(S): at 05:07

## 2024-04-25 RX ADMIN — CHLORHEXIDINE GLUCONATE 15 MILLILITER(S): 213 SOLUTION TOPICAL at 05:07

## 2024-04-25 RX ADMIN — HEPARIN SODIUM 5000 UNIT(S): 5000 INJECTION INTRAVENOUS; SUBCUTANEOUS at 13:51

## 2024-04-25 RX ADMIN — CHLORHEXIDINE GLUCONATE 1 APPLICATION(S): 213 SOLUTION TOPICAL at 21:18

## 2024-04-25 RX ADMIN — SCOPALAMINE 1 PATCH: 1 PATCH, EXTENDED RELEASE TRANSDERMAL at 07:00

## 2024-04-25 RX ADMIN — FENTANYL CITRATE 20 MICROGRAM(S): 50 INJECTION INTRAVENOUS at 03:14

## 2024-04-25 RX ADMIN — METHADONE HYDROCHLORIDE 10 MILLIGRAM(S): 40 TABLET ORAL at 05:07

## 2024-04-25 NOTE — PROGRESS NOTE ADULT - SUBJECTIVE AND OBJECTIVE BOX
Patient is a 74y old  Female who presents with a chief complaint of cardiac arrest (24 Apr 2024 15:22)        Over Night Events:        ROS:     All pertinent ROS are negative except HPI         PHYSICAL EXAM    ICU Vital Signs Last 24 Hrs  T(C): 36.3 (25 Apr 2024 07:10), Max: 36.9 (24 Apr 2024 11:00)  T(F): 97.3 (25 Apr 2024 07:10), Max: 98.4 (24 Apr 2024 11:00)  HR: 57 (25 Apr 2024 07:00) (56 - 71)  BP: 141/71 (25 Apr 2024 07:00) (120/58 - 185/79)  BP(mean): 99 (25 Apr 2024 07:00) (83 - 114)  ABP: --  ABP(mean): --  RR: 30 (25 Apr 2024 07:10) (24 - 30)  SpO2: 100% (25 Apr 2024 07:00) (99% - 100%)    O2 Parameters below as of 25 Apr 2024 07:00  Patient On (Oxygen Delivery Method): ventilator    O2 Concentration (%): 40        CONSTITUTIONAL:   Ill appearing.  Well nourished.  NAD    ENT:   Airway patent,   Mouth with normal mucosa.   No thrush    EYES:   Pupils equal,   Round and reactive to light.    CARDIAC:   Normal rate,   Regular rhythm.    No edema      Vascular:  Normal systolic impulse  No Carotid bruits    RESPIRATORY:   No wheezing  Bilateral BS  Normal chest expansion  Not tachypneic,  No use of accessory muscles    GASTROINTESTINAL:  Abdomen soft,   Non-tender,   No guarding,   + BS    GENITOURINARY  normal genitalia for sex  no edema    MUSCULOSKELETAL:   Range of motion is not limited,  No clubbing, cyanosis    NEUROLOGICAL:   Alert and oriented   No motor  deficits.  pertinent DTRs normal    SKIN:   Skin normal color for race,   Warm and dry  No evidence of rash.    PSYCHIATRIC:   Normal mood and affect.   No apparent risk to self or others.    HEMATOLOGICAL:  No cervical  lymphadenopathy.  no inguinal lymphadenopathy      04-24-24 @ 07:01  -  04-25-24 @ 07:00  --------------------------------------------------------  IN:    Osmolite 1.2: 720 mL  Total IN: 720 mL    OUT:    Indwelling Catheter - Urethral (mL): 1520 mL    Rectal Tube (mL): 0 mL  Total OUT: 1520 mL    Total NET: -800 mL      04-25-24 @ 07:01  -  04-25-24 @ 08:58  --------------------------------------------------------  IN:  Total IN: 0 mL    OUT:    Indwelling Catheter - Urethral (mL): 100 mL  Total OUT: 100 mL    Total NET: -100 mL          LABS:                            8.2    8.53  )-----------( 394      ( 25 Apr 2024 05:40 )             25.3                                               04-25    128<L>  |  93<L>  |  30<H>  ----------------------------<  104<H>  5.6<H>   |  30  |  1.2    Ca    7.9<L>      25 Apr 2024 05:40  Mg     2.0     04-25    TPro  6.0  /  Alb  2.0<L>  /  TBili  <0.2  /  DBili  x   /  AST  42<H>  /  ALT  8   /  AlkPhos  167<H>  04-25                                             Urinalysis Basic - ( 25 Apr 2024 05:40 )    Color: x / Appearance: x / SG: x / pH: x  Gluc: 104 mg/dL / Ketone: x  / Bili: x / Urobili: x   Blood: x / Protein: x / Nitrite: x   Leuk Esterase: x / RBC: x / WBC x   Sq Epi: x / Non Sq Epi: x / Bacteria: x                                                  LIVER FUNCTIONS - ( 25 Apr 2024 05:40 )  Alb: 2.0 g/dL / Pro: 6.0 g/dL / ALK PHOS: 167 U/L / ALT: 8 U/L / AST: 42 U/L / GGT: x                                                                                               Mode: AC/ CMV (Assist Control/ Continuous Mandatory Ventilation)  RR (machine): 28  TV (machine): 300  FiO2: 40  PEEP: 5  MAP: 12  PIP: 26                                      ABG - ( 25 Apr 2024 05:22 )  pH, Arterial: 7.39  pH, Blood: x     /  pCO2: 52    /  pO2: 150   / HCO3: 32    / Base Excess: 5.1   /  SaO2: 99.5                MEDICATIONS  (STANDING):  artificial  tears Solution 1 Drop(s) Both EYES three times a day  chlorhexidine 0.12% Liquid 15 milliLiter(s) Oral Mucosa every 12 hours  chlorhexidine 2% Cloths 1 Application(s) Topical daily  chlorhexidine 2% Cloths 1 Application(s) Topical <User Schedule>  dextrose 50% Injectable 25 milliLiter(s) IV Push once  heparin   Injectable 5000 Unit(s) SubCutaneous every 8 hours  insulin regular  human recombinant 5 Unit(s) IV Push once  labetalol 100 milliGRAM(s) Oral three times a day  levothyroxine 100 MICROGram(s) Oral daily  lidocaine 1%/epinephrine 1:100,000 Inj 20 milliLiter(s) Local Injection once  lidocaine 2% Viscous 10 milliLiter(s) Oral once  methadone    Tablet 10 milliGRAM(s) Oral two times a day  pantoprazole  Injectable 40 milliGRAM(s) IV Push daily  petrolatum Ophthalmic Ointment 1 Application(s) Both EYES daily  propofol Infusion 10 MICROgram(s)/kG/Min (2.4 mL/Hr) IV Continuous <Continuous>  sodium zirconium cyclosilicate 10 Gram(s) Oral every 8 hours  sodium zirconium cyclosilicate 10 Gram(s) Oral once    MEDICATIONS  (PRN):  labetalol Injectable 20 milliGRAM(s) IV Push every 4 hours PRN Systolic blood pressure >180  scopolamine 1 mG/72 Hr(s) Patch 1 Patch Transdermal every 72 hours PRN for secretions      New X-rays reviewed:                                                                                  ECHO    CXR interpreted by me:       Patient is a 74y old  Female who presents with a chief complaint of cardiac arrest (24 Apr 2024 15:22)        Over Night Events: no acute events overnight         ICU Vital Signs Last 24 Hrs  T(C): 36.3 (25 Apr 2024 07:10), Max: 36.9 (24 Apr 2024 11:00)  T(F): 97.3 (25 Apr 2024 07:10), Max: 98.4 (24 Apr 2024 11:00)  HR: 57 (25 Apr 2024 07:00) (56 - 71)  BP: 141/71 (25 Apr 2024 07:00) (120/58 - 185/79)  BP(mean): 99 (25 Apr 2024 07:00) (83 - 114)  RR: 30 (25 Apr 2024 07:10) (24 - 30)  SpO2: 100% (25 Apr 2024 07:00) (99% - 100%)    O2 Parameters below as of 25 Apr 2024 07:00  Patient On (Oxygen Delivery Method): ventilator    O2 Concentration (%): 40          CONSTITUTIONAL:   Ill appearing.     ENT:   trached     EYES:   pupil sluggish bilateral        CARDIAC:   Normal rate,   Regular rhythm.      RESPIRATORY:   No wheezing  Bilateral BS  Normal chest expansion  Not tachypneic,  No use of accessory muscles    GASTROINTESTINAL:  Abdomen soft,   Non-tender,   No guarding,   + BS      MUSCULOSKELETAL:   Range of motion is limited    NEUROLOGICAL:   does not follow commands    SKIN:   Skin normal color for race,   Warm and dry  sacral DTI         04-24-24 @ 07:01  -  04-25-24 @ 07:00  --------------------------------------------------------  IN:    Osmolite 1.2: 720 mL  Total IN: 720 mL    OUT:    Indwelling Catheter - Urethral (mL): 1520 mL    Rectal Tube (mL): 0 mL  Total OUT: 1520 mL    Total NET: -800 mL      04-25-24 @ 07:01  -  04-25-24 @ 08:58  --------------------------------------------------------  IN:  Total IN: 0 mL    OUT:    Indwelling Catheter - Urethral (mL): 100 mL  Total OUT: 100 mL    Total NET: -100 mL          LABS:                            8.2    8.53  )-----------( 394      ( 25 Apr 2024 05:40 )             25.3                                               04-25    128<L>  |  93<L>  |  30<H>  ----------------------------<  104<H>  5.6<H>   |  30  |  1.2    Ca    7.9<L>      25 Apr 2024 05:40  Mg     2.0     04-25    TPro  6.0  /  Alb  2.0<L>  /  TBili  <0.2  /  DBili  x   /  AST  42<H>  /  ALT  8   /  AlkPhos  167<H>  04-25                                             Urinalysis Basic - ( 25 Apr 2024 05:40 )    Color: x / Appearance: x / SG: x / pH: x  Gluc: 104 mg/dL / Ketone: x  / Bili: x / Urobili: x   Blood: x / Protein: x / Nitrite: x   Leuk Esterase: x / RBC: x / WBC x   Sq Epi: x / Non Sq Epi: x / Bacteria: x                                                  LIVER FUNCTIONS - ( 25 Apr 2024 05:40 )  Alb: 2.0 g/dL / Pro: 6.0 g/dL / ALK PHOS: 167 U/L / ALT: 8 U/L / AST: 42 U/L / GGT: x                                                                                               Mode: AC/ CMV (Assist Control/ Continuous Mandatory Ventilation)  RR (machine): 28  TV (machine): 300  FiO2: 40  PEEP: 5  MAP: 12  PIP: 26                                      ABG - ( 25 Apr 2024 05:22 )  pH, Arterial: 7.39  pH, Blood: x     /  pCO2: 52    /  pO2: 150   / HCO3: 32    / Base Excess: 5.1   /  SaO2: 99.5                MEDICATIONS  (STANDING):  artificial  tears Solution 1 Drop(s) Both EYES three times a day  chlorhexidine 0.12% Liquid 15 milliLiter(s) Oral Mucosa every 12 hours  chlorhexidine 2% Cloths 1 Application(s) Topical daily  chlorhexidine 2% Cloths 1 Application(s) Topical <User Schedule>  dextrose 50% Injectable 25 milliLiter(s) IV Push once  heparin   Injectable 5000 Unit(s) SubCutaneous every 8 hours  insulin regular  human recombinant 5 Unit(s) IV Push once  labetalol 100 milliGRAM(s) Oral three times a day  levothyroxine 100 MICROGram(s) Oral daily  lidocaine 1%/epinephrine 1:100,000 Inj 20 milliLiter(s) Local Injection once  lidocaine 2% Viscous 10 milliLiter(s) Oral once  methadone    Tablet 10 milliGRAM(s) Oral two times a day  pantoprazole  Injectable 40 milliGRAM(s) IV Push daily  petrolatum Ophthalmic Ointment 1 Application(s) Both EYES daily  propofol Infusion 10 MICROgram(s)/kG/Min (2.4 mL/Hr) IV Continuous <Continuous>  sodium zirconium cyclosilicate 10 Gram(s) Oral every 8 hours  sodium zirconium cyclosilicate 10 Gram(s) Oral once    MEDICATIONS  (PRN):  labetalol Injectable 20 milliGRAM(s) IV Push every 4 hours PRN Systolic blood pressure >180  scopolamine 1 mG/72 Hr(s) Patch 1 Patch Transdermal every 72 hours PRN for secretions

## 2024-04-25 NOTE — CHART NOTE - NSCHARTNOTEFT_GEN_A_CORE
Registered Dietitian Follow-Up     Patient Profile Reviewed                           Yes []   No []     Nutrition History Previously Obtained        Yes []  No []       Pertinent Subjective Information:     Pertinent Medical Interventions:     Diet order:     Anthropometrics:  - Ht.   - Wt.   - %wt change  - BMI   - IBW      Pertinent Lab Data:     Pertinent Meds:     Physical Findings:  - Appearance:  - GI function:  - Tubes:  - Oral/Mouth cavity:  - Skin:      Nutrition Requirements  Weight Used:      Estimated Energy Needs    Continue []  Adjust []  Adjusted Energy Recommendations:   kcal/day        Estimated Protein Needs    Continue []  Adjust []  Adjusted Protein Recommendations:   gm/day        Estimated Fluid Needs        Continue []  Adjust []  Adjusted Fluid Recommendations:   mL/day     Nutrient Intake:        [] Previous Nutrition Diagnosis:            [] Ongoing          [] Resolved    [] No active nutrition diagnosis identified at this time     Nutrition Diagnostic #1  Problem:   Etiology:   Statement:      Nutrition Diagnostic #2  Problem:  Etiology:  Statement:     Nutrition Intervention:     Goal/Expected Outcome:     Indicator/Monitoring: Registered Dietitian Follow-Up     Patient Profile Reviewed                           Yes [X]   No []     Nutrition History Previously Obtained        Yes []  No []       Pertinent  Information:    pt is 74-year-old female with PMhx of pancreatic CA s/p Whipple, CKD stage 3, HTN, COPD, pulmonary fibrosis, PRESS syndrome (hospitalized in 2019), Raynaud's syndrome, ANCA vasculitis, Scleroderma, patient was hospitalized back in Dec 2023 in [place] for 18 days for multifocal pneumonia 2/2 human metapneumovirus.   presents to ED due to cardiac arrest. As per GI no concern for cholangitis clinically pt has normal bilirubin and has no jaundice to concern for cholangitis mild transaminitis is downtrending and could be due to CPR  , + anoxic brain injury. 4/18 s/p bronch with trach. unsuccessful attempt at bedside PEG Placement 4/19. s/p PEG placement today, EN to be resumed in 4 hours.      Diet, NPO with Tube Feed:   Tube Feeding Modality: Orogastric  Osmolite 1.2 Lei (OSMOLITERTH)  Total Volume for 24 Hours (mL): 1080  Continuous  Starting Tube Feed Rate {mL per Hour}: 10  Until Goal Tube Feed Rate (mL per Hour): 45  Tube Feed Duration (in Hours): 24  Tube Feed Start Time: 12:00  Free Water Flush  Free Water Flush Instructions:  150 Q8hr (04-25-24 @ 15:51) [Active]       Anthropometrics:  - Ht. 154.9 cm  - Wt. 46.8 kg today vs 37.9 kg upon admission   - %wt change      04-25    128<L>  |  92<L>  |  30<H>  ----------------------------<  118<H>  5.3<H>   |  29  |  1.2    Ca    8.0<L>      25 Apr 2024 10:40  Mg     2.0     04-25    TPro  6.0  /  Alb  2.0<L>  /  TBili  <0.2  /  DBili  x   /  AST  42<H>  /  ALT  8   /  AlkPhos  167<H>  04-25                            8.2    8.53  )-----------( 394      ( 25 Apr 2024 05:40 )             25.3       MEDICATIONS  (STANDING):  labetalol 100 milliGRAM(s) Oral three times a day  levothyroxine 100 MICROGram(s) Oral daily  methadone    Tablet 10 milliGRAM(s) Oral two times a day  pantoprazole  Injectable 40 milliGRAM(s) IV Push daily  petrolatum Ophthalmic Ointment 1 Application(s) Both EYES daily  senna 2 Tablet(s) Oral at bedtime  sodium zirconium cyclosilicate 10 Gram(s) Oral every 8 hours    MEDICATIONS  (PRN):  labetalol Injectable 20 milliGRAM(s) IV Push every 4 hours PRN Systolic blood pressure >180  polyethylene glycol 3350 17 Gram(s) Oral daily PRN Constipation  scopolamine 1 mG/72 Hr(s) Patch 1 Patch Transdermal every 72 hours PRN for secretions       Physical Findings:  - Appearance: trach to vent   - GI function: +BS, +BM   - Tubes: PEG  - Oral/Mouth cavity:  - Skin: as per wound care noted DTPI to coccyx (previously a stage I)     Nutrition Requirements  Weight Used: 37.9 kgs       Estimated Energy Needs:  0851-4170 kcals (35-40 kcal/kg/BW)  45-53g protein (1.2-1.4g/kg/BW)  ~1L 2/2 hyponatremia                Nutrient Intake: EN presently held        [] Previous Nutrition Diagnosis:            [] Ongoing          [] Resolved    [X] No active nutrition diagnosis identified at this time     Nutrition Intervention: once cleared by GI resume previous feeds of Osmolite 1.2 at 45 ml/hr, 2/2 hyponatremia decrease H20 flushes to 50 ml pre and post feed will provide pt with 1296 kcals, 59g protein and 1080+100 ml total volume meeting >80% of estimated needs       Goal/Expected Outcome: pt to tolerate EN and meet at least 80% of estimated needs within 3-5 days      Indicator/Monitoring: RD to monitor tolerance to EN, labs/meds, NFPF and f/u as needed within 3-5 days  high risk

## 2024-04-25 NOTE — BRIEF OPERATIVE NOTE - NSICDXBRIEFPOSTOP_GEN_ALL_CORE_FT
POST-OP DIAGNOSIS:  Acute respiratory failure with hypoxia 18-Apr-2024 12:14:15  Ac Canseco  
POST-OP DIAGNOSIS:  Acute respiratory failure with hypoxia 18-Apr-2024 12:14:15  cA Canseco  Severe malnutrition 25-Apr-2024 13:14:41  Ac Canseco

## 2024-04-25 NOTE — CHART NOTE - NSCHARTNOTEFT_GEN_A_CORE
HPI:  s/p PEG tube placement, POD#0    PAST MEDICAL & SURGICAL HISTORY:  H/O vasculitis    Pancreatic cancer    History of COPD    Hypertension    H/O Raynaud's syndrome    Stage 3 chronic kidney disease    History of knee replacement    H/O Whipple procedure        Allergies    celecoxib (Rash)  Originally Entered as [U UNKNOWN] reaction to [celebrit] (Unknown)    Intolerances        MEDICATIONS  (STANDING):  artificial  tears Solution 1 Drop(s) Both EYES three times a day  chlorhexidine 0.12% Liquid 15 milliLiter(s) Oral Mucosa every 12 hours  chlorhexidine 2% Cloths 1 Application(s) Topical <User Schedule>  chlorhexidine 2% Cloths 1 Application(s) Topical daily  heparin   Injectable 5000 Unit(s) SubCutaneous every 8 hours  labetalol 100 milliGRAM(s) Oral three times a day  levothyroxine 100 MICROGram(s) Oral daily  lidocaine 1%/epinephrine 1:100,000 Inj 20 milliLiter(s) Local Injection once  lidocaine 2% Viscous 10 milliLiter(s) Oral once  methadone    Tablet 10 milliGRAM(s) Oral two times a day  pantoprazole  Injectable 40 milliGRAM(s) IV Push daily  petrolatum Ophthalmic Ointment 1 Application(s) Both EYES daily  propofol Infusion 10 MICROgram(s)/kG/Min (2.4 mL/Hr) IV Continuous <Continuous>  senna 2 Tablet(s) Oral at bedtime  sodium zirconium cyclosilicate 10 Gram(s) Oral every 8 hours    MEDICATIONS  (PRN):  labetalol Injectable 20 milliGRAM(s) IV Push every 4 hours PRN Systolic blood pressure >180  polyethylene glycol 3350 17 Gram(s) Oral daily PRN Constipation  scopolamine 1 mG/72 Hr(s) Patch 1 Patch Transdermal every 72 hours PRN for secretions      General:	no fever, weight loss,  chills  Skin: no rash, ulcers  Ophthalmologic: no visual changes  ENMT:	no sore throat  Respiratory and Thorax: no cough, wheeze,  sob  Cardiovascular:	no chest pain, palpitations, dizziness  Gastrointestinal:	no nausea, vomiting, diarrhea, abd pain  Genitourinary:	no dysuria, hematuria  Musculoskeletal:	no joint pains  Neurological:	 no speech disturbance, focal weakness, numbness  Psychiatric:	no depression, anxiety, psychosis  Hematology/Lymphatics:	no anemia  Endocrine:	no polyuria, polydipsia        Vital Signs Last 24 Hrs  T(C): 35.7 (25 Apr 2024 15:00), Max: 36.6 (25 Apr 2024 11:00)  T(F): 96.3 (25 Apr 2024 15:00), Max: 97.9 (25 Apr 2024 11:00)  HR: 60 (25 Apr 2024 18:00) (56 - 80)  BP: 106/57 (25 Apr 2024 18:00) (106/57 - 185/79)  BP(mean): 76 (25 Apr 2024 18:00) (76 - 121)  RR: 28 (25 Apr 2024 18:00) (24 - 37)  SpO2: 100% (25 Apr 2024 18:00) (99% - 100%)    PHYSICAL EXAM:      Constitutional: A&Ox4  Respiratory: cta b/l  Cardiovascular: s1 s2 rrr  Gastrointestinal: soft nt  nd + bs no rebound or guarding  Genitourinary: no cva tenderness  Extremities: normal rom, no edema, calf tenderness  Neurological:no focal deficits  Skin: no rash                            8.2    8.53  )-----------( 394      ( 25 Apr 2024 05:40 )             25.3       04-25    128<L>  |  92<L>  |  30<H>  ----------------------------<  118<H>  5.3<H>   |  29  |  1.2    Ca    8.0<L>      25 Apr 2024 10:40  Mg     2.0     04-25    TPro  6.0  /  Alb  2.0<L>  /  TBili  <0.2  /  DBili  x   /  AST  42<H>  /  ALT  8   /  AlkPhos  167<H>  04-25      PT/INR - ( 25 Apr 2024 10:40 )   PT: 11.50 sec;   INR: 1.01 ratio         PTT - ( 25 Apr 2024 10:40 )  PTT:32.4 sec    Urinalysis Basic - ( 25 Apr 2024 10:40 )    Color: x / Appearance: x / SG: x / pH: x  Gluc: 118 mg/dL / Ketone: x  / Bili: x / Urobili: x   Blood: x / Protein: x / Nitrite: x   Leuk Esterase: x / RBC: x / WBC x   Sq Epi: x / Non Sq Epi: x / Bacteria: x        CT scan HPI:  s/p PEG tube placement, POD#0    PAST MEDICAL & SURGICAL HISTORY:  H/O vasculitis    Pancreatic cancer    History of COPD    Hypertension    H/O Raynaud's syndrome    Stage 3 chronic kidney disease    History of knee replacement    H/O Whipple procedure        Allergies    celecoxib (Rash)  Originally Entered as [U UNKNOWN] reaction to [celebrit] (Unknown)      MEDICATIONS  (STANDING):  artificial  tears Solution 1 Drop(s) Both EYES three times a day  chlorhexidine 0.12% Liquid 15 milliLiter(s) Oral Mucosa every 12 hours  chlorhexidine 2% Cloths 1 Application(s) Topical <User Schedule>  chlorhexidine 2% Cloths 1 Application(s) Topical daily  heparin   Injectable 5000 Unit(s) SubCutaneous every 8 hours  labetalol 100 milliGRAM(s) Oral three times a day  levothyroxine 100 MICROGram(s) Oral daily  lidocaine 1%/epinephrine 1:100,000 Inj 20 milliLiter(s) Local Injection once  lidocaine 2% Viscous 10 milliLiter(s) Oral once  methadone    Tablet 10 milliGRAM(s) Oral two times a day  pantoprazole  Injectable 40 milliGRAM(s) IV Push daily  petrolatum Ophthalmic Ointment 1 Application(s) Both EYES daily  propofol Infusion 10 MICROgram(s)/kG/Min (2.4 mL/Hr) IV Continuous <Continuous>  senna 2 Tablet(s) Oral at bedtime  sodium zirconium cyclosilicate 10 Gram(s) Oral every 8 hours    MEDICATIONS  (PRN):  labetalol Injectable 20 milliGRAM(s) IV Push every 4 hours PRN Systolic blood pressure >180  polyethylene glycol 3350 17 Gram(s) Oral daily PRN Constipation  scopolamine 1 mG/72 Hr(s) Patch 1 Patch Transdermal every 72 hours PRN for secretions      Vital Signs Last 24 Hrs  T(C): 35.7 (25 Apr 2024 15:00), Max: 36.6 (25 Apr 2024 11:00)  T(F): 96.3 (25 Apr 2024 15:00), Max: 97.9 (25 Apr 2024 11:00)  HR: 60 (25 Apr 2024 18:00) (56 - 80)  BP: 106/57 (25 Apr 2024 18:00) (106/57 - 185/79)  BP(mean): 76 (25 Apr 2024 18:00) (76 - 121)  RR: 28 (25 Apr 2024 18:00) (24 - 37)  SpO2: 100% (25 Apr 2024 18:00) (99% - 100%)    PHYSICAL EXAM:      Constitutional: NAD on vent  Respiratory: cta b/l  Cardiovascular: s1 s2 rrr  Gastrointestinal: soft nt  nd + bs no rebound or guarding  Genitourinary: no cva tenderness  Extremities: normal rom, no edema, calf tenderness  Neurological: does not follow commands                            8.2    8.53  )-----------( 394      ( 25 Apr 2024 05:40 )             25.3       04-25    128<L>  |  92<L>  |  30<H>  ----------------------------<  118<H>  5.3<H>   |  29  |  1.2    Ca    8.0<L>      25 Apr 2024 10:40  Mg     2.0     04-25    TPro  6.0  /  Alb  2.0<L>  /  TBili  <0.2  /  DBili  x   /  AST  42<H>  /  ALT  8   /  AlkPhos  167<H>  04-25      PT/INR - ( 25 Apr 2024 10:40 )   PT: 11.50 sec;   INR: 1.01 ratio         PTT - ( 25 Apr 2024 10:40 )  PTT:32.4 sec        1. S/P PEG  -may use peg for feeds and meds  -check residuals q8h and prn HPI:  s/p PEG tube placement, POD#0    PAST MEDICAL & SURGICAL HISTORY:  H/O vasculitis    Pancreatic cancer    History of COPD    Hypertension    H/O Raynaud's syndrome    Stage 3 chronic kidney disease    History of knee replacement    H/O Whipple procedure        Allergies    celecoxib (Rash)  Originally Entered as [U UNKNOWN] reaction to [celebrit] (Unknown)      MEDICATIONS  (STANDING):  artificial  tears Solution 1 Drop(s) Both EYES three times a day  chlorhexidine 0.12% Liquid 15 milliLiter(s) Oral Mucosa every 12 hours  chlorhexidine 2% Cloths 1 Application(s) Topical <User Schedule>  chlorhexidine 2% Cloths 1 Application(s) Topical daily  heparin   Injectable 5000 Unit(s) SubCutaneous every 8 hours  labetalol 100 milliGRAM(s) Oral three times a day  levothyroxine 100 MICROGram(s) Oral daily  lidocaine 1%/epinephrine 1:100,000 Inj 20 milliLiter(s) Local Injection once  lidocaine 2% Viscous 10 milliLiter(s) Oral once  methadone    Tablet 10 milliGRAM(s) Oral two times a day  pantoprazole  Injectable 40 milliGRAM(s) IV Push daily  petrolatum Ophthalmic Ointment 1 Application(s) Both EYES daily  propofol Infusion 10 MICROgram(s)/kG/Min (2.4 mL/Hr) IV Continuous <Continuous>  senna 2 Tablet(s) Oral at bedtime  sodium zirconium cyclosilicate 10 Gram(s) Oral every 8 hours    MEDICATIONS  (PRN):  labetalol Injectable 20 milliGRAM(s) IV Push every 4 hours PRN Systolic blood pressure >180  polyethylene glycol 3350 17 Gram(s) Oral daily PRN Constipation  scopolamine 1 mG/72 Hr(s) Patch 1 Patch Transdermal every 72 hours PRN for secretions      Vital Signs Last 24 Hrs  T(C): 35.7 (25 Apr 2024 15:00), Max: 36.6 (25 Apr 2024 11:00)  T(F): 96.3 (25 Apr 2024 15:00), Max: 97.9 (25 Apr 2024 11:00)  HR: 60 (25 Apr 2024 18:00) (56 - 80)  BP: 106/57 (25 Apr 2024 18:00) (106/57 - 185/79)  BP(mean): 76 (25 Apr 2024 18:00) (76 - 121)  RR: 28 (25 Apr 2024 18:00) (24 - 37)  SpO2: 100% (25 Apr 2024 18:00) (99% - 100%)    PHYSICAL EXAM:      Constitutional: NAD on vent  Respiratory: cta b/l  Cardiovascular: s1 s2 rrr  Gastrointestinal: soft nt  nd + bs no rebound or guarding, peg in place, feeds are running, area is c/d/i, no swelling, no bleeding  Genitourinary: no cva tenderness  Extremities: normal rom, no edema, calf tenderness  Neurological: does not follow commands                            8.2    8.53  )-----------( 394      ( 25 Apr 2024 05:40 )             25.3       04-25    128<L>  |  92<L>  |  30<H>  ----------------------------<  118<H>  5.3<H>   |  29  |  1.2    Ca    8.0<L>      25 Apr 2024 10:40  Mg     2.0     04-25    TPro  6.0  /  Alb  2.0<L>  /  TBili  <0.2  /  DBili  x   /  AST  42<H>  /  ALT  8   /  AlkPhos  167<H>  04-25      PT/INR - ( 25 Apr 2024 10:40 )   PT: 11.50 sec;   INR: 1.01 ratio         PTT - ( 25 Apr 2024 10:40 )  PTT:32.4 sec        1. S/P PEG  -may use peg for feeds and meds  -check residuals q8h and prn

## 2024-04-25 NOTE — PROGRESS NOTE ADULT - ASSESSMENT
IMPRESSION:    Anoxic brain damage  Acute Hypoxemic Respiratory Failure on MV SP trach  SP CPA downtime 30 minutes   Cardiac bradycardia   Lung Fibrosis   GIUSEPPE, resolved   Hyperkalemia   HO Raynaud's  HO PRESS    HO Anemia  HO Pancreatic cancer     PLAN:    CNS: off sedation   continue on methadone  monitor Qtc, 432 today    HEENT: oral and trach care     PULMONARY: keep pox >92%  monitor peak and plateau  no vent changes, pressure support as tolerated     CARDIOVASCULAR:  2D-Echo noted, continue labetalol and hydralazine to 25mg Q6H     RENAL: follow is and os, trend Cr, Nephrology follow up, continue lokelma monitor BMP Q6H     INFECTIOUS DISEASE: cultures negative to date, procalcitonin elevated, completed Zosyn course    GASTRO: GI ppx, tube feeds follow GSx planned for PEG     HEMATOLOGICAL:  DVT prophylaxis.    ENDOCRINE:  Follow up FS.  Insulin protocol if needed. check cortisol level    MUSCULOSKELETAL: bedrest    Full Code, Palliative care following    Grave prognosis     The patient has a high probability of further deterioration.    Discussed with daughter yesterday, daughter does not wish to be contacted unless emergencies or in need of consent for procedures.    /case mgmt for LTAC placement/disposition    MICU monitoring    IMPRESSION:    Anoxic brain damage  Acute Hypoxemic Respiratory Failure on MV SP trach  SP CPA downtime 30 minutes   Cardiac bradycardia   Lung Fibrosis   GIUSEPPE, resolved   Hyperkalemia   HO Raynaud's  HO PRESS    HO Anemia  HO Pancreatic cancer     PLAN:    CNS: off sedation   continue on methadone  monitor Qtc, 432 today    HEENT: oral and trach care     PULMONARY: keep pox >92%  monitor peak and plateau  no vent changes, pressure support as tolerated     CARDIOVASCULAR:  2D-Echo noted, continue antihypertensives     RENAL: follow is and os, trend Cr, Nephrology follow up, continue lokelma monitor BMP Q6H     INFECTIOUS DISEASE: cultures negative to date, procalcitonin elevated, completed Zosyn course    GASTRO: GI ppx, NPO GSx planned for PEG today    HEMATOLOGICAL:  DVT prophylaxis.    ENDOCRINE:  Follow up FS.  Insulin protocol if needed. f/u cortisol level    MUSCULOSKELETAL: bedrest    Full Code, Palliative care following    Grave prognosis     The patient has a high probability of further deterioration.    /case mgmt for LTAC placement/disposition    MICU monitoring

## 2024-04-25 NOTE — PROGRESS NOTE ADULT - ASSESSMENT
74F with PHMx of pancreatic CA s/p Whipple, CKD stage 3, HTN, COPD, pulmonary fibrosis, PRESS syndrome (hospitalized in 2019), Raynaud's syndrome, ANCA vasculitis, Scleroderma presents to ED due to cardiac arrest.    PLAN:  #S/p Cardiac Arrest with Anoxic Brain Injury  #Acute Hypoxic Respiratory Failure   - Planning PEG today , possible open gastrostomy    - NPO w IVF    - labs reviewed, potassium was high, given lokelma, d50, and insulin   - f/u repeat potassium level   - Trach sutures to be removed after 10 days (4/28)    74F with PHMx of pancreatic CA s/p Whipple, CKD stage 3, HTN, COPD, pulmonary fibrosis, PRESS syndrome (hospitalized in 2019), Raynaud's syndrome, ANCA vasculitis, Scleroderma presents to ED due to cardiac arrest.    PLAN:  #S/p Cardiac Arrest with Anoxic Brain Injury  #Acute Hypoxic Respiratory Failure   - S/p 20Fr PEG today, may begin trickle feeds immediately   - S/p bedside percutaneous tracheostomy (7.0 Portex) 4/18    - Trach sutures to be removed after 10 days (4/28)   - Remainder of care per ICU   - Surgery following     General Surgery  [id number]

## 2024-04-25 NOTE — PROGRESS NOTE ADULT - NSPROGADDITIONALINFOA_GEN_ALL_CORE
I saw and examined the patient.  Plan for PEG today.  She is withdrawing to pain and making some blinking maneuvers.  There does not appear to be purposeful movement at this time.  An extensive discussion was held yesterday with her daughter.  I discussed the risks and benefits of PEG placement and consent was signed on the chart for today's procedure.  Preop Ancef in OR.  Tube feedings were held last night.

## 2024-04-25 NOTE — PROGRESS NOTE ADULT - SUBJECTIVE AND OBJECTIVE BOX
Nephrology progress note    Patient is seen and examined, events over the last 24 h noted .    Allergies:  celecoxib (Rash)  Originally Entered as [U UNKNOWN] reaction to [celebrit] (Unknown)    Hospital Medications:   MEDICATIONS  (STANDING):  artificial  tears Solution 1 Drop(s) Both EYES three times a day  chlorhexidine 0.12% Liquid 15 milliLiter(s) Oral Mucosa every 12 hours  chlorhexidine 2% Cloths 1 Application(s) Topical <User Schedule>  chlorhexidine 2% Cloths 1 Application(s) Topical daily  heparin   Injectable 5000 Unit(s) SubCutaneous every 8 hours  labetalol 100 milliGRAM(s) Oral three times a day  levothyroxine 100 MICROGram(s) Oral daily  lidocaine 1%/epinephrine 1:100,000 Inj 20 milliLiter(s) Local Injection once  lidocaine 2% Viscous 10 milliLiter(s) Oral once  methadone    Tablet 10 milliGRAM(s) Oral two times a day  pantoprazole  Injectable 40 milliGRAM(s) IV Push daily  petrolatum Ophthalmic Ointment 1 Application(s) Both EYES daily  propofol Infusion 10 MICROgram(s)/kG/Min (2.4 mL/Hr) IV Continuous <Continuous>  senna 2 Tablet(s) Oral at bedtime  sodium zirconium cyclosilicate 10 Gram(s) Oral every 8 hours        VITALS:  T(F): 96.3 (04-25-24 @ 15:00), Max: 97.9 (04-25-24 @ 11:00)  HR: 66 (04-25-24 @ 16:00)  BP: 161/78 (04-25-24 @ 16:00)  RR: 89 (04-25-24 @ 16:00)  SpO2: 99% (04-25-24 @ 16:00)  Wt(kg): --    04-23 @ 07:01  -  04-24 @ 07:00  --------------------------------------------------------  IN: 2240 mL / OUT: 804 mL / NET: 1436 mL    04-24 @ 07:01  -  04-25 @ 07:00  --------------------------------------------------------  IN: 720 mL / OUT: 1520 mL / NET: -800 mL    04-25 @ 07:01  -  04-25 @ 17:32  --------------------------------------------------------  IN: 0 mL / OUT: 605 mL / NET: -605 mL      Height (cm): 154.9 (04-25 @ 10:10)  Weight (kg): 40 (04-25 @ 10:10)  BMI (kg/m2): 16.7 (04-25 @ 10:10)  BSA (m2): 1.33 (04-25 @ 10:10)    PHYSICAL EXAM:  Constitutional: NAD, on vent  Neck: No JVD  Respiratory: CTA  Cardiovascular: S1, S2, RRR  Gastrointestinal: BS+, soft, NT/ND  Extremities: No cyanosis or clubbing. No peripheral edema  Neurological: unresponsive  : has hill.   Skin: No rashes  Vascular Access:    LABS:  04-25    128<L>  |  92<L>  |  30<H>  ----------------------------<  118<H>  5.3<H>   |  29  |  1.2    Ca    8.0<L>      25 Apr 2024 10:40  Mg     2.0     04-25    TPro  6.0  /  Alb  2.0<L>  /  TBili  <0.2  /  DBili      /  AST  42<H>  /  ALT  8   /  AlkPhos  167<H>  04-25                          8.2    8.53  )-----------( 394      ( 25 Apr 2024 05:40 )             25.3       Urine Studies:  Urinalysis Basic - ( 25 Apr 2024 10:40 )    Color:  / Appearance:  / SG:  / pH:   Gluc: 118 mg/dL / Ketone:   / Bili:  / Urobili:    Blood:  / Protein:  / Nitrite:    Leuk Esterase:  / RBC:  / WBC    Sq Epi:  / Non Sq Epi:  / Bacteria:         RADIOLOGY & ADDITIONAL STUDIES:

## 2024-04-25 NOTE — CHART NOTE - NSCHARTNOTEFT_GEN_A_CORE
PACU ANESTHESIA ADMISSION NOTE      Procedure: egd, peg    ____  Intubated  TV:______       Rate: ______      FiO2: ______    ___x_  Patent Airway      __x__  Full recovery from anesthesia / back to baseline     Vitals:   T:     98.5      R:    25              BP:   117/66               Sat:        100           P: 105      Mental Status:  ___ Awake   _____ Alert   _____ Drowsy   ___x__ Sedated    Nausea/Vomiting:  __x__ NO  ______Yes,   See Post - Op Orders          Pain Scale (0-10):  _0____    Treatment: ____ None    ____ See Post - Op/PCA Orders    Post - Operative Fluids:   ____ Oral   ___x_ See Post - Op Orders    Plan: Discharge:   ___Home       _____Floor     __x___Critical Care    _____  Other:_________________    Comments:

## 2024-04-25 NOTE — PROGRESS NOTE ADULT - ASSESSMENT
74-year-old female with PHMx of pancreatic CA s/p Whipple, CKD stage 3, HTN, COPD, pulmonary fibrosis, PRESS syndrome (hospitalized in 2019), Raynaud's syndrome, ANCA vasculitis, Scleroderma, hypothyroid  presents to ED due to cardiac arrest. Pt lives at home with son and daughter, went to use the bathroom and became unresponsive.  EMS intubated her and gave her 4 rounds of epinephrine.  ROSC was achieved within a minute of her arrival to the ED.  Patient was found to be hypothermic and bradycardic    acute respiratory failure / cardiac arrest / anoxic brain injury / HTN / aspiration pneumonia / hyper K     - s/p trach 4/18/24, Trach sutures to be removed after 10 days (4/28)   - GI unable to place PEG tube, Surgery will place PEG this week    - anoxic brain injury as seen on CT brain and EEG  - lokelma, repeat K level    - DC planning to NH after PEG placement    - completed antibiotic course    - Labetalol, hold lisinopril for Hyper K   - will need TOV    - DVT and GI prophylaxis   74-year-old female with PHMx of pancreatic CA s/p Whipple, CKD stage 3, HTN, COPD, pulmonary fibrosis, PRESS syndrome (hospitalized in 2019), Raynaud's syndrome, ANCA vasculitis, Scleroderma, hypothyroid  presents to ED due to cardiac arrest. Pt lives at home with son and daughter, went to use the bathroom and became unresponsive.  EMS intubated her and gave her 4 rounds of epinephrine.  ROSC was achieved within a minute of her arrival to the ED.  Patient was found to be hypothermic and bradycardic    acute respiratory failure / cardiac arrest / anoxic brain injury / HTN / aspiration pneumonia / hyper K     - s/p trach 4/18/24, Trach sutures to be removed after 10 days (4/28)   - GI unable to place PEG tube, Surgery will place PEG on 4/25   - anoxic brain injury as seen on CT brain and EEG  - lokelma, repeat K level    - DC planning to NH after PEG placement    - completed antibiotic course    - Labetalol, hold lisinopril for Hyper K   - will need TOV    - DVT and GI prophylaxis

## 2024-04-25 NOTE — PRE-ANESTHESIA EVALUATION ADULT - MALLAMPATI CLASS
unable to comply with airway exam/Class I (easy) - visualization of the soft palate, fauces, uvula, and both anterior and posterior pillars

## 2024-04-25 NOTE — BRIEF OPERATIVE NOTE - NSICDXBRIEFPROCEDURE_GEN_ALL_CORE_FT
PROCEDURES:  Bronchoscopy with percutaneous tracheostomy 18-Apr-2024 12:13:53  Ac Canseco  
PROCEDURES:  Bronchoscopy with percutaneous tracheostomy 18-Apr-2024 12:13:53  Ac Canseco  EGD, with PEG 25-Apr-2024 13:14:21  Ac Canseco

## 2024-04-25 NOTE — PROGRESS NOTE ADULT - ASSESSMENT
74-year-old female with PHMx of pancreatic CA s/p Whipple, CKD stage 3, HTN, COPD, pulmonary fibrosis, PRESS syndrome (hospitalized in 2019), Raynaud's syndrome, ANCA vasculitis, Scleroderma  presents to ED due to cardiac arrest.  now intubated on MV     renal called for hyponatremia hyperkalemia mild GIUSEPPE    # Hyponatremia/ hyperkalemia  # resp failure on MV  # sp code blue     - need to rule out adrenal insufficiency ( sepsis / severe illness/ UFH)  - serum sodium better / K acceptable - free water d/varsha, send Urine NA and OSm  - check cortisol AM levels - not available yet  - follow BMP   - lokelma 10 g po q8h if k> 5.5    prognosis guarded  will follow

## 2024-04-25 NOTE — PROGRESS NOTE ADULT - SUBJECTIVE AND OBJECTIVE BOX
Patient is a 74y old  Female who presents with a chief complaint of cardiac arrest (25 Apr 2024 08:58)      OVERNIGHT EVENTS: no major events reported     SUBJECTIVE / INTERVAL HPI: Patient seen and examined at bedside.     VITAL SIGNS:  Vital Signs Last 24 Hrs  T(C): 36.3 (25 Apr 2024 07:10), Max: 36.9 (24 Apr 2024 11:00)  T(F): 97.3 (25 Apr 2024 07:10), Max: 98.4 (24 Apr 2024 11:00)  HR: 57 (25 Apr 2024 07:00) (56 - 71)  BP: 141/71 (25 Apr 2024 07:00) (120/58 - 185/79)  BP(mean): 99 (25 Apr 2024 07:00) (83 - 114)  RR: 30 (25 Apr 2024 07:10) (24 - 30)  SpO2: 100% (25 Apr 2024 07:00) (99% - 100%)    Parameters below as of 25 Apr 2024 07:00  Patient On (Oxygen Delivery Method): ventilator    O2 Concentration (%): 40    PHYSICAL EXAM:    General: old thin male chronically looks ill   HEENT: NC/AT; PERRL, clear conjunctiva  Neck: supple, + trach   Cardiovascular: +S1/S2; RRR  Respiratory: CTA b/l; no W/R/R  Gastrointestinal: soft, NT/ND; +BSx4  +ve hill   Extremities: WWP; 2+ peripheral pulses; no edema   Neurological: lethargic, not following commands     MEDICATIONS:  MEDICATIONS  (STANDING):  artificial  tears Solution 1 Drop(s) Both EYES three times a day  chlorhexidine 0.12% Liquid 15 milliLiter(s) Oral Mucosa every 12 hours  chlorhexidine 2% Cloths 1 Application(s) Topical <User Schedule>  chlorhexidine 2% Cloths 1 Application(s) Topical daily  dextrose 50% Injectable 25 milliLiter(s) IV Push once  heparin   Injectable 5000 Unit(s) SubCutaneous every 8 hours  insulin regular  human recombinant 5 Unit(s) IV Push once  labetalol 100 milliGRAM(s) Oral three times a day  levothyroxine 100 MICROGram(s) Oral daily  lidocaine 1%/epinephrine 1:100,000 Inj 20 milliLiter(s) Local Injection once  lidocaine 2% Viscous 10 milliLiter(s) Oral once  methadone    Tablet 10 milliGRAM(s) Oral two times a day  pantoprazole  Injectable 40 milliGRAM(s) IV Push daily  petrolatum Ophthalmic Ointment 1 Application(s) Both EYES daily  propofol Infusion 10 MICROgram(s)/kG/Min (2.4 mL/Hr) IV Continuous <Continuous>  sodium zirconium cyclosilicate 10 Gram(s) Oral every 8 hours  sodium zirconium cyclosilicate 10 Gram(s) Oral once    MEDICATIONS  (PRN):  labetalol Injectable 20 milliGRAM(s) IV Push every 4 hours PRN Systolic blood pressure >180  scopolamine 1 mG/72 Hr(s) Patch 1 Patch Transdermal every 72 hours PRN for secretions      ALLERGIES:  Allergies    celecoxib (Rash)  Originally Entered as [U UNKNOWN] reaction to [celebrit] (Unknown)    Intolerances        LABS:                        8.2    8.53  )-----------( 394      ( 25 Apr 2024 05:40 )             25.3     04-25    128<L>  |  93<L>  |  30<H>  ----------------------------<  104<H>  5.6<H>   |  30  |  1.2    Ca    7.9<L>      25 Apr 2024 05:40  Mg     2.0     04-25    TPro  6.0  /  Alb  2.0<L>  /  TBili  <0.2  /  DBili  x   /  AST  42<H>  /  ALT  8   /  AlkPhos  167<H>  04-25      Urinalysis Basic - ( 25 Apr 2024 05:40 )    Color: x / Appearance: x / SG: x / pH: x  Gluc: 104 mg/dL / Ketone: x  / Bili: x / Urobili: x   Blood: x / Protein: x / Nitrite: x   Leuk Esterase: x / RBC: x / WBC x   Sq Epi: x / Non Sq Epi: x / Bacteria: x      CAPILLARY BLOOD GLUCOSE      POCT Blood Glucose.: 88 mg/dL (25 Apr 2024 08:59)      RADIOLOGY & ADDITIONAL TESTS: Reviewed.    ASSESSMENT:    PLAN:

## 2024-04-25 NOTE — BRIEF OPERATIVE NOTE - NSICDXBRIEFPREOP_GEN_ALL_CORE_FT
PRE-OP DIAGNOSIS:  Acute respiratory failure with hypoxia 18-Apr-2024 12:14:04  Ac Canseco  
PRE-OP DIAGNOSIS:  Acute respiratory failure with hypoxia 18-Apr-2024 12:14:04  Ac Canseco  Severe malnutrition 25-Apr-2024 13:14:33  Ac Canseco

## 2024-04-25 NOTE — BRIEF OPERATIVE NOTE - OPERATION/FINDINGS
Percutaneous tracheostomy. Needle introduced between 2nd/3rd rings of trachea under direct vision. Dilated, and 7.0 portex in place. 
EGD with PEG placement. 20Fr PEG placed 2cm below left costal margin. 2cm at the skin, 2.5cm at the bumper.

## 2024-04-25 NOTE — PROGRESS NOTE ADULT - SUBJECTIVE AND OBJECTIVE BOX
HPI:  For peg today.      PAST MEDICAL & SURGICAL HISTORY:  H/O vasculitis    Pancreatic cancer    History of COPD    Hypertension    H/O Raynaud's syndrome    Stage 3 chronic kidney disease    History of knee replacement    H/O Whipple procedure        Allergies    celecoxib (Rash)  Originally Entered as [U UNKNOWN] reaction to [celebrit] (Unknown)          MEDICATIONS  (STANDING):  artificial  tears Solution 1 Drop(s) Both EYES three times a day  chlorhexidine 0.12% Liquid 15 milliLiter(s) Oral Mucosa every 12 hours  chlorhexidine 2% Cloths 1 Application(s) Topical <User Schedule>  chlorhexidine 2% Cloths 1 Application(s) Topical daily  heparin   Injectable 5000 Unit(s) SubCutaneous every 8 hours  labetalol 100 milliGRAM(s) Oral three times a day  levothyroxine 100 MICROGram(s) Oral daily  lidocaine 1%/epinephrine 1:100,000 Inj 20 milliLiter(s) Local Injection once  lidocaine 2% Viscous 10 milliLiter(s) Oral once  methadone    Tablet 10 milliGRAM(s) Oral two times a day  pantoprazole  Injectable 40 milliGRAM(s) IV Push daily  petrolatum Ophthalmic Ointment 1 Application(s) Both EYES daily  propofol Infusion 10 MICROgram(s)/kG/Min (2.4 mL/Hr) IV Continuous <Continuous>  sodium zirconium cyclosilicate 10 Gram(s) Oral every 8 hours  sodium zirconium cyclosilicate 10 Gram(s) Oral once    MEDICATIONS  (PRN):  labetalol Injectable 20 milliGRAM(s) IV Push every 4 hours PRN Systolic blood pressure >180  scopolamine 1 mG/72 Hr(s) Patch 1 Patch Transdermal every 72 hours PRN for secretions      ROS:  Cannot obtain from pt      Vital Signs Last 24 Hrs  T(F): 97.3 (25 Apr 2024 07:10), Max: 98.4 (24 Apr 2024 11:00)  HR: 57 (25 Apr 2024 07:00) (56 - 71)  BP: 141/71 (25 Apr 2024 07:00) (120/58 - 185/79)  BP(mean): 99 (25 Apr 2024 07:00) (83 - 114)  RR: 30 (25 Apr 2024 07:10) (24 - 30)  SpO2: 100% (25 Apr 2024 07:00) (99% - 100%)    PHYSICAL EXAM:      Constitutional: sedated, on vent  Respiratory: cta b/l  Cardiovascular: s1 s2 rrr  Gastrointestinal: soft nt  nd + bs no rebound or guarding  Genitourinary: no cva tenderness  Extremities: normal rom, no edema, calf tenderness  Neurological: does not follow commands  Skin: no rash                            8.2    8.53  )-----------( 394      ( 25 Apr 2024 05:40 )             25.3       04-25    128<L>  |  93<L>  |  30<H>  ----------------------------<  104<H>  5.6<H>   |  30  |  1.2    Ca    7.9<L>      25 Apr 2024 05:40  Mg     2.0     04-25    TPro  6.0  /  Alb  2.0<L>  /  TBili  <0.2  /  DBili  x   /  AST  42<H>  /  ALT  8   /  AlkPhos  167<H>  04-25      PT/INR - ( 25 Apr 2024 09:00 )   PT: 10.80 sec;   INR: 0.95 ratio             Urinalysis Basic - ( 25 Apr 2024 05:40 )    Color: x / Appearance: x / SG: x / pH: x  Gluc: 104 mg/dL / Ketone: x  / Bili: x / Urobili: x   Blood: x / Protein: x / Nitrite: x   Leuk Esterase: x / RBC: x / WBC x   Sq Epi: x / Non Sq Epi: x / Bacteria: x

## 2024-04-26 LAB
ALBUMIN SERPL ELPH-MCNC: 2 G/DL — LOW (ref 3.5–5.2)
ALP SERPL-CCNC: 160 U/L — HIGH (ref 30–115)
ALT FLD-CCNC: 7 U/L — SIGNIFICANT CHANGE UP (ref 0–41)
ANION GAP SERPL CALC-SCNC: 7 MMOL/L — SIGNIFICANT CHANGE UP (ref 7–14)
AST SERPL-CCNC: 43 U/L — HIGH (ref 0–41)
BASOPHILS # BLD AUTO: 0.05 K/UL — SIGNIFICANT CHANGE UP (ref 0–0.2)
BASOPHILS NFR BLD AUTO: 0.7 % — SIGNIFICANT CHANGE UP (ref 0–1)
BILIRUB SERPL-MCNC: 0.2 MG/DL — SIGNIFICANT CHANGE UP (ref 0.2–1.2)
BUN SERPL-MCNC: 33 MG/DL — HIGH (ref 10–20)
CALCIUM SERPL-MCNC: 8 MG/DL — LOW (ref 8.4–10.5)
CHLORIDE SERPL-SCNC: 94 MMOL/L — LOW (ref 98–110)
CO2 SERPL-SCNC: 29 MMOL/L — SIGNIFICANT CHANGE UP (ref 17–32)
CREAT SERPL-MCNC: 1.3 MG/DL — SIGNIFICANT CHANGE UP (ref 0.7–1.5)
EGFR: 43 ML/MIN/1.73M2 — LOW
EOSINOPHIL # BLD AUTO: 0.22 K/UL — SIGNIFICANT CHANGE UP (ref 0–0.7)
EOSINOPHIL NFR BLD AUTO: 3.2 % — SIGNIFICANT CHANGE UP (ref 0–8)
GLUCOSE SERPL-MCNC: 111 MG/DL — HIGH (ref 70–99)
HCT VFR BLD CALC: 22.3 % — LOW (ref 37–47)
HGB BLD-MCNC: 7.3 G/DL — LOW (ref 12–16)
IMM GRANULOCYTES NFR BLD AUTO: 0.6 % — HIGH (ref 0.1–0.3)
LYMPHOCYTES # BLD AUTO: 1.11 K/UL — LOW (ref 1.2–3.4)
LYMPHOCYTES # BLD AUTO: 16.3 % — LOW (ref 20.5–51.1)
MAGNESIUM SERPL-MCNC: 1.8 MG/DL — SIGNIFICANT CHANGE UP (ref 1.8–2.4)
MCHC RBC-ENTMCNC: 25.9 PG — LOW (ref 27–31)
MCHC RBC-ENTMCNC: 32.7 G/DL — SIGNIFICANT CHANGE UP (ref 32–37)
MCV RBC AUTO: 79.1 FL — LOW (ref 81–99)
MONOCYTES # BLD AUTO: 0.84 K/UL — HIGH (ref 0.1–0.6)
MONOCYTES NFR BLD AUTO: 12.4 % — HIGH (ref 1.7–9.3)
NEUTROPHILS # BLD AUTO: 4.54 K/UL — SIGNIFICANT CHANGE UP (ref 1.4–6.5)
NEUTROPHILS NFR BLD AUTO: 66.8 % — SIGNIFICANT CHANGE UP (ref 42.2–75.2)
NRBC # BLD: 0 /100 WBCS — SIGNIFICANT CHANGE UP (ref 0–0)
PLATELET # BLD AUTO: 417 K/UL — HIGH (ref 130–400)
PMV BLD: 10.3 FL — SIGNIFICANT CHANGE UP (ref 7.4–10.4)
POTASSIUM SERPL-MCNC: 5.1 MMOL/L — HIGH (ref 3.5–5)
POTASSIUM SERPL-SCNC: 5.1 MMOL/L — HIGH (ref 3.5–5)
PROT SERPL-MCNC: 5.9 G/DL — LOW (ref 6–8)
RBC # BLD: 2.82 M/UL — LOW (ref 4.2–5.4)
RBC # FLD: 16.6 % — HIGH (ref 11.5–14.5)
SODIUM SERPL-SCNC: 130 MMOL/L — LOW (ref 135–146)
WBC # BLD: 6.8 K/UL — SIGNIFICANT CHANGE UP (ref 4.8–10.8)
WBC # FLD AUTO: 6.8 K/UL — SIGNIFICANT CHANGE UP (ref 4.8–10.8)

## 2024-04-26 PROCEDURE — 99233 SBSQ HOSP IP/OBS HIGH 50: CPT

## 2024-04-26 RX ADMIN — Medication 1 DROP(S): at 05:24

## 2024-04-26 RX ADMIN — SENNA PLUS 2 TABLET(S): 8.6 TABLET ORAL at 22:31

## 2024-04-26 RX ADMIN — SCOPALAMINE 1 PATCH: 1 PATCH, EXTENDED RELEASE TRANSDERMAL at 16:00

## 2024-04-26 RX ADMIN — Medication 1 DROP(S): at 13:52

## 2024-04-26 RX ADMIN — SODIUM ZIRCONIUM CYCLOSILICATE 10 GRAM(S): 10 POWDER, FOR SUSPENSION ORAL at 05:22

## 2024-04-26 RX ADMIN — PANTOPRAZOLE SODIUM 40 MILLIGRAM(S): 20 TABLET, DELAYED RELEASE ORAL at 11:26

## 2024-04-26 RX ADMIN — Medication 100 MILLIGRAM(S): at 05:22

## 2024-04-26 RX ADMIN — SCOPALAMINE 1 PATCH: 1 PATCH, EXTENDED RELEASE TRANSDERMAL at 22:32

## 2024-04-26 RX ADMIN — Medication 100 MILLIGRAM(S): at 22:31

## 2024-04-26 RX ADMIN — METHADONE HYDROCHLORIDE 10 MILLIGRAM(S): 40 TABLET ORAL at 05:23

## 2024-04-26 RX ADMIN — HEPARIN SODIUM 5000 UNIT(S): 5000 INJECTION INTRAVENOUS; SUBCUTANEOUS at 05:22

## 2024-04-26 RX ADMIN — Medication 100 MILLIGRAM(S): at 13:51

## 2024-04-26 RX ADMIN — METHADONE HYDROCHLORIDE 10 MILLIGRAM(S): 40 TABLET ORAL at 18:02

## 2024-04-26 RX ADMIN — CHLORHEXIDINE GLUCONATE 1 APPLICATION(S): 213 SOLUTION TOPICAL at 05:22

## 2024-04-26 RX ADMIN — CHLORHEXIDINE GLUCONATE 15 MILLILITER(S): 213 SOLUTION TOPICAL at 05:22

## 2024-04-26 RX ADMIN — Medication 1 APPLICATION(S): at 11:27

## 2024-04-26 RX ADMIN — SCOPALAMINE 1 PATCH: 1 PATCH, EXTENDED RELEASE TRANSDERMAL at 06:13

## 2024-04-26 RX ADMIN — HEPARIN SODIUM 5000 UNIT(S): 5000 INJECTION INTRAVENOUS; SUBCUTANEOUS at 13:51

## 2024-04-26 RX ADMIN — CHLORHEXIDINE GLUCONATE 15 MILLILITER(S): 213 SOLUTION TOPICAL at 18:02

## 2024-04-26 RX ADMIN — HEPARIN SODIUM 5000 UNIT(S): 5000 INJECTION INTRAVENOUS; SUBCUTANEOUS at 22:31

## 2024-04-26 RX ADMIN — Medication 100 MICROGRAM(S): at 05:22

## 2024-04-26 RX ADMIN — Medication 1 DROP(S): at 22:31

## 2024-04-26 NOTE — PROGRESS NOTE ADULT - ATTENDING COMMENTS
Attending Statement: I have personally performed a face to face diagnostic evaluation on this patient. The patient is suffering from:  Acute Hypoxemic Respiratory Failure  SP CPA downtime 30 minutes   Cardiac bradycardia   Lung fibrosis   GIUSEPPE  ? Cholangitis   I have made amendments to the documentation where necessary. I have personally seen and examined this patient.  I have fully participated in the care of this patient.  I have reviewed all pertinent clinical information, including history, physical exam, plan and note.
Attending Statement: I have personally performed a face to face diagnostic evaluation on this patient. The patient is suffering from:  Acute Hypoxemic Respiratory Failure on MV  SP CPA downtime 30 minutes   Cardiac bradycardia   Lung Fibrosis   GIUSEPPE  I have made amendments to the documentation where necessary. I have personally seen and examined this patient.  I have fully participated in the care of this patient.  I have reviewed all pertinent clinical information, including history, physical exam, plan and note.
Attending Statement: I have personally performed a face to face diagnostic evaluation on this patient. The patient is suffering from:  Anoxic brain damage  Acute Hypoxemic Respiratory Failure on MV  SP CPA downtime 30 minutes   Cardiac bradycardia   Lung Fibrosis   GIUSEPPE  I have made amendments to the documentation where necessary. I have personally seen and examined this patient.  I have fully participated in the care of this patient.  I have reviewed all pertinent clinical information, including history, physical exam, plan and note.
Attending Statement: I have personally performed a face to face diagnostic evaluation on this patient. The patient is suffering from:  Anoxic brain damage  Acute Hypoxemic Respiratory Failure on MV SP trach  SP CPA downtime 30 minutes   Cardiac bradycardia   Lung Fibrosis   GIUSEPPE  I have made amendments to the documentation where necessary. I have personally seen and examined this patient.  I have fully participated in the care of this patient.  I have reviewed all pertinent clinical information, including history, physical exam, plan and note.
May be candidate for PEG if transilluminates. Will consider for Wed or Thurs.  Trachostomy via percutaneous technique without complication. CXR no pneumothorax.
above noted discussed case with surgical resident trach site ok no bleeding
anoxic brain injury s/p trach. Unable to place PEG, pending GOC and scheduling of G tube placement if family agrees.
pt seen and examined in icu  discussed with critical care team and palliative care team    #cardiac arrest sp rosc  #acute resp failure - vent support per pulm/crit  #anoxic brain injury - pt is not brain dead, EEG with concern for ECS - pt does move spontanteously and has reflexes  - likley no meaningful recovery  - will need family meeting next week  - family does not want to feel rushed to make decision too soon  - want her to remain full code for now   - will need ongoing GOC discussion     #hx of panc ca sp whipple  #COPD  #CKDIII    grave overall prognosis   time spent 50 mins
Attending Statement: I have personally performed a face to face diagnostic evaluation on this patient. The patient is suffering from:  Anoxic brain damage  Acute Hypoxemic Respiratory Failure on MV  SP CPA downtime 30 minutes   Cardiac bradycardia   Lung Fibrosis   GIUSEPPE,   I have made amendments to the documentation where necessary. I have personally seen and examined this patient.  I have fully participated in the care of this patient.  I have reviewed all pertinent clinical information, including history, physical exam, plan and note.
patient seen and examined agree above   lokelma as above   follow BMP , K   off sedation   neurology follow up   pending family decision for trach VS terminal wean
Attending Statement: I have personally performed a face to face diagnostic evaluation on this patient. The patient is suffering from:  Anoxic brain damage  Acute Hypoxemic Respiratory Failure on MV SP trach and PEG  SP CPA downtime 30 minutes   Cardiac bradycardia   Lung Fibrosis   GIUSEPPE, resolved   I have made amendments to the documentation where necessary. I have personally seen and examined this patient.  I have fully participated in the care of this patient.  I have reviewed all pertinent clinical information, including history, physical exam, plan and note.
I had an extensive discussion with the patient's daughter who prefers at this time to be aggressive with rehab potential.  To this and we will plan for percutaneous video assisted tracheostomy tomorrow in conjunction with the ICU team at bedside.  I feel that the patient's anatomy certainly appropriate for this low risk procedure.  I also evaluated the CT scan with radiology and note that there is a window for possible PEG tube placement however we will evaluate for that likely next week if the patient is stable for transfer to a rehab type facility.  Risks and benefits were described at length for tracheostomy including but not limited to the risk of immediate death cardiac instability respiratory insufficiency inadequate placement or damage to organs in the vicinity.  Consent is documented on the chart.  Thank you
patient seen and examined agree above   lokelma as above   follow BMP , K   off sedation   neurology follow up   pending family decision for trach VS terminal wean
pt seen and examined in icu this am   discussed with family bedside extensively - all qs answerwed at time    #cardiac arrest - acute hypxic resp failure - maintian vent setting per [facility]  #anoxic brain injury  - not brain dead, withdrawing to pain - restarted on fentanyl drip  sp VEEG - concern for electrocerebral silence  very grave prognosis - explained to family will most liekly not recover to have any meaningful quality of life  repeat CT HEAD today:  < from: CT Head No Cont (04.11.24 @ 11:35) >    IMPRESSION:    Findings compatible with diffuse anoxic injury of the brain.    < end of copied text >    will discuss with palliative about possible family meeting
Attending Statement: I have personally performed a face to face diagnostic evaluation on this patient. The patient is suffering from:  Anoxic brain damage  Acute Hypoxemic Respiratory Failure on MV SP trach  SP CPA downtime 30 minutes   Cardiac bradycardia   Lung Fibrosis   GIUSEPPE, resolved   Hyperkalemia   I have made amendments to the documentation where necessary. I have personally seen and examined this patient.  I have fully participated in the care of this patient.  I have reviewed all pertinent clinical information, including history, physical exam, plan and note.
Attending Statement: I have personally performed a face to face diagnostic evaluation on this patient. The patient is suffering from:  Anoxic brain damage  Acute Hypoxemic Respiratory Failure on MV SP trach  SP CPA downtime 30 minutes   Cardiac bradycardia   Lung Fibrosis   GIUSEPPE, resolved   Hyperkalemia   I have made amendments to the documentation where necessary. I have personally seen and examined this patient.  I have fully participated in the care of this patient.  I have reviewed all pertinent clinical information, including history, physical exam, plan and note.
The patient is still minimally responsive.  She has been off sedation.  Prognosis remains poor.  I discussed this with the family and they continue to maintain that all treatments should be available.  We discussed the risks and benefits and the process and procedure of percutaneous endoscopic gastrostomy placement.  I was encouraged by speaking with the GI doctors about how they attempted PEG placement but still did not feel comfortable.  It sounds like there was adequate transillumination and location however they did not feel comfortable completing the procedure.  I discussed this with the family and offered them a repeat PEG in the OR under more optimal conditions.  All the daughter's questions were answered and she signed informed consent.  If we are unable to perform PEG placement then open gastrostomy may be a reasonable choice for a limited placement of the feeding tube.  Consent is signed on the chart.
Attending Statement: I have personally performed a face to face diagnostic evaluation on this patient. The patient is suffering from:  Anoxic brain damage  Acute Hypoxemic Respiratory Failure on MV  SP CPA downtime 30 minutes   Cardiac bradycardia   Lung Fibrosis   GIUSEPPE, Cr downtrending   I have made amendments to the documentation where necessary. I have personally seen and examined this patient.  I have fully participated in the care of this patient.  I have reviewed all pertinent clinical information, including history, physical exam, plan and note.
Attending Statement: I have personally performed a face to face diagnostic evaluation on this patient. The patient is suffering from:  Anoxic brain damage  Acute Hypoxemic Respiratory Failure on MV SP trach  SP CPA downtime 30 minutes   Cardiac bradycardia   Lung Fibrosis   GIUSEPPE, resolved   Hyperkalemia   I have made amendments to the documentation where necessary. I have personally seen and examined this patient.  I have fully participated in the care of this patient.  I have reviewed all pertinent clinical information, including history, physical exam, plan and note.

## 2024-04-26 NOTE — PROGRESS NOTE ADULT - ASSESSMENT
74-year-old female with PHMx of pancreatic CA s/p Whipple, CKD stage 3, HTN, COPD, pulmonary fibrosis, PRESS syndrome (hospitalized in 2019), Raynaud's syndrome, ANCA vasculitis, Scleroderma  presents to ED due to cardiac arrest.  now intubated on MV     renal called for hyponatremia hyperkalemia mild GIUSEPPE    # Hyponatremia/ hyperkalemia  # resp failure on MV  # sp code blue     -  adrenal insufficiency was ruled out, cortisol wnl  - may give lasix 20 mg IV x1 - last CXR with b/l opacities    - serum sodium better / K acceptable - free water d/varsha, send Urine NA and OSm  - follow BMP   - lokelma 10 g po q8h if k> 5.5      will sign off  recall as needed  prognosis guarded  will follow

## 2024-04-26 NOTE — PROGRESS NOTE ADULT - SUBJECTIVE AND OBJECTIVE BOX
Nephrology progress note    Patient is seen and examined, events over the last 24 h noted .    Allergies:  celecoxib (Rash)  Originally Entered as [U UNKNOWN] reaction to [celebrit] (Unknown)    Hospital Medications:   MEDICATIONS  (STANDING):  artificial  tears Solution 1 Drop(s) Both EYES three times a day  chlorhexidine 0.12% Liquid 15 milliLiter(s) Oral Mucosa every 12 hours  chlorhexidine 2% Cloths 1 Application(s) Topical <User Schedule>  chlorhexidine 2% Cloths 1 Application(s) Topical daily  heparin   Injectable 5000 Unit(s) SubCutaneous every 8 hours  labetalol 100 milliGRAM(s) Oral three times a day  levothyroxine 100 MICROGram(s) Oral daily  lidocaine 1%/epinephrine 1:100,000 Inj 20 milliLiter(s) Local Injection once  lidocaine 2% Viscous 10 milliLiter(s) Oral once  methadone    Tablet 10 milliGRAM(s) Oral two times a day  pantoprazole  Injectable 40 milliGRAM(s) IV Push daily  petrolatum Ophthalmic Ointment 1 Application(s) Both EYES daily  propofol Infusion 10 MICROgram(s)/kG/Min (2.4 mL/Hr) IV Continuous <Continuous>  senna 2 Tablet(s) Oral at bedtime        VITALS:  T(F): 96.1 (04-26-24 @ 07:05), Max: 99.1 (04-26-24 @ 03:00)  HR: 60 (04-26-24 @ 09:00)  BP: 119/58 (04-26-24 @ 09:00)  RR: 28 (04-26-24 @ 09:00)  SpO2: 100% (04-26-24 @ 09:00)  Wt(kg): --    04-24 @ 07:01  -  04-25 @ 07:00  --------------------------------------------------------  IN: 720 mL / OUT: 1520 mL / NET: -800 mL    04-25 @ 07:01  -  04-26 @ 07:00  --------------------------------------------------------  IN: 675 mL / OUT: 1320 mL / NET: -645 mL    04-26 @ 07:01  -  04-26 @ 09:58  --------------------------------------------------------  IN: 45 mL / OUT: 250 mL / NET: -205 mL      Height (cm): 154.9 (04-25 @ 10:10)  Weight (kg): 40 (04-25 @ 10:10)  BMI (kg/m2): 16.7 (04-25 @ 10:10)  BSA (m2): 1.33 (04-25 @ 10:10)    PHYSICAL EXAM:  Constitutional: NAD  On vent  Neck: Trach  Respiratory: CTA  Cardiovascular: S1, S2, RRR  Gastrointestinal: BS+, soft, NT/ND  Extremities: No peripheral edema  Neurological: Lethargic  : + hill.   Skin: No rashes  Vascular Access:    LABS:  04-26    130<L>  |  94<L>  |  33<H>  ----------------------------<  111<H>  5.1<H>   |  29  |  1.3    Ca    8.0<L>      26 Apr 2024 05:28  Mg     1.8     04-26    TPro  5.9<L>  /  Alb  2.0<L>  /  TBili  0.2  /  DBili      /  AST  43<H>  /  ALT  7   /  AlkPhos  160<H>  04-26                          7.3    6.80  )-----------( 417      ( 26 Apr 2024 05:28 )             22.3       Urine Studies:  Urinalysis Basic - ( 26 Apr 2024 05:28 )    Color:  / Appearance:  / SG:  / pH:   Gluc: 111 mg/dL / Ketone:   / Bili:  / Urobili:    Blood:  / Protein:  / Nitrite:    Leuk Esterase:  / RBC:  / WBC    Sq Epi:  / Non Sq Epi:  / Bacteria:         RADIOLOGY & ADDITIONAL STUDIES:  < from: Xray Chest 1 View- PORTABLE-Routine (04.22.24 @ 08:07) >    Impression:    Bilateral opacities, unchanged.    < end of copied text >

## 2024-04-26 NOTE — CHART NOTE - NSCHARTNOTEFT_GEN_A_CORE
STEPDOWN DOWNGRADE NOTE      74-year-old female with PHMx of pancreatic CA s/p Whipple, CKD stage 3, HTN, COPD, pulmonary fibrosis, PRESS syndrome (hospitalized in 2019), Raynaud's syndrome, ANCA vasculitis, Scleroderma, hypothyroid  presents to ED due to cardiac arrest. Pt lives at home with son and daughter, went to use the bathroom and became unresponsive.  EMS intubated her and gave her 4 rounds of epinephrine.  ROSC was achieved within a minute of her arrival to the ED.  Patient was found to be hypothermic and bradycardic. Admitted to MICU for unresponsiveness post cardiac arrest.  Initial concern for myxedema coma, however thyroid profile within normal limits. CT head showed evidence of diffuse anoxic brain injury. During her ICU stay, she remained unresponsive off sedation. Discussions were had with family for goals of care with neurology, palliative, and primary teams. Family wanted full medical treatment, full code, and wanted to re-evaluate after 3 months. Pt is s/p trach and PEG. Vitals remained stable. Pt deemed stable for downgrade to general medical floor.       FOLLOW UP:  - DC planning to LTACH w/ vent, f/u case management  - monitor K, s/p Lokelma, f/u nephrology, cortisol am  - CBC 3pm (monitor H&H)       A/p:  acute respiratory failure / cardiac arrest / anoxic brain injury / HTN / aspiration pneumonia / hyper K   - s/p trach 4/18/24, Trach sutures to be removed after 10 days (4/28)   - s/p PEG by surgery 4/25- started feeds    - anoxic brain injury as seen on CT brain and EEG  - lokelma, repeat K level    - DC planning to NH - case management aware   - completed antibiotic course    - monitor BP  - will need TOV    - DVT and GI prophylaxis      VS/LABS over last 24 hours:  OBJECTIVE DATA:    T(C): 35.6 (04-26-24 @ 07:05), Max: 37.3 (04-26-24 @ 03:00)  T(F): 96.1 (04-26-24 @ 07:05), Max: 99.1 (04-26-24 @ 03:00)  HR: 60 (04-26-24 @ 09:00) (56 - 80)  BP: 119/58 (04-26-24 @ 09:00) (87/52 - 179/85)  ABP: --  ABP(mean): --  RR: 28 (04-26-24 @ 09:00) (26 - 32)  SpO2: 100% (04-26-24 @ 09:00) (99% - 100%)    I&O's Summary    25 Apr 2024 07:01  -  26 Apr 2024 07:00  --------------------------------------------------------  IN: 675 mL / OUT: 1320 mL / NET: -645 mL    26 Apr 2024 07:01  -  26 Apr 2024 09:53  --------------------------------------------------------  IN: 45 mL / OUT: 250 mL / NET: -205 mL               7.3    6.80  )-----------( 417      ( 26 Apr 2024 05:28 )             22.3     04-26    130<L>  |  94<L>  |  33<H>  ----------------------------<  111<H>  5.1<H>   |  29  |  1.3    Ca    8.0<L>      26 Apr 2024 05:28  Mg     1.8     04-26    TPro  5.9<L>  /  Alb  2.0<L>  /  TBili  0.2  /  DBili  x   /  AST  43<H>  /  ALT  7   /  AlkPhos  160<H>  04-26          ABG - ( 25 Apr 2024 05:22 )  pH, Arterial: 7.39  pH, Blood: x     /  pCO2: 52    /  pO2: 150   / HCO3: 32    / Base Excess: 5.1   /  SaO2: 99.5 STEPDOWN DOWNGRADE NOTE      74-year-old female with PHMx of pancreatic CA s/p Whipple, CKD stage 3, HTN, COPD, pulmonary fibrosis, PRESS syndrome (hospitalized in 2019), Raynaud's syndrome, ANCA vasculitis, Scleroderma, hypothyroid  presents to ED due to cardiac arrest. Pt lives at home with son and daughter, went to use the bathroom and became unresponsive.  EMS intubated her and gave her 4 rounds of epinephrine.  ROSC was achieved within a minute of her arrival to the ED.  Patient was found to be hypothermic and bradycardic. Admitted to MICU for unresponsiveness post cardiac arrest.  CT head showed evidence of diffuse anoxic brain injury. During her ICU stay, she remained unresponsive off sedation. Discussions were had with family for goals of care with neurology, palliative, and primary teams. Family wanted full medical treatment, full code, and wanted to re-evaluate after 3 months. Pt is s/p trach and PEG. Vitals remained stable. Pt deemed stable for downgrade to general medical floor.       FOLLOW UP:  - DC planning to LTACH w/ vent, f/u case management  - monitor K, s/p Lokelma, f/u nephrology, cortisol am  - CBC 3pm (monitor H&H)       A/p:  acute respiratory failure / cardiac arrest / anoxic brain injury / HTN / aspiration pneumonia / hyper K   - s/p trach 4/18/24, Trach sutures to be removed after 10 days (4/28)   - s/p PEG by surgery 4/25- started feeds    - anoxic brain injury as seen on CT brain and EEG  - lokelma, repeat K level    - DC planning to NH - case management aware   - completed antibiotic course    - monitor BP  - will need TOV    - DVT and GI prophylaxis      VS/LABS over last 24 hours:  OBJECTIVE DATA:    T(C): 35.6 (04-26-24 @ 07:05), Max: 37.3 (04-26-24 @ 03:00)  T(F): 96.1 (04-26-24 @ 07:05), Max: 99.1 (04-26-24 @ 03:00)  HR: 60 (04-26-24 @ 09:00) (56 - 80)  BP: 119/58 (04-26-24 @ 09:00) (87/52 - 179/85)  ABP: --  ABP(mean): --  RR: 28 (04-26-24 @ 09:00) (26 - 32)  SpO2: 100% (04-26-24 @ 09:00) (99% - 100%)    I&O's Summary    25 Apr 2024 07:01  -  26 Apr 2024 07:00  --------------------------------------------------------  IN: 675 mL / OUT: 1320 mL / NET: -645 mL    26 Apr 2024 07:01  -  26 Apr 2024 09:53  --------------------------------------------------------  IN: 45 mL / OUT: 250 mL / NET: -205 mL               7.3    6.80  )-----------( 417      ( 26 Apr 2024 05:28 )             22.3     04-26    130<L>  |  94<L>  |  33<H>  ----------------------------<  111<H>  5.1<H>   |  29  |  1.3    Ca    8.0<L>      26 Apr 2024 05:28  Mg     1.8     04-26    TPro  5.9<L>  /  Alb  2.0<L>  /  TBili  0.2  /  DBili  x   /  AST  43<H>  /  ALT  7   /  AlkPhos  160<H>  04-26          ABG - ( 25 Apr 2024 05:22 )  pH, Arterial: 7.39  pH, Blood: x     /  pCO2: 52    /  pO2: 150   / HCO3: 32    / Base Excess: 5.1   /  SaO2: 99.5

## 2024-04-26 NOTE — PROGRESS NOTE ADULT - ASSESSMENT
74-year-old female with PHMx of pancreatic CA s/p Whipple, CKD stage 3, HTN, COPD, pulmonary fibrosis, PRESS syndrome (hospitalized in 2019), Raynaud's syndrome, ANCA vasculitis, Scleroderma, hypothyroid  presents to ED due to cardiac arrest. Pt lives at home with son and daughter, went to use the bathroom and became unresponsive.  EMS intubated her and gave her 4 rounds of epinephrine.  ROSC was achieved within a minute of her arrival to the ED.  Patient was found to be hypothermic and bradycardic    acute respiratory failure / cardiac arrest / anoxic brain injury / HTN / aspiration pneumonia / hyper K     - s/p trach 4/18/24, Trach sutures to be removed after 10 days (4/28)   -sp PEG - feeds started    - anoxic brain injury as seen on CT brain and EEG  - lokelma, repeat K level  hyperkalemia better , hyponatremia better free water    - completed antibiotic course    - Labetalol, hold lisinopril for Hyper K   - will need TOV    - DVT and GI prophylaxis  - follow up with case mangement - LTACH vs vent snf placement  discussed with family bedside

## 2024-04-26 NOTE — PROGRESS NOTE ADULT - SUBJECTIVE AND OBJECTIVE BOX
Patient is a 74y old  Female who presents with a chief complaint of cardiac arrest (25 Apr 2024 08:58)      OVERNIGHT EVENTS: no major events reported     SUBJECTIVE / INTERVAL HPI: Patient seen and examined at bedside.   ICU Vital Signs Last 24 Hrs  T(C): 36.8 (26 Apr 2024 11:00), Max: 37.3 (26 Apr 2024 03:00)  T(F): 98.2 (26 Apr 2024 11:00), Max: 99.1 (26 Apr 2024 03:00)  HR: 60 (26 Apr 2024 12:00) (56 - 66)  BP: 140/67 (26 Apr 2024 12:00) (87/52 - 179/85)  BP(mean): 97 (26 Apr 2024 12:00) (67 - 122)  ABP: --  ABP(mean): --  RR: 30 (26 Apr 2024 12:00) (26 - 33)  SpO2: 100% (26 Apr 2024 12:00) (99% - 100%)    O2 Parameters below as of 26 Apr 2024 12:00  Patient On (Oxygen Delivery Method): ventilator    O2 Concentration (%): 40    PHYSICAL EXAM:    General: old thin male chronically looks ill   HEENT: NC/AT; PERRL, clear conjunctiva  Neck: supple, + trach   Cardiovascular: +S1/S2; RRR  Respiratory: CTA b/l; no W/R/R  Gastrointestinal: soft, NT/ND; +BSx4  +ve hill   Extremities: WWP; 2+ peripheral pulses; no edema   Neurological: lethargic, not following commands     MEDICATIONS:  MEDICATIONS  (STANDING):  artificial  tears Solution 1 Drop(s) Both EYES three times a day  chlorhexidine 0.12% Liquid 15 milliLiter(s) Oral Mucosa every 12 hours  chlorhexidine 2% Cloths 1 Application(s) Topical <User Schedule>  chlorhexidine 2% Cloths 1 Application(s) Topical daily  dextrose 50% Injectable 25 milliLiter(s) IV Push once  heparin   Injectable 5000 Unit(s) SubCutaneous every 8 hours  insulin regular  human recombinant 5 Unit(s) IV Push once  labetalol 100 milliGRAM(s) Oral three times a day  levothyroxine 100 MICROGram(s) Oral daily  lidocaine 1%/epinephrine 1:100,000 Inj 20 milliLiter(s) Local Injection once  lidocaine 2% Viscous 10 milliLiter(s) Oral once  methadone    Tablet 10 milliGRAM(s) Oral two times a day  pantoprazole  Injectable 40 milliGRAM(s) IV Push daily  petrolatum Ophthalmic Ointment 1 Application(s) Both EYES daily  propofol Infusion 10 MICROgram(s)/kG/Min (2.4 mL/Hr) IV Continuous <Continuous>  sodium zirconium cyclosilicate 10 Gram(s) Oral every 8 hours  sodium zirconium cyclosilicate 10 Gram(s) Oral once    MEDICATIONS  (PRN):  labetalol Injectable 20 milliGRAM(s) IV Push every 4 hours PRN Systolic blood pressure >180  scopolamine 1 mG/72 Hr(s) Patch 1 Patch Transdermal every 72 hours PRN for secretions      ALLERGIES:  Allergies    celecoxib (Rash)  Originally Entered as [U UNKNOWN] reaction to [celebrit] (Unknown)    Intolerances        LABS:  04-26    130<L>  |  94<L>  |  33<H>  ----------------------------<  111<H>  5.1<H>   |  29  |  1.3    Ca    8.0<L>      26 Apr 2024 05:28  Mg     1.8     04-26    TPro  5.9<L>  /  Alb  2.0<L>  /  TBili  0.2  /  DBili  x   /  AST  43<H>  /  ALT  7   /  AlkPhos  160<H>  04-26                          8.2    8.53  )-----------( 394      ( 25 Apr 2024 05:40 )             25.3     04-25    128<L>  |  93<L>  |  30<H>  ----------------------------<  104<H>  5.6<H>   |  30  |  1.2    Ca    7.9<L>      25 Apr 2024 05:40  Mg     2.0     04-25    TPro  6.0  /  Alb  2.0<L>  /  TBili  <0.2  /  DBili  x   /  AST  42<H>  /  ALT  8   /  AlkPhos  167<H>  04-25      Urinalysis Basic - ( 25 Apr 2024 05:40 )    Color: x / Appearance: x / SG: x / pH: x  Gluc: 104 mg/dL / Ketone: x  / Bili: x / Urobili: x   Blood: x / Protein: x / Nitrite: x   Leuk Esterase: x / RBC: x / WBC x   Sq Epi: x / Non Sq Epi: x / Bacteria: x      CAPILLARY BLOOD GLUCOSE      POCT Blood Glucose.: 88 mg/dL (25 Apr 2024 08:59)      RADIOLOGY & ADDITIONAL TESTS: Reviewed.    ASSESSMENT:    PLAN:

## 2024-04-26 NOTE — PROGRESS NOTE ADULT - SUBJECTIVE AND OBJECTIVE BOX
Patient is a 74y old  Female who presents with a chief complaint of cardiac arrest (25 Apr 2024 17:32)        Over Night Events: SP PEG no acute events overnight       ICU Vital Signs Last 24 Hrs  T(C): 35.6 (26 Apr 2024 07:05), Max: 37.3 (26 Apr 2024 03:00)  T(F): 96.1 (26 Apr 2024 07:05), Max: 99.1 (26 Apr 2024 03:00)  HR: 62 (26 Apr 2024 08:22) (56 - 80)  BP: 109/53 (26 Apr 2024 08:00) (87/52 - 179/85)  BP(mean): 76 (26 Apr 2024 08:00) (67 - 122)  RR: 28 (26 Apr 2024 08:00) (26 - 37)  SpO2: 100% (26 Apr 2024 08:22) (99% - 100%)    O2 Parameters below as of 26 Apr 2024 08:00  Patient On (Oxygen Delivery Method): ventilator    O2 Concentration (%): 40        CONSTITUTIONAL:  NAD    ENT:   trached     EYES:   Pupils Round and reactive to light, sluggish     CARDIAC:   Normal rate,   Regular rhythm.        RESPIRATORY:   No wheezing  Bilateral BS  Normal chest expansion  Not tachypneic,  No use of accessory muscles    GASTROINTESTINAL:  Abdomen soft,   Non-tender,   No guarding,   + BS    MUSCULOSKELETAL:   Range of motion is limited      NEUROLOGICAL:   does not follow commands     SKIN:   Skin normal color for race,   Warm and dry        04-25-24 @ 07:01  -  04-26-24 @ 07:00  --------------------------------------------------------  IN:    Free Water: 300 mL    Osmolite 1.2: 375 mL  Total IN: 675 mL    OUT:    Indwelling Catheter - Urethral (mL): 1320 mL  Total OUT: 1320 mL    Total NET: -645 mL      04-26-24 @ 07:01  -  04-26-24 @ 08:54  --------------------------------------------------------  IN:    Osmolite 1.2: 45 mL  Total IN: 45 mL    OUT:    Indwelling Catheter - Urethral (mL): 100 mL  Total OUT: 100 mL    Total NET: -55 mL          LABS:                            7.3    6.80  )-----------( 417      ( 26 Apr 2024 05:28 )             22.3                                               04-26    130<L>  |  94<L>  |  33<H>  ----------------------------<  111<H>  5.1<H>   |  29  |  1.3    Ca    8.0<L>      26 Apr 2024 05:28  Mg     1.8     04-26    TPro  5.9<L>  /  Alb  2.0<L>  /  TBili  0.2  /  DBili  x   /  AST  43<H>  /  ALT  7   /  AlkPhos  160<H>  04-26      PT/INR - ( 25 Apr 2024 10:40 )   PT: 11.50 sec;   INR: 1.01 ratio         PTT - ( 25 Apr 2024 10:40 )  PTT:32.4 sec                                       Urinalysis Basic - ( 26 Apr 2024 05:28 )    Color: x / Appearance: x / SG: x / pH: x  Gluc: 111 mg/dL / Ketone: x  / Bili: x / Urobili: x   Blood: x / Protein: x / Nitrite: x   Leuk Esterase: x / RBC: x / WBC x   Sq Epi: x / Non Sq Epi: x / Bacteria: x        CARDIAC MARKERS ( 25 Apr 2024 19:40 )  x     / x     / 55 U/L / x     / x                                                LIVER FUNCTIONS - ( 26 Apr 2024 05:28 )  Alb: 2.0 g/dL / Pro: 5.9 g/dL / ALK PHOS: 160 U/L / ALT: 7 U/L / AST: 43 U/L / GGT: x                                                                                               Mode: AC/ CMV (Assist Control/ Continuous Mandatory Ventilation)  RR (machine): 28  TV (machine): 300  FiO2: 40  PEEP: 5  ITime: 0.8  MAP: 11  PIP: 27                                      ABG - ( 25 Apr 2024 05:22 )  pH, Arterial: 7.39  pH, Blood: x     /  pCO2: 52    /  pO2: 150   / HCO3: 32    / Base Excess: 5.1   /  SaO2: 99.5                MEDICATIONS  (STANDING):  artificial  tears Solution 1 Drop(s) Both EYES three times a day  chlorhexidine 0.12% Liquid 15 milliLiter(s) Oral Mucosa every 12 hours  chlorhexidine 2% Cloths 1 Application(s) Topical daily  chlorhexidine 2% Cloths 1 Application(s) Topical <User Schedule>  heparin   Injectable 5000 Unit(s) SubCutaneous every 8 hours  labetalol 100 milliGRAM(s) Oral three times a day  levothyroxine 100 MICROGram(s) Oral daily  lidocaine 1%/epinephrine 1:100,000 Inj 20 milliLiter(s) Local Injection once  lidocaine 2% Viscous 10 milliLiter(s) Oral once  methadone    Tablet 10 milliGRAM(s) Oral two times a day  pantoprazole  Injectable 40 milliGRAM(s) IV Push daily  petrolatum Ophthalmic Ointment 1 Application(s) Both EYES daily  propofol Infusion 10 MICROgram(s)/kG/Min (2.4 mL/Hr) IV Continuous <Continuous>  senna 2 Tablet(s) Oral at bedtime    MEDICATIONS  (PRN):  labetalol Injectable 20 milliGRAM(s) IV Push every 4 hours PRN Systolic blood pressure >180  polyethylene glycol 3350 17 Gram(s) Oral daily PRN Constipation  scopolamine 1 mG/72 Hr(s) Patch 1 Patch Transdermal every 72 hours PRN for secretions       No

## 2024-04-26 NOTE — PROGRESS NOTE ADULT - ASSESSMENT
IMPRESSION:    Anoxic brain damage  Acute Hypoxemic Respiratory Failure on MV SP trach and PEG  SP CPA downtime 30 minutes   Cardiac bradycardia   Lung Fibrosis   GIUSEPPE, resolved   Hyperkalemia   HO Raynaud's  HO PRESS    HO Anemia  HO Pancreatic cancer     PLAN:    CNS: off sedation   continue on methadone  monitor Qtc    HEENT: oral and trach care     PULMONARY: keep pox >92%  monitor peak and plateau  no vent changes, pressure support as tolerated     CARDIOVASCULAR:  2D-Echo noted, continue antihypertensives     RENAL: follow is and os, trend Cr, Nephrology follow up, continue lokelma monitor BMP Q6H     INFECTIOUS DISEASE: cultures negative to date, completed Zosyn course    GASTRO: GI ppx, PEG feeds     HEMATOLOGICAL:  DVT prophylaxis.    ENDOCRINE:  Follow up FS.  Insulin protocol if needed. cortisol level noted     MUSCULOSKELETAL: bedrest    Full Code, Palliative care following    Grave prognosis     The patient has a high probability of further deterioration.    /case mgmt for LTAC placement/disposition    MICU monitoring    IMPRESSION:    Anoxic brain damage  Acute Hypoxemic Respiratory Failure on MV SP trach and PEG  SP CPA downtime 30 minutes   Cardiac bradycardia   Lung Fibrosis   GIUSEPPE, resolved   Hyperkalemia   HO Raynaud's  HO PRESS    HO Anemia  HO Pancreatic cancer     PLAN:    CNS: off sedation   continue on methadone  monitor Qtc    HEENT: oral and trach care     PULMONARY: keep pox >92%  monitor peak and plateau  no vent changes, pressure support as tolerated     CARDIOVASCULAR:  2D-Echo noted, continue antihypertensives     RENAL: follow is and os, trend Cr, Nephrology follow up, continue lokelma monitor BMP Q6H     INFECTIOUS DISEASE: cultures negative to date, completed Zosyn course    GASTRO: GI ppx, PEG feeds     HEMATOLOGICAL:  DVT prophylaxis.    ENDOCRINE:  Follow up FS.  Insulin protocol if needed. cortisol level noted     MUSCULOSKELETAL: bedrest    Full Code, Palliative care following    Grave prognosis     The patient has a high probability of further deterioration.    /case mgmt for LTAC placement/disposition    Downgrade to [facility]

## 2024-04-26 NOTE — PROGRESS NOTE ADULT - ATTENDING SUPERVISION STATEMENT
Fellow
Resident
Fellow
Fellow
Resident
Fellow

## 2024-04-27 LAB
ALBUMIN SERPL ELPH-MCNC: 2.2 G/DL — LOW (ref 3.5–5.2)
ALP SERPL-CCNC: 166 U/L — HIGH (ref 30–115)
ALT FLD-CCNC: 6 U/L — SIGNIFICANT CHANGE UP (ref 0–41)
ANION GAP SERPL CALC-SCNC: 7 MMOL/L — SIGNIFICANT CHANGE UP (ref 7–14)
AST SERPL-CCNC: 45 U/L — HIGH (ref 0–41)
BASOPHILS # BLD AUTO: 0.04 K/UL — SIGNIFICANT CHANGE UP (ref 0–0.2)
BASOPHILS NFR BLD AUTO: 0.6 % — SIGNIFICANT CHANGE UP (ref 0–1)
BILIRUB SERPL-MCNC: <0.2 MG/DL — SIGNIFICANT CHANGE UP (ref 0.2–1.2)
BUN SERPL-MCNC: 32 MG/DL — HIGH (ref 10–20)
CALCIUM SERPL-MCNC: 7.9 MG/DL — LOW (ref 8.4–10.5)
CHLORIDE SERPL-SCNC: 96 MMOL/L — LOW (ref 98–110)
CO2 SERPL-SCNC: 30 MMOL/L — SIGNIFICANT CHANGE UP (ref 17–32)
CREAT SERPL-MCNC: 1.2 MG/DL — SIGNIFICANT CHANGE UP (ref 0.7–1.5)
EGFR: 47 ML/MIN/1.73M2 — LOW
EOSINOPHIL # BLD AUTO: 0.36 K/UL — SIGNIFICANT CHANGE UP (ref 0–0.7)
EOSINOPHIL NFR BLD AUTO: 5.2 % — SIGNIFICANT CHANGE UP (ref 0–8)
GLUCOSE SERPL-MCNC: 143 MG/DL — HIGH (ref 70–99)
HCT VFR BLD CALC: 24.7 % — LOW (ref 37–47)
HGB BLD-MCNC: 8 G/DL — LOW (ref 12–16)
IMM GRANULOCYTES NFR BLD AUTO: 0.4 % — HIGH (ref 0.1–0.3)
LYMPHOCYTES # BLD AUTO: 0.98 K/UL — LOW (ref 1.2–3.4)
LYMPHOCYTES # BLD AUTO: 14.2 % — LOW (ref 20.5–51.1)
MAGNESIUM SERPL-MCNC: 2 MG/DL — SIGNIFICANT CHANGE UP (ref 1.8–2.4)
MCHC RBC-ENTMCNC: 26.2 PG — LOW (ref 27–31)
MCHC RBC-ENTMCNC: 32.4 G/DL — SIGNIFICANT CHANGE UP (ref 32–37)
MCV RBC AUTO: 81 FL — SIGNIFICANT CHANGE UP (ref 81–99)
MONOCYTES # BLD AUTO: 0.76 K/UL — HIGH (ref 0.1–0.6)
MONOCYTES NFR BLD AUTO: 11 % — HIGH (ref 1.7–9.3)
NEUTROPHILS # BLD AUTO: 4.72 K/UL — SIGNIFICANT CHANGE UP (ref 1.4–6.5)
NEUTROPHILS NFR BLD AUTO: 68.6 % — SIGNIFICANT CHANGE UP (ref 42.2–75.2)
NRBC # BLD: 0 /100 WBCS — SIGNIFICANT CHANGE UP (ref 0–0)
PLATELET # BLD AUTO: 368 K/UL — SIGNIFICANT CHANGE UP (ref 130–400)
PMV BLD: 9.3 FL — SIGNIFICANT CHANGE UP (ref 7.4–10.4)
POTASSIUM SERPL-MCNC: 5.1 MMOL/L — HIGH (ref 3.5–5)
POTASSIUM SERPL-SCNC: 5.1 MMOL/L — HIGH (ref 3.5–5)
PROT SERPL-MCNC: 6.2 G/DL — SIGNIFICANT CHANGE UP (ref 6–8)
RBC # BLD: 3.05 M/UL — LOW (ref 4.2–5.4)
RBC # FLD: 17.2 % — HIGH (ref 11.5–14.5)
SODIUM SERPL-SCNC: 133 MMOL/L — LOW (ref 135–146)
WBC # BLD: 6.89 K/UL — SIGNIFICANT CHANGE UP (ref 4.8–10.8)
WBC # FLD AUTO: 6.89 K/UL — SIGNIFICANT CHANGE UP (ref 4.8–10.8)

## 2024-04-27 PROCEDURE — 99233 SBSQ HOSP IP/OBS HIGH 50: CPT

## 2024-04-27 PROCEDURE — 99232 SBSQ HOSP IP/OBS MODERATE 35: CPT

## 2024-04-27 RX ORDER — LABETALOL HCL 100 MG
200 TABLET ORAL THREE TIMES A DAY
Refills: 0 | Status: DISCONTINUED | OUTPATIENT
Start: 2024-04-27 | End: 2024-04-27

## 2024-04-27 RX ORDER — LOSARTAN POTASSIUM 100 MG/1
50 TABLET, FILM COATED ORAL DAILY
Refills: 0 | Status: DISCONTINUED | OUTPATIENT
Start: 2024-04-27 | End: 2024-04-27

## 2024-04-27 RX ORDER — LABETALOL HCL 100 MG
100 TABLET ORAL THREE TIMES A DAY
Refills: 0 | Status: DISCONTINUED | OUTPATIENT
Start: 2024-04-27 | End: 2024-05-04

## 2024-04-27 RX ORDER — PANTOPRAZOLE SODIUM 20 MG/1
40 TABLET, DELAYED RELEASE ORAL DAILY
Refills: 0 | Status: DISCONTINUED | OUTPATIENT
Start: 2024-04-27 | End: 2024-05-07

## 2024-04-27 RX ORDER — AMLODIPINE BESYLATE 2.5 MG/1
5 TABLET ORAL DAILY
Refills: 0 | Status: DISCONTINUED | OUTPATIENT
Start: 2024-04-27 | End: 2024-05-01

## 2024-04-27 RX ORDER — NIFEDIPINE 30 MG
30 TABLET, EXTENDED RELEASE 24 HR ORAL DAILY
Refills: 0 | Status: DISCONTINUED | OUTPATIENT
Start: 2024-04-27 | End: 2024-04-27

## 2024-04-27 RX ADMIN — HEPARIN SODIUM 5000 UNIT(S): 5000 INJECTION INTRAVENOUS; SUBCUTANEOUS at 14:42

## 2024-04-27 RX ADMIN — AMLODIPINE BESYLATE 5 MILLIGRAM(S): 2.5 TABLET ORAL at 12:41

## 2024-04-27 RX ADMIN — CHLORHEXIDINE GLUCONATE 1 APPLICATION(S): 213 SOLUTION TOPICAL at 07:01

## 2024-04-27 RX ADMIN — Medication 100 MICROGRAM(S): at 07:01

## 2024-04-27 RX ADMIN — Medication 100 MILLIGRAM(S): at 07:01

## 2024-04-27 RX ADMIN — METHADONE HYDROCHLORIDE 10 MILLIGRAM(S): 40 TABLET ORAL at 17:58

## 2024-04-27 RX ADMIN — SCOPALAMINE 1 PATCH: 1 PATCH, EXTENDED RELEASE TRANSDERMAL at 07:55

## 2024-04-27 RX ADMIN — Medication 1 DROP(S): at 22:15

## 2024-04-27 RX ADMIN — SENNA PLUS 2 TABLET(S): 8.6 TABLET ORAL at 22:15

## 2024-04-27 RX ADMIN — SCOPALAMINE 1 PATCH: 1 PATCH, EXTENDED RELEASE TRANSDERMAL at 22:04

## 2024-04-27 RX ADMIN — CHLORHEXIDINE GLUCONATE 1 APPLICATION(S): 213 SOLUTION TOPICAL at 00:23

## 2024-04-27 RX ADMIN — METHADONE HYDROCHLORIDE 10 MILLIGRAM(S): 40 TABLET ORAL at 07:01

## 2024-04-27 RX ADMIN — CHLORHEXIDINE GLUCONATE 15 MILLILITER(S): 213 SOLUTION TOPICAL at 07:02

## 2024-04-27 RX ADMIN — Medication 20 MILLIGRAM(S): at 07:46

## 2024-04-27 RX ADMIN — Medication 1 DROP(S): at 07:02

## 2024-04-27 RX ADMIN — Medication 1 DROP(S): at 14:47

## 2024-04-27 RX ADMIN — Medication 1 APPLICATION(S): at 12:41

## 2024-04-27 RX ADMIN — Medication 100 MILLIGRAM(S): at 14:45

## 2024-04-27 RX ADMIN — HEPARIN SODIUM 5000 UNIT(S): 5000 INJECTION INTRAVENOUS; SUBCUTANEOUS at 07:01

## 2024-04-27 RX ADMIN — HEPARIN SODIUM 5000 UNIT(S): 5000 INJECTION INTRAVENOUS; SUBCUTANEOUS at 22:14

## 2024-04-27 RX ADMIN — CHLORHEXIDINE GLUCONATE 1 APPLICATION(S): 213 SOLUTION TOPICAL at 12:41

## 2024-04-27 RX ADMIN — CHLORHEXIDINE GLUCONATE 15 MILLILITER(S): 213 SOLUTION TOPICAL at 17:58

## 2024-04-27 RX ADMIN — PANTOPRAZOLE SODIUM 40 MILLIGRAM(S): 20 TABLET, DELAYED RELEASE ORAL at 12:41

## 2024-04-27 RX ADMIN — Medication 100 MILLIGRAM(S): at 22:15

## 2024-04-27 NOTE — PROGRESS NOTE ADULT - SUBJECTIVE AND OBJECTIVE BOX
Pt seen, pt calm    T(F): , Max: 99 (04-27-24 @ 15:09)  HR: 63 (04-27-24 @ 20:57) (55 - 63)  BP: 148/70 (04-27-24 @ 08:00)  RR: 27 (04-27-24 @ 15:09)  SpO2: 100% (04-27-24 @ 20:57)  IN: 1485 mL / OUT: 1950 mL / NET: -465 mL    IN: 0 mL / OUT: 750 mL / NET: -750 mL      General: No apparent distress  Cardiovascular: S1, S2  Gastrointestinal: Soft, Non-tender, Non-distended  Respiratory: vented  Lymphatic: No edema  Neurologic: sedated  Dermatologic: Skin dry                          8.0    6.89  )-----------( 368      ( 27 Apr 2024 06:27 )             24.7     04-27    133<L>  |  96<L>  |  32<H>  ----------------------------<  143<H>  5.1<H>   |  30  |  1.2    Ca    7.9<L>      27 Apr 2024 06:27  Mg     2.0     04-27    TPro  6.2  /  Alb  2.2<L>  /  TBili  <0.2  /  DBili  x   /  AST  45<H>  /  ALT  6   /  AlkPhos  166<H>  04-27

## 2024-04-27 NOTE — PROGRESS NOTE ADULT - ASSESSMENT
IMPRESSION:    Anoxic brain damage  Acute Hypoxemic Respiratory Failure on MV SP trach and PEG  SP CPA downtime 30 minutes   Cardiac bradycardia   Lung Fibrosis   GIUSEPPE, resolved   Hyperkalemia   HO Raynaud's  HO PRESS    HO Anemia  HO Pancreatic cancer     PLAN:    CNS: off sedation   continue on methadone  monitor Qtc    HEENT: oral and trach care     PULMONARY: keep pox >92%  monitor peak and plateau  no vent changes, pressure support as tolerated     CARDIOVASCULAR:  2D-Echo noted, continue antihypertensives     RENAL: follow is and os, trend Cr, Nephrology follow up, continue lokelma monitor BMP Q6H     INFECTIOUS DISEASE: cultures negative to date, completed Zosyn course    GASTRO: GI ppx, PEG feeds     HEMATOLOGICAL:  DVT prophylaxis. follow morning h/h     ENDOCRINE:  Follow up FS.  Insulin protocol if needed. cortisol level noted     MUSCULOSKELETAL: bedrest    Full Code, Palliative care following    Grave prognosis     The patient has a high probability of further deterioration.    /case mgmt for LTAC placement/disposition    Downgrade to F   SDU if no beds on F

## 2024-04-27 NOTE — PROGRESS NOTE ADULT - SUBJECTIVE AND OBJECTIVE BOX
Patient is a 74y old  Female who presents with a chief complaint of cardiac arrest (26 Apr 2024 15:08)      Over Night Events:  Patient seen and examined.   off sedation   on vent     ROS:  See HPI    PHYSICAL EXAM    ICU Vital Signs Last 24 Hrs  T(C): 36.4 (27 Apr 2024 03:00), Max: 36.8 (26 Apr 2024 11:00)  T(F): 97.5 (27 Apr 2024 03:00), Max: 98.2 (26 Apr 2024 11:00)  HR: 58 (27 Apr 2024 04:00) (53 - 64)  BP: 149/70 (27 Apr 2024 04:00) (109/53 - 182/73)  BP(mean): 100 (27 Apr 2024 04:00) (76 - 114)  ABP: --  ABP(mean): --  RR: 26 (27 Apr 2024 04:00) (26 - 120)  SpO2: 100% (27 Apr 2024 04:00) (100% - 100%)    O2 Parameters below as of 27 Apr 2024 03:00  Patient On (Oxygen Delivery Method): ventilator            General: opene yes   HEENT:  trach               Lymph Nodes: NO cervical LN   Lungs: Bilateral BS  Cardiovascular: Regular   Abdomen: Soft, Positive BS  Extremities: No clubbing   Skin: warm   Neurological: positive gag       I&O's Detail    25 Apr 2024 07:01  -  26 Apr 2024 07:00  --------------------------------------------------------  IN:    Free Water: 300 mL    Osmolite 1.2: 375 mL  Total IN: 675 mL    OUT:    Indwelling Catheter - Urethral (mL): 1320 mL  Total OUT: 1320 mL    Total NET: -645 mL      26 Apr 2024 07:01  -  27 Apr 2024 06:37  --------------------------------------------------------  IN:    Free Water: 300 mL    Osmolite 1.2: 945 mL  Total IN: 1245 mL    OUT:    Indwelling Catheter - Urethral (mL): 1400 mL  Total OUT: 1400 mL    Total NET: -155 mL          LABS:                          7.3    6.80  )-----------( 417      ( 26 Apr 2024 05:28 )             22.3         26 Apr 2024 05:28    130    |  94     |  33     ----------------------------<  111    5.1     |  29     |  1.3      Ca    8.0        26 Apr 2024 05:28  Mg     1.8       26 Apr 2024 05:28                                               PT/INR - ( 25 Apr 2024 10:40 )   PT: 11.50 sec;   INR: 1.01 ratio         PTT - ( 25 Apr 2024 10:40 )  PTT:32.4 sec                                       Urinalysis Basic - ( 26 Apr 2024 05:28 )    Color: x / Appearance: x / SG: x / pH: x  Gluc: 111 mg/dL / Ketone: x  / Bili: x / Urobili: x   Blood: x / Protein: x / Nitrite: x   Leuk Esterase: x / RBC: x / WBC x   Sq Epi: x / Non Sq Epi: x / Bacteria: x          CARDIAC MARKERS ( 25 Apr 2024 19:40 )  x     / x     / 55 U/L / x     / x                                                                                                         Mode: AC/ CMV (Assist Control/ Continuous Mandatory Ventilation)  RR (machine): 28  TV (machine): 300  FiO2: 40  PEEP: 5  ITime: 0.8  MAP: 13  PIP: 29                                          MEDICATIONS  (STANDING):  artificial  tears Solution 1 Drop(s) Both EYES three times a day  chlorhexidine 0.12% Liquid 15 milliLiter(s) Oral Mucosa every 12 hours  chlorhexidine 2% Cloths 1 Application(s) Topical <User Schedule>  chlorhexidine 2% Cloths 1 Application(s) Topical daily  heparin   Injectable 5000 Unit(s) SubCutaneous every 8 hours  labetalol 100 milliGRAM(s) Oral three times a day  levothyroxine 100 MICROGram(s) Oral daily  lidocaine 1%/epinephrine 1:100,000 Inj 20 milliLiter(s) Local Injection once  lidocaine 2% Viscous 10 milliLiter(s) Oral once  methadone    Tablet 10 milliGRAM(s) Oral two times a day  pantoprazole  Injectable 40 milliGRAM(s) IV Push daily  petrolatum Ophthalmic Ointment 1 Application(s) Both EYES daily  propofol Infusion 10 MICROgram(s)/kG/Min (2.4 mL/Hr) IV Continuous <Continuous>  senna 2 Tablet(s) Oral at bedtime    MEDICATIONS  (PRN):  labetalol Injectable 20 milliGRAM(s) IV Push every 4 hours PRN Systolic blood pressure >180  polyethylene glycol 3350 17 Gram(s) Oral daily PRN Constipation  scopolamine 1 mG/72 Hr(s) Patch 1 Patch Transdermal every 72 hours PRN for secretions          Xrays:  TLC:  OG:  ET tube:                                                                                       ECHO:  CAM ICU:

## 2024-04-27 NOTE — PROGRESS NOTE ADULT - ASSESSMENT
74-year-old female with PHMx of pancreatic CA s/p Whipple, CKD stage 3, HTN, COPD, pulmonary fibrosis, PRESS syndrome (hospitalized in 2019), Raynaud's syndrome, ANCA vasculitis, Scleroderma, hypothyroid  presents to ED due to cardiac arrest.     acute respiratory failure / cardiac arrest / anoxic brain injury      - s/p trach 4/18/24  - Trach sutures to be removed tomorrow   -peg feeds   LTACH vs vent snf placement      very poor prognosis

## 2024-04-28 LAB
ALBUMIN SERPL ELPH-MCNC: 2.1 G/DL — LOW (ref 3.5–5.2)
ALP SERPL-CCNC: 148 U/L — HIGH (ref 30–115)
ALT FLD-CCNC: 5 U/L — SIGNIFICANT CHANGE UP (ref 0–41)
ANION GAP SERPL CALC-SCNC: 8 MMOL/L — SIGNIFICANT CHANGE UP (ref 7–14)
AST SERPL-CCNC: 39 U/L — SIGNIFICANT CHANGE UP (ref 0–41)
BASOPHILS # BLD AUTO: 0.04 K/UL — SIGNIFICANT CHANGE UP (ref 0–0.2)
BASOPHILS NFR BLD AUTO: 0.6 % — SIGNIFICANT CHANGE UP (ref 0–1)
BILIRUB SERPL-MCNC: <0.2 MG/DL — SIGNIFICANT CHANGE UP (ref 0.2–1.2)
BUN SERPL-MCNC: 32 MG/DL — HIGH (ref 10–20)
CALCIUM SERPL-MCNC: 7.9 MG/DL — LOW (ref 8.4–10.5)
CHLORIDE SERPL-SCNC: 93 MMOL/L — LOW (ref 98–110)
CO2 SERPL-SCNC: 29 MMOL/L — SIGNIFICANT CHANGE UP (ref 17–32)
CREAT SERPL-MCNC: 1.1 MG/DL — SIGNIFICANT CHANGE UP (ref 0.7–1.5)
EGFR: 52 ML/MIN/1.73M2 — LOW
EOSINOPHIL # BLD AUTO: 0.27 K/UL — SIGNIFICANT CHANGE UP (ref 0–0.7)
EOSINOPHIL NFR BLD AUTO: 3.9 % — SIGNIFICANT CHANGE UP (ref 0–8)
GLUCOSE SERPL-MCNC: 131 MG/DL — HIGH (ref 70–99)
HCT VFR BLD CALC: 23.1 % — LOW (ref 37–47)
HGB BLD-MCNC: 7.5 G/DL — LOW (ref 12–16)
IMM GRANULOCYTES NFR BLD AUTO: 0.4 % — HIGH (ref 0.1–0.3)
LYMPHOCYTES # BLD AUTO: 0.81 K/UL — LOW (ref 1.2–3.4)
LYMPHOCYTES # BLD AUTO: 11.6 % — LOW (ref 20.5–51.1)
MAGNESIUM SERPL-MCNC: 2 MG/DL — SIGNIFICANT CHANGE UP (ref 1.8–2.4)
MCHC RBC-ENTMCNC: 26.5 PG — LOW (ref 27–31)
MCHC RBC-ENTMCNC: 32.5 G/DL — SIGNIFICANT CHANGE UP (ref 32–37)
MCV RBC AUTO: 81.6 FL — SIGNIFICANT CHANGE UP (ref 81–99)
MONOCYTES # BLD AUTO: 0.66 K/UL — HIGH (ref 0.1–0.6)
MONOCYTES NFR BLD AUTO: 9.4 % — HIGH (ref 1.7–9.3)
NEUTROPHILS # BLD AUTO: 5.2 K/UL — SIGNIFICANT CHANGE UP (ref 1.4–6.5)
NEUTROPHILS NFR BLD AUTO: 74.1 % — SIGNIFICANT CHANGE UP (ref 42.2–75.2)
NRBC # BLD: 0 /100 WBCS — SIGNIFICANT CHANGE UP (ref 0–0)
PLATELET # BLD AUTO: 353 K/UL — SIGNIFICANT CHANGE UP (ref 130–400)
PMV BLD: 9.5 FL — SIGNIFICANT CHANGE UP (ref 7.4–10.4)
POTASSIUM SERPL-MCNC: 5.4 MMOL/L — HIGH (ref 3.5–5)
POTASSIUM SERPL-SCNC: 5.4 MMOL/L — HIGH (ref 3.5–5)
PROT SERPL-MCNC: 6.1 G/DL — SIGNIFICANT CHANGE UP (ref 6–8)
RBC # BLD: 2.83 M/UL — LOW (ref 4.2–5.4)
RBC # FLD: 17 % — HIGH (ref 11.5–14.5)
SODIUM SERPL-SCNC: 130 MMOL/L — LOW (ref 135–146)
WBC # BLD: 7.01 K/UL — SIGNIFICANT CHANGE UP (ref 4.8–10.8)
WBC # FLD AUTO: 7.01 K/UL — SIGNIFICANT CHANGE UP (ref 4.8–10.8)

## 2024-04-28 PROCEDURE — 99233 SBSQ HOSP IP/OBS HIGH 50: CPT

## 2024-04-28 PROCEDURE — 99231 SBSQ HOSP IP/OBS SF/LOW 25: CPT

## 2024-04-28 RX ADMIN — Medication 100 MILLIGRAM(S): at 22:04

## 2024-04-28 RX ADMIN — HEPARIN SODIUM 5000 UNIT(S): 5000 INJECTION INTRAVENOUS; SUBCUTANEOUS at 16:44

## 2024-04-28 RX ADMIN — CHLORHEXIDINE GLUCONATE 1 APPLICATION(S): 213 SOLUTION TOPICAL at 12:23

## 2024-04-28 RX ADMIN — CHLORHEXIDINE GLUCONATE 15 MILLILITER(S): 213 SOLUTION TOPICAL at 05:09

## 2024-04-28 RX ADMIN — Medication 100 MILLIGRAM(S): at 16:44

## 2024-04-28 RX ADMIN — Medication 100 MILLIGRAM(S): at 05:08

## 2024-04-28 RX ADMIN — CHLORHEXIDINE GLUCONATE 1 APPLICATION(S): 213 SOLUTION TOPICAL at 05:09

## 2024-04-28 RX ADMIN — CHLORHEXIDINE GLUCONATE 15 MILLILITER(S): 213 SOLUTION TOPICAL at 22:03

## 2024-04-28 RX ADMIN — Medication 1 APPLICATION(S): at 12:21

## 2024-04-28 RX ADMIN — Medication 100 MICROGRAM(S): at 05:08

## 2024-04-28 RX ADMIN — AMLODIPINE BESYLATE 5 MILLIGRAM(S): 2.5 TABLET ORAL at 05:08

## 2024-04-28 RX ADMIN — HEPARIN SODIUM 5000 UNIT(S): 5000 INJECTION INTRAVENOUS; SUBCUTANEOUS at 05:08

## 2024-04-28 RX ADMIN — Medication 1 DROP(S): at 16:47

## 2024-04-28 RX ADMIN — Medication 1 DROP(S): at 22:04

## 2024-04-28 RX ADMIN — SCOPALAMINE 1 PATCH: 1 PATCH, EXTENDED RELEASE TRANSDERMAL at 07:24

## 2024-04-28 RX ADMIN — Medication 1 DROP(S): at 05:08

## 2024-04-28 RX ADMIN — METHADONE HYDROCHLORIDE 10 MILLIGRAM(S): 40 TABLET ORAL at 05:08

## 2024-04-28 RX ADMIN — SCOPALAMINE 1 PATCH: 1 PATCH, EXTENDED RELEASE TRANSDERMAL at 18:35

## 2024-04-28 RX ADMIN — HEPARIN SODIUM 5000 UNIT(S): 5000 INJECTION INTRAVENOUS; SUBCUTANEOUS at 22:04

## 2024-04-28 RX ADMIN — PANTOPRAZOLE SODIUM 40 MILLIGRAM(S): 20 TABLET, DELAYED RELEASE ORAL at 12:24

## 2024-04-28 NOTE — PROGRESS NOTE ADULT - SUBJECTIVE AND OBJECTIVE BOX
Patient is a 74y old  Female who presents with a chief complaint of cardiac arrest (27 Apr 2024 15:17)      Over Night Events:  Patient seen and examined.   on acute events     ROS:  See HPI    PHYSICAL EXAM    ICU Vital Signs Last 24 Hrs  T(C): 36.7 (27 Apr 2024 19:00), Max: 37.2 (27 Apr 2024 15:09)  T(F): 98.1 (27 Apr 2024 19:00), Max: 99 (27 Apr 2024 15:09)  HR: 69 (28 Apr 2024 00:47) (60 - 69)  BP: 145/69 (27 Apr 2024 23:00) (141/67 - 194/89)  BP(mean): 99 (27 Apr 2024 23:00) (94 - 128)  ABP: --  ABP(mean): --  RR: 28 (27 Apr 2024 23:00) (27 - 30)  SpO2: 100% (28 Apr 2024 00:47) (100% - 100%)    O2 Parameters below as of 27 Apr 2024 19:00  Patient On (Oxygen Delivery Method): ventilator    O2 Concentration (%): 40        General: open eyes   HEENT:    trach             Lymph Nodes: NO cervical LN   Lungs: Bilateral BS  Cardiovascular: Regular   Abdomen: Soft, Positive BS  Extremities: No clubbing   Skin: warm   Neurological: positive gag   Musculoskeletal: move all ext     I&O's Detail    26 Apr 2024 07:01  -  27 Apr 2024 07:00  --------------------------------------------------------  IN:    Free Water: 450 mL    Osmolite 1.2: 1035 mL  Total IN: 1485 mL    OUT:    Indwelling Catheter - Urethral (mL): 1900 mL    Rectal Tube (mL): 50 mL  Total OUT: 1950 mL    Total NET: -465 mL      27 Apr 2024 07:01  -  28 Apr 2024 06:42  --------------------------------------------------------  IN:    Free Water: 150 mL    Osmolite 1.2: 315 mL  Total IN: 465 mL    OUT:    Indwelling Catheter - Urethral (mL): 1200 mL  Total OUT: 1200 mL    Total NET: -735 mL          LABS:                          8.0    6.89  )-----------( 368      ( 27 Apr 2024 06:27 )             24.7         27 Apr 2024 06:27    133    |  96     |  32     ----------------------------<  143    5.1     |  30     |  1.2      Ca    7.9        27 Apr 2024 06:27  Mg     2.0       27 Apr 2024 06:27                                                                                      Urinalysis Basic - ( 27 Apr 2024 06:27 )    Color: x / Appearance: x / SG: x / pH: x  Gluc: 143 mg/dL / Ketone: x  / Bili: x / Urobili: x   Blood: x / Protein: x / Nitrite: x   Leuk Esterase: x / RBC: x / WBC x   Sq Epi: x / Non Sq Epi: x / Bacteria: x                                                                                                             Mode: AC/ CMV (Assist Control/ Continuous Mandatory Ventilation)  RR (machine): 28  TV (machine): 300  FiO2: 40  PEEP: 5  ITime: 1  MAP: 11  PIP: 24                                          MEDICATIONS  (STANDING):  amLODIPine   Tablet 5 milliGRAM(s) Oral daily  artificial  tears Solution 1 Drop(s) Both EYES three times a day  chlorhexidine 0.12% Liquid 15 milliLiter(s) Oral Mucosa every 12 hours  chlorhexidine 2% Cloths 1 Application(s) Topical daily  chlorhexidine 2% Cloths 1 Application(s) Topical <User Schedule>  heparin   Injectable 5000 Unit(s) SubCutaneous every 8 hours  labetalol 100 milliGRAM(s) Oral three times a day  levothyroxine 100 MICROGram(s) Oral daily  lidocaine 1%/epinephrine 1:100,000 Inj 20 milliLiter(s) Local Injection once  lidocaine 2% Viscous 10 milliLiter(s) Oral once  methadone    Tablet 10 milliGRAM(s) Oral two times a day  pantoprazole   Suspension 40 milliGRAM(s) Oral daily  petrolatum Ophthalmic Ointment 1 Application(s) Both EYES daily  senna 2 Tablet(s) Oral at bedtime    MEDICATIONS  (PRN):  polyethylene glycol 3350 17 Gram(s) Oral daily PRN Constipation  scopolamine 1 mG/72 Hr(s) Patch 1 Patch Transdermal every 72 hours PRN for secretions          Xrays:  TLC:  OG:  ET tube:                                                                                       ECHO:  CAM ICU:

## 2024-04-28 NOTE — PROGRESS NOTE ADULT - SUBJECTIVE AND OBJECTIVE BOX
Pt calm    T(F): , Max: 97.2 (04-28-24 @ 15:01)  HR: 63 (04-28-24 @ 20:00) (57 - 74)  BP: 129/61 (04-28-24 @ 20:00)  RR: 38 (04-28-24 @ 20:00)  SpO2: 100% (04-28-24 @ 20:00)  IN: 935 mL / OUT: 2100 mL / NET: -1165 mL    IN: 780 mL / OUT: 750 mL / NET: 30 mL      General: No apparent distress  Cardiovascular: S1, S2  Gastrointestinal: Soft, Non-tender, Non-distended  Respiratory: Good air entry bilaterally, trach  Lymphatic: extendsive edema  Dermatologic: Skin dry                          7.5    7.01  )-----------( 353      ( 28 Apr 2024 05:42 )             23.1     04-28    130<L>  |  93<L>  |  32<H>  ----------------------------<  131<H>  5.4<H>   |  29  |  1.1    Ca    7.9<L>      28 Apr 2024 05:42  Mg     2.0     04-28    TPro  6.1  /  Alb  2.1<L>  /  TBili  <0.2  /  DBili  x   /  AST  39  /  ALT  5   /  AlkPhos  148<H>  04-28

## 2024-04-28 NOTE — PROGRESS NOTE ADULT - ASSESSMENT
74-year-old female with PHMx of pancreatic CA s/p Whipple, CKD stage 3, HTN, COPD, pulmonary fibrosis, PRESS syndrome (hospitalized in 2019), Raynaud's syndrome, ANCA vasculitis, Scleroderma, hypothyroid  presents to ED due to cardiac arrest.     acute respiratory failure / cardiac arrest / anoxic brain injury      - s/p trach 4/18/24  - Trach sutures to be removed today   -peg feeds   LTACH vs vent snf placement      very poor prognosis

## 2024-04-29 LAB
ALBUMIN SERPL ELPH-MCNC: 2.1 G/DL — LOW (ref 3.5–5.2)
ALP SERPL-CCNC: 162 U/L — HIGH (ref 30–115)
ALT FLD-CCNC: 6 U/L — SIGNIFICANT CHANGE UP (ref 0–41)
ANION GAP SERPL CALC-SCNC: 6 MMOL/L — LOW (ref 7–14)
ANION GAP SERPL CALC-SCNC: 7 MMOL/L — SIGNIFICANT CHANGE UP (ref 7–14)
AST SERPL-CCNC: 46 U/L — HIGH (ref 0–41)
BASOPHILS # BLD AUTO: 0.02 K/UL — SIGNIFICANT CHANGE UP (ref 0–0.2)
BASOPHILS NFR BLD AUTO: 0.2 % — SIGNIFICANT CHANGE UP (ref 0–1)
BILIRUB SERPL-MCNC: <0.2 MG/DL — SIGNIFICANT CHANGE UP (ref 0.2–1.2)
BUN SERPL-MCNC: 34 MG/DL — HIGH (ref 10–20)
BUN SERPL-MCNC: 34 MG/DL — HIGH (ref 10–20)
CALCIUM SERPL-MCNC: 7.9 MG/DL — LOW (ref 8.4–10.5)
CALCIUM SERPL-MCNC: 8 MG/DL — LOW (ref 8.4–10.5)
CHLORIDE SERPL-SCNC: 95 MMOL/L — LOW (ref 98–110)
CHLORIDE SERPL-SCNC: 95 MMOL/L — LOW (ref 98–110)
CO2 SERPL-SCNC: 28 MMOL/L — SIGNIFICANT CHANGE UP (ref 17–32)
CO2 SERPL-SCNC: 29 MMOL/L — SIGNIFICANT CHANGE UP (ref 17–32)
CREAT SERPL-MCNC: 1.1 MG/DL — SIGNIFICANT CHANGE UP (ref 0.7–1.5)
CREAT SERPL-MCNC: 1.2 MG/DL — SIGNIFICANT CHANGE UP (ref 0.7–1.5)
EGFR: 47 ML/MIN/1.73M2 — LOW
EGFR: 52 ML/MIN/1.73M2 — LOW
EOSINOPHIL # BLD AUTO: 0.22 K/UL — SIGNIFICANT CHANGE UP (ref 0–0.7)
EOSINOPHIL NFR BLD AUTO: 2.4 % — SIGNIFICANT CHANGE UP (ref 0–8)
GLUCOSE BLDC GLUCOMTR-MCNC: 138 MG/DL — HIGH (ref 70–99)
GLUCOSE BLDC GLUCOMTR-MCNC: 148 MG/DL — HIGH (ref 70–99)
GLUCOSE SERPL-MCNC: 119 MG/DL — HIGH (ref 70–99)
GLUCOSE SERPL-MCNC: 125 MG/DL — HIGH (ref 70–99)
HCT VFR BLD CALC: 23.4 % — LOW (ref 37–47)
HGB BLD-MCNC: 7.5 G/DL — LOW (ref 12–16)
IMM GRANULOCYTES NFR BLD AUTO: 0.3 % — SIGNIFICANT CHANGE UP (ref 0.1–0.3)
LYMPHOCYTES # BLD AUTO: 0.72 K/UL — LOW (ref 1.2–3.4)
LYMPHOCYTES # BLD AUTO: 7.9 % — LOW (ref 20.5–51.1)
MAGNESIUM SERPL-MCNC: 2 MG/DL — SIGNIFICANT CHANGE UP (ref 1.8–2.4)
MCHC RBC-ENTMCNC: 25.1 PG — LOW (ref 27–31)
MCHC RBC-ENTMCNC: 32.1 G/DL — SIGNIFICANT CHANGE UP (ref 32–37)
MCV RBC AUTO: 78.3 FL — LOW (ref 81–99)
MONOCYTES # BLD AUTO: 0.64 K/UL — HIGH (ref 0.1–0.6)
MONOCYTES NFR BLD AUTO: 7 % — SIGNIFICANT CHANGE UP (ref 1.7–9.3)
NEUTROPHILS # BLD AUTO: 7.51 K/UL — HIGH (ref 1.4–6.5)
NEUTROPHILS NFR BLD AUTO: 82.2 % — HIGH (ref 42.2–75.2)
NRBC # BLD: 0 /100 WBCS — SIGNIFICANT CHANGE UP (ref 0–0)
PLATELET # BLD AUTO: 348 K/UL — SIGNIFICANT CHANGE UP (ref 130–400)
PMV BLD: 9.7 FL — SIGNIFICANT CHANGE UP (ref 7.4–10.4)
POTASSIUM SERPL-MCNC: 5.6 MMOL/L — HIGH (ref 3.5–5)
POTASSIUM SERPL-MCNC: 5.8 MMOL/L — HIGH (ref 3.5–5)
POTASSIUM SERPL-SCNC: 5.6 MMOL/L — HIGH (ref 3.5–5)
POTASSIUM SERPL-SCNC: 5.8 MMOL/L — HIGH (ref 3.5–5)
PROT SERPL-MCNC: 6.2 G/DL — SIGNIFICANT CHANGE UP (ref 6–8)
RBC # BLD: 2.99 M/UL — LOW (ref 4.2–5.4)
RBC # FLD: 17.2 % — HIGH (ref 11.5–14.5)
SODIUM SERPL-SCNC: 129 MMOL/L — LOW (ref 135–146)
SODIUM SERPL-SCNC: 131 MMOL/L — LOW (ref 135–146)
WBC # BLD: 9.14 K/UL — SIGNIFICANT CHANGE UP (ref 4.8–10.8)
WBC # FLD AUTO: 9.14 K/UL — SIGNIFICANT CHANGE UP (ref 4.8–10.8)

## 2024-04-29 PROCEDURE — 99232 SBSQ HOSP IP/OBS MODERATE 35: CPT

## 2024-04-29 RX ORDER — SODIUM ZIRCONIUM CYCLOSILICATE 10 G/10G
10 POWDER, FOR SUSPENSION ORAL EVERY 8 HOURS
Refills: 0 | Status: COMPLETED | OUTPATIENT
Start: 2024-04-29 | End: 2024-05-01

## 2024-04-29 RX ORDER — INSULIN HUMAN 100 [IU]/ML
10 INJECTION, SOLUTION SUBCUTANEOUS ONCE
Refills: 0 | Status: COMPLETED | OUTPATIENT
Start: 2024-04-29 | End: 2024-04-29

## 2024-04-29 RX ORDER — SODIUM ZIRCONIUM CYCLOSILICATE 10 G/10G
10 POWDER, FOR SUSPENSION ORAL ONCE
Refills: 0 | Status: COMPLETED | OUTPATIENT
Start: 2024-04-29 | End: 2024-04-29

## 2024-04-29 RX ORDER — DEXTROSE 50 % IN WATER 50 %
50 SYRINGE (ML) INTRAVENOUS ONCE
Refills: 0 | Status: COMPLETED | OUTPATIENT
Start: 2024-04-29 | End: 2024-04-29

## 2024-04-29 RX ADMIN — Medication 1 DROP(S): at 21:05

## 2024-04-29 RX ADMIN — Medication 1 DROP(S): at 05:29

## 2024-04-29 RX ADMIN — Medication 100 MILLIGRAM(S): at 05:28

## 2024-04-29 RX ADMIN — PANTOPRAZOLE SODIUM 40 MILLIGRAM(S): 20 TABLET, DELAYED RELEASE ORAL at 11:51

## 2024-04-29 RX ADMIN — Medication 50 MILLILITER(S): at 14:41

## 2024-04-29 RX ADMIN — SODIUM ZIRCONIUM CYCLOSILICATE 10 GRAM(S): 10 POWDER, FOR SUSPENSION ORAL at 23:12

## 2024-04-29 RX ADMIN — CHLORHEXIDINE GLUCONATE 1 APPLICATION(S): 213 SOLUTION TOPICAL at 11:51

## 2024-04-29 RX ADMIN — SENNA PLUS 2 TABLET(S): 8.6 TABLET ORAL at 21:05

## 2024-04-29 RX ADMIN — Medication 100 MILLIGRAM(S): at 21:05

## 2024-04-29 RX ADMIN — CHLORHEXIDINE GLUCONATE 15 MILLILITER(S): 213 SOLUTION TOPICAL at 17:14

## 2024-04-29 RX ADMIN — SCOPALAMINE 1 PATCH: 1 PATCH, EXTENDED RELEASE TRANSDERMAL at 22:32

## 2024-04-29 RX ADMIN — SCOPALAMINE 1 PATCH: 1 PATCH, EXTENDED RELEASE TRANSDERMAL at 18:30

## 2024-04-29 RX ADMIN — METHADONE HYDROCHLORIDE 10 MILLIGRAM(S): 40 TABLET ORAL at 05:28

## 2024-04-29 RX ADMIN — INSULIN HUMAN 10 UNIT(S): 100 INJECTION, SOLUTION SUBCUTANEOUS at 14:42

## 2024-04-29 RX ADMIN — INSULIN HUMAN 10 UNIT(S): 100 INJECTION, SOLUTION SUBCUTANEOUS at 09:58

## 2024-04-29 RX ADMIN — HEPARIN SODIUM 5000 UNIT(S): 5000 INJECTION INTRAVENOUS; SUBCUTANEOUS at 05:28

## 2024-04-29 RX ADMIN — Medication 50 MILLILITER(S): at 10:01

## 2024-04-29 RX ADMIN — AMLODIPINE BESYLATE 5 MILLIGRAM(S): 2.5 TABLET ORAL at 05:28

## 2024-04-29 RX ADMIN — METHADONE HYDROCHLORIDE 10 MILLIGRAM(S): 40 TABLET ORAL at 17:13

## 2024-04-29 RX ADMIN — SODIUM ZIRCONIUM CYCLOSILICATE 10 GRAM(S): 10 POWDER, FOR SUSPENSION ORAL at 17:13

## 2024-04-29 RX ADMIN — SCOPALAMINE 1 PATCH: 1 PATCH, EXTENDED RELEASE TRANSDERMAL at 08:35

## 2024-04-29 RX ADMIN — Medication 100 MICROGRAM(S): at 05:28

## 2024-04-29 RX ADMIN — HEPARIN SODIUM 5000 UNIT(S): 5000 INJECTION INTRAVENOUS; SUBCUTANEOUS at 21:04

## 2024-04-29 RX ADMIN — SODIUM ZIRCONIUM CYCLOSILICATE 10 GRAM(S): 10 POWDER, FOR SUSPENSION ORAL at 10:21

## 2024-04-29 RX ADMIN — Medication 100 MILLIGRAM(S): at 14:47

## 2024-04-29 RX ADMIN — CHLORHEXIDINE GLUCONATE 1 APPLICATION(S): 213 SOLUTION TOPICAL at 05:29

## 2024-04-29 RX ADMIN — Medication 1 DROP(S): at 14:45

## 2024-04-29 RX ADMIN — Medication 1 APPLICATION(S): at 11:43

## 2024-04-29 RX ADMIN — HEPARIN SODIUM 5000 UNIT(S): 5000 INJECTION INTRAVENOUS; SUBCUTANEOUS at 14:29

## 2024-04-29 RX ADMIN — CHLORHEXIDINE GLUCONATE 15 MILLILITER(S): 213 SOLUTION TOPICAL at 05:29

## 2024-04-29 RX ADMIN — SCOPALAMINE 1 PATCH: 1 PATCH, EXTENDED RELEASE TRANSDERMAL at 21:48

## 2024-04-29 NOTE — PROGRESS NOTE ADULT - ASSESSMENT
74-year-old female with PHMx of pancreatic CA s/p Whipple, CKD stage 3, HTN, COPD, pulmonary fibrosis, PRESS syndrome (hospitalized in 2019), Raynaud's syndrome, ANCA vasculitis, Scleroderma  presents to ED due to cardiac arrest for 30 minutes.   acute respiratory failure / cardiac arrest / anoxic brain injury / HTN / aspiration pneumonia / hyper K   - s/p trach 4/18/24, Trach sutures to be removed after 10 days (4/28)   - s/p PEG by surgery 4/25- started feeds    - anoxic brain injury as seen on CT brain and EEG  - lokelma, repeat K level    - DC planning to NH - case management aware   - completed antibiotic course    - monitor BP  - will need TOV    - DVT and GI prophylaxis   74-year-old female with PHMx of pancreatic CA s/p Whipple, CKD stage 3, HTN, COPD, pulmonary fibrosis, PRESS syndrome (hospitalized in 2019), Raynaud's syndrome, ANCA vasculitis, Scleroderma  presents to ED due to cardiac arrest for 30 minutes.       acute respiratory failure / cardiac arrest / anoxic brain injury / HTN / aspiration pneumonia / hyper K   - s/p trach 4/18/24, Trach sutures to be removed after 10 days (4/28)   - s/p PEG by surgery 4/25- started feeds    - anoxic brain injury as seen on CT brain and EEG  - lokelma, repeat K level    - DC planning to NH - case management aware   - completed antibiotic course    - monitor BP  - will need TOV    - DVT and GI prophylaxis   74-year-old female with PHMx of pancreatic CA s/p Whipple, CKD stage 3, HTN, COPD, pulmonary fibrosis, PRESS syndrome (hospitalized in 2019), Raynaud's syndrome, ANCA vasculitis, Scleroderma  presents to ED due to cardiac arrest for 30 minutes. Hospital course complicated by anoxic brain injury, aspiration PNA. Pt is medically cleared to be discharged to LTACH/NH.     #s/p cardiac arrest 30 minutes  #anoxic brain injury   #acute respiratory failure s/p intubation now trach/peg  - s/p trach 4/18/24, trach sutures removed  - s/p PEG by 4/25  - anoxic brain injury as seen on CT brain and EEG  - completed antibiotic course   - monitor BP  - DC planning to NH - case management aware    #hyperK  - lokelma, repeat K level   - monitor BMP  - continue with lokelma q8    #HTN  - continue with labetalol, amlodipine    #Misc:  - DVT ppx: heparin  - Gi ppx: protonix  - Diet: NPO with tube feeds  - Dispo: dc planning to LTACH/NH

## 2024-04-29 NOTE — PROGRESS NOTE ADULT - SUBJECTIVE AND OBJECTIVE BOX
Patient is a 74y old  Female who presents with a chief complaint of cardiac arrest (28 Apr 2024 13:11)      Over Night Events:  Patient seen and examined.   no acute changes     ROS:  See HPI    PHYSICAL EXAM    ICU Vital Signs Last 24 Hrs  T(C): 36.3 (29 Apr 2024 07:00), Max: 36.3 (29 Apr 2024 07:00)  T(F): 97.3 (29 Apr 2024 07:00), Max: 97.3 (29 Apr 2024 07:00)  HR: 73 (29 Apr 2024 06:10) (57 - 74)  BP: 120/57 (29 Apr 2024 06:10) (116/62 - 161/77)  BP(mean): 82 (29 Apr 2024 06:10) (82 - 117)  ABP: --  ABP(mean): --  RR: 25 (29 Apr 2024 07:00) (25 - 122)  SpO2: 100% (29 Apr 2024 06:10) (100% - 100%)    O2 Parameters below as of 28 Apr 2024 19:15  Patient On (Oxygen Delivery Method): ventilator    O2 Concentration (%): 40        Genera open eyes   HEENT:             trach    Lymph Nodes: NO cervical LN   Lungs: Bilateral BS  Cardiovascular: Regular   Abdomen: Soft, Positive BS  Extremities: No clubbing   Skin: warm   Neurological: positive gag        I&O's Detail    28 Apr 2024 07:01  -  29 Apr 2024 07:00  --------------------------------------------------------  IN:    Free Water: 500 mL    Osmolite 1.2: 1035 mL  Total IN: 1535 mL    OUT:    Indwelling Catheter - Urethral (mL): 1650 mL  Total OUT: 1650 mL    Total NET: -115 mL          LABS:                          7.5    7.01  )-----------( 353      ( 28 Apr 2024 05:42 )             23.1         28 Apr 2024 05:42    130    |  93     |  32     ----------------------------<  131    5.4     |  29     |  1.1      Ca    7.9        28 Apr 2024 05:42  Mg     2.0       28 Apr 2024 05:42                                                                                      Urinalysis Basic - ( 28 Apr 2024 05:42 )    Color: x / Appearance: x / SG: x / pH: x  Gluc: 131 mg/dL / Ketone: x  / Bili: x / Urobili: x   Blood: x / Protein: x / Nitrite: x   Leuk Esterase: x / RBC: x / WBC x   Sq Epi: x / Non Sq Epi: x / Bacteria: x                                                                                                             Mode: AC/ CMV (Assist Control/ Continuous Mandatory Ventilation)  RR (machine): 28  TV (machine): 300  FiO2: 40  PEEP: 5  ITime: 0.8  MAP: 12  PIP: 27                                          MEDICATIONS  (STANDING):  amLODIPine   Tablet 5 milliGRAM(s) Oral daily  artificial  tears Solution 1 Drop(s) Both EYES three times a day  chlorhexidine 0.12% Liquid 15 milliLiter(s) Oral Mucosa every 12 hours  chlorhexidine 2% Cloths 1 Application(s) Topical <User Schedule>  chlorhexidine 2% Cloths 1 Application(s) Topical daily  heparin   Injectable 5000 Unit(s) SubCutaneous every 8 hours  labetalol 100 milliGRAM(s) Oral three times a day  levothyroxine 100 MICROGram(s) Oral daily  lidocaine 1%/epinephrine 1:100,000 Inj 20 milliLiter(s) Local Injection once  lidocaine 2% Viscous 10 milliLiter(s) Oral once  methadone    Tablet 10 milliGRAM(s) Oral two times a day  pantoprazole   Suspension 40 milliGRAM(s) Oral daily  petrolatum Ophthalmic Ointment 1 Application(s) Both EYES daily  senna 2 Tablet(s) Oral at bedtime    MEDICATIONS  (PRN):  polyethylene glycol 3350 17 Gram(s) Oral daily PRN Constipation  scopolamine 1 mG/72 Hr(s) Patch 1 Patch Transdermal every 72 hours PRN for secretions          Xrays:  TLC:  OG:  ET tube:                                                                                       ECHO:  [facility] ICU:

## 2024-04-29 NOTE — PROGRESS NOTE ADULT - ASSESSMENT
IMPRESSION:    Anoxic brain damage  Acute Hypoxemic Respiratory Failure on MV SP trach and PEG  SP CPA downtime 30 minutes   Cardiac bradycardia   Lung Fibrosis   GIUSEPPE, resolved   Hyperkalemia   HO Raynaud's  HO PRESS    HO Anemia  HO Pancreatic cancer     PLAN:    CNS: off sedation   continue on methadone  monitor Qtc    HEENT: oral and trach care     PULMONARY: keep pox >92%  monitor peak and plateau  no vent changes, pressure support as tolerated     CARDIOVASCULAR:  2D-Echo noted, continue antihypertensives     RENAL: follow is and os, trend Cr, Nephrology follow up, continue lokelma monitor BMP Q6H     INFECTIOUS DISEASE: cultures negative to date, completed Zosyn course    GASTRO: GI ppx, PEG feeds     HEMATOLOGICAL:  DVT prophylaxis. follow morning h/h     ENDOCRINE:  Follow up FS.  Insulin protocol if needed. cortisol level noted     MUSCULOSKELETAL: bedrest    Full Code, Palliative care following    Grave prognosis     The patient has a high probability of further deterioration.    /case mgmt for LTAC placement/disposition    Downgrade to GMF or vent unit   social service for d/c planning

## 2024-04-29 NOTE — PROGRESS NOTE ADULT - SUBJECTIVE AND OBJECTIVE BOX
KIRAN MORFIN 74y Female  MRN#: [id number]   Hospital Day: 23d    SUBJECTIVE  Patient is a 74y old Female who presents with a chief complaint of cardiac arrest (29 Apr 2024 11:58)  Currently admitted to medicine with the primary diagnosis of Cardiac arrest      INTERVAL HPI AND OVERNIGHT EVENTS:  Patient was examined and seen at bedside. This morning she is resting comfortably in bed.    OBJECTIVE  PAST MEDICAL & SURGICAL HISTORY  H/O vasculitis    Pancreatic cancer    History of COPD    Hypertension    H/O Raynaud's syndrome    Stage 3 chronic kidney disease    History of knee replacement    H/O Whipple procedure      ALLERGIES:  celecoxib (Rash)  Originally Entered as [U UNKNOWN] reaction to [celebrit] (Unknown)    MEDICATIONS:  STANDING MEDICATIONS  amLODIPine   Tablet 5 milliGRAM(s) Oral daily  artificial  tears Solution 1 Drop(s) Both EYES three times a day  chlorhexidine 0.12% Liquid 15 milliLiter(s) Oral Mucosa every 12 hours  chlorhexidine 2% Cloths 1 Application(s) Topical <User Schedule>  chlorhexidine 2% Cloths 1 Application(s) Topical daily  dextrose 50% Injectable 50 milliLiter(s) IV Push once  heparin   Injectable 5000 Unit(s) SubCutaneous every 8 hours  insulin regular  human recombinant 10 Unit(s) IV Push once  labetalol 100 milliGRAM(s) Oral three times a day  levothyroxine 100 MICROGram(s) Oral daily  lidocaine 1%/epinephrine 1:100,000 Inj 20 milliLiter(s) Local Injection once  lidocaine 2% Viscous 10 milliLiter(s) Oral once  methadone    Tablet 10 milliGRAM(s) Oral two times a day  pantoprazole   Suspension 40 milliGRAM(s) Oral daily  petrolatum Ophthalmic Ointment 1 Application(s) Both EYES daily  senna 2 Tablet(s) Oral at bedtime  sodium zirconium cyclosilicate 10 Gram(s) Oral every 8 hours    PRN MEDICATIONS  polyethylene glycol 3350 17 Gram(s) Oral daily PRN  scopolamine 1 mG/72 Hr(s) Patch 1 Patch Transdermal every 72 hours PRN      VITAL SIGNS: Last 24 Hours  T(C): 36.3 (29 Apr 2024 07:00), Max: 36.3 (29 Apr 2024 07:00)  T(F): 97.3 (29 Apr 2024 07:00), Max: 97.3 (29 Apr 2024 07:00)  HR: 69 (29 Apr 2024 10:00) (57 - 74)  BP: 155/74 (29 Apr 2024 10:00) (120/57 - 161/77)  BP(mean): 106 (29 Apr 2024 10:00) (82 - 117)  RR: 36 (29 Apr 2024 10:00) (25 - 72)  SpO2: 100% (29 Apr 2024 10:00) (100% - 100%)    LABS:                        7.5    9.14  )-----------( 348      ( 29 Apr 2024 07:03 )             23.4     04-29    129<L>  |  95<L>  |  34<H>  ----------------------------<  125<H>  5.6<H>   |  28  |  1.1    Ca    8.0<L>      29 Apr 2024 11:00  Mg     2.0     04-29    TPro  6.2  /  Alb  2.1<L>  /  TBili  <0.2  /  DBili  x   /  AST  46<H>  /  ALT  6   /  AlkPhos  162<H>  04-29      Urinalysis Basic - ( 29 Apr 2024 11:00 )    Color: x / Appearance: x / SG: x / pH: x  Gluc: 125 mg/dL / Ketone: x  / Bili: x / Urobili: x   Blood: x / Protein: x / Nitrite: x   Leuk Esterase: x / RBC: x / WBC x   Sq Epi: x / Non Sq Epi: x / Bacteria: x                PHYSICAL EXAM:  CONSTITUTIONAL: No acute distress  PULMONARY: bilateral breath sounds  CARDIOVASCULAR: Regular rate and rhythm  GASTROINTESTINAL: Soft, non-tender, non-distended; bowel sounds present. PEG tube present  MUSCULOSKELETAL: 2+ peripheral pulses; no clubbing, no cyanosis, no edema  SKIN: No rashes or lesions; warm and dry

## 2024-04-29 NOTE — PROGRESS NOTE ADULT - SUBJECTIVE AND OBJECTIVE BOX
Patient is a 74y old  Female who presents with a chief complaint of cardiac arrest (29 Apr 2024 07:55)      OVERNIGHT EVENTS: no major events reported     SUBJECTIVE / INTERVAL HPI: Patient seen and examined at bedside.     VITAL SIGNS:  Vital Signs Last 24 Hrs  T(C): 36.3 (29 Apr 2024 07:00), Max: 36.3 (29 Apr 2024 07:00)  T(F): 97.3 (29 Apr 2024 07:00), Max: 97.3 (29 Apr 2024 07:00)  HR: 69 (29 Apr 2024 10:00) (57 - 74)  BP: 155/74 (29 Apr 2024 10:00) (120/57 - 161/77)  BP(mean): 106 (29 Apr 2024 10:00) (82 - 117)  RR: 36 (29 Apr 2024 10:00) (25 - 72)  SpO2: 100% (29 Apr 2024 10:00) (100% - 100%)    Parameters below as of 29 Apr 2024 10:00  Patient On (Oxygen Delivery Method): ventilator    O2 Concentration (%): 40    PHYSICAL EXAM:    General: old thin female chronically looks ill   HEENT: NC/AT; PERRL, clear conjunctiva  Neck: supple, + trach   Cardiovascular: +S1/S2; RRR  Respiratory: CTA b/l; no W/R/R  Gastrointestinal: soft, NT/ND; +BSx4  +ve hill   Extremities: WWP; 2+ peripheral pulses; no edema   Neurological: lethargic, not following commands     MEDICATIONS:  MEDICATIONS  (STANDING):  amLODIPine   Tablet 5 milliGRAM(s) Oral daily  artificial  tears Solution 1 Drop(s) Both EYES three times a day  chlorhexidine 0.12% Liquid 15 milliLiter(s) Oral Mucosa every 12 hours  chlorhexidine 2% Cloths 1 Application(s) Topical daily  chlorhexidine 2% Cloths 1 Application(s) Topical <User Schedule>  heparin   Injectable 5000 Unit(s) SubCutaneous every 8 hours  labetalol 100 milliGRAM(s) Oral three times a day  levothyroxine 100 MICROGram(s) Oral daily  lidocaine 1%/epinephrine 1:100,000 Inj 20 milliLiter(s) Local Injection once  lidocaine 2% Viscous 10 milliLiter(s) Oral once  methadone    Tablet 10 milliGRAM(s) Oral two times a day  pantoprazole   Suspension 40 milliGRAM(s) Oral daily  petrolatum Ophthalmic Ointment 1 Application(s) Both EYES daily  senna 2 Tablet(s) Oral at bedtime    MEDICATIONS  (PRN):  polyethylene glycol 3350 17 Gram(s) Oral daily PRN Constipation  scopolamine 1 mG/72 Hr(s) Patch 1 Patch Transdermal every 72 hours PRN for secretions      ALLERGIES:  Allergies    celecoxib (Rash)  Originally Entered as [U UNKNOWN] reaction to [celebrit] (Unknown)    Intolerances        LABS:                        7.5    9.14  )-----------( 348      ( 29 Apr 2024 07:03 )             23.4     04-29    131<L>  |  95<L>  |  34<H>  ----------------------------<  119<H>  5.8<H>   |  29  |  1.2    Ca    7.9<L>      29 Apr 2024 07:03  Mg     2.0     04-29    TPro  6.2  /  Alb  2.1<L>  /  TBili  <0.2  /  DBili  x   /  AST  46<H>  /  ALT  6   /  AlkPhos  162<H>  04-29      Urinalysis Basic - ( 29 Apr 2024 07:03 )    Color: x / Appearance: x / SG: x / pH: x  Gluc: 119 mg/dL / Ketone: x  / Bili: x / Urobili: x   Blood: x / Protein: x / Nitrite: x   Leuk Esterase: x / RBC: x / WBC x   Sq Epi: x / Non Sq Epi: x / Bacteria: x      CAPILLARY BLOOD GLUCOSE      POCT Blood Glucose.: 138 mg/dL (29 Apr 2024 09:55)      RADIOLOGY & ADDITIONAL TESTS: Reviewed.    ASSESSMENT:    PLAN:

## 2024-04-29 NOTE — CHART NOTE - NSCHARTNOTEFT_GEN_A_CORE
GENERAL SURGERY NOTE    Trach sutures removed at bedside today. Site appears clean, dry, and intact. G-tube clean, dry and intact.     Surgery to sign off, please recall as needed.    General Surgery  SPECTRA [id number]

## 2024-04-29 NOTE — PROGRESS NOTE ADULT - ASSESSMENT
74-year-old female with PHMx of pancreatic CA s/p Whipple, CKD stage 3, HTN, COPD, pulmonary fibrosis, PRESS syndrome (hospitalized in 2019), Raynaud's syndrome, ANCA vasculitis, Scleroderma, hypothyroid  presents to ED due to cardiac arrest. Pt lives at home with son and daughter, went to use the bathroom and became unresponsive.  EMS intubated her and gave her 4 rounds of epinephrine.  ROSC was achieved within a minute of her arrival to the ED.  Patient was found to be hypothermic and bradycardic    acute respiratory failure / cardiac arrest / anoxic brain injury / HTN / aspiration pneumonia /  Hyponatremia/ hyperkalemia      - completed antibiotic course    - s/p trach 4/18/24, Trach sutures to be removed after 10 days (4/28)   - s/p PEG tube by Surgery   - anoxic brain injury as seen on CT brain and EEG   - Labetalol, hold lisinopril for Hyper K   - K still elevated, needs daily Lokelma, repeat K level    - DVT and GI prophylaxis   - DC planning to  LTACH vs vent snf placement

## 2024-04-30 LAB
ALBUMIN SERPL ELPH-MCNC: 2.1 G/DL — LOW (ref 3.5–5.2)
ALP SERPL-CCNC: 152 U/L — HIGH (ref 30–115)
ALT FLD-CCNC: 8 U/L — SIGNIFICANT CHANGE UP (ref 0–41)
ANION GAP SERPL CALC-SCNC: 5 MMOL/L — LOW (ref 7–14)
AST SERPL-CCNC: 52 U/L — HIGH (ref 0–41)
BASOPHILS # BLD AUTO: 0.04 K/UL — SIGNIFICANT CHANGE UP (ref 0–0.2)
BASOPHILS NFR BLD AUTO: 0.6 % — SIGNIFICANT CHANGE UP (ref 0–1)
BILIRUB SERPL-MCNC: 0.2 MG/DL — SIGNIFICANT CHANGE UP (ref 0.2–1.2)
BUN SERPL-MCNC: 35 MG/DL — HIGH (ref 10–20)
CALCIUM SERPL-MCNC: 7.9 MG/DL — LOW (ref 8.4–10.5)
CHLORIDE SERPL-SCNC: 91 MMOL/L — LOW (ref 98–110)
CO2 SERPL-SCNC: 31 MMOL/L — SIGNIFICANT CHANGE UP (ref 17–32)
CREAT ?TM UR-MCNC: 13 MG/DL — SIGNIFICANT CHANGE UP
CREAT SERPL-MCNC: 1.1 MG/DL — SIGNIFICANT CHANGE UP (ref 0.7–1.5)
EGFR: 52 ML/MIN/1.73M2 — LOW
EOSINOPHIL # BLD AUTO: 0.31 K/UL — SIGNIFICANT CHANGE UP (ref 0–0.7)
EOSINOPHIL NFR BLD AUTO: 5 % — SIGNIFICANT CHANGE UP (ref 0–8)
GLUCOSE SERPL-MCNC: 105 MG/DL — HIGH (ref 70–99)
HCT VFR BLD CALC: 22 % — LOW (ref 37–47)
HGB BLD-MCNC: 7.4 G/DL — LOW (ref 12–16)
IMM GRANULOCYTES NFR BLD AUTO: 0.5 % — HIGH (ref 0.1–0.3)
LYMPHOCYTES # BLD AUTO: 0.95 K/UL — LOW (ref 1.2–3.4)
LYMPHOCYTES # BLD AUTO: 15.3 % — LOW (ref 20.5–51.1)
MAGNESIUM SERPL-MCNC: 2 MG/DL — SIGNIFICANT CHANGE UP (ref 1.8–2.4)
MCHC RBC-ENTMCNC: 27.9 PG — SIGNIFICANT CHANGE UP (ref 27–31)
MCHC RBC-ENTMCNC: 33.6 G/DL — SIGNIFICANT CHANGE UP (ref 32–37)
MCV RBC AUTO: 83 FL — SIGNIFICANT CHANGE UP (ref 81–99)
MONOCYTES # BLD AUTO: 0.72 K/UL — HIGH (ref 0.1–0.6)
MONOCYTES NFR BLD AUTO: 11.6 % — HIGH (ref 1.7–9.3)
NEUTROPHILS # BLD AUTO: 4.14 K/UL — SIGNIFICANT CHANGE UP (ref 1.4–6.5)
NEUTROPHILS NFR BLD AUTO: 67 % — SIGNIFICANT CHANGE UP (ref 42.2–75.2)
NRBC # BLD: 0 /100 WBCS — SIGNIFICANT CHANGE UP (ref 0–0)
OSMOLALITY UR: 251 MOS/KG — SIGNIFICANT CHANGE UP (ref 50–1200)
PLATELET # BLD AUTO: 355 K/UL — SIGNIFICANT CHANGE UP (ref 130–400)
PMV BLD: 9.9 FL — SIGNIFICANT CHANGE UP (ref 7.4–10.4)
POTASSIUM SERPL-MCNC: 4.8 MMOL/L — SIGNIFICANT CHANGE UP (ref 3.5–5)
POTASSIUM SERPL-SCNC: 4.8 MMOL/L — SIGNIFICANT CHANGE UP (ref 3.5–5)
POTASSIUM UR-SCNC: 11 MMOL/L — SIGNIFICANT CHANGE UP
PROT ?TM UR-MCNC: 22 MG/DLG/24H — SIGNIFICANT CHANGE UP
PROT SERPL-MCNC: 6.4 G/DL — SIGNIFICANT CHANGE UP (ref 6–8)
PROT/CREAT UR-RTO: 1.7 RATIO — HIGH (ref 0–0.2)
RBC # BLD: 2.65 M/UL — LOW (ref 4.2–5.4)
RBC # FLD: 17.5 % — HIGH (ref 11.5–14.5)
SODIUM SERPL-SCNC: 127 MMOL/L — LOW (ref 135–146)
SODIUM UR-SCNC: 88 MMOL/L — SIGNIFICANT CHANGE UP
UUN UR-MCNC: 184 MG/DL — SIGNIFICANT CHANGE UP
WBC # BLD: 6.19 K/UL — SIGNIFICANT CHANGE UP (ref 4.8–10.8)
WBC # FLD AUTO: 6.19 K/UL — SIGNIFICANT CHANGE UP (ref 4.8–10.8)

## 2024-04-30 PROCEDURE — 99232 SBSQ HOSP IP/OBS MODERATE 35: CPT

## 2024-04-30 PROCEDURE — 99233 SBSQ HOSP IP/OBS HIGH 50: CPT

## 2024-04-30 PROCEDURE — 71045 X-RAY EXAM CHEST 1 VIEW: CPT | Mod: 26

## 2024-04-30 RX ORDER — FUROSEMIDE 40 MG
40 TABLET ORAL ONCE
Refills: 0 | Status: COMPLETED | OUTPATIENT
Start: 2024-04-30 | End: 2024-04-30

## 2024-04-30 RX ADMIN — Medication 100 MILLIGRAM(S): at 21:10

## 2024-04-30 RX ADMIN — SENNA PLUS 2 TABLET(S): 8.6 TABLET ORAL at 21:08

## 2024-04-30 RX ADMIN — Medication 1 DROP(S): at 13:53

## 2024-04-30 RX ADMIN — PANTOPRAZOLE SODIUM 40 MILLIGRAM(S): 20 TABLET, DELAYED RELEASE ORAL at 13:52

## 2024-04-30 RX ADMIN — CHLORHEXIDINE GLUCONATE 15 MILLILITER(S): 213 SOLUTION TOPICAL at 17:16

## 2024-04-30 RX ADMIN — CHLORHEXIDINE GLUCONATE 1 APPLICATION(S): 213 SOLUTION TOPICAL at 05:30

## 2024-04-30 RX ADMIN — Medication 100 MILLIGRAM(S): at 13:52

## 2024-04-30 RX ADMIN — CHLORHEXIDINE GLUCONATE 1 APPLICATION(S): 213 SOLUTION TOPICAL at 23:02

## 2024-04-30 RX ADMIN — HEPARIN SODIUM 5000 UNIT(S): 5000 INJECTION INTRAVENOUS; SUBCUTANEOUS at 13:52

## 2024-04-30 RX ADMIN — SCOPALAMINE 1 PATCH: 1 PATCH, EXTENDED RELEASE TRANSDERMAL at 07:00

## 2024-04-30 RX ADMIN — METHADONE HYDROCHLORIDE 10 MILLIGRAM(S): 40 TABLET ORAL at 17:16

## 2024-04-30 RX ADMIN — AMLODIPINE BESYLATE 5 MILLIGRAM(S): 2.5 TABLET ORAL at 05:07

## 2024-04-30 RX ADMIN — SODIUM ZIRCONIUM CYCLOSILICATE 10 GRAM(S): 10 POWDER, FOR SUSPENSION ORAL at 08:50

## 2024-04-30 RX ADMIN — HEPARIN SODIUM 5000 UNIT(S): 5000 INJECTION INTRAVENOUS; SUBCUTANEOUS at 21:08

## 2024-04-30 RX ADMIN — Medication 100 MICROGRAM(S): at 05:07

## 2024-04-30 RX ADMIN — Medication 100 MILLIGRAM(S): at 05:07

## 2024-04-30 RX ADMIN — SODIUM ZIRCONIUM CYCLOSILICATE 10 GRAM(S): 10 POWDER, FOR SUSPENSION ORAL at 17:16

## 2024-04-30 RX ADMIN — Medication 40 MILLIGRAM(S): at 13:52

## 2024-04-30 RX ADMIN — METHADONE HYDROCHLORIDE 10 MILLIGRAM(S): 40 TABLET ORAL at 05:07

## 2024-04-30 RX ADMIN — SCOPALAMINE 1 PATCH: 1 PATCH, EXTENDED RELEASE TRANSDERMAL at 20:30

## 2024-04-30 RX ADMIN — Medication 1 DROP(S): at 05:07

## 2024-04-30 RX ADMIN — CHLORHEXIDINE GLUCONATE 15 MILLILITER(S): 213 SOLUTION TOPICAL at 05:06

## 2024-04-30 RX ADMIN — Medication 1 DROP(S): at 21:10

## 2024-04-30 RX ADMIN — SODIUM ZIRCONIUM CYCLOSILICATE 10 GRAM(S): 10 POWDER, FOR SUSPENSION ORAL at 23:01

## 2024-04-30 RX ADMIN — Medication 1 APPLICATION(S): at 17:18

## 2024-04-30 RX ADMIN — HEPARIN SODIUM 5000 UNIT(S): 5000 INJECTION INTRAVENOUS; SUBCUTANEOUS at 05:07

## 2024-04-30 RX ADMIN — Medication 1 APPLICATION(S): at 13:53

## 2024-04-30 NOTE — PROGRESS NOTE ADULT - ASSESSMENT
74-year-old female with PHMx of pancreatic CA s/p Whipple, CKD stage 3, HTN, COPD, pulmonary fibrosis, PRESS syndrome (hospitalized in 2019), Raynaud's syndrome, ANCA vasculitis, Scleroderma, hypothyroid  presents to ED due to cardiac arrest. Pt lives at home with son and daughter, went to use the bathroom and became unresponsive.  EMS intubated her and gave her 4 rounds of epinephrine.  ROSC was achieved within a minute of her arrival to the ED.  Patient was found to be hypothermic and bradycardic    acute respiratory failure / cardiac arrest / anoxic brain injury / HTN / aspiration pneumonia /  Hyponatremia/ hyperkalemia      - completed antibiotic course    - s/p trach 4/18/24, Trach sutures to be removed after 10 days (4/28)   - s/p PEG tube by Surgery   - anoxic brain injury as seen on CT brain and EEG  - methadone, monitor QTc, last is 410 on 4/21   - Labetalol, hold lisinopril for Hyper K   - K elevated, on Lokelma 10mg TID for 2 days till 5/2, then reassess off Lokelma   - send U Na serum and Urine osmo, Hypo Na suspected from 3rdspacing VS poverload, give one dose lasix IV 40mg and repeat level in AM   - DC hill and do TOV   - DVT and GI prophylaxis  - downgrade to vent bed at Med/Surg   - DC planning to  LTACH (was denied) so to snf

## 2024-04-30 NOTE — PROGRESS NOTE ADULT - SUBJECTIVE AND OBJECTIVE BOX
Patient is a 74y old  Female who presents with a chief complaint of cardiac arrest (29 Apr 2024 13:51)      Over Night Events:  Patient seen and examined.   stable   pending placement     ROS:  See HPI    PHYSICAL EXAM    ICU Vital Signs Last 24 Hrs  T(C): 36.3 (30 Apr 2024 07:01), Max: 37.2 (29 Apr 2024 15:01)  T(F): 97.3 (30 Apr 2024 07:01), Max: 99 (29 Apr 2024 15:01)  HR: 60 (30 Apr 2024 07:57) (52 - 69)  BP: 141/75 (30 Apr 2024 07:08) (119/59 - 155/74)  BP(mean): 103 (30 Apr 2024 07:08) (83 - 106)  ABP: --  ABP(mean): --  RR: 131 (30 Apr 2024 07:08) (20 - 131)  SpO2: 100% (30 Apr 2024 07:57) (100% - 100%)    O2 Parameters below as of 30 Apr 2024 07:08  Patient On (Oxygen Delivery Method): ventilator    O2 Concentration (%): 40        General: open eyes   HEENT:             trach   Lungs: Bilateral BS  Cardiovascular: Regular   Abdomen: Soft, Positive BS  Extremities: No clubbing   Skin: warm   Neurological: positive gag   Musculoskeletal: move all ext     I&O's Detail    29 Apr 2024 07:01  -  30 Apr 2024 07:00  --------------------------------------------------------  IN:    Enteral Tube Flush: 120 mL    Free Water: 450 mL    Osmolite 1.2: 990 mL  Total IN: 1560 mL    OUT:    Indwelling Catheter - Urethral (mL): 1750 mL  Total OUT: 1750 mL    Total NET: -190 mL          LABS:                          7.5    9.14  )-----------( 348      ( 29 Apr 2024 07:03 )             23.4         29 Apr 2024 11:00    129    |  95     |  34     ----------------------------<  125    5.6     |  28     |  1.1      Ca    8.0        29 Apr 2024 11:00  Mg     2.0       29 Apr 2024 07:03                                                                                      Urinalysis Basic - ( 29 Apr 2024 11:00 )    Color: x / Appearance: x / SG: x / pH: x  Gluc: 125 mg/dL / Ketone: x  / Bili: x / Urobili: x   Blood: x / Protein: x / Nitrite: x   Leuk Esterase: x / RBC: x / WBC x   Sq Epi: x / Non Sq Epi: x / Bacteria: x                                                                                                             Mode: AC/ CMV (Assist Control/ Continuous Mandatory Ventilation)  RR (machine): 28  TV (machine): 300  FiO2: 40  PEEP: 5  ITime: 0.8  MAP: 10  PIP: 23                                          MEDICATIONS  (STANDING):  amLODIPine   Tablet 5 milliGRAM(s) Oral daily  artificial  tears Solution 1 Drop(s) Both EYES three times a day  chlorhexidine 0.12% Liquid 15 milliLiter(s) Oral Mucosa every 12 hours  chlorhexidine 2% Cloths 1 Application(s) Topical daily  chlorhexidine 2% Cloths 1 Application(s) Topical <User Schedule>  heparin   Injectable 5000 Unit(s) SubCutaneous every 8 hours  labetalol 100 milliGRAM(s) Oral three times a day  levothyroxine 100 MICROGram(s) Oral daily  lidocaine 1%/epinephrine 1:100,000 Inj 20 milliLiter(s) Local Injection once  lidocaine 2% Viscous 10 milliLiter(s) Oral once  methadone    Tablet 10 milliGRAM(s) Oral two times a day  pantoprazole   Suspension 40 milliGRAM(s) Oral daily  petrolatum Ophthalmic Ointment 1 Application(s) Both EYES daily  senna 2 Tablet(s) Oral at bedtime  sodium zirconium cyclosilicate 10 Gram(s) Oral every 8 hours    MEDICATIONS  (PRN):  polyethylene glycol 3350 17 Gram(s) Oral daily PRN Constipation  scopolamine 1 mG/72 Hr(s) Patch 1 Patch Transdermal every 72 hours PRN for secretions          Xrays:  TLC:  OG:  ET tube:                                                                                       ECHO:  [facility] ICU:

## 2024-04-30 NOTE — PROGRESS NOTE ADULT - SUBJECTIVE AND OBJECTIVE BOX
Patient is a 74y old  Female who presents with a chief complaint of cardiac arrest (30 Apr 2024 10:25)      OVERNIGHT EVENTS: no major events reported     SUBJECTIVE / INTERVAL HPI: Patient seen and examined at bedside.     VITAL SIGNS:  Vital Signs Last 24 Hrs  T(C): 36.3 (30 Apr 2024 07:01), Max: 37.2 (29 Apr 2024 15:01)  T(F): 97.3 (30 Apr 2024 07:01), Max: 99 (29 Apr 2024 15:01)  HR: 60 (30 Apr 2024 07:57) (52 - 66)  BP: 141/75 (30 Apr 2024 07:08) (119/59 - 145/71)  BP(mean): 103 (30 Apr 2024 07:08) (83 - 103)  RR: 131 (30 Apr 2024 07:08) (20 - 131)  SpO2: 100% (30 Apr 2024 07:57) (100% - 100%)    Parameters below as of 30 Apr 2024 07:08  Patient On (Oxygen Delivery Method): ventilator    O2 Concentration (%): 40    PHYSICAL EXAM:    General: old thin female chronically looks ill   HEENT: NC/AT; PERRL, clear conjunctiva  Neck: supple, + trach   Cardiovascular: +S1/S2; RRR  Respiratory: CTA b/l; no W/R/R  Gastrointestinal: soft, NT/ND; +BSx4  +ve hill   Extremities: WWP; 2+ peripheral pulses; no edema   Neurological: lethargic, not following commands     MEDICATIONS:  MEDICATIONS  (STANDING):  amLODIPine   Tablet 5 milliGRAM(s) Oral daily  artificial  tears Solution 1 Drop(s) Both EYES three times a day  chlorhexidine 0.12% Liquid 15 milliLiter(s) Oral Mucosa every 12 hours  chlorhexidine 2% Cloths 1 Application(s) Topical <User Schedule>  chlorhexidine 2% Cloths 1 Application(s) Topical daily  heparin   Injectable 5000 Unit(s) SubCutaneous every 8 hours  labetalol 100 milliGRAM(s) Oral three times a day  levothyroxine 100 MICROGram(s) Oral daily  lidocaine 1%/epinephrine 1:100,000 Inj 20 milliLiter(s) Local Injection once  lidocaine 2% Viscous 10 milliLiter(s) Oral once  methadone    Tablet 10 milliGRAM(s) Oral two times a day  pantoprazole   Suspension 40 milliGRAM(s) Oral daily  petrolatum Ophthalmic Ointment 1 Application(s) Both EYES daily  senna 2 Tablet(s) Oral at bedtime  silver sulfADIAZINE 1% Cream 1 Application(s) Topical two times a day  sodium zirconium cyclosilicate 10 Gram(s) Oral every 8 hours    MEDICATIONS  (PRN):  polyethylene glycol 3350 17 Gram(s) Oral daily PRN Constipation  scopolamine 1 mG/72 Hr(s) Patch 1 Patch Transdermal every 72 hours PRN for secretions      ALLERGIES:  Allergies    celecoxib (Rash)  Originally Entered as [U UNKNOWN] reaction to [celebrit] (Unknown)    Intolerances        LABS:                        7.4    6.19  )-----------( 355      ( 30 Apr 2024 06:10 )             22.0     04-30    127<L>  |  91<L>  |  35<H>  ----------------------------<  105<H>  4.8   |  31  |  1.1    Ca    7.9<L>      30 Apr 2024 06:10  Mg     2.0     04-30    TPro  6.4  /  Alb  2.1<L>  /  TBili  0.2  /  DBili  x   /  AST  52<H>  /  ALT  8   /  AlkPhos  152<H>  04-30      Urinalysis Basic - ( 30 Apr 2024 06:10 )    Color: x / Appearance: x / SG: x / pH: x  Gluc: 105 mg/dL / Ketone: x  / Bili: x / Urobili: x   Blood: x / Protein: x / Nitrite: x   Leuk Esterase: x / RBC: x / WBC x   Sq Epi: x / Non Sq Epi: x / Bacteria: x      CAPILLARY BLOOD GLUCOSE      POCT Blood Glucose.: 148 mg/dL (29 Apr 2024 14:39)      RADIOLOGY & ADDITIONAL TESTS: Reviewed.    ASSESSMENT:    PLAN:

## 2024-04-30 NOTE — PROGRESS NOTE ADULT - NUTRITIONAL ASSESSMENT
Diet:  Diet, NPO with Tube Feed:   Tube Feeding Modality: Orogastric  Osmolite 1.2 Lei (OSMOLITERTH)  Total Volume for 24 Hours (mL): 1080  Continuous  Starting Tube Feed Rate mL per Hour: 10  Until Goal Tube Feed Rate (mL per Hour): 45  Tube Feed Duration (in Hours): 24  Tube Feed Start Time: 12:00  Free Water Flush  Free Water Flush Instructions:  150 Q8hr (04-25-24 @ 15:51)

## 2024-04-30 NOTE — PROGRESS NOTE ADULT - SUBJECTIVE AND OBJECTIVE BOX
KIRAN MORFIN 74y Female  MRN#: [id number]   Hospital Day: 24d    SUBJECTIVE  Patient is a 74y old Female who presents with a chief complaint of cardiac arrest (30 Apr 2024 13:11)  Currently admitted to medicine with the primary diagnosis of Cardiac arrest    INTERVAL HPI AND OVERNIGHT EVENTS:  Patient was examined and seen at bedside. This morning she is comfortable, no overnight events.     OBJECTIVE  PAST MEDICAL & SURGICAL HISTORY  H/O vasculitis    Pancreatic cancer    History of COPD    Hypertension    H/O Raynaud's syndrome    Stage 3 chronic kidney disease    History of knee replacement    H/O Whipple procedure      ALLERGIES:  celecoxib (Rash)  Originally Entered as [U UNKNOWN] reaction to [celebrit] (Unknown)    MEDICATIONS:  STANDING MEDICATIONS  amLODIPine   Tablet 5 milliGRAM(s) Oral daily  artificial  tears Solution 1 Drop(s) Both EYES three times a day  chlorhexidine 0.12% Liquid 15 milliLiter(s) Oral Mucosa every 12 hours  chlorhexidine 2% Cloths 1 Application(s) Topical daily  chlorhexidine 2% Cloths 1 Application(s) Topical <User Schedule>  heparin   Injectable 5000 Unit(s) SubCutaneous every 8 hours  labetalol 100 milliGRAM(s) Oral three times a day  levothyroxine 100 MICROGram(s) Oral daily  lidocaine 1%/epinephrine 1:100,000 Inj 20 milliLiter(s) Local Injection once  lidocaine 2% Viscous 10 milliLiter(s) Oral once  methadone    Tablet 10 milliGRAM(s) Oral two times a day  pantoprazole   Suspension 40 milliGRAM(s) Oral daily  petrolatum Ophthalmic Ointment 1 Application(s) Both EYES daily  senna 2 Tablet(s) Oral at bedtime  silver sulfADIAZINE 1% Cream 1 Application(s) Topical two times a day  sodium zirconium cyclosilicate 10 Gram(s) Oral every 8 hours    PRN MEDICATIONS  polyethylene glycol 3350 17 Gram(s) Oral daily PRN  scopolamine 1 mG/72 Hr(s) Patch 1 Patch Transdermal every 72 hours PRN      VITAL SIGNS: Last 24 Hours  T(C): 36.3 (30 Apr 2024 07:01), Max: 37.2 (29 Apr 2024 15:01)  T(F): 97.3 (30 Apr 2024 07:01), Max: 99 (29 Apr 2024 15:01)  HR: 52 (30 Apr 2024 14:03) (52 - 66)  BP: 141/75 (30 Apr 2024 07:08) (119/59 - 145/71)  BP(mean): 103 (30 Apr 2024 07:08) (83 - 103)  RR: 131 (30 Apr 2024 07:08) (20 - 131)  SpO2: 100% (30 Apr 2024 14:03) (100% - 100%)    LABS:                        7.4    6.19  )-----------( 355      ( 30 Apr 2024 06:10 )             22.0     04-30    127<L>  |  91<L>  |  35<H>  ----------------------------<  105<H>  4.8   |  31  |  1.1    Ca    7.9<L>      30 Apr 2024 06:10  Mg     2.0     04-30    TPro  6.4  /  Alb  2.1<L>  /  TBili  0.2  /  DBili  x   /  AST  52<H>  /  ALT  8   /  AlkPhos  152<H>  04-30      Urinalysis Basic - ( 30 Apr 2024 06:10 )    Color: x / Appearance: x / SG: x / pH: x  Gluc: 105 mg/dL / Ketone: x  / Bili: x / Urobili: x   Blood: x / Protein: x / Nitrite: x   Leuk Esterase: x / RBC: x / WBC x   Sq Epi: x / Non Sq Epi: x / Bacteria: x      PHYSICAL EXAM:  CONSTITUTIONAL: No acute distress, intubated  RESPIRATORY: bilateral breath sounds  CARDIOVASCULAR: Regular rate and rhythm; no murmurs, rubs, or gallops  GASTROINTESTINAL: Soft, non-tender, non-distended; bowel sounds present  MUSCULOSKELETAL:  no edema

## 2024-04-30 NOTE — CHART NOTE - NSCHARTNOTEFT_GEN_A_CORE
Registered Dietitian Follow-Up  assessment completed by Helena Lombardi MS, RD, [facility]     Patient Profile Reviewed                           Yes X[]   No []     Nutrition History Previously Obtained        Yes []  No []       Pertinent Information:  pt is 74-year-old female with PMhx of pancreatic CA s/p Whipple, CKD stage 3, HTN, COPD, pulmonary fibrosis, PRESS syndrome (hospitalized in ), Raynaud's syndrome, ANCA vasculitis, Scleroderma, patient was hospitalized back in Dec 2023 in [place] for 18 days for multifocal pneumonia 2/2 human metapneumovirus.   presents to ED due to cardiac arrest. As per GI no concern for cholangitis clinically pt has normal bilirubin and has no jaundice to concern for cholangitis mild transaminitis is downtrending and could be due to CPR  , + anoxic brain injury.  s/p bronch with trach. unsuccessful attempt at bedside PEG Placement . s/p PEG placement 24.       Diet, NPO with Tube Feed:   Tube Feeding Modality: Orogastric  Osmolite 1.2 Lei (OSMOLITERTH)  Total Volume for 24 Hours (mL): 1080  Continuous  Starting Tube Feed Rate {mL per Hour}: 10  Until Goal Tube Feed Rate (mL per Hour): 45  Tube Feed Duration (in Hours): 24  Tube Feed Start Time: 12:00  Free Water Flush  Free Water Flush Instructions:  150 Q8hr (24 @ 15:51) [Active]        Anthropometrics:  Height (cm): 154.9 (24 @ 10:10)  Weight (kg): 40 (24 @ 10:10)  BMI (kg/m2): 16.7 (24 @ 10:10)  IBW:     Daily Weight in k.1 (-30), Weight in k.8 (-29), Weight in k (-28), Weight in k.1 (-27), Weight in k (-26), Weight in k.8 (-25), Weight in k.2 (-24)  % Weight Change    MEDICATIONS  (STANDING):  amLODIPine   Tablet 5 milliGRAM(s) Oral daily  labetalol 100 milliGRAM(s) Oral three times a day  levothyroxine 100 MICROGram(s) Oral daily  methadone    Tablet 10 milliGRAM(s) Oral two times a day  pantoprazole   Suspension 40 milliGRAM(s) Oral daily  petrolatum Ophthalmic Ointment 1 Application(s) Both EYES daily  senna 2 Tablet(s) Oral at bedtime  silver sulfADIAZINE 1% Cream 1 Application(s) Topical two times a day  sodium zirconium cyclosilicate 10 Gram(s) Oral every 8 hours    MEDICATIONS  (PRN):  polyethylene glycol 3350 17 Gram(s) Oral daily PRN Constipation  scopolamine 1 mG/72 Hr(s) Patch 1 Patch Transdermal every 72 hours PRN for secretions    Pertinent Labs:  @ 06:10: Na 127<L>, BUN 35<H>, Cr 1.1, <H>, K+ 4.8, Phos --, Mg 2.0, Alk Phos 152<H>, ALT/SGPT 8, AST/SGOT 52<H>, HbA1c --    Finger Sticks:      Physical Findings:  - Appearance: trach to vent   - GI function: +BS, +BM   - Tubes: PEG  - Oral/Mouth cavity:  - Skin: as per wound care noted DTPI to coccyx (previously a stage I)     Nutrition Requirements  Weight Used: 37.9 kgs       Estimated Energy Needs:  5301-9907 kcals (35-40 kcal/kg/BW)  45-53g protein (1.2-1.4g/kg/BW)  ~1L 2/2 hyponatremia         Nutrient Intake: present EN regimen infusing at 45 ml/hr provides pt with 1296 kcals, 59g protein and 1080+450 ml total volume meeting >80% of estimated needs       [] Previous Nutrition Diagnosis:            [] Ongoing          [] Resolved    X[] No active nutrition diagnosis identified at this time     Nutrition Intervention  maintain on present feeds, recommend to decrease water flushes 2/2 hyponatremia to 50 ml pre and post feeds     Goal/Expected Outcome: pt to continue to tolerate EN and meet >80% of estimated needs      Indicator/Monitoring: RD to monitor tolerance to EN, labs/meds, NFPF and f/u as needed within 5 days  high risk

## 2024-04-30 NOTE — PROGRESS NOTE ADULT - ASSESSMENT
Skin assessed-   DTPI documented in error as stage one on admission  - at stage of progression with complete epidermal skin loss                           ~5x5 in size - wound base  pink with about 40% black devitalised skin tissue                          B/L buttock intact , b/L heel  blanchable redness                         No odor, erythema, increased warmth, tenderness, induration, fluctuance, nor crepitus                        High risk for pressure injury development or progression     [  Plan:  Wound and skin care recs.   Clean coccyx with vashe wound cleanser, pat dry then apply silvadene with moist vashe guaze dressing - please monitor silvadene  for cross Callergy reaction and inform provider   Pressure injury  preventive  measures  skin  and incontinence  care  Assess skin  and inform primary provider of any changes   Case discussed with primary Rn  Wound/ ostomy specialist  to f/u as needed     Offloading: [x ] Frequent position changes [ ] Devices/Equipment  Cleansing: [ ] Saline [ x] Soap/Water [ ] Other: ______  Topicals: [ x] Barrier Cream [ x] Antimicrobial [ ] Enzymatic Wound Debridement  Dressings: [ ] Dry, sterile [ ] Allevyn  Foam [ ] Absorbant Pads [ ] Collagenase    Other Recs.   Per Primary team      Total time for bedside assessment , review of medical records  and  discussion of plan of care with primary team greater than 35 mi

## 2024-04-30 NOTE — PROGRESS NOTE ADULT - SUBJECTIVE AND OBJECTIVE BOX
--------------------------------------------------------------------------------    24 hour events/subjective:  NAD, F/U skin assessment           ROS:  pt unable to offer    ALLERGIES & MEDICATIONS  --------------------------------------------------------------------------------  Allergies    celecoxib (Rash)  Originally Entered as [U UNKNOWN] reaction to [celebrit] (Unknown)    Intolerances          STANDING INPATIENT MEDICATIONS    amLODIPine   Tablet 5 milliGRAM(s) Oral daily  artificial  tears Solution 1 Drop(s) Both EYES three times a day  chlorhexidine 0.12% Liquid 15 milliLiter(s) Oral Mucosa every 12 hours  chlorhexidine 2% Cloths 1 Application(s) Topical daily  chlorhexidine 2% Cloths 1 Application(s) Topical <User Schedule>  heparin   Injectable 5000 Unit(s) SubCutaneous every 8 hours  labetalol 100 milliGRAM(s) Oral three times a day  levothyroxine 100 MICROGram(s) Oral daily  lidocaine 1%/epinephrine 1:100,000 Inj 20 milliLiter(s) Local Injection once  lidocaine 2% Viscous 10 milliLiter(s) Oral once  methadone    Tablet 10 milliGRAM(s) Oral two times a day  pantoprazole   Suspension 40 milliGRAM(s) Oral daily  petrolatum Ophthalmic Ointment 1 Application(s) Both EYES daily  senna 2 Tablet(s) Oral at bedtime  sodium zirconium cyclosilicate 10 Gram(s) Oral every 8 hours      PRN INPATIENT MEDICATION  polyethylene glycol 3350 17 Gram(s) Oral daily PRN  scopolamine 1 mG/72 Hr(s) Patch 1 Patch Transdermal every 72 hours PRN        VITALS/PHYSICAL EXAM-   --------------------------------------------------------------------------------  T(C): 36.3 (04-30-24 @ 07:01), Max: 37.2 (04-29-24 @ 15:01)  HR: 60 (04-30-24 @ 07:57) (52 - 66)  BP: 141/75 (04-30-24 @ 07:08) (119/59 - 145/71)  RR: 131 (04-30-24 @ 07:08) (20 - 131)  SpO2: 100% (04-30-24 @ 07:57) (100% - 100%)  Wt(kg): --        04-29-24 @ 07:01  -  04-30-24 @ 07:00  --------------------------------------------------------  IN: 1560 mL / OUT: 1750 mL / NET: -190 mL    04-30-24 @ 07:01  -  04-30-24 @ 10:26  --------------------------------------------------------  IN: 0 mL / OUT: 300 mL / NET: -300 mL            LABS/ CULTURES/ RADIOLOGY:              7.4    6.19  >-----------<  355      [04-30-24 @ 06:10]              22.0     127  |  91  |  35  ----------------------------<  105      [04-30-24 @ 06:10]  4.8   |  31  |  1.1        Ca     7.9     [04-30-24 @ 06:10]      Mg     2.0     [04-30-24 @ 06:10]    TPro  6.4  /  Alb  2.1  /  TBili  0.2  /  DBili  x   /  AST  52  /  ALT  8   /  AlkPhos  152  [04-30-24 @ 06:10]              CAPILLARY BLOOD GLUCOSE      POCT Blood Glucose.: 148 mg/dL (29 Apr 2024 14:39)

## 2024-04-30 NOTE — PROGRESS NOTE ADULT - ASSESSMENT
IMPRESSION:    Anoxic brain damage  Acute Hypoxemic Respiratory Failure on MV SP trach and PEG  SP CPA downtime 30 minutes   Cardiac bradycardia   Lung Fibrosis   GIUSEPPE, resolved   Hyperkalemia   HO Raynaud's  HO PRESS    HO Anemia  HO Pancreatic cancer     PLAN:    CNS: off sedation   continue on methadone  monitor Qtc    HEENT: oral and trach care     PULMONARY: keep pox >92%  monitor peak and plateau  no vent changes, pressure support as tolerated     CARDIOVASCULAR:  2D-Echo noted, continue antihypertensives     RENAL: follow is and os, trend Cr, Nephrology follow up, if K more then 5 give  lokelma monitorfollow K    INFECTIOUS DISEASE: cultures negative to date, completed Zosyn course    GASTRO: GI ppx, PEG feeds     HEMATOLOGICAL:  DVT prophylaxis. follow morning h/h     ENDOCRINE:  Follow up FS.  Insulin protocol if needed.    MUSCULOSKELETAL: bedrest    Full Code, Palliative care following    Grave prognosis     The patient has a high probability of further deterioration.    /case mgmt for LTAC placement/disposition    Downgrade to GMF or vent unit   social service for d/c planning      IMPRESSION:    Anoxic brain damage  Acute Hypoxemic Respiratory Failure on MV SP trach and PEG  SP CPA downtime 30 minutes   Cardiac bradycardia   Lung Fibrosis   GIUSEPPE, resolved   Hyperkalemia   HO Raynaud's  HO PRESS    HO Anemia  HO Pancreatic cancer     PLAN:    CNS: off sedation   continue on methadone  monitor Qtc    HEENT: oral and trach care     PULMONARY: keep pox >92%  monitor peak and plateau  no vent changes, pressure support as tolerated     CARDIOVASCULAR:  2D-Echo noted, continue antihypertensives     RENAL: follow is and os, trend Cr, Nephrology follow up, if K more then 5 give  lokelma monitorfollow K    INFECTIOUS DISEASE: cultures negative to date, completed Zosyn course    GASTRO: GI ppx, PEG feeds     HEMATOLOGICAL:  DVT prophylaxis. follow morning h/h     ENDOCRINE:  Follow up FS.  Insulin protocol if needed.    MUSCULOSKELETAL: bedrest    Full Code, Palliative care following    Grave prognosis     The patient has a high probability of further deterioration.    /case mgmt for LTAC placement/disposition    Downgrade to GMF or vent unit   social service for d/c planning   voidign trial

## 2024-04-30 NOTE — PROGRESS NOTE ADULT - ASSESSMENT
74-year-old female with PHMx of pancreatic CA s/p Whipple, CKD stage 3, HTN, COPD, pulmonary fibrosis, PRESS syndrome (hospitalized in 2019), Raynaud's syndrome, ANCA vasculitis, Scleroderma  presents to ED due to cardiac arrest for 30 minutes. Hospital course complicated by anoxic brain injury, aspiration PNA. Pt is medically cleared to be discharged to LTACH/NH.     #s/p cardiac arrest 30 minutes  #anoxic brain injury   #acute respiratory failure s/p intubation now trach/peg  - s/p trach 4/18/24, trach sutures removed  - s/p PEG by 4/25  - anoxic brain injury as seen on CT brain and EEG  - completed antibiotic course   - monitor BP  - DC planning to NH - case management aware  - d/c sergio and BEBE    #hyperK- resolved  - lokelma, repeat K level   - monitor BMP  - continue with lokelma q8    #hyponatremia  - Na downtrending, possibly due to overload?  - IV lasix 40mg x 1   - monitor BMP    #HTN  - continue with labetalol, amlodipine    #Misc:  - DVT ppx: heparin  - Gi ppx: protonix  - Diet: NPO with tube feeds  - Dispo: dc planning to LTACH/NH    #Pending: d/c planning, monitor sodium, d/c hill and TOV       74-year-old female with PHMx of pancreatic CA s/p Whipple, CKD stage 3, HTN, COPD, pulmonary fibrosis, PRESS syndrome (hospitalized in 2019), Raynaud's syndrome, ANCA vasculitis, Scleroderma  presents to ED due to cardiac arrest for 30 minutes. Hospital course complicated by anoxic brain injury, aspiration PNA. Pt is medically cleared to be discharged to LTACH/NH.     #s/p cardiac arrest 30 minutes  #anoxic brain injury   #acute respiratory failure s/p intubation now trach/peg  - s/p trach 4/18/24, trach sutures removed  - s/p PEG by 4/25  - anoxic brain injury as seen on CT brain and EEG  - completed antibiotic course   - monitor BP  - DC planning to NH - case management aware  - d/c sergio and TONADIRA    #hyperK- resolved  - lokelma, repeat K level   - monitor BMP  - continue with lokelma q8    #hyponatremia  - Na downtrending, possibly due to overload?  - IV lasix 40mg x 1   - monitor BMP  - send urine studies    #HTN  - continue with labetalol, amlodipine    #Misc:  - DVT ppx: heparin  - Gi ppx: protonix  - Diet: NPO with tube feeds  - Dispo: dc planning to LTACH/NH    #Pending: d/c planning, monitor sodium, d/c hill and TONADIRA

## 2024-05-01 LAB
ALBUMIN SERPL ELPH-MCNC: 2.1 G/DL — LOW (ref 3.5–5.2)
ALP SERPL-CCNC: 151 U/L — HIGH (ref 30–115)
ALT FLD-CCNC: 9 U/L — SIGNIFICANT CHANGE UP (ref 0–41)
ANION GAP SERPL CALC-SCNC: 8 MMOL/L — SIGNIFICANT CHANGE UP (ref 7–14)
AST SERPL-CCNC: 47 U/L — HIGH (ref 0–41)
BASOPHILS # BLD AUTO: 0.04 K/UL — SIGNIFICANT CHANGE UP (ref 0–0.2)
BASOPHILS NFR BLD AUTO: 0.6 % — SIGNIFICANT CHANGE UP (ref 0–1)
BILIRUB SERPL-MCNC: 0.2 MG/DL — SIGNIFICANT CHANGE UP (ref 0.2–1.2)
BUN SERPL-MCNC: 36 MG/DL — HIGH (ref 10–20)
CALCIUM SERPL-MCNC: 8 MG/DL — LOW (ref 8.4–10.5)
CHLORIDE SERPL-SCNC: 91 MMOL/L — LOW (ref 98–110)
CO2 SERPL-SCNC: 31 MMOL/L — SIGNIFICANT CHANGE UP (ref 17–32)
CREAT SERPL-MCNC: 1.1 MG/DL — SIGNIFICANT CHANGE UP (ref 0.7–1.5)
EGFR: 52 ML/MIN/1.73M2 — LOW
EOSINOPHIL # BLD AUTO: 0.41 K/UL — SIGNIFICANT CHANGE UP (ref 0–0.7)
EOSINOPHIL NFR BLD AUTO: 6.1 % — SIGNIFICANT CHANGE UP (ref 0–8)
GLUCOSE SERPL-MCNC: 118 MG/DL — HIGH (ref 70–99)
HCT VFR BLD CALC: 26.2 % — LOW (ref 37–47)
HGB BLD-MCNC: 8.4 G/DL — LOW (ref 12–16)
IMM GRANULOCYTES NFR BLD AUTO: 0.1 % — SIGNIFICANT CHANGE UP (ref 0.1–0.3)
LYMPHOCYTES # BLD AUTO: 0.89 K/UL — LOW (ref 1.2–3.4)
LYMPHOCYTES # BLD AUTO: 13.3 % — LOW (ref 20.5–51.1)
MAGNESIUM SERPL-MCNC: 1.9 MG/DL — SIGNIFICANT CHANGE UP (ref 1.8–2.4)
MCHC RBC-ENTMCNC: 25.7 PG — LOW (ref 27–31)
MCHC RBC-ENTMCNC: 32.1 G/DL — SIGNIFICANT CHANGE UP (ref 32–37)
MCV RBC AUTO: 80.1 FL — LOW (ref 81–99)
MONOCYTES # BLD AUTO: 0.85 K/UL — HIGH (ref 0.1–0.6)
MONOCYTES NFR BLD AUTO: 12.7 % — HIGH (ref 1.7–9.3)
NEUTROPHILS # BLD AUTO: 4.51 K/UL — SIGNIFICANT CHANGE UP (ref 1.4–6.5)
NEUTROPHILS NFR BLD AUTO: 67.2 % — SIGNIFICANT CHANGE UP (ref 42.2–75.2)
NRBC # BLD: 0 /100 WBCS — SIGNIFICANT CHANGE UP (ref 0–0)
PLATELET # BLD AUTO: 370 K/UL — SIGNIFICANT CHANGE UP (ref 130–400)
PMV BLD: 9.9 FL — SIGNIFICANT CHANGE UP (ref 7.4–10.4)
POTASSIUM SERPL-MCNC: 4.9 MMOL/L — SIGNIFICANT CHANGE UP (ref 3.5–5)
POTASSIUM SERPL-SCNC: 4.9 MMOL/L — SIGNIFICANT CHANGE UP (ref 3.5–5)
PROT SERPL-MCNC: 7 G/DL — SIGNIFICANT CHANGE UP (ref 6–8)
RBC # BLD: 3.27 M/UL — LOW (ref 4.2–5.4)
RBC # FLD: 17.2 % — HIGH (ref 11.5–14.5)
SODIUM SERPL-SCNC: 130 MMOL/L — LOW (ref 135–146)
WBC # BLD: 6.71 K/UL — SIGNIFICANT CHANGE UP (ref 4.8–10.8)
WBC # FLD AUTO: 6.71 K/UL — SIGNIFICANT CHANGE UP (ref 4.8–10.8)

## 2024-05-01 PROCEDURE — 99232 SBSQ HOSP IP/OBS MODERATE 35: CPT

## 2024-05-01 PROCEDURE — 93010 ELECTROCARDIOGRAM REPORT: CPT

## 2024-05-01 RX ORDER — AMLODIPINE BESYLATE 2.5 MG/1
5 TABLET ORAL ONCE
Refills: 0 | Status: COMPLETED | OUTPATIENT
Start: 2024-05-01 | End: 2024-05-01

## 2024-05-01 RX ORDER — AMLODIPINE BESYLATE 2.5 MG/1
1 TABLET ORAL
Qty: 0 | Refills: 0 | DISCHARGE
Start: 2024-05-01

## 2024-05-01 RX ORDER — POLYETHYLENE GLYCOL 3350 17 G/17G
17 POWDER, FOR SOLUTION ORAL
Qty: 0 | Refills: 0 | DISCHARGE
Start: 2024-05-01

## 2024-05-01 RX ORDER — METHADONE HYDROCHLORIDE 40 MG/1
1 TABLET ORAL
Qty: 0 | Refills: 0 | DISCHARGE
Start: 2024-05-01

## 2024-05-01 RX ORDER — LISINOPRIL 2.5 MG/1
1 TABLET ORAL
Refills: 0 | DISCHARGE

## 2024-05-01 RX ORDER — HYDRALAZINE HCL 50 MG
10 TABLET ORAL ONCE
Refills: 0 | Status: COMPLETED | OUTPATIENT
Start: 2024-05-01 | End: 2024-05-01

## 2024-05-01 RX ORDER — SENNA PLUS 8.6 MG/1
2 TABLET ORAL
Qty: 0 | Refills: 0 | DISCHARGE
Start: 2024-05-01

## 2024-05-01 RX ORDER — AMLODIPINE BESYLATE 2.5 MG/1
10 TABLET ORAL DAILY
Refills: 0 | Status: DISCONTINUED | OUTPATIENT
Start: 2024-05-02 | End: 2024-05-04

## 2024-05-01 RX ADMIN — SODIUM ZIRCONIUM CYCLOSILICATE 10 GRAM(S): 10 POWDER, FOR SUSPENSION ORAL at 09:09

## 2024-05-01 RX ADMIN — CHLORHEXIDINE GLUCONATE 15 MILLILITER(S): 213 SOLUTION TOPICAL at 05:04

## 2024-05-01 RX ADMIN — Medication 10 MILLIGRAM(S): at 18:11

## 2024-05-01 RX ADMIN — METHADONE HYDROCHLORIDE 10 MILLIGRAM(S): 40 TABLET ORAL at 17:00

## 2024-05-01 RX ADMIN — Medication 1 APPLICATION(S): at 13:22

## 2024-05-01 RX ADMIN — CHLORHEXIDINE GLUCONATE 1 APPLICATION(S): 213 SOLUTION TOPICAL at 13:14

## 2024-05-01 RX ADMIN — Medication 100 MICROGRAM(S): at 05:04

## 2024-05-01 RX ADMIN — HEPARIN SODIUM 5000 UNIT(S): 5000 INJECTION INTRAVENOUS; SUBCUTANEOUS at 21:22

## 2024-05-01 RX ADMIN — Medication 100 MILLIGRAM(S): at 13:15

## 2024-05-01 RX ADMIN — Medication 1 APPLICATION(S): at 17:00

## 2024-05-01 RX ADMIN — Medication 1 APPLICATION(S): at 05:04

## 2024-05-01 RX ADMIN — AMLODIPINE BESYLATE 5 MILLIGRAM(S): 2.5 TABLET ORAL at 05:04

## 2024-05-01 RX ADMIN — CHLORHEXIDINE GLUCONATE 1 APPLICATION(S): 213 SOLUTION TOPICAL at 05:05

## 2024-05-01 RX ADMIN — HEPARIN SODIUM 5000 UNIT(S): 5000 INJECTION INTRAVENOUS; SUBCUTANEOUS at 05:05

## 2024-05-01 RX ADMIN — AMLODIPINE BESYLATE 5 MILLIGRAM(S): 2.5 TABLET ORAL at 16:16

## 2024-05-01 RX ADMIN — CHLORHEXIDINE GLUCONATE 15 MILLILITER(S): 213 SOLUTION TOPICAL at 17:00

## 2024-05-01 RX ADMIN — Medication 1 DROP(S): at 13:15

## 2024-05-01 RX ADMIN — SCOPALAMINE 1 PATCH: 1 PATCH, EXTENDED RELEASE TRANSDERMAL at 19:15

## 2024-05-01 RX ADMIN — Medication 1 DROP(S): at 05:04

## 2024-05-01 RX ADMIN — HEPARIN SODIUM 5000 UNIT(S): 5000 INJECTION INTRAVENOUS; SUBCUTANEOUS at 13:14

## 2024-05-01 RX ADMIN — Medication 1 DROP(S): at 21:22

## 2024-05-01 RX ADMIN — Medication 100 MILLIGRAM(S): at 21:22

## 2024-05-01 RX ADMIN — SCOPALAMINE 1 PATCH: 1 PATCH, EXTENDED RELEASE TRANSDERMAL at 07:00

## 2024-05-01 RX ADMIN — SENNA PLUS 2 TABLET(S): 8.6 TABLET ORAL at 21:22

## 2024-05-01 RX ADMIN — METHADONE HYDROCHLORIDE 10 MILLIGRAM(S): 40 TABLET ORAL at 05:04

## 2024-05-01 RX ADMIN — PANTOPRAZOLE SODIUM 40 MILLIGRAM(S): 20 TABLET, DELAYED RELEASE ORAL at 13:14

## 2024-05-01 NOTE — PROGRESS NOTE ADULT - SUBJECTIVE AND OBJECTIVE BOX
Patient is a 74y old  Female who presents with a chief complaint of cardiac arrest (30 Apr 2024 14:31)      Over Night Events:  Patient seen and examined.   on vent   pending bed in NH or floor     ROS:  See HPI    PHYSICAL EXAM    ICU Vital Signs Last 24 Hrs  T(C): 36 (01 May 2024 07:01), Max: 36 (01 May 2024 07:01)  T(F): 96.8 (01 May 2024 07:01), Max: 96.8 (01 May 2024 07:01)  HR: 57 (01 May 2024 07:16) (52 - 60)  BP: 163/79 (01 May 2024 05:00) (150/72 - 163/79)  BP(mean): 113 (01 May 2024 05:00) (108 - 113)  ABP: --  ABP(mean): --  RR: 29 (01 May 2024 07:01) (29 - 30)  SpO2: 100% (01 May 2024 07:16) (99% - 100%)    O2 Parameters below as of 01 May 2024 05:00  Patient On (Oxygen Delivery Method): ventilator    O2 Concentration (%): 40        General: stable open eyes   HEENT:          trach       Lymph Nodes: NO cervical LN   Lungs: Bilateral BS  Cardiovascular: Regular   Abdomen: Soft, Positive BS  Extremities: No clubbing   Skin: warm   Neurological:   Musculoskeletal: move all ext     I&O's Detail    30 Apr 2024 07:01  -  01 May 2024 07:00  --------------------------------------------------------  IN:    Free Water: 450 mL    Osmolite 1.2: 945 mL  Total IN: 1395 mL    OUT:    Indwelling Catheter - Urethral (mL): 300 mL    Voided (mL): 2320 mL  Total OUT: 2620 mL    Total NET: -1225 mL          LABS:                          8.4    6.71  )-----------( 370      ( 01 May 2024 05:26 )             26.2         01 May 2024 05:26    130    |  91     |  36     ----------------------------<  118    4.9     |  31     |  1.1      Ca    8.0        01 May 2024 05:26  Mg     1.9       01 May 2024 05:26    TPro  7.0    /  Alb  2.1    /  TBili  0.2    /  DBili  x      /  AST  47     /  ALT  9      /  AlkPhos  151    01 May 2024 05:26  Amylase x     lipase x                                                                                        Urinalysis Basic - ( 01 May 2024 05:26 )    Color: x / Appearance: x / SG: x / pH: x  Gluc: 118 mg/dL / Ketone: x  / Bili: x / Urobili: x   Blood: x / Protein: x / Nitrite: x   Leuk Esterase: x / RBC: x / WBC x   Sq Epi: x / Non Sq Epi: x / Bacteria: x                                                                                                             Mode: AC/ CMV (Assist Control/ Continuous Mandatory Ventilation)  RR (machine): 28  TV (machine): 300  FiO2: 40  PEEP: 5  MAP: 15  PIP: 33                                          MEDICATIONS  (STANDING):  amLODIPine   Tablet 5 milliGRAM(s) Oral daily  artificial  tears Solution 1 Drop(s) Both EYES three times a day  chlorhexidine 0.12% Liquid 15 milliLiter(s) Oral Mucosa every 12 hours  chlorhexidine 2% Cloths 1 Application(s) Topical daily  chlorhexidine 2% Cloths 1 Application(s) Topical <User Schedule>  heparin   Injectable 5000 Unit(s) SubCutaneous every 8 hours  labetalol 100 milliGRAM(s) Oral three times a day  levothyroxine 100 MICROGram(s) Oral daily  lidocaine 1%/epinephrine 1:100,000 Inj 20 milliLiter(s) Local Injection once  lidocaine 2% Viscous 10 milliLiter(s) Oral once  methadone    Tablet 10 milliGRAM(s) Oral two times a day  pantoprazole   Suspension 40 milliGRAM(s) Oral daily  petrolatum Ophthalmic Ointment 1 Application(s) Both EYES daily  senna 2 Tablet(s) Oral at bedtime  silver sulfADIAZINE 1% Cream 1 Application(s) Topical two times a day  sodium zirconium cyclosilicate 10 Gram(s) Oral every 8 hours    MEDICATIONS  (PRN):  polyethylene glycol 3350 17 Gram(s) Oral daily PRN Constipation  scopolamine 1 mG/72 Hr(s) Patch 1 Patch Transdermal every 72 hours PRN for secretions          Xrays:  TLC:  OG:  ET tube:                                                                                       ECHO:  [facility] ICU:

## 2024-05-01 NOTE — PROGRESS NOTE ADULT - ASSESSMENT
74-year-old female with PHMx of pancreatic CA s/p Whipple, CKD stage 3, HTN, COPD, pulmonary fibrosis, PRESS syndrome (hospitalized in 2019), Raynaud's syndrome, ANCA vasculitis, Scleroderma, hypothyroid  presents to ED due to cardiac arrest. Pt lives at home with son and daughter, went to use the bathroom and became unresponsive.  EMS intubated her and gave her 4 rounds of epinephrine.  ROSC was achieved within a minute of her arrival to the ED.  Patient was found to be hypothermic and bradycardic    acute respiratory failure / cardiac arrest / anoxic brain injury     - pt is s/p trach and PEG   - poor prognosis - family aware   - methadone, Norvasc synthroid, labetolol   - DVT and GI prophylaxis   - I discussed plan with CC    - DC planning to LTAC facility

## 2024-05-01 NOTE — PROGRESS NOTE ADULT - ASSESSMENT
IMPRESSION:    Anoxic brain damage  Acute Hypoxemic Respiratory Failure on MV SP trach and PEG  SP CPA downtime 30 minutes   Cardiac bradycardia   Lung Fibrosis   GIUSEPPE, resolved   Hyperkalemia   HO Raynaud's  HO PRESS    HO Anemia  HO Pancreatic cancer     PLAN:    CNS: off sedation   continue on methadone  monitor Qtc    HEENT: oral and trach care     PULMONARY: keep pox >92%  monitor peak and plateau  no vent changes, pressure support as tolerated     CARDIOVASCULAR:  2D-Echo noted, continue antihypertensives     RENAL: follow is and os, trend Cr, Nephrology follow up, if K more then 5 give  lokelma monitorfollow K    INFECTIOUS DISEASE: cultures negative to date, completed Zosyn course    GASTRO: GI ppx, PEG feeds     HEMATOLOGICAL:  DVT prophylaxis. follow morning h/h     ENDOCRINE:  Follow up FS.  Insulin protocol if needed.    MUSCULOSKELETAL: bedrest    Full Code, Palliative care following    Grave prognosis     The patient has a high probability of further deterioration.    /case mgmt for LTAC placement/disposition    Downgrade to [facility]F or vent unit    social service for d/c planning   voiding trial

## 2024-05-01 NOTE — PROGRESS NOTE ADULT - SUBJECTIVE AND OBJECTIVE BOX
Patient seen and evaluated this am, facial grimace to stimuli, does not follow commands       T(F): 96.8 (05-01-24 @ 07:01), Max: 96.8 (05-01-24 @ 07:01)  HR: 57 (05-01-24 @ 07:16)  BP: 158/74 (05-01-24 @ 07:07)  RR: 20  SpO2: 100% (05-01-24 @ 07:16) (99% - 100%)    PHYSICAL EXAM:  GENERAL: NAD  HEAD:  Atraumatic, Normocephalic  EYES: EOMI, PERRLA, conjunctiva and sclera clear  NERVOUS SYSTEM:  positive gag and corneal reflex  CHEST/LUNG: Clear to percussion bilaterally; No rales, rhonchi, wheezing, or rubs  HEART: Regular rate and rhythm; No murmurs, rubs, or gallops  ABDOMEN: Soft, Nontender, Nondistended; Bowel sounds present  EXTREMITIES:  2+ Peripheral Pulses, No clubbing, cyanosis, or edema    LABS  05-01    130<L>  |  91<L>  |  36<H>  ----------------------------<  118<H>  4.9   |  31  |  1.1    Ca    8.0<L>      01 May 2024 05:26  Mg     1.9     05-01    TPro  7.0  /  Alb  2.1<L>  /  TBili  0.2  /  DBili  x   /  AST  47<H>  /  ALT  9   /  AlkPhos  151<H>  05-01                          8.4    6.71  )-----------( 370      ( 01 May 2024 05:26 )             26.2       Mode: AC/ CMV (Assist Control/ Continuous Mandatory Ventilation)  RR (machine): 28  TV (machine): 300  FiO2: 40  PEEP: 5        Culture Results:   Normal Respiratory Dina present (04-18-24)  Culture Results:   No growth (04-06-24)  Culture Results:   No growth at 5 days (04-06-24)  Culture Results:   No growth at 5 days (04-06-24)    RADIOLOGY  < from: CT Head No Cont (04.11.24 @ 11:35) >  IMPRESSION:    Findings compatible with diffuse anoxic injury of the brain.      < end of copied text >    MEDICATIONS  (STANDING):  amLODIPine   Tablet 5 milliGRAM(s) Oral daily  artificial  tears Solution 1 Drop(s) Both EYES three times a day  chlorhexidine 0.12% Liquid 15 milliLiter(s) Oral Mucosa every 12 hours  chlorhexidine 2% Cloths 1 Application(s) Topical daily  heparin   Injectable 5000 Unit(s) SubCutaneous every 8 hours  labetalol 100 milliGRAM(s) Oral three times a day  levothyroxine 100 MICROGram(s) Oral daily  methadone    Tablet 10 milliGRAM(s) Oral two times a day  pantoprazole   Suspension 40 milliGRAM(s) Oral daily  petrolatum Ophthalmic Ointment 1 Application(s) Both EYES daily  senna 2 Tablet(s) Oral at bedtime  silver sulfADIAZINE 1% Cream 1 Application(s) Topical two times a day    MEDICATIONS  (PRN):  polyethylene glycol 3350 17 Gram(s) Oral daily PRN Constipation  scopolamine 1 mG/72 Hr(s) Patch 1 Patch Transdermal every 72 hours PRN for secretions

## 2024-05-02 ENCOUNTER — TRANSCRIPTION ENCOUNTER (OUTPATIENT)
Age: 74
End: 2024-05-02

## 2024-05-02 LAB
ALBUMIN SERPL ELPH-MCNC: 2.3 G/DL — LOW (ref 3.5–5.2)
ALP SERPL-CCNC: 142 U/L — HIGH (ref 30–115)
ALT FLD-CCNC: 9 U/L — SIGNIFICANT CHANGE UP (ref 0–41)
ANION GAP SERPL CALC-SCNC: 5 MMOL/L — LOW (ref 7–14)
AST SERPL-CCNC: 42 U/L — HIGH (ref 0–41)
BASOPHILS # BLD AUTO: 0.03 K/UL — SIGNIFICANT CHANGE UP (ref 0–0.2)
BASOPHILS NFR BLD AUTO: 0.5 % — SIGNIFICANT CHANGE UP (ref 0–1)
BILIRUB SERPL-MCNC: <0.2 MG/DL — SIGNIFICANT CHANGE UP (ref 0.2–1.2)
BUN SERPL-MCNC: 33 MG/DL — HIGH (ref 10–20)
CALCIUM SERPL-MCNC: 8.1 MG/DL — LOW (ref 8.4–10.5)
CHLORIDE SERPL-SCNC: 90 MMOL/L — LOW (ref 98–110)
CO2 SERPL-SCNC: 32 MMOL/L — SIGNIFICANT CHANGE UP (ref 17–32)
CREAT SERPL-MCNC: 1 MG/DL — SIGNIFICANT CHANGE UP (ref 0.7–1.5)
EGFR: 59 ML/MIN/1.73M2 — LOW
EOSINOPHIL # BLD AUTO: 0.14 K/UL — SIGNIFICANT CHANGE UP (ref 0–0.7)
EOSINOPHIL NFR BLD AUTO: 2.1 % — SIGNIFICANT CHANGE UP (ref 0–8)
GLUCOSE SERPL-MCNC: 134 MG/DL — HIGH (ref 70–99)
HCT VFR BLD CALC: 25.4 % — LOW (ref 37–47)
HGB BLD-MCNC: 8.1 G/DL — LOW (ref 12–16)
IMM GRANULOCYTES NFR BLD AUTO: 0.3 % — SIGNIFICANT CHANGE UP (ref 0.1–0.3)
LYMPHOCYTES # BLD AUTO: 0.82 K/UL — LOW (ref 1.2–3.4)
LYMPHOCYTES # BLD AUTO: 12.4 % — LOW (ref 20.5–51.1)
MAGNESIUM SERPL-MCNC: 1.9 MG/DL — SIGNIFICANT CHANGE UP (ref 1.8–2.4)
MCHC RBC-ENTMCNC: 26.3 PG — LOW (ref 27–31)
MCHC RBC-ENTMCNC: 31.9 G/DL — LOW (ref 32–37)
MCV RBC AUTO: 82.5 FL — SIGNIFICANT CHANGE UP (ref 81–99)
MONOCYTES # BLD AUTO: 0.84 K/UL — HIGH (ref 0.1–0.6)
MONOCYTES NFR BLD AUTO: 12.7 % — HIGH (ref 1.7–9.3)
NEUTROPHILS # BLD AUTO: 4.75 K/UL — SIGNIFICANT CHANGE UP (ref 1.4–6.5)
NEUTROPHILS NFR BLD AUTO: 72 % — SIGNIFICANT CHANGE UP (ref 42.2–75.2)
NRBC # BLD: 0 /100 WBCS — SIGNIFICANT CHANGE UP (ref 0–0)
PLATELET # BLD AUTO: 412 K/UL — HIGH (ref 130–400)
PMV BLD: 10 FL — SIGNIFICANT CHANGE UP (ref 7.4–10.4)
POTASSIUM SERPL-MCNC: 5.3 MMOL/L — HIGH (ref 3.5–5)
POTASSIUM SERPL-SCNC: 5.3 MMOL/L — HIGH (ref 3.5–5)
PROT SERPL-MCNC: 7 G/DL — SIGNIFICANT CHANGE UP (ref 6–8)
RBC # BLD: 3.08 M/UL — LOW (ref 4.2–5.4)
RBC # FLD: 17.2 % — HIGH (ref 11.5–14.5)
SODIUM SERPL-SCNC: 127 MMOL/L — LOW (ref 135–146)
WBC # BLD: 6.6 K/UL — SIGNIFICANT CHANGE UP (ref 4.8–10.8)
WBC # FLD AUTO: 6.6 K/UL — SIGNIFICANT CHANGE UP (ref 4.8–10.8)

## 2024-05-02 PROCEDURE — 99232 SBSQ HOSP IP/OBS MODERATE 35: CPT

## 2024-05-02 RX ORDER — SODIUM ZIRCONIUM CYCLOSILICATE 10 G/10G
10 POWDER, FOR SUSPENSION ORAL ONCE
Refills: 0 | Status: COMPLETED | OUTPATIENT
Start: 2024-05-02 | End: 2024-05-02

## 2024-05-02 RX ORDER — PANTOPRAZOLE SODIUM 20 MG/1
1 TABLET, DELAYED RELEASE ORAL
Qty: 0 | Refills: 0 | DISCHARGE
Start: 2024-05-02

## 2024-05-02 RX ADMIN — Medication 1 APPLICATION(S): at 17:49

## 2024-05-02 RX ADMIN — Medication 1 APPLICATION(S): at 11:15

## 2024-05-02 RX ADMIN — POLYETHYLENE GLYCOL 3350 17 GRAM(S): 17 POWDER, FOR SOLUTION ORAL at 21:01

## 2024-05-02 RX ADMIN — METHADONE HYDROCHLORIDE 10 MILLIGRAM(S): 40 TABLET ORAL at 05:06

## 2024-05-02 RX ADMIN — SENNA PLUS 2 TABLET(S): 8.6 TABLET ORAL at 21:00

## 2024-05-02 RX ADMIN — CHLORHEXIDINE GLUCONATE 1 APPLICATION(S): 213 SOLUTION TOPICAL at 11:17

## 2024-05-02 RX ADMIN — SCOPALAMINE 1 PATCH: 1 PATCH, EXTENDED RELEASE TRANSDERMAL at 08:11

## 2024-05-02 RX ADMIN — Medication 1 DROP(S): at 05:07

## 2024-05-02 RX ADMIN — Medication 1 DROP(S): at 14:41

## 2024-05-02 RX ADMIN — METHADONE HYDROCHLORIDE 10 MILLIGRAM(S): 40 TABLET ORAL at 17:47

## 2024-05-02 RX ADMIN — SODIUM ZIRCONIUM CYCLOSILICATE 10 GRAM(S): 10 POWDER, FOR SUSPENSION ORAL at 10:15

## 2024-05-02 RX ADMIN — HEPARIN SODIUM 5000 UNIT(S): 5000 INJECTION INTRAVENOUS; SUBCUTANEOUS at 14:40

## 2024-05-02 RX ADMIN — PANTOPRAZOLE SODIUM 40 MILLIGRAM(S): 20 TABLET, DELAYED RELEASE ORAL at 11:19

## 2024-05-02 RX ADMIN — CHLORHEXIDINE GLUCONATE 1 APPLICATION(S): 213 SOLUTION TOPICAL at 05:07

## 2024-05-02 RX ADMIN — Medication 1 DROP(S): at 21:22

## 2024-05-02 RX ADMIN — Medication 100 MILLIGRAM(S): at 05:06

## 2024-05-02 RX ADMIN — Medication 100 MILLIGRAM(S): at 14:40

## 2024-05-02 RX ADMIN — Medication 100 MILLIGRAM(S): at 21:01

## 2024-05-02 RX ADMIN — Medication 1 APPLICATION(S): at 05:07

## 2024-05-02 RX ADMIN — AMLODIPINE BESYLATE 10 MILLIGRAM(S): 2.5 TABLET ORAL at 05:06

## 2024-05-02 RX ADMIN — SCOPALAMINE 1 PATCH: 1 PATCH, EXTENDED RELEASE TRANSDERMAL at 09:12

## 2024-05-02 RX ADMIN — CHLORHEXIDINE GLUCONATE 15 MILLILITER(S): 213 SOLUTION TOPICAL at 17:48

## 2024-05-02 RX ADMIN — HEPARIN SODIUM 5000 UNIT(S): 5000 INJECTION INTRAVENOUS; SUBCUTANEOUS at 21:00

## 2024-05-02 RX ADMIN — HEPARIN SODIUM 5000 UNIT(S): 5000 INJECTION INTRAVENOUS; SUBCUTANEOUS at 05:06

## 2024-05-02 RX ADMIN — Medication 100 MICROGRAM(S): at 05:06

## 2024-05-02 RX ADMIN — CHLORHEXIDINE GLUCONATE 15 MILLILITER(S): 213 SOLUTION TOPICAL at 05:06

## 2024-05-02 NOTE — DISCHARGE NOTE PROVIDER - NSDCCPCAREPLAN_GEN_ALL_CORE_FT
PRINCIPAL DISCHARGE DIAGNOSIS  Diagnosis: Cardiac arrest  Assessment and Plan of Treatment: You were brought in after having cardiac arrest for 30 minutes. You were intubated and admitted and monitored in the ICU. CT head showed diffuse anoxic brain injury and you had aspiration pneumonia which you got antibiotics for. While off sedation, you were unresponsive and after long discussion with your family, decision was made to trach and place PEG tube. You also had an acute kidney injury which is now resolved. You are stable to be discharged to the nursing home.        SECONDARY DISCHARGE DIAGNOSES  Diagnosis: Hyperkalemia  Assessment and Plan of Treatment:      PRINCIPAL DISCHARGE DIAGNOSIS  Diagnosis: Cardiac arrest  Assessment and Plan of Treatment: You were brought in after having cardiac arrest for 30 minutes. You were intubated and admitted and monitored in the ICU. CT head showed diffuse anoxic brain injury and you had aspiration pneumonia which you got antibiotics for. While off sedation, you were unresponsive and after long discussion with your family, decision was made to trach and place PEG tube. You also had an acute kidney injury which is now resolved. You are stable to be discharged to the nursing home.

## 2024-05-02 NOTE — DISCHARGE NOTE PROVIDER - HOSPITAL COURSE
74-year-old female with PHMx of pancreatic CA s/p Whipple, CKD stage 3, HTN, COPD, pulmonary fibrosis, PRESS syndrome (hospitalized in 2019), Raynaud's syndrome, ANCA vasculitis, Scleroderma  presents to ED due to cardiac arrest. Pt lives at home with son and daughter, went to use the bathroom. Daughter notes that she heard the patient's walker scratching the floor, went upstairs to see what was going on. Daughter noticed patient was slumped over and struggling to get onto the toilet. Daughter helped patient onto the toilet, but noticed that the patient became less responsive and alert so she called EMS. Patient was found down by EMS, intubated her in the field, and resuscitated her with compressions and 4 rounds of epinephrine. ROSC achieved within a minute of her arrival to the ED. Patient was found to be hypothermic and bradycardic. Of note, patient was hospitalized back in Dec 2023 in [facility] for 18 days for multifocal pneumonia 2/2 human metapneumovirus. Patient also was hospitalized in 2019 for PRESS syndrome. Pt had a negative stress test in Sept 2023. She follows pulmonology for pulmonary fibrosis, had recent PFTs done.     ED vitals and workup:  BP 90/57, HR 88, Temp 94.1F, satting 100% on ventilator   Labs: Hgb 8.7, Trop 56 -->79, K 5.3, Lactate 3.5, Lipase 256, , , ALT 52  VBG: pH 7.12, pCO2 67, Lactate 6.6    CXR bilateral opacities. cardiomegaly  CTH: mild atrophic changes  CT angio chest PE, A/P w/ IV cont: Bilary ductal enhancement s/p CCY. Diffuse fibrotic changes of lungs. Trace bilateral effusions. NO PE.    s/p atropine 1mg x4 doses, calcium gluconate 3g, [id number] x1, insulin 10 units, cefepime 1g in the ED.     Admitted to MICU for unresponsiveness s/p cardiac arrest.    In the ICU, CT head showed evidence of diffuse anoxic brain injury complicated by aspiration pneumonia now s/p antibiotics course. During her ICU stay, she remained unresponsive off sedation. Discussions were had with family for goals of care with neurology, palliative, and primary teams. Family wanted full medical treatment, full code, and wanted to re-evaluate after 3 months. Pt is s/p trach and PEG. She also had an GIUSEPPE which resolved. She further was having hyperkalemia which resolved with stopping lisinopril and giving lokelma. She had a hill taking out on 5/1 and is now on TOV.     She is medically stable to be discharged to nursing home.    74-year-old female with PHMx of pancreatic CA s/p Whipple, CKD stage 3, HTN, COPD, pulmonary fibrosis, PRESS syndrome (hospitalized in 2019), Raynaud's syndrome, ANCA vasculitis, Scleroderma  presents to ED due to cardiac arrest. Pt lives at home with son and daughter, went to use the bathroom. Daughter notes that she heard the patient's walker scratching the floor, went upstairs to see what was going on. Daughter noticed patient was slumped over and struggling to get onto the toilet. Daughter helped patient onto the toilet, but noticed that the patient became less responsive and alert so she called EMS. Patient was found down by EMS, intubated her in the field, and resuscitated her with compressions and 4 rounds of epinephrine. ROSC achieved within a minute of her arrival to the ED. Patient was found to be hypothermic and bradycardic. Of note, patient was hospitalized back in Dec 2023 in [facility] for 18 days for multifocal pneumonia 2/2 human metapneumovirus. Patient also was hospitalized in 2019 for PRESS syndrome. Pt had a negative stress test in Sept 2023. She follows pulmonology for pulmonary fibrosis, had recent PFTs done.     ED vitals and workup:  BP 90/57, HR 88, Temp 94.1F, satting 100% on ventilator   Labs: Hgb 8.7, Trop 56 -->79, K 5.3, Lactate 3.5, Lipase 256, , , ALT 52  VBG: pH 7.12, pCO2 67, Lactate 6.6    CXR bilateral opacities. cardiomegaly  CTH: mild atrophic changes  CT angio chest PE, A/P w/ IV cont: Bilary ductal enhancement s/p CCY. Diffuse fibrotic changes of lungs. Trace bilateral effusions. NO PE.    s/p atropine 1mg x4 doses, calcium gluconate 3g, [id number] x1, insulin 10 units, cefepime 1g in the ED.     Admitted to MICU for unresponsiveness s/p cardiac arrest.    In the ICU, CT head showed evidence of diffuse anoxic brain injury complicated by aspiration pneumonia now s/p antibiotics course. During her ICU stay, she remained unresponsive off sedation. Discussions were had with family for goals of care with neurology, palliative, and primary teams. Family wanted full medical treatment, full code, and wanted to re-evaluate after 3 months. Pt is s/p trach and PEG. She also had an GIUSEPPE which resolved. She further was having hyperkalemia which resolved with stopping lisinopril and giving lokelma.    Pt to be discharged to nursing home.    Extremely poor prognosis and patient is very unlikely to recover.

## 2024-05-02 NOTE — PROGRESS NOTE ADULT - SUBJECTIVE AND OBJECTIVE BOX
Patient is a 74y old  Female who presents with a chief complaint of cardiac arrest (01 May 2024 11:43)      Over Night Events:  Patient seen and examined.   no acute changes       ROS:  See HPI    PHYSICAL EXAM    ICU Vital Signs Last 24 Hrs  T(C): 36.9 (02 May 2024 07:01), Max: 36.9 (02 May 2024 07:01)  T(F): 98.4 (02 May 2024 07:01), Max: 98.4 (02 May 2024 07:01)  HR: 72 (02 May 2024 05:45) (52 - 79)  BP: 168/81 (02 May 2024 05:45) (127/62 - 183/87)  BP(mean): 114 (02 May 2024 05:45) (89 - 123)  ABP: --  ABP(mean): --  RR: 31 (02 May 2024 07:01) (28 - 32)  SpO2: 100% (02 May 2024 05:45) (99% - 100%)    O2 Parameters below as of 02 May 2024 05:45  Patient On (Oxygen Delivery Method): ventilator    O2 Concentration (%): 40        General: open eyes   HEENT:     trach            Lymph Nodes: NO cervical LN   Lungs: Bilateral BS  Cardiovascular: Regular   Abdomen: Soft, Positive BS  Extremities: No clubbing   Skin: warm   Neurological:   Musculoskeletal: move all ext     I&O's Detail    01 May 2024 07:01  -  02 May 2024 07:00  --------------------------------------------------------  IN:    Free Water: 300 mL    Osmolite 1.2: 540 mL  Total IN: 840 mL    OUT:    Intermittent Catheterization - Urethral (mL): 650 mL    Voided (mL): 1000 mL  Total OUT: 1650 mL    Total NET: -810 mL          LABS:                          8.1    6.60  )-----------( 412      ( 02 May 2024 05:48 )             25.4         02 May 2024 05:48    127    |  90     |  33     ----------------------------<  134    5.3     |  32     |  1.0      Ca    8.1        02 May 2024 05:48  Mg     1.9       02 May 2024 05:48    TPro  7.0    /  Alb  2.3    /  TBili  <0.2   /  DBili  x      /  AST  42     /  ALT  9      /  AlkPhos  142    02 May 2024 05:48  Amylase x     lipase x                                                                                        Urinalysis Basic - ( 02 May 2024 05:48 )    Color: x / Appearance: x / SG: x / pH: x  Gluc: 134 mg/dL / Ketone: x  / Bili: x / Urobili: x   Blood: x / Protein: x / Nitrite: x   Leuk Esterase: x / RBC: x / WBC x   Sq Epi: x / Non Sq Epi: x / Bacteria: x                                                                                                             Mode: AC/ CMV (Assist Control/ Continuous Mandatory Ventilation)  RR (machine): 28  TV (machine): 300  FiO2: 40  PEEP: 5  MAP: 14  PIP: 36                                          MEDICATIONS  (STANDING):  amLODIPine   Tablet 10 milliGRAM(s) Oral daily  artificial  tears Solution 1 Drop(s) Both EYES three times a day  chlorhexidine 0.12% Liquid 15 milliLiter(s) Oral Mucosa every 12 hours  chlorhexidine 2% Cloths 1 Application(s) Topical <User Schedule>  chlorhexidine 2% Cloths 1 Application(s) Topical daily  heparin   Injectable 5000 Unit(s) SubCutaneous every 8 hours  labetalol 100 milliGRAM(s) Oral three times a day  levothyroxine 100 MICROGram(s) Oral daily  lidocaine 1%/epinephrine 1:100,000 Inj 20 milliLiter(s) Local Injection once  lidocaine 2% Viscous 10 milliLiter(s) Oral once  methadone    Tablet 10 milliGRAM(s) Oral two times a day  pantoprazole   Suspension 40 milliGRAM(s) Oral daily  petrolatum Ophthalmic Ointment 1 Application(s) Both EYES daily  senna 2 Tablet(s) Oral at bedtime  silver sulfADIAZINE 1% Cream 1 Application(s) Topical two times a day  sodium zirconium cyclosilicate 10 Gram(s) Oral once    MEDICATIONS  (PRN):  polyethylene glycol 3350 17 Gram(s) Oral daily PRN Constipation  scopolamine 1 mG/72 Hr(s) Patch 1 Patch Transdermal every 72 hours PRN for secretions          Xrays:  TLC:  OG:  ET tube:                                                                                       ECHO:  CAM ICU:

## 2024-05-02 NOTE — PROGRESS NOTE ADULT - SUBJECTIVE AND OBJECTIVE BOX
Patient seen and evaluated this am, on ventilator, not following commands       T(F): 98.3 (05-02-24 @ 15:00), Max: 98.4 (05-02-24 @ 07:01)  HR: 61 (05-02-24 @ 14:56)  BP: 174/78 (05-02-24 @ 07:05)  RR: 28 (05-02-24 @ 15:00)  SpO2: 100% (05-02-24 @ 14:56) (99% - 100%)    PHYSICAL EXAM:  GENERAL: NAD  HEAD:  Atraumatic, Normocephalic  EYES: EOMI, PERRLA, conjunctiva and sclera clear  NERVOUS SYSTEM: positive gag reflex  CHEST/LUNG: Clear to percussion bilaterally  HEART: Regular rate and rhythm; No murmurs, rubs, or gallops  ABDOMEN: Soft, Nontender, Nondistended; Bowel sounds present  EXTREMITIES:  2+ Peripheral Pulses, No clubbing, cyanosis, or edema    LABS  05-02    127<L>  |  90<L>  |  33<H>  ----------------------------<  134<H>  5.3<H>   |  32  |  1.0    Ca    8.1<L>      02 May 2024 05:48  Mg     1.9     05-02    TPro  7.0  /  Alb  2.3<L>  /  TBili  <0.2  /  DBili  x   /  AST  42<H>  /  ALT  9   /  AlkPhos  142<H>  05-02                          8.1    6.60  )-----------( 412      ( 02 May 2024 05:48 )             25.4       Mode: AC/ CMV (Assist Control/ Continuous Mandatory Ventilation)  RR (machine): 28  TV (machine): 300  FiO2: 40  PEEP: 5    Culture Results:   Normal Respiratory Dina present (04-18-24)  Culture Results:   No growth (04-06-24)  Culture Results:   No growth at 5 days (04-06-24)  Culture Results:   No growth at 5 days (04-06-24)    RADIOLOGY  < from: Xray Chest 1 View- PORTABLE-Routine (Xray Chest 1 View- PORTABLE-Routine in AM.) (04.30.24 @ 08:25) >    Impression:    Essentially stable bilateral opacities.        < end of copied text >    MEDICATIONS  (STANDING):  amLODIPine   Tablet 10 milliGRAM(s) Oral daily  artificial  tears Solution 1 Drop(s) Both EYES three times a day  chlorhexidine 0.12% Liquid 15 milliLiter(s) Oral Mucosa every 12 hours  chlorhexidine 2% Cloths 1 Application(s) Topical daily  heparin   Injectable 5000 Unit(s) SubCutaneous every 8 hours  labetalol 100 milliGRAM(s) Oral three times a day  levothyroxine 100 MICROGram(s) Oral daily  lidocaine 1%/epinephrine 1:100,000 Inj 20 milliLiter(s) Local Injection once  methadone    Tablet 10 milliGRAM(s) Oral two times a day  pantoprazole   Suspension 40 milliGRAM(s) Oral daily  petrolatum Ophthalmic Ointment 1 Application(s) Both EYES daily  senna 2 Tablet(s) Oral at bedtime  silver sulfADIAZINE 1% Cream 1 Application(s) Topical two times a day    MEDICATIONS  (PRN):  polyethylene glycol 3350 17 Gram(s) Oral daily PRN Constipation

## 2024-05-02 NOTE — PROGRESS NOTE ADULT - ASSESSMENT
IMPRESSION:    Anoxic brain damage  Acute Hypoxemic Respiratory Failure on MV SP trach and PEG  SP CPA downtime 30 minutes   Cardiac bradycardia   Lung Fibrosis   GIUSEPPE, resolved   Hyperkalemia   HO Raynaud's  HO PRESS    HO Anemia  HO Pancreatic cancer     PLAN:    CNS: off sedation   continue on methadone  monitor Qtc    HEENT: oral and trach care     PULMONARY: keep pox >92%  monitor peak and plateau  no vent changes, pressure support as tolerated     CARDIOVASCULAR:  2D-Echo noted, continue antihypertensives     RENAL: follow is and os, trend Cr, Nephrology follow up, if K more then 5 give  lokelma monitorfollow K    INFECTIOUS DISEASE: cultures negative to date, completed Zosyn course    GASTRO: GI ppx, PEG feeds     HEMATOLOGICAL:  DVT prophylaxis. follow morning h/h     ENDOCRINE:  Follow up FS.  Insulin protocol if needed.    MUSCULOSKELETAL: bedrest    Full Code, Palliative care following    Grave prognosis     The patient has a high probability of further deterioration.    /case mgmt for LTAC placement/disposition    Downgrade to GMF or vent unit    social service for d/c planning

## 2024-05-02 NOTE — DISCHARGE NOTE PROVIDER - INSTRUCTIONS
NPO with tube feed:  Osmolite 1.2 Lei  total volume for 24 hours: 1080  tube feed rate: 45cc/hr  Tube feed duration: 24 hours  free water flushes: 150cc [id number]

## 2024-05-02 NOTE — DISCHARGE NOTE PROVIDER - CARE PROVIDER_API CALL
Ines [address] [address]  Phone: [phone]  Fax: [phone]  Follow Up Time: 1 week

## 2024-05-02 NOTE — DISCHARGE NOTE PROVIDER - NSDCMRMEDTOKEN_GEN_ALL_CORE_FT
amLODIPine 10 mg oral tablet: 1 tab(s) orally once a day  labetalol 100 mg oral tablet: 1 tab(s) orally 3 times a day  levothyroxine 100 mcg (0.1 mg) oral tablet: 1 tab(s) orally once a day (at bedtime)  methadone 10 mg oral tablet: 1 tab(s) orally 2 times a day  ocular lubricant ophthalmic solution: 1 drop(s) to each affected eye 3 times a day  pantoprazole 40 mg oral granule, delayed release: 1 tab(s) orally once a day  polyethylene glycol 3350 oral powder for reconstitution: 17 gram(s) orally once a day As needed Constipation  senna leaf extract oral tablet: 2 tab(s) orally once a day (at bedtime)  silver sulfADIAZINE 1% topical cream: 1 Apply topically to affected area 2 times a day   amLODIPine 10 mg oral tablet: 1 tab(s) orally once a day  labetalol 100 mg oral tablet: 1 tab(s) orally 3 times a day  levothyroxine 100 mcg (0.1 mg) oral tablet: 1 tab(s) by gastrostomy tube once a day (at bedtime)  ocular lubricant ophthalmic solution: 1 drop(s) to each affected eye 3 times a day  pantoprazole 40 mg oral granule, delayed release: 1 tab(s) orally once a day  polyethylene glycol 3350 oral powder for reconstitution: 17 gram(s) orally once a day As needed Constipation  senna leaf extract oral tablet: 2 tab(s) orally once a day (at bedtime)  silver sulfADIAZINE 1% topical cream: 1 Apply topically to affected area 2 times a day

## 2024-05-03 LAB
ALBUMIN SERPL ELPH-MCNC: 2.3 G/DL — LOW (ref 3.5–5.2)
ALP SERPL-CCNC: 133 U/L — HIGH (ref 30–115)
ALT FLD-CCNC: 7 U/L — SIGNIFICANT CHANGE UP (ref 0–41)
ANION GAP SERPL CALC-SCNC: 9 MMOL/L — SIGNIFICANT CHANGE UP (ref 7–14)
AST SERPL-CCNC: 41 U/L — SIGNIFICANT CHANGE UP (ref 0–41)
BILIRUB SERPL-MCNC: <0.2 MG/DL — SIGNIFICANT CHANGE UP (ref 0.2–1.2)
BUN SERPL-MCNC: 34 MG/DL — HIGH (ref 10–20)
CALCIUM SERPL-MCNC: 8 MG/DL — LOW (ref 8.4–10.5)
CHLORIDE SERPL-SCNC: 89 MMOL/L — LOW (ref 98–110)
CO2 SERPL-SCNC: 31 MMOL/L — SIGNIFICANT CHANGE UP (ref 17–32)
CREAT SERPL-MCNC: 0.9 MG/DL — SIGNIFICANT CHANGE UP (ref 0.7–1.5)
EGFR: 67 ML/MIN/1.73M2 — SIGNIFICANT CHANGE UP
GLUCOSE SERPL-MCNC: 128 MG/DL — HIGH (ref 70–99)
HCT VFR BLD CALC: 24.8 % — LOW (ref 37–47)
HGB BLD-MCNC: 7.8 G/DL — LOW (ref 12–16)
MAGNESIUM SERPL-MCNC: 2 MG/DL — SIGNIFICANT CHANGE UP (ref 1.8–2.4)
MCHC RBC-ENTMCNC: 25.3 PG — LOW (ref 27–31)
MCHC RBC-ENTMCNC: 31.5 G/DL — LOW (ref 32–37)
MCV RBC AUTO: 80.5 FL — LOW (ref 81–99)
NRBC # BLD: 0 /100 WBCS — SIGNIFICANT CHANGE UP (ref 0–0)
PLATELET # BLD AUTO: 388 K/UL — SIGNIFICANT CHANGE UP (ref 130–400)
PMV BLD: 9.7 FL — SIGNIFICANT CHANGE UP (ref 7.4–10.4)
POTASSIUM SERPL-MCNC: 5.4 MMOL/L — HIGH (ref 3.5–5)
POTASSIUM SERPL-SCNC: 5.4 MMOL/L — HIGH (ref 3.5–5)
PROT SERPL-MCNC: 6.9 G/DL — SIGNIFICANT CHANGE UP (ref 6–8)
RBC # BLD: 3.08 M/UL — LOW (ref 4.2–5.4)
RBC # FLD: 17.2 % — HIGH (ref 11.5–14.5)
SODIUM SERPL-SCNC: 129 MMOL/L — LOW (ref 135–146)
WBC # BLD: 7.02 K/UL — SIGNIFICANT CHANGE UP (ref 4.8–10.8)
WBC # FLD AUTO: 7.02 K/UL — SIGNIFICANT CHANGE UP (ref 4.8–10.8)

## 2024-05-03 PROCEDURE — 99232 SBSQ HOSP IP/OBS MODERATE 35: CPT

## 2024-05-03 PROCEDURE — 93010 ELECTROCARDIOGRAM REPORT: CPT

## 2024-05-03 RX ORDER — SODIUM ZIRCONIUM CYCLOSILICATE 10 G/10G
10 POWDER, FOR SUSPENSION ORAL THREE TIMES A DAY
Refills: 0 | Status: DISCONTINUED | OUTPATIENT
Start: 2024-05-03 | End: 2024-05-05

## 2024-05-03 RX ADMIN — AMLODIPINE BESYLATE 10 MILLIGRAM(S): 2.5 TABLET ORAL at 05:28

## 2024-05-03 RX ADMIN — SODIUM ZIRCONIUM CYCLOSILICATE 10 GRAM(S): 10 POWDER, FOR SUSPENSION ORAL at 13:51

## 2024-05-03 RX ADMIN — HEPARIN SODIUM 5000 UNIT(S): 5000 INJECTION INTRAVENOUS; SUBCUTANEOUS at 21:34

## 2024-05-03 RX ADMIN — Medication 1 DROP(S): at 05:28

## 2024-05-03 RX ADMIN — Medication 1 DROP(S): at 13:45

## 2024-05-03 RX ADMIN — SENNA PLUS 2 TABLET(S): 8.6 TABLET ORAL at 21:34

## 2024-05-03 RX ADMIN — Medication 1 APPLICATION(S): at 16:48

## 2024-05-03 RX ADMIN — HEPARIN SODIUM 5000 UNIT(S): 5000 INJECTION INTRAVENOUS; SUBCUTANEOUS at 05:27

## 2024-05-03 RX ADMIN — HEPARIN SODIUM 5000 UNIT(S): 5000 INJECTION INTRAVENOUS; SUBCUTANEOUS at 13:50

## 2024-05-03 RX ADMIN — Medication 100 MILLIGRAM(S): at 21:34

## 2024-05-03 RX ADMIN — Medication 100 MICROGRAM(S): at 05:27

## 2024-05-03 RX ADMIN — SODIUM ZIRCONIUM CYCLOSILICATE 10 GRAM(S): 10 POWDER, FOR SUSPENSION ORAL at 21:35

## 2024-05-03 RX ADMIN — Medication 1 APPLICATION(S): at 05:29

## 2024-05-03 RX ADMIN — SODIUM ZIRCONIUM CYCLOSILICATE 10 GRAM(S): 10 POWDER, FOR SUSPENSION ORAL at 08:36

## 2024-05-03 RX ADMIN — CHLORHEXIDINE GLUCONATE 15 MILLILITER(S): 213 SOLUTION TOPICAL at 16:53

## 2024-05-03 RX ADMIN — Medication 100 MILLIGRAM(S): at 13:51

## 2024-05-03 RX ADMIN — CHLORHEXIDINE GLUCONATE 1 APPLICATION(S): 213 SOLUTION TOPICAL at 05:27

## 2024-05-03 RX ADMIN — PANTOPRAZOLE SODIUM 40 MILLIGRAM(S): 20 TABLET, DELAYED RELEASE ORAL at 13:51

## 2024-05-03 RX ADMIN — POLYETHYLENE GLYCOL 3350 17 GRAM(S): 17 POWDER, FOR SOLUTION ORAL at 16:48

## 2024-05-03 RX ADMIN — Medication 100 MILLIGRAM(S): at 05:28

## 2024-05-03 RX ADMIN — Medication 1 APPLICATION(S): at 13:51

## 2024-05-03 RX ADMIN — CHLORHEXIDINE GLUCONATE 15 MILLILITER(S): 213 SOLUTION TOPICAL at 05:40

## 2024-05-03 RX ADMIN — Medication 1 DROP(S): at 21:35

## 2024-05-03 RX ADMIN — CHLORHEXIDINE GLUCONATE 1 APPLICATION(S): 213 SOLUTION TOPICAL at 13:45

## 2024-05-03 NOTE — PROGRESS NOTE ADULT - ASSESSMENT
74-year-old female with PHMx of pancreatic CA s/p Whipple, CKD stage 3, HTN, COPD, pulmonary fibrosis, PRESS syndrome (hospitalized in 2019), Raynaud's syndrome, ANCA vasculitis, Scleroderma  presents to ED due to cardiac arrest for 30 minutes. Hospital course complicated by anoxic brain injury, aspiration PNA. Pt is medically cleared to be discharged to LTACH/NH.     #s/p cardiac arrest 30 minutes  #anoxic brain injury   #acute respiratory failure s/p intubation now trach/peg  - s/p trach 4/18/24, trach sutures removed  - s/p PEG by 4/25  - anoxic brain injury as seen on CT brain and EEG  - completed antibiotic course   - monitor BP  - DC planning to NH - case management aware  - hill replaced for failed TOV    #hyperK-  - lokelma, repeat K level   - monitor BMP  - continue with lokelma q8 x2 days    #hyponatremia  - IV lasix 40mg x 1 given  - monitor BMP    #HTN  - continue with labetalol, amlodipine    #Misc:  - DVT ppx: heparin  - Gi ppx: protonix  - Diet: NPO with tube feeds  - Dispo: dc planning to LTACH/NH    #Pending: d/c planning, monitor potassium

## 2024-05-03 NOTE — PROGRESS NOTE ADULT - SUBJECTIVE AND OBJECTIVE BOX
Patient seen and evaluated this am, remains minimally responsive on ventilator      T(F): 97.9 (05-02-24 @ 23:39), Max: 98.3 (05-02-24 @ 15:00)  HR: 62 (05-03-24 @ 09:30)  BP: 129/66 (05-03-24 @ 08:03)  RR: 22  SpO2: 100% (05-03-24 @ 09:30) (100% - 100%)    PHYSICAL EXAM:  GENERAL: NAD  HEAD:  Atraumatic, Normocephalic  EYES: EOMI, PERRLA, conjunctiva and sclera clear  NERVOUS SYSTEM:  Alert & Oriented X3, no focal deficits   CHEST/LUNG: Clear to percussion bilaterally; No rales, rhonchi, wheezing, or rubs  HEART: Regular rate and rhythm; No murmurs, rubs, or gallops  ABDOMEN: Soft, Nontender, Nondistended; Bowel sounds present  EXTREMITIES:  2+ Peripheral Pulses, No clubbing, cyanosis, or edema    LABS  05-03    129<L>  |  89<L>  |  34<H>  ----------------------------<  128<H>  5.4<H>   |  31  |  0.9    Ca    8.0<L>      03 May 2024 05:27  Mg     2.0     05-03    TPro  6.9  /  Alb  2.3<L>  /  TBili  <0.2  /  DBili  x   /  AST  41  /  ALT  7   /  AlkPhos  133<H>  05-03                          7.8    7.02  )-----------( 388      ( 03 May 2024 05:27 )             24.8       Mode: AC/ CMV (Assist Control/ Continuous Mandatory Ventilation)  RR (machine): 28  TV (machine): 300  FiO2: 40  PEEP: 5      Culture Results:   Normal Respiratory Dina present (04-18-24)  Culture Results:   No growth (04-06-24)  Culture Results:   No growth at 5 days (04-06-24)  Culture Results:   No growth at 5 days (04-06-24)      MEDICATIONS  (STANDING):  amLODIPine   Tablet 10 milliGRAM(s) Oral daily  artificial  tears Solution 1 Drop(s) Both EYES three times a day  chlorhexidine 0.12% Liquid 15 milliLiter(s) Oral Mucosa every 12 hours  chlorhexidine 2% Cloths 1 Application(s) Topical daily  heparin   Injectable 5000 Unit(s) SubCutaneous every 8 hours  labetalol 100 milliGRAM(s) Oral three times a day  levothyroxine 100 MICROGram(s) Oral daily  pantoprazole   Suspension 40 milliGRAM(s) Oral daily  petrolatum Ophthalmic Ointment 1 Application(s) Both EYES daily  senna 2 Tablet(s) Oral at bedtime  silver sulfADIAZINE 1% Cream 1 Application(s) Topical two times a day  sodium zirconium cyclosilicate 10 Gram(s) Oral three times a day    MEDICATIONS  (PRN):  polyethylene glycol 3350 17 Gram(s) Oral daily PRN Constipation

## 2024-05-03 NOTE — PROGRESS NOTE ADULT - SUBJECTIVE AND OBJECTIVE BOX
KIRAN MORFIN 74y Female  MRN#: [id number]   Hospital Day: 27d    SUBJECTIVE  Patient is a 74y old Female who presents with a chief complaint of cardiac arrest (03 May 2024 07:38)  Currently admitted to medicine with the primary diagnosis of Cardiac arrest    INTERVAL HPI AND OVERNIGHT EVENTS:  Patient was examined and seen at bedside. This morning she is resting comfortably in bed. No overnight issues.      OBJECTIVE  PAST MEDICAL & SURGICAL HISTORY  H/O vasculitis    Pancreatic cancer    History of COPD    Hypertension    H/O Raynaud's syndrome    Stage 3 chronic kidney disease    History of knee replacement    H/O Whipple procedure      ALLERGIES:  celecoxib (Rash)  Originally Entered as [U UNKNOWN] reaction to [celebrit] (Unknown)    MEDICATIONS:  STANDING MEDICATIONS  amLODIPine   Tablet 10 milliGRAM(s) Oral daily  artificial  tears Solution 1 Drop(s) Both EYES three times a day  chlorhexidine 0.12% Liquid 15 milliLiter(s) Oral Mucosa every 12 hours  chlorhexidine 2% Cloths 1 Application(s) Topical daily  chlorhexidine 2% Cloths 1 Application(s) Topical <User Schedule>  heparin   Injectable 5000 Unit(s) SubCutaneous every 8 hours  labetalol 100 milliGRAM(s) Oral three times a day  levothyroxine 100 MICROGram(s) Oral daily  lidocaine 1%/epinephrine 1:100,000 Inj 20 milliLiter(s) Local Injection once  lidocaine 2% Viscous 10 milliLiter(s) Oral once  pantoprazole   Suspension 40 milliGRAM(s) Oral daily  petrolatum Ophthalmic Ointment 1 Application(s) Both EYES daily  senna 2 Tablet(s) Oral at bedtime  silver sulfADIAZINE 1% Cream 1 Application(s) Topical two times a day  sodium zirconium cyclosilicate 10 Gram(s) Oral three times a day    PRN MEDICATIONS  polyethylene glycol 3350 17 Gram(s) Oral daily PRN      VITAL SIGNS: Last 24 Hours  T(C): 36.6 (02 May 2024 23:39), Max: 36.8 (02 May 2024 15:00)  T(F): 97.9 (02 May 2024 23:39), Max: 98.3 (02 May 2024 15:00)  HR: 62 (03 May 2024 09:30) (61 - 71)  BP: 129/66 (03 May 2024 08:03) (129/66 - 176/85)  BP(mean): 92 (03 May 2024 08:03) (91 - 119)  RR: 30 (03 May 2024 08:03) (18 - 31)  SpO2: 100% (03 May 2024 09:30) (100% - 100%)    LABS:                        7.8    7.02  )-----------( 388      ( 03 May 2024 05:27 )             24.8     05-03    129<L>  |  89<L>  |  34<H>  ----------------------------<  128<H>  5.4<H>   |  31  |  0.9    Ca    8.0<L>      03 May 2024 05:27  Mg     2.0     05-03    TPro  6.9  /  Alb  2.3<L>  /  TBili  <0.2  /  DBili  x   /  AST  41  /  ALT  7   /  AlkPhos  133<H>  05-03      Urinalysis Basic - ( 03 May 2024 05:27 )    Color: x / Appearance: x / SG: x / pH: x  Gluc: 128 mg/dL / Ketone: x  / Bili: x / Urobili: x   Blood: x / Protein: x / Nitrite: x   Leuk Esterase: x / RBC: x / WBC x   Sq Epi: x / Non Sq Epi: x / Bacteria: x      PHYSICAL EXAM:  CONSTITUTIONAL: No acute distress, intubated  RESPIRATORY: bilateral breath sounds  CARDIOVASCULAR: Regular rate and rhythm; no murmurs, rubs, or gallops  GASTROINTESTINAL: Soft, non-tender, non-distended; bowel sounds present  MUSCULOSKELETAL:  no edema

## 2024-05-03 NOTE — PROGRESS NOTE ADULT - SUBJECTIVE AND OBJECTIVE BOX
Patient is a 74y old  Female who presents with a chief complaint of cardiac arrest (02 May 2024 15:28)      Over Night Events:  Patient seen and examined.   pending bed on floor or placement     ROS:  See HPI    PHYSICAL EXAM    ICU Vital Signs Last 24 Hrs  T(C): 36.6 (02 May 2024 23:39), Max: 36.8 (02 May 2024 15:00)  T(F): 97.9 (02 May 2024 23:39), Max: 98.3 (02 May 2024 15:00)  HR: 68 (03 May 2024 05:30) (61 - 72)  BP: 134/66 (03 May 2024 05:30) (134/66 - 176/85)  BP(mean): 93 (03 May 2024 05:30) (91 - 119)  ABP: --  ABP(mean): --  RR: 28 (03 May 2024 05:30) (18 - 31)  SpO2: 100% (03 May 2024 05:30) (100% - 100%)    O2 Parameters below as of 03 May 2024 05:30  Patient On (Oxygen Delivery Method): ventilator    O2 Concentration (%): 40        General: open eyes   HEENT:     trach            Lymph Nodes: NO cervical LN   Lungs: Bilateral BS  Cardiovascular: Regular   Abdomen: Soft, Positive BS  Extremities: No clubbing   Skin: warm   Neurological: positive gag   t     I&O's Detail    02 May 2024 07:01  -  03 May 2024 07:00  --------------------------------------------------------  IN:    Enteral Tube Flush: 400 mL    Free Water: 450 mL    Osmolite 1.2: 1080 mL  Total IN: 1930 mL    OUT:    Indwelling Catheter - Urethral (mL): 1650 mL    Intermittent Catheterization - Urethral (mL): 200 mL    Voided (mL): 100 mL  Total OUT: 1950 mL    Total NET: -20 mL          LABS:                          7.8    7.02  )-----------( 388      ( 03 May 2024 05:27 )             24.8         03 May 2024 05:27    129    |  89     |  34     ----------------------------<  128    5.4     |  31     |  0.9      Ca    8.0        03 May 2024 05:27  Mg     2.0       03 May 2024 05:27    TPro  6.9    /  Alb  2.3    /  TBili  <0.2   /  DBili  x      /  AST  41     /  ALT  7      /  AlkPhos  133    03 May 2024 05:27  Amylase x     lipase x                                                                                        Urinalysis Basic - ( 03 May 2024 05:27 )    Color: x / Appearance: x / SG: x / pH: x  Gluc: 128 mg/dL / Ketone: x  / Bili: x / Urobili: x   Blood: x / Protein: x / Nitrite: x   Leuk Esterase: x / RBC: x / WBC x   Sq Epi: x / Non Sq Epi: x / Bacteria: x                                                                                                             Mode: AC/ CMV (Assist Control/ Continuous Mandatory Ventilation)  RR (machine): 28  TV (machine): 300  FiO2: 40  PEEP: 5  ITime: 1  MAP: 13  PC: 5  PIP: 30                                          MEDICATIONS  (STANDING):  amLODIPine   Tablet 10 milliGRAM(s) Oral daily  artificial  tears Solution 1 Drop(s) Both EYES three times a day  chlorhexidine 0.12% Liquid 15 milliLiter(s) Oral Mucosa every 12 hours  chlorhexidine 2% Cloths 1 Application(s) Topical <User Schedule>  chlorhexidine 2% Cloths 1 Application(s) Topical daily  heparin   Injectable 5000 Unit(s) SubCutaneous every 8 hours  labetalol 100 milliGRAM(s) Oral three times a day  levothyroxine 100 MICROGram(s) Oral daily  lidocaine 1%/epinephrine 1:100,000 Inj 20 milliLiter(s) Local Injection once  lidocaine 2% Viscous 10 milliLiter(s) Oral once  pantoprazole   Suspension 40 milliGRAM(s) Oral daily  petrolatum Ophthalmic Ointment 1 Application(s) Both EYES daily  senna 2 Tablet(s) Oral at bedtime  silver sulfADIAZINE 1% Cream 1 Application(s) Topical two times a day  sodium zirconium cyclosilicate 10 Gram(s) Oral three times a day    MEDICATIONS  (PRN):  polyethylene glycol 3350 17 Gram(s) Oral daily PRN Constipation          Xrays:  TLC:  OG:  ET tube:                                                                                       ECHO:  [facility] ICU:

## 2024-05-03 NOTE — PROGRESS NOTE ADULT - ASSESSMENT
74-year-old female with PHMx of pancreatic CA s/p Whipple, CKD stage 3, HTN, COPD, pulmonary fibrosis, PRESS syndrome (hospitalized in 2019), Raynaud's syndrome, ANCA vasculitis, Scleroderma, hypothyroid  presents to ED due to cardiac arrest. Pt lives at home with son and daughter, went to use the bathroom and became unresponsive.  EMS intubated her and gave her 4 rounds of epinephrine.  ROSC was achieved within a minute of her arrival to the ED.  Patient was found to be hypothermic and bradycardic    acute respiratory failure / cardiac arrest / anoxic brain injury     - pt is s/p trach and PEG   - poor prognosis - family aware   - methadone, Norvasc synthroid, labetolol   - lactulose for mild hyperkalemia    - DVT and GI prophylaxis   - I discussed plan with CC    - DC planning to LTAC facility

## 2024-05-04 LAB
ALBUMIN SERPL ELPH-MCNC: 2.3 G/DL — LOW (ref 3.5–5.2)
ALP SERPL-CCNC: 132 U/L — HIGH (ref 30–115)
ALT FLD-CCNC: 8 U/L — SIGNIFICANT CHANGE UP (ref 0–41)
ANION GAP SERPL CALC-SCNC: 5 MMOL/L — LOW (ref 7–14)
ANION GAP SERPL CALC-SCNC: 6 MMOL/L — LOW (ref 7–14)
AST SERPL-CCNC: 38 U/L — SIGNIFICANT CHANGE UP (ref 0–41)
BASOPHILS # BLD AUTO: 0.03 K/UL — SIGNIFICANT CHANGE UP (ref 0–0.2)
BASOPHILS NFR BLD AUTO: 0.4 % — SIGNIFICANT CHANGE UP (ref 0–1)
BILIRUB SERPL-MCNC: <0.2 MG/DL — SIGNIFICANT CHANGE UP (ref 0.2–1.2)
BUN SERPL-MCNC: 38 MG/DL — HIGH (ref 10–20)
BUN SERPL-MCNC: 41 MG/DL — HIGH (ref 10–20)
CALCIUM SERPL-MCNC: 8 MG/DL — LOW (ref 8.4–10.5)
CALCIUM SERPL-MCNC: 8.1 MG/DL — LOW (ref 8.4–10.5)
CHLORIDE SERPL-SCNC: 90 MMOL/L — LOW (ref 98–110)
CHLORIDE SERPL-SCNC: 91 MMOL/L — LOW (ref 98–110)
CO2 SERPL-SCNC: 31 MMOL/L — SIGNIFICANT CHANGE UP (ref 17–32)
CO2 SERPL-SCNC: 32 MMOL/L — SIGNIFICANT CHANGE UP (ref 17–32)
CREAT SERPL-MCNC: 1 MG/DL — SIGNIFICANT CHANGE UP (ref 0.7–1.5)
CREAT SERPL-MCNC: 1 MG/DL — SIGNIFICANT CHANGE UP (ref 0.7–1.5)
EGFR: 59 ML/MIN/1.73M2 — LOW
EGFR: 59 ML/MIN/1.73M2 — LOW
EOSINOPHIL # BLD AUTO: 0.19 K/UL — SIGNIFICANT CHANGE UP (ref 0–0.7)
EOSINOPHIL NFR BLD AUTO: 2.6 % — SIGNIFICANT CHANGE UP (ref 0–8)
GLUCOSE BLDC GLUCOMTR-MCNC: 159 MG/DL — HIGH (ref 70–99)
GLUCOSE SERPL-MCNC: 116 MG/DL — HIGH (ref 70–99)
GLUCOSE SERPL-MCNC: 133 MG/DL — HIGH (ref 70–99)
HCT VFR BLD CALC: 22.8 % — LOW (ref 37–47)
HGB BLD-MCNC: 7.1 G/DL — LOW (ref 12–16)
IMM GRANULOCYTES NFR BLD AUTO: 0.3 % — SIGNIFICANT CHANGE UP (ref 0.1–0.3)
LYMPHOCYTES # BLD AUTO: 1.11 K/UL — LOW (ref 1.2–3.4)
LYMPHOCYTES # BLD AUTO: 15.5 % — LOW (ref 20.5–51.1)
MAGNESIUM SERPL-MCNC: 2.1 MG/DL — SIGNIFICANT CHANGE UP (ref 1.8–2.4)
MCHC RBC-ENTMCNC: 25 PG — LOW (ref 27–31)
MCHC RBC-ENTMCNC: 31.1 G/DL — LOW (ref 32–37)
MCV RBC AUTO: 80.3 FL — LOW (ref 81–99)
MONOCYTES # BLD AUTO: 1.1 K/UL — HIGH (ref 0.1–0.6)
MONOCYTES NFR BLD AUTO: 15.3 % — HIGH (ref 1.7–9.3)
NEUTROPHILS # BLD AUTO: 4.72 K/UL — SIGNIFICANT CHANGE UP (ref 1.4–6.5)
NEUTROPHILS NFR BLD AUTO: 65.9 % — SIGNIFICANT CHANGE UP (ref 42.2–75.2)
NRBC # BLD: 0 /100 WBCS — SIGNIFICANT CHANGE UP (ref 0–0)
PLATELET # BLD AUTO: 383 K/UL — SIGNIFICANT CHANGE UP (ref 130–400)
PMV BLD: 9.5 FL — SIGNIFICANT CHANGE UP (ref 7.4–10.4)
POTASSIUM SERPL-MCNC: 5 MMOL/L — SIGNIFICANT CHANGE UP (ref 3.5–5)
POTASSIUM SERPL-MCNC: 5.3 MMOL/L — HIGH (ref 3.5–5)
POTASSIUM SERPL-SCNC: 5 MMOL/L — SIGNIFICANT CHANGE UP (ref 3.5–5)
POTASSIUM SERPL-SCNC: 5.3 MMOL/L — HIGH (ref 3.5–5)
PROT SERPL-MCNC: 6.9 G/DL — SIGNIFICANT CHANGE UP (ref 6–8)
RBC # BLD: 2.84 M/UL — LOW (ref 4.2–5.4)
RBC # FLD: 17.6 % — HIGH (ref 11.5–14.5)
SODIUM SERPL-SCNC: 127 MMOL/L — LOW (ref 135–146)
SODIUM SERPL-SCNC: 128 MMOL/L — LOW (ref 135–146)
WBC # BLD: 7.17 K/UL — SIGNIFICANT CHANGE UP (ref 4.8–10.8)
WBC # FLD AUTO: 7.17 K/UL — SIGNIFICANT CHANGE UP (ref 4.8–10.8)

## 2024-05-04 PROCEDURE — 99233 SBSQ HOSP IP/OBS HIGH 50: CPT

## 2024-05-04 RX ORDER — SODIUM ZIRCONIUM CYCLOSILICATE 10 G/10G
10 POWDER, FOR SUSPENSION ORAL ONCE
Refills: 0 | Status: COMPLETED | OUTPATIENT
Start: 2024-05-05 | End: 2024-05-05

## 2024-05-04 RX ORDER — SODIUM CHLORIDE 9 MG/ML
1 INJECTION INTRAMUSCULAR; INTRAVENOUS; SUBCUTANEOUS
Refills: 0 | Status: COMPLETED | OUTPATIENT
Start: 2024-05-04 | End: 2024-05-05

## 2024-05-04 RX ORDER — SODIUM CHLORIDE 9 MG/ML
1000 INJECTION INTRAMUSCULAR; INTRAVENOUS; SUBCUTANEOUS
Refills: 0 | Status: DISCONTINUED | OUTPATIENT
Start: 2024-05-04 | End: 2024-05-05

## 2024-05-04 RX ADMIN — SODIUM ZIRCONIUM CYCLOSILICATE 10 GRAM(S): 10 POWDER, FOR SUSPENSION ORAL at 06:07

## 2024-05-04 RX ADMIN — CHLORHEXIDINE GLUCONATE 1 APPLICATION(S): 213 SOLUTION TOPICAL at 06:21

## 2024-05-04 RX ADMIN — Medication 1 DROP(S): at 13:13

## 2024-05-04 RX ADMIN — AMLODIPINE BESYLATE 10 MILLIGRAM(S): 2.5 TABLET ORAL at 06:07

## 2024-05-04 RX ADMIN — SODIUM ZIRCONIUM CYCLOSILICATE 10 GRAM(S): 10 POWDER, FOR SUSPENSION ORAL at 13:10

## 2024-05-04 RX ADMIN — CHLORHEXIDINE GLUCONATE 15 MILLILITER(S): 213 SOLUTION TOPICAL at 06:21

## 2024-05-04 RX ADMIN — Medication 1 APPLICATION(S): at 13:08

## 2024-05-04 RX ADMIN — Medication 1 APPLICATION(S): at 06:07

## 2024-05-04 RX ADMIN — Medication 100 MILLIGRAM(S): at 06:07

## 2024-05-04 RX ADMIN — CHLORHEXIDINE GLUCONATE 1 APPLICATION(S): 213 SOLUTION TOPICAL at 13:13

## 2024-05-04 RX ADMIN — Medication 1 APPLICATION(S): at 18:21

## 2024-05-04 RX ADMIN — Medication 1 DROP(S): at 06:20

## 2024-05-04 RX ADMIN — Medication 100 MICROGRAM(S): at 06:08

## 2024-05-04 RX ADMIN — PANTOPRAZOLE SODIUM 40 MILLIGRAM(S): 20 TABLET, DELAYED RELEASE ORAL at 13:12

## 2024-05-04 RX ADMIN — Medication 1 DROP(S): at 21:47

## 2024-05-04 RX ADMIN — SODIUM ZIRCONIUM CYCLOSILICATE 10 GRAM(S): 10 POWDER, FOR SUSPENSION ORAL at 21:46

## 2024-05-04 RX ADMIN — SENNA PLUS 2 TABLET(S): 8.6 TABLET ORAL at 21:46

## 2024-05-04 RX ADMIN — HEPARIN SODIUM 5000 UNIT(S): 5000 INJECTION INTRAVENOUS; SUBCUTANEOUS at 06:20

## 2024-05-04 RX ADMIN — CHLORHEXIDINE GLUCONATE 15 MILLILITER(S): 213 SOLUTION TOPICAL at 18:21

## 2024-05-04 NOTE — PROGRESS NOTE ADULT - SUBJECTIVE AND OBJECTIVE BOX
SUBJECTIVE:    Patient is a 74y old Female who presents with a chief complaint of cardiac arrest (03 May 2024 10:25)    Currently admitted to medicine with the primary diagnosis of Cardiac arrest       Today is hospital day 28d. This morning she is resting comfortably in bed     ROS:     unable to obtain given medical condition       PAST MEDICAL & SURGICAL HISTORY  H/O vasculitis    Pancreatic cancer    History of COPD    Hypertension    H/O Raynaud's syndrome    Stage 3 chronic kidney disease    History of knee replacement    H/O Whipple procedure      SOCIAL HISTORY:    ALLERGIES:  celecoxib (Rash)  Originally Entered as [U UNKNOWN] reaction to [celebrit] (Unknown)    MEDICATIONS:  STANDING MEDICATIONS  amLODIPine   Tablet 10 milliGRAM(s) Oral daily  artificial  tears Solution 1 Drop(s) Both EYES three times a day  chlorhexidine 0.12% Liquid 15 milliLiter(s) Oral Mucosa every 12 hours  chlorhexidine 2% Cloths 1 Application(s) Topical <User Schedule>  chlorhexidine 2% Cloths 1 Application(s) Topical daily  heparin   Injectable 5000 Unit(s) SubCutaneous every 8 hours  labetalol 100 milliGRAM(s) Oral three times a day  levothyroxine 100 MICROGram(s) Oral daily  lidocaine 1%/epinephrine 1:100,000 Inj 20 milliLiter(s) Local Injection once  lidocaine 2% Viscous 10 milliLiter(s) Oral once  pantoprazole   Suspension 40 milliGRAM(s) Oral daily  petrolatum Ophthalmic Ointment 1 Application(s) Both EYES daily  senna 2 Tablet(s) Oral at bedtime  silver sulfADIAZINE 1% Cream 1 Application(s) Topical two times a day  sodium zirconium cyclosilicate 10 Gram(s) Oral three times a day    PRN MEDICATIONS  polyethylene glycol 3350 17 Gram(s) Oral daily PRN    VITALS:   T(F): 98.6  HR: 64  BP: 109/57  RR: 18  SpO2: 100%    LABS:  Negative for smoking/alcohol/drug use.                         7.1    7.17  )-----------( 383      ( 04 May 2024 05:36 )             22.8     05-04    128<L>  |  91<L>  |  38<H>  ----------------------------<  116<H>  5.0   |  32  |  1.0    Ca    8.0<L>      04 May 2024 05:36  Mg     2.1     05-04    TPro  6.9  /  Alb  2.3<L>  /  TBili  <0.2  /  DBili  x   /  AST  38  /  ALT  8   /  AlkPhos  132<H>  05-04      Urinalysis Basic - ( 04 May 2024 05:36 )    Color: x / Appearance: x / SG: x / pH: x  Gluc: 116 mg/dL / Ketone: x  / Bili: x / Urobili: x   Blood: x / Protein: x / Nitrite: x   Leuk Esterase: x / RBC: x / WBC x   Sq Epi: x / Non Sq Epi: x / Bacteria: x        RADIOLOGY:    PHYSICAL EXAM:  GEN: No acute distress  HEENT: normocephalic, atraumatic, aniceteric  LUNGS: bl breath sounds   HEART: S1/S2 present. RRR, no murmurs  ABD: Soft, non-tender, non-distended. Bowel sounds present  EXT: warm   NEURO: eyes open, does not follow commands, does not respond to pain       ASSESSMENT AND PLAN:    74-year-old female with PHMx of pancreatic CA s/p Whipple, CKD stage 3, HTN, COPD, pulmonary fibrosis, PRESS syndrome (hospitalized in 2019), Raynaud's syndrome, ANCA vasculitis, Scleroderma, hypothyroid  presents to ED due to cardiac arrest. Pt lives at home with son and daughter, went to use the bathroom and became unresponsive.  EMS intubated her and gave her 4 rounds of epinephrine.  ROSC was achieved within a minute of her arrival to the ED.  Patient was found to be hypothermic and bradycardic    DOWNGRADE FROM UNIT ON 5/4     # Anoxic brain injury   # Acute respiratory failure sp trach / peg   # Cardiac arrest   # Hyponatremia   # Normocytic anemia- no gross bleed     PLAN:      - s/p trach and PEG     - poor prognosis - family aware ; palliative following - full code    - methadone ; QTc noted 410   - HOLD norvasc and labetalol given soft blood pressure - resume as needed   -  IVF , tube feeds   - repeat bmp , do not over correct sodium more than 8 meq in 24 hrs   - Active type and screen, repeat cbc ; no gross gib ; rx dvt ppx held , PPI    -DC planning to LTAC facility  - ongoing GOC     # dvt  ppx - SCD / gi ppx/diet - tube feeds  # dispo: dc ready to vent facility - per chart review from  - "Pt approved for auth for [facility]. However per notes from [facility] pt to be accpeted after letter of retention from pt  is received"     prepare for dc to LTACH   full code

## 2024-05-04 NOTE — CHART NOTE - NSCHARTNOTEFT_GEN_A_CORE
In fact sodium dropped to 127 and despite several doses of Lokelma, potassium now is higher at 5.3. Of note, patient is on continuous feedings and patient is only scheduled to get 1 more dose of Lokelma. t this point will schedule an extra dose of Lokelma overnight and ask RN to hold feedings prior to it's administration. Will also ask dietician to assess for new feeds options in view of this electrolyte issue. Finally will give sodium chloride for 2 doses via feeding tube In fact sodium dropped to 127 despite NS infusion, and despite several doses of Lokelma, potassium now is higher at 5.3. Of note, patient is on continuous feedings and patient is only scheduled to get 1 more dose of Lokelma. t this point will schedule an extra dose of Lokelma overnight and ask RN to hold feedings prior to it's administration. Will also ask dietician to assess for new feeds options in view of this electrolyte issue. Finally will give sodium chloride for 2 doses via feeding tube In fact sodium dropped to 127 despite temporary NS infusion (will not extend), and despite several doses of Lokelma, potassium now is higher at 5.3. Of note, patient is on continuous feedings and patient is only scheduled to get 1 more dose of Lokelma. t this point will schedule an extra dose of Lokelma overnight and ask RN to hold feedings prior to it's administration. Will also ask dietician to assess for new feeds options in view of this electrolyte issue. Finally will give sodium chloride for 2 doses via feeding tube In fact sodium dropped to 127 despite temporary NS infusion (will not extend), and despite several doses of Lokelma, potassium now is higher at 5.3. Of note, patient is on continuous feedings and patient is only scheduled to get 1 more dose of Lokelma. At this point will schedule an extra dose of Lokelma overnight and ask RN to hold feedings prior to it's administration. Will also ask dietician to assess for new feeds options in view of this electrolyte issue. Finally will give sodium chloride for 2 doses via feeding tube In fact sodium dropped to 127 despite temporary NS infusion (will not extend), and despite several doses of Lokelma, potassium now is higher at 5.3. Of note, patient is on continuous feedings and patient is only scheduled to get 1 more dose of Lokelma (scheduled for 6 doses over 48 hours). At this point will schedule to give 6th dose of Lokelma overnight and ask RN to hold feedings prior to it's administration. Will also ask dietician to assess for new feeds options in view of this electrolyte issue. Finally will give sodium chloride for 2 doses via feeding tube In fact sodium dropped to 127 despite temporary NS infusion (will not extend), and despite several doses of Lokelma, potassium now is higher at 5.3. Of note, patient is on continuous feedings and patient is only scheduled to get 1 more dose of Lokelma (scheduled for 6 doses over 48 hours). At this point will schedule to give 6th dose of Lokelma overnight and ask RN to hold feedings prior to it's administration. Also consider dose of Lasix with NS bolus. Will  ask dietician to assess for new feeds options in view of this electrolyte issue. Finally will give sodium chloride for 2 doses via feeding tube

## 2024-05-05 LAB
ALBUMIN SERPL ELPH-MCNC: 2.2 G/DL — LOW (ref 3.5–5.2)
ALP SERPL-CCNC: 142 U/L — HIGH (ref 30–115)
ALT FLD-CCNC: 10 U/L — SIGNIFICANT CHANGE UP (ref 0–41)
ANION GAP SERPL CALC-SCNC: 10 MMOL/L — SIGNIFICANT CHANGE UP (ref 7–14)
AST SERPL-CCNC: 48 U/L — HIGH (ref 0–41)
BASOPHILS # BLD AUTO: 0.03 K/UL — SIGNIFICANT CHANGE UP (ref 0–0.2)
BASOPHILS NFR BLD AUTO: 0.4 % — SIGNIFICANT CHANGE UP (ref 0–1)
BILIRUB SERPL-MCNC: 0.2 MG/DL — SIGNIFICANT CHANGE UP (ref 0.2–1.2)
BLD GP AB SCN SERPL QL: SIGNIFICANT CHANGE UP
BUN SERPL-MCNC: 42 MG/DL — HIGH (ref 10–20)
CALCIUM SERPL-MCNC: 7.7 MG/DL — LOW (ref 8.4–10.5)
CHLORIDE SERPL-SCNC: 91 MMOL/L — LOW (ref 98–110)
CO2 SERPL-SCNC: 28 MMOL/L — SIGNIFICANT CHANGE UP (ref 17–32)
CREAT SERPL-MCNC: 1 MG/DL — SIGNIFICANT CHANGE UP (ref 0.7–1.5)
EGFR: 59 ML/MIN/1.73M2 — LOW
EOSINOPHIL # BLD AUTO: 0.15 K/UL — SIGNIFICANT CHANGE UP (ref 0–0.7)
EOSINOPHIL NFR BLD AUTO: 1.8 % — SIGNIFICANT CHANGE UP (ref 0–8)
GLUCOSE SERPL-MCNC: 101 MG/DL — HIGH (ref 70–99)
HCT VFR BLD CALC: 21.6 % — LOW (ref 37–47)
HCT VFR BLD CALC: 27 % — LOW (ref 37–47)
HGB BLD-MCNC: 6.8 G/DL — CRITICAL LOW (ref 12–16)
HGB BLD-MCNC: 8.8 G/DL — LOW (ref 12–16)
IMM GRANULOCYTES NFR BLD AUTO: 0.2 % — SIGNIFICANT CHANGE UP (ref 0.1–0.3)
LYMPHOCYTES # BLD AUTO: 1.28 K/UL — SIGNIFICANT CHANGE UP (ref 1.2–3.4)
LYMPHOCYTES # BLD AUTO: 15.5 % — LOW (ref 20.5–51.1)
MAGNESIUM SERPL-MCNC: 2 MG/DL — SIGNIFICANT CHANGE UP (ref 1.8–2.4)
MCHC RBC-ENTMCNC: 24.6 PG — LOW (ref 27–31)
MCHC RBC-ENTMCNC: 25.5 PG — LOW (ref 27–31)
MCHC RBC-ENTMCNC: 31.5 G/DL — LOW (ref 32–37)
MCHC RBC-ENTMCNC: 32.6 G/DL — SIGNIFICANT CHANGE UP (ref 32–37)
MCV RBC AUTO: 78.3 FL — LOW (ref 81–99)
MCV RBC AUTO: 78.3 FL — LOW (ref 81–99)
MONOCYTES # BLD AUTO: 1.19 K/UL — HIGH (ref 0.1–0.6)
MONOCYTES NFR BLD AUTO: 14.4 % — HIGH (ref 1.7–9.3)
NEUTROPHILS # BLD AUTO: 5.61 K/UL — SIGNIFICANT CHANGE UP (ref 1.4–6.5)
NEUTROPHILS NFR BLD AUTO: 67.7 % — SIGNIFICANT CHANGE UP (ref 42.2–75.2)
NRBC # BLD: 0 /100 WBCS — SIGNIFICANT CHANGE UP (ref 0–0)
NRBC # BLD: 0 /100 WBCS — SIGNIFICANT CHANGE UP (ref 0–0)
PLATELET # BLD AUTO: 374 K/UL — SIGNIFICANT CHANGE UP (ref 130–400)
PLATELET # BLD AUTO: 432 K/UL — HIGH (ref 130–400)
PMV BLD: 9.3 FL — SIGNIFICANT CHANGE UP (ref 7.4–10.4)
PMV BLD: 9.7 FL — SIGNIFICANT CHANGE UP (ref 7.4–10.4)
POTASSIUM SERPL-MCNC: 5.1 MMOL/L — HIGH (ref 3.5–5)
POTASSIUM SERPL-SCNC: 5.1 MMOL/L — HIGH (ref 3.5–5)
PROT SERPL-MCNC: 6.7 G/DL — SIGNIFICANT CHANGE UP (ref 6–8)
RBC # BLD: 2.76 M/UL — LOW (ref 4.2–5.4)
RBC # BLD: 3.45 M/UL — LOW (ref 4.2–5.4)
RBC # FLD: 16.7 % — HIGH (ref 11.5–14.5)
RBC # FLD: 17.5 % — HIGH (ref 11.5–14.5)
SODIUM SERPL-SCNC: 129 MMOL/L — LOW (ref 135–146)
WBC # BLD: 8.28 K/UL — SIGNIFICANT CHANGE UP (ref 4.8–10.8)
WBC # BLD: 9.72 K/UL — SIGNIFICANT CHANGE UP (ref 4.8–10.8)
WBC # FLD AUTO: 8.28 K/UL — SIGNIFICANT CHANGE UP (ref 4.8–10.8)
WBC # FLD AUTO: 9.72 K/UL — SIGNIFICANT CHANGE UP (ref 4.8–10.8)

## 2024-05-05 PROCEDURE — 99232 SBSQ HOSP IP/OBS MODERATE 35: CPT

## 2024-05-05 RX ORDER — SODIUM CHLORIDE 9 MG/ML
1000 INJECTION INTRAMUSCULAR; INTRAVENOUS; SUBCUTANEOUS
Refills: 0 | Status: DISCONTINUED | OUTPATIENT
Start: 2024-05-05 | End: 2024-05-06

## 2024-05-05 RX ORDER — IRON SUCROSE 20 MG/ML
200 INJECTION, SOLUTION INTRAVENOUS
Refills: 0 | Status: DISCONTINUED | OUTPATIENT
Start: 2024-05-05 | End: 2024-05-06

## 2024-05-05 RX ORDER — SCOPALAMINE 1 MG/3D
1 PATCH, EXTENDED RELEASE TRANSDERMAL ONCE
Refills: 0 | Status: COMPLETED | OUTPATIENT
Start: 2024-05-05 | End: 2024-05-05

## 2024-05-05 RX ADMIN — SCOPALAMINE 1 PATCH: 1 PATCH, EXTENDED RELEASE TRANSDERMAL at 22:07

## 2024-05-05 RX ADMIN — IRON SUCROSE 110 MILLIGRAM(S): 20 INJECTION, SOLUTION INTRAVENOUS at 16:40

## 2024-05-05 RX ADMIN — Medication 1 APPLICATION(S): at 17:12

## 2024-05-05 RX ADMIN — CHLORHEXIDINE GLUCONATE 1 APPLICATION(S): 213 SOLUTION TOPICAL at 05:56

## 2024-05-05 RX ADMIN — Medication 1 APPLICATION(S): at 05:56

## 2024-05-05 RX ADMIN — SODIUM CHLORIDE 1 GRAM(S): 9 INJECTION INTRAMUSCULAR; INTRAVENOUS; SUBCUTANEOUS at 00:09

## 2024-05-05 RX ADMIN — Medication 1 DROP(S): at 05:56

## 2024-05-05 RX ADMIN — Medication 1 DROP(S): at 21:14

## 2024-05-05 RX ADMIN — Medication 1 DROP(S): at 13:48

## 2024-05-05 RX ADMIN — Medication 100 MICROGRAM(S): at 05:55

## 2024-05-05 RX ADMIN — SODIUM ZIRCONIUM CYCLOSILICATE 10 GRAM(S): 10 POWDER, FOR SUSPENSION ORAL at 01:19

## 2024-05-05 RX ADMIN — CHLORHEXIDINE GLUCONATE 15 MILLILITER(S): 213 SOLUTION TOPICAL at 05:55

## 2024-05-05 RX ADMIN — CHLORHEXIDINE GLUCONATE 15 MILLILITER(S): 213 SOLUTION TOPICAL at 17:12

## 2024-05-05 RX ADMIN — SODIUM CHLORIDE 1 GRAM(S): 9 INJECTION INTRAMUSCULAR; INTRAVENOUS; SUBCUTANEOUS at 03:49

## 2024-05-05 RX ADMIN — Medication 1 APPLICATION(S): at 11:05

## 2024-05-05 RX ADMIN — PANTOPRAZOLE SODIUM 40 MILLIGRAM(S): 20 TABLET, DELAYED RELEASE ORAL at 11:07

## 2024-05-05 RX ADMIN — SENNA PLUS 2 TABLET(S): 8.6 TABLET ORAL at 22:07

## 2024-05-05 RX ADMIN — CHLORHEXIDINE GLUCONATE 1 APPLICATION(S): 213 SOLUTION TOPICAL at 11:07

## 2024-05-05 RX ADMIN — SODIUM CHLORIDE 50 MILLILITER(S): 9 INJECTION INTRAMUSCULAR; INTRAVENOUS; SUBCUTANEOUS at 13:49

## 2024-05-05 NOTE — PROGRESS NOTE ADULT - SUBJECTIVE AND OBJECTIVE BOX
KIRAN MORFIN 74y Female  MRN#: [id number]     SUBJECTIVE  Patient is a 74y old Female who presents with a chief complaint of cardiac arrest (04 May 2024 12:42)  Today is hospital day 29d, and this morning she is lying in bed without distress.   No acute overnight events.     OBJECTIVE  PAST MEDICAL & SURGICAL HISTORY  H/O vasculitis    Pancreatic cancer    History of COPD    Hypertension    H/O Raynaud's syndrome    Stage 3 chronic kidney disease    History of knee replacement    H/O Whipple procedure      ALLERGIES:  celecoxib (Rash)  Originally Entered as [U UNKNOWN] reaction to [celebrit] (Unknown)    MEDICATIONS:  STANDING MEDICATIONS  artificial  tears Solution 1 Drop(s) Both EYES three times a day  chlorhexidine 0.12% Liquid 15 milliLiter(s) Oral Mucosa every 12 hours  chlorhexidine 2% Cloths 1 Application(s) Topical daily  chlorhexidine 2% Cloths 1 Application(s) Topical <User Schedule>  iron sucrose IVPB 200 milliGRAM(s) IV Intermittent every 48 hours  levothyroxine 100 MICROGram(s) Oral daily  lidocaine 1%/epinephrine 1:100,000 Inj 20 milliLiter(s) Local Injection once  lidocaine 2% Viscous 10 milliLiter(s) Oral once  pantoprazole   Suspension 40 milliGRAM(s) Oral daily  petrolatum Ophthalmic Ointment 1 Application(s) Both EYES daily  senna 2 Tablet(s) Oral at bedtime  silver sulfADIAZINE 1% Cream 1 Application(s) Topical two times a day  sodium chloride 0.9%. 1000 milliLiter(s) IV Continuous <Continuous>    PRN MEDICATIONS  polyethylene glycol 3350 17 Gram(s) Oral daily PRN    HOME MEDICATIONS  Home Medications:  amLODIPine 10 mg oral tablet: 1 tab(s) orally once a day (01 May 2024 16:51)  labetalol 100 mg oral tablet: 1 tab(s) orally 3 times a day (06 Apr 2024 15:09)  levothyroxine 100 mcg (0.1 mg) oral tablet: 1 tab(s) orally once a day (at bedtime) (06 Apr 2024 15:09)  methadone 10 mg oral tablet: 1 tab(s) orally 2 times a day (01 May 2024 16:51)  ocular lubricant ophthalmic solution: 1 drop(s) to each affected eye 3 times a day (01 May 2024 16:51)  pantoprazole 40 mg oral granule, delayed release: 1 tab(s) orally once a day (02 May 2024 12:38)  polyethylene glycol 3350 oral powder for reconstitution: 17 gram(s) orally once a day As needed Constipation (01 May 2024 16:51)  senna leaf extract oral tablet: 2 tab(s) orally once a day (at bedtime) (01 May 2024 16:51)  silver sulfADIAZINE 1% topical cream: 1 Apply topically to affected area 2 times a day (01 May 2024 10:58)      VITAL SIGNS: Last 24 Hours  T(C): 37.4 (05 May 2024 04:43), Max: 37.4 (05 May 2024 04:43)  T(F): 99.3 (05 May 2024 04:43), Max: 99.3 (05 May 2024 04:43)  HR: 70 (05 May 2024 07:51) (68 - 72)  BP: 116/57 (05 May 2024 04:43) (116/57 - 124/60)  BP(mean): --  RR: 26 (05 May 2024 04:43) (18 - 26)  SpO2: 100% (05 May 2024 07:51) (95% - 100%)    05-04-24 @ 07:01  -  05-05-24 @ 07:00  --------------------------------------------------------  IN: 0 mL / OUT: 775 mL / NET: -775 mL        LABS:                        6.8    8.28  )-----------( 374      ( 05 May 2024 05:56 )             21.6     05-05    129<L>  |  91<L>  |  42<H>  ----------------------------<  101<H>  5.1<H>   |  28  |  1.0    Ca    7.7<L>      05 May 2024 05:56  Mg     2.0     05-05    TPro  6.7  /  Alb  2.2<L>  /  TBili  0.2  /  DBili  x   /  AST  48<H>  /  ALT  10  /  AlkPhos  142<H>  05-05    LIVER FUNCTIONS - ( 05 May 2024 05:56 )  Alb: 2.2 g/dL / Pro: 6.7 g/dL / ALK PHOS: 142 U/L / ALT: 10 U/L / AST: 48 U/L / GGT: x             Urinalysis Basic - ( 05 May 2024 05:56 )    Color: x / Appearance: x / SG: x / pH: x  Gluc: 101 mg/dL / Ketone: x  / Bili: x / Urobili: x   Blood: x / Protein: x / Nitrite: x   Leuk Esterase: x / RBC: x / WBC x   Sq Epi: x / Non Sq Epi: x / Bacteria: x    CAPILLARY BLOOD GLUCOSE  POCT Blood Glucose.: 159 mg/dL (04 May 2024 06:52)      RADIOLOGY:    PHYSICAL EXAM:  GENERAL: NAD, 74y F  HEAD:  Atraumatic, Normocephalic  EYES: conjunctivae clear and sclerae white  NECK: Supple, No JVD  CHEST/LUNG: Vented and trached. No wheeze; No crackles; No accessory muscles used  HEART: Regular rate and rhythm; No murmurs;   ABDOMEN: Soft, Nontender, Nondistended; Bowel sounds present; No guarding  EXTREMITIES:  2+ Peripheral Pulses, No cyanosis or edema  NEUROLOGY: Stupor    ADMISSION SUMMARY  Patient is a 74y old Female who presents with a chief complaint of cardiac arrest (04 May 2024 12:42)

## 2024-05-05 NOTE — PROGRESS NOTE ADULT - ASSESSMENT
74-year-old female with PHMx of pancreatic CA s/p Whipple, CKD stage 3, HTN, COPD, pulmonary fibrosis, PRESS syndrome (hospitalized in 2019), Raynaud's syndrome, ANCA vasculitis, Scleroderma, hypothyroid  presents to ED due to cardiac arrest. Pt lives at home with son and daughter, went to use the bathroom and became unresponsive.  EMS intubated her and gave her 4 rounds of epinephrine.  ROSC was achieved within a minute of her arrival to the ED.  Patient was found to be hypothermic and bradycardic    DOWNGRADE FROM UNIT ON 5/4     # Anoxic brain injury   # Acute respiratory failure sp trach / peg   # Cardiac arrest   # Hyponatremia   # Normocytic anemia likely to iron deficiency- no gross bleed     PLAN:   - s/p trach and PEG    - poor prognosis - family aware ; palliative following - full code   - methadone ; QTc noted 410   - HOLD norvasc and labetalol given soft blood pressure - resume as needed   - c/w tube feeds   - c/w gentle NS at 50 cc/hr  - give 1U of PRBC today. Given iron % of 4 and ferritin of 42 consistent with iron deficiency anemia, will give venofer 200 mg every 48hrs x 3 doses  - keep active type and screen, repeat cbc ; no gross gib ; rx dvt ppx held , PPI   - DC planning to LTAC facility  - ongoing GOC     # dvt ppx - SCD / gi ppx/diet - tube feeds  # dispo: dc to vent facility - per chart review from CM - "Pt approved for auth for [facility]. However per notes from [facility] pt to be accpeted after letter of retention from pt  is received"     prepare for dc to LTACH   full code

## 2024-05-06 LAB
ALBUMIN SERPL ELPH-MCNC: 2.1 G/DL — LOW (ref 3.5–5.2)
ALP SERPL-CCNC: 183 U/L — HIGH (ref 30–115)
ALT FLD-CCNC: 13 U/L — SIGNIFICANT CHANGE UP (ref 0–41)
ANION GAP SERPL CALC-SCNC: 9 MMOL/L — SIGNIFICANT CHANGE UP (ref 7–14)
AST SERPL-CCNC: 50 U/L — HIGH (ref 0–41)
BASOPHILS # BLD AUTO: 0.04 K/UL — SIGNIFICANT CHANGE UP (ref 0–0.2)
BASOPHILS NFR BLD AUTO: 0.4 % — SIGNIFICANT CHANGE UP (ref 0–1)
BILIRUB SERPL-MCNC: 0.3 MG/DL — SIGNIFICANT CHANGE UP (ref 0.2–1.2)
BUN SERPL-MCNC: 42 MG/DL — HIGH (ref 10–20)
CALCIUM SERPL-MCNC: 7.7 MG/DL — LOW (ref 8.4–10.5)
CHLORIDE SERPL-SCNC: 94 MMOL/L — LOW (ref 98–110)
CO2 SERPL-SCNC: 28 MMOL/L — SIGNIFICANT CHANGE UP (ref 17–32)
CREAT SERPL-MCNC: 1 MG/DL — SIGNIFICANT CHANGE UP (ref 0.7–1.5)
EGFR: 59 ML/MIN/1.73M2 — LOW
EOSINOPHIL # BLD AUTO: 0.17 K/UL — SIGNIFICANT CHANGE UP (ref 0–0.7)
EOSINOPHIL NFR BLD AUTO: 1.8 % — SIGNIFICANT CHANGE UP (ref 0–8)
GLUCOSE SERPL-MCNC: 126 MG/DL — HIGH (ref 70–99)
HCT VFR BLD CALC: 26 % — LOW (ref 37–47)
HGB BLD-MCNC: 8.7 G/DL — LOW (ref 12–16)
IMM GRANULOCYTES NFR BLD AUTO: 0.5 % — HIGH (ref 0.1–0.3)
LYMPHOCYTES # BLD AUTO: 1.33 K/UL — SIGNIFICANT CHANGE UP (ref 1.2–3.4)
LYMPHOCYTES # BLD AUTO: 14.3 % — LOW (ref 20.5–51.1)
MAGNESIUM SERPL-MCNC: 2.1 MG/DL — SIGNIFICANT CHANGE UP (ref 1.8–2.4)
MCHC RBC-ENTMCNC: 26 PG — LOW (ref 27–31)
MCHC RBC-ENTMCNC: 33.5 G/DL — SIGNIFICANT CHANGE UP (ref 32–37)
MCV RBC AUTO: 77.6 FL — LOW (ref 81–99)
MONOCYTES # BLD AUTO: 1.49 K/UL — HIGH (ref 0.1–0.6)
MONOCYTES NFR BLD AUTO: 16 % — HIGH (ref 1.7–9.3)
NEUTROPHILS # BLD AUTO: 6.25 K/UL — SIGNIFICANT CHANGE UP (ref 1.4–6.5)
NEUTROPHILS NFR BLD AUTO: 67 % — SIGNIFICANT CHANGE UP (ref 42.2–75.2)
NRBC # BLD: 0 /100 WBCS — SIGNIFICANT CHANGE UP (ref 0–0)
PLATELET # BLD AUTO: 412 K/UL — HIGH (ref 130–400)
PMV BLD: 10 FL — SIGNIFICANT CHANGE UP (ref 7.4–10.4)
POTASSIUM SERPL-MCNC: 4.5 MMOL/L — SIGNIFICANT CHANGE UP (ref 3.5–5)
POTASSIUM SERPL-SCNC: 4.5 MMOL/L — SIGNIFICANT CHANGE UP (ref 3.5–5)
PROT SERPL-MCNC: 7.2 G/DL — SIGNIFICANT CHANGE UP (ref 6–8)
RBC # BLD: 3.35 M/UL — LOW (ref 4.2–5.4)
RBC # FLD: 17 % — HIGH (ref 11.5–14.5)
SODIUM SERPL-SCNC: 131 MMOL/L — LOW (ref 135–146)
WBC # BLD: 9.33 K/UL — SIGNIFICANT CHANGE UP (ref 4.8–10.8)
WBC # FLD AUTO: 9.33 K/UL — SIGNIFICANT CHANGE UP (ref 4.8–10.8)

## 2024-05-06 PROCEDURE — 99231 SBSQ HOSP IP/OBS SF/LOW 25: CPT

## 2024-05-06 RX ADMIN — Medication 1 DROP(S): at 05:26

## 2024-05-06 RX ADMIN — Medication 1 APPLICATION(S): at 05:30

## 2024-05-06 RX ADMIN — PANTOPRAZOLE SODIUM 40 MILLIGRAM(S): 20 TABLET, DELAYED RELEASE ORAL at 12:48

## 2024-05-06 RX ADMIN — SENNA PLUS 2 TABLET(S): 8.6 TABLET ORAL at 23:11

## 2024-05-06 RX ADMIN — CHLORHEXIDINE GLUCONATE 15 MILLILITER(S): 213 SOLUTION TOPICAL at 18:33

## 2024-05-06 RX ADMIN — Medication 1 DROP(S): at 14:03

## 2024-05-06 RX ADMIN — Medication 1 DROP(S): at 22:11

## 2024-05-06 RX ADMIN — CHLORHEXIDINE GLUCONATE 15 MILLILITER(S): 213 SOLUTION TOPICAL at 05:30

## 2024-05-06 RX ADMIN — CHLORHEXIDINE GLUCONATE 1 APPLICATION(S): 213 SOLUTION TOPICAL at 13:00

## 2024-05-06 RX ADMIN — Medication 1 APPLICATION(S): at 12:51

## 2024-05-06 RX ADMIN — Medication 100 MICROGRAM(S): at 05:30

## 2024-05-06 RX ADMIN — SODIUM CHLORIDE 50 MILLILITER(S): 9 INJECTION INTRAMUSCULAR; INTRAVENOUS; SUBCUTANEOUS at 10:17

## 2024-05-06 RX ADMIN — CHLORHEXIDINE GLUCONATE 1 APPLICATION(S): 213 SOLUTION TOPICAL at 05:30

## 2024-05-06 RX ADMIN — Medication 1 APPLICATION(S): at 18:33

## 2024-05-06 RX ADMIN — SCOPALAMINE 1 PATCH: 1 PATCH, EXTENDED RELEASE TRANSDERMAL at 08:01

## 2024-05-06 NOTE — PROGRESS NOTE ADULT - ASSESSMENT
74-year-old female with PHMx of pancreatic CA s/p Whipple, CKD stage 3, HTN, COPD, pulmonary fibrosis, PRESS syndrome (hospitalized in 2019), Raynaud's syndrome, ANCA vasculitis, Scleroderma, hypothyroid  presents to ED due to cardiac arrest. Pt lives at home with son and daughter, went to use the bathroom and became unresponsive.  EMS intubated her and gave her 4 rounds of epinephrine.  ROSC was achieved within a minute of her arrival to the ED.  Patient was found to be hypothermic and bradycardic    DOWNGRADE FROM UNIT ON 5/4     # Anoxic brain injury   # Acute respiratory failure sp trach / peg   # Cardiac arrest   # Hyponatremia   # Normocytic anemia likely to iron deficiency- no gross bleed     PLAN:   - s/p trach and PEG    - poor prognosis - family aware ; palliative following - full code   - methadone ; QTc noted 410   - HOLD norvasc and labetalol given soft blood pressure - resume as needed   - c/w tube feeds   - give 1U of PRBC 5/5, repeat hgb stable  - keep active type and screen, repeat cbc ; no gross gib ; rx dvt ppx held , PPI   - DC planning to vent facility  - ongoing GOC     # dvt ppx - SCD / gi ppx/diet - tube feeds  # dispo: dc to vent facility - per chart review from CM - "Pt approved for auth for [facility]. However per notes from [facility] pt to be accpeted after letter of retention from pt  is received"       full code

## 2024-05-06 NOTE — CHART NOTE - NSCHARTNOTEFT_GEN_A_CORE
Registered Dietitian Follow-Up     Patient Profile Reviewed                           Yes X[]   No []     Nutrition History Previously Obtained        Yes []  No []       Pertinent  Information:  pt is 74-year-old female with PMhx of pancreatic CA s/p Whipple, CKD stage 3, HTN, COPD, pulmonary fibrosis, PRESS syndrome (hospitalized in 2019), Raynaud's syndrome, ANCA vasculitis, Scleroderma, patient was hospitalized back in Dec 2023 in [place] for 18 days for multifocal pneumonia 2/2 human metapneumovirus.   presents to ED due to cardiac arrest. As per GI no concern for cholangitis clinically pt has normal bilirubin and has no jaundice to concern for cholangitis mild transaminitis is downtrending and could be due to CPR  , + anoxic brain injury. 4/18 s/p bronch with trach.. s/p PEG placement 4/25/24.  pt is presently downgraded to med-surg unit. tolerating EN thus far. RD consulted for  hyponatremia and hyperkalemia (presently resolved)      Diet, NPO with Tube Feed:   Tube Feeding Modality: Orogastric  Osmolite 1.2 Lei (OSMOLITERTH)  Total Volume for 24 Hours (mL): 1080  Continuous  Starting Tube Feed Rate {mL per Hour}: 10  Until Goal Tube Feed Rate (mL per Hour): 45  Tube Feed Duration (in Hours): 24  Tube Feed Start Time: 12:00  Free Water Flush  Free Water Flush Instructions:  150 Q8hr (04-25-24 @ 15:51) [Active]      Anthropometrics:  - Ht. 154.9 cm   - Wt.  41.6 kg 5/3 vs 37.9 kg upon admission   - %wt change  - BMI   - IBW        05-06    131<L>  |  94<L>  |  42<H>  ----------------------------<  126<H>  4.5   |  28  |  1.0    Ca    7.7<L>      06 May 2024 05:28  Mg     2.1     05-06    TPro  7.2  /  Alb  2.1<L>  /  TBili  0.3  /  DBili  x   /  AST  50<H>  /  ALT  13  /  AlkPhos  183<H>  05-06                          8.7    9.33  )-----------( 412      ( 06 May 2024 05:28 )             26.0       MEDICATIONS  (STANDING):  levothyroxine 100 MICROGram(s) Oral daily  pantoprazole   Suspension 40 milliGRAM(s) Oral daily  senna 2 Tablet(s) Oral at bedtime  silver sulfADIAZINE 1% Cream 1 Application(s) Topical two times a day  sodium chloride 0.9%. 1000 milliLiter(s) (50 mL/Hr) IV Continuous <Continuous>    MEDICATIONS  (PRN):  polyethylene glycol 3350 17 Gram(s) Oral daily PRN Constipation       Physical Findings:  - Appearance: awake, trach to vent   - GI function: +BS, BM noted 5/5  - Tubes:  PEG  - Skin: DTPI to coccyx     Nutrition Requirements  Weight Used: 37.9 kgs       Estimated Energy Needs:  9568-6242 kcals (35-40 kcal/kg/BW)  45-53g protein (1.2-1.4g/kg/BW)  ~1L 2/2 hyponatremia        Nutrient Intake: present EN regimen provides  pt with 1296 kcals, 59g protein and 1080+450 ml total volume meeting >80% of estimated needs       [] Previous Nutrition Diagnosis:            [] Ongoing          [] Resolved    [X] No active nutrition diagnosis identified at this time     Nutrition Intervention: recommend to change EN to bolus feeds of Osmolite 1.2  360 ml q 8 hrs with H20 flushes of 30 ml pre and post feed will provide pt with 1296 kcals, 59g protein and 1080+180 ml total volume and 1935 mg potassium.     Goal/Expected Outcome: pt to continue to tolerate EN and meet at least 80% of estimated nutrient needs within 3-5 days     Indicator/Monitoring: Rd to monitor tolerance to EN, labs/meds, NFPF and f/u as needed  high risk

## 2024-05-06 NOTE — CHART NOTE - NSCHARTNOTESELECT_GEN_ALL_CORE
Event Note
Nutrition Services
PEG placement
Palliative Care - Social Work/Event Note
Surgery PA POC
Transfer Note
Event Note
GI Update/Event Note
GI workup/Event Note
Nutrition Services
Palliative Care - Social Work/Event Note
Surgery
Transfer Note

## 2024-05-06 NOTE — PROGRESS NOTE ADULT - SUBJECTIVE AND OBJECTIVE BOX
74-year-old female with PHMx of pancreatic CA s/p Whipple, CKD stage 3, HTN, COPD, pulmonary fibrosis, PRESS syndrome (hospitalized in 2019), Raynaud's syndrome, ANCA vasculitis, Scleroderma, hypothyroid  presents to ED due to cardiac arrest. Pt lives at home with son and daughter, went to use the bathroom and became unresponsive.  EMS intubated her and gave her 4 rounds of epinephrine.  ROSC was achieved within a minute of her arrival to the ED.  Patient was found to be hypothermic and bradycardic    Today:  Seen at bedside, patient on vent, non-verbal.        REVIEW OF SYSTEMS:  not a reliable historian    MEDICATIONS  (STANDING):  artificial  tears Solution 1 Drop(s) Both EYES three times a day  chlorhexidine 0.12% Liquid 15 milliLiter(s) Oral Mucosa every 12 hours  chlorhexidine 2% Cloths 1 Application(s) Topical daily  chlorhexidine 2% Cloths 1 Application(s) Topical <User Schedule>  iron sucrose IVPB 200 milliGRAM(s) IV Intermittent every 48 hours  levothyroxine 100 MICROGram(s) Oral daily  lidocaine 1%/epinephrine 1:100,000 Inj 20 milliLiter(s) Local Injection once  lidocaine 2% Viscous 10 milliLiter(s) Oral once  pantoprazole   Suspension 40 milliGRAM(s) Oral daily  petrolatum Ophthalmic Ointment 1 Application(s) Both EYES daily  senna 2 Tablet(s) Oral at bedtime  silver sulfADIAZINE 1% Cream 1 Application(s) Topical two times a day  sodium chloride 0.9%. 1000 milliLiter(s) (50 mL/Hr) IV Continuous <Continuous>    MEDICATIONS  (PRN):  polyethylene glycol 3350 17 Gram(s) Oral daily PRN Constipation      Allergies  celecoxib (Rash)  Originally Entered as [U UNKNOWN] reaction to [celebrit] (Unknown)        Vital Signs Last 24 Hrs  T(C): 37 (06 May 2024 04:52), Max: 37 (06 May 2024 04:52)  T(F): 98.6 (06 May 2024 04:52), Max: 98.6 (06 May 2024 04:52)  HR: 82 (06 May 2024 09:06) (74 - 86)  BP: 132/63 (06 May 2024 04:52) (131/63 - 134/63)  RR: 26 (06 May 2024 04:52) (16 - 26)  SpO2: 98% (06 May 2024 09:06) (97% - 100%)    Parameters below as of 06 May 2024 04:52  Patient On (Oxygen Delivery Method): ventilator        PHYSICAL EXAM:  GENERAL: NAD, ill-appearing on vent with PEG  HEAD:  Atraumatic, Normocephalic  EYES: conjunctivae clear and sclerae white  NECK: Supple, No JVD  CHEST/LUNG: Vented and trached. No wheeze; No crackles; No accessory muscles used  HEART: Regular rate and rhythm; No murmurs;   ABDOMEN: Soft, Nondistended; Bowel sounds present, PEG  EXTREMITIES:  2+ Peripheral Pulses, No cyanosis or edema      LABS:                        8.7    9.33  )-----------( 412      ( 06 May 2024 05:28 )             26.0     05-06    131<L>  |  94<L>  |  42<H>  ----------------------------<  126<H>  4.5   |  28  |  1.0    Ca    7.7<L>      06 May 2024 05:28  Mg     2.1     05-06    TPro  7.2  /  Alb  2.1<L>  /  TBili  0.3  /  DBili  x   /  AST  50<H>  /  ALT  13  /  AlkPhos  183<H>  05-06      Urinalysis Basic - ( 06 May 2024 05:28 )    Color: x / Appearance: x / SG: x / pH: x  Gluc: 126 mg/dL / Ketone: x  / Bili: x / Urobili: x   Blood: x / Protein: x / Nitrite: x   Leuk Esterase: x / RBC: x / WBC x   Sq Epi: x / Non Sq Epi: x / Bacteria: x

## 2024-05-06 NOTE — PROGRESS NOTE ADULT - PROVIDER SPECIALTY LIST ADULT
Critical Care
Gastroenterology
Hospitalist
Internal Medicine
Nephrology
Pulmonology
Surgery
Surgery
Critical Care
Gastroenterology
Hospitalist
Internal Medicine
Neurology
Palliative Care
Pulmonology
Surgery
Surgery
CCU
Critical Care
Hospitalist
Infectious Disease
Infectious Disease
Nephrology
Pulmonology
Surgery
Critical Care
Hospitalist
Internal Medicine
Nephrology
Neurology
Pulmonology
Wound Care
Pulmonology
Palliative Care

## 2024-05-06 NOTE — PROGRESS NOTE ADULT - REASON FOR ADMISSION
cardiac arrest
s/p cardiac arrest
s/p cardiac arrest; anoxic brain injury
cardiac arrest

## 2024-05-07 ENCOUNTER — TRANSCRIPTION ENCOUNTER (OUTPATIENT)
Age: 74
End: 2024-05-07

## 2024-05-07 VITALS — HEART RATE: 86 BPM | OXYGEN SATURATION: 99 %

## 2024-05-07 PROCEDURE — 99239 HOSP IP/OBS DSCHRG MGMT >30: CPT

## 2024-05-07 RX ORDER — LEVOTHYROXINE SODIUM 125 MCG
1 TABLET ORAL
Qty: 0 | Refills: 0 | DISCHARGE

## 2024-05-07 RX ORDER — ACETAMINOPHEN 500 MG
600 TABLET ORAL EVERY 4 HOURS
Refills: 0 | Status: DISCONTINUED | OUTPATIENT
Start: 2024-05-07 | End: 2024-05-07

## 2024-05-07 RX ADMIN — Medication 1 DROP(S): at 06:22

## 2024-05-07 RX ADMIN — CHLORHEXIDINE GLUCONATE 15 MILLILITER(S): 213 SOLUTION TOPICAL at 18:02

## 2024-05-07 RX ADMIN — CHLORHEXIDINE GLUCONATE 1 APPLICATION(S): 213 SOLUTION TOPICAL at 12:40

## 2024-05-07 RX ADMIN — Medication 100 MICROGRAM(S): at 06:22

## 2024-05-07 RX ADMIN — Medication 600 MILLIGRAM(S): at 02:52

## 2024-05-07 RX ADMIN — PANTOPRAZOLE SODIUM 40 MILLIGRAM(S): 20 TABLET, DELAYED RELEASE ORAL at 12:39

## 2024-05-07 RX ADMIN — Medication 1 DROP(S): at 14:03

## 2024-05-07 RX ADMIN — CHLORHEXIDINE GLUCONATE 1 APPLICATION(S): 213 SOLUTION TOPICAL at 06:23

## 2024-05-07 RX ADMIN — Medication 1 APPLICATION(S): at 12:39

## 2024-05-07 RX ADMIN — Medication 1 APPLICATION(S): at 06:22

## 2024-05-07 RX ADMIN — CHLORHEXIDINE GLUCONATE 15 MILLILITER(S): 213 SOLUTION TOPICAL at 06:22

## 2024-05-07 RX ADMIN — Medication 1 APPLICATION(S): at 18:00

## 2024-05-07 NOTE — DISCHARGE NOTE NURSING/CASE MANAGEMENT/SOCIAL WORK - NSDCPEFALRISK_GEN_ALL_CORE
For information on Fall & Injury Prevention, visit: [email or web address] OR  [email or web address] OR  https://www.cdc.gov/steadi/patient.html

## 2024-05-07 NOTE — DISCHARGE NOTE NURSING/CASE MANAGEMENT/SOCIAL WORK - PATIENT PORTAL LINK FT
You can access the FollowMyHealth Patient Portal offered by [facility] by registering at the following website: [email or web address]. By joining [facility]’s FollowMyHealth portal, you will also be able to view your health information using other applications (apps) compatible with our system.

## 2024-05-12 DIAGNOSIS — R00.1 BRADYCARDIA, UNSPECIFIED: ICD-10-CM

## 2024-05-12 DIAGNOSIS — N18.30 CHRONIC KIDNEY DISEASE, STAGE 3 UNSPECIFIED: ICD-10-CM

## 2024-05-12 DIAGNOSIS — E87.1 HYPO-OSMOLALITY AND HYPONATREMIA: ICD-10-CM

## 2024-05-12 DIAGNOSIS — Z79.890 HORMONE REPLACEMENT THERAPY: ICD-10-CM

## 2024-05-12 DIAGNOSIS — D84.9 IMMUNODEFICIENCY, UNSPECIFIED: ICD-10-CM

## 2024-05-12 DIAGNOSIS — I46.9 CARDIAC ARREST, CAUSE UNSPECIFIED: ICD-10-CM

## 2024-05-12 DIAGNOSIS — E87.5 HYPERKALEMIA: ICD-10-CM

## 2024-05-12 DIAGNOSIS — G93.1 ANOXIC BRAIN DAMAGE, NOT ELSEWHERE CLASSIFIED: ICD-10-CM

## 2024-05-12 DIAGNOSIS — Z88.8 ALLERGY STATUS TO OTHER DRUGS, MEDICAMENTS AND BIOLOGICAL SUBSTANCES: ICD-10-CM

## 2024-05-12 DIAGNOSIS — J69.0 PNEUMONITIS DUE TO INHALATION OF FOOD AND VOMIT: ICD-10-CM

## 2024-05-12 DIAGNOSIS — E03.9 HYPOTHYROIDISM, UNSPECIFIED: ICD-10-CM

## 2024-05-12 DIAGNOSIS — I21.4 NON-ST ELEVATION (NSTEMI) MYOCARDIAL INFARCTION: ICD-10-CM

## 2024-05-12 DIAGNOSIS — D50.9 IRON DEFICIENCY ANEMIA, UNSPECIFIED: ICD-10-CM

## 2024-05-12 DIAGNOSIS — R57.0 CARDIOGENIC SHOCK: ICD-10-CM

## 2024-05-12 DIAGNOSIS — E87.20 ACIDOSIS, UNSPECIFIED: ICD-10-CM

## 2024-05-12 DIAGNOSIS — I12.9 HYPERTENSIVE CHRONIC KIDNEY DISEASE WITH STAGE 1 THROUGH STAGE 4 CHRONIC KIDNEY DISEASE, OR UNSPECIFIED CHRONIC KIDNEY DISEASE: ICD-10-CM

## 2024-05-12 DIAGNOSIS — A41.9 SEPSIS, UNSPECIFIED ORGANISM: ICD-10-CM

## 2024-05-12 DIAGNOSIS — J44.9 CHRONIC OBSTRUCTIVE PULMONARY DISEASE, UNSPECIFIED: ICD-10-CM

## 2024-05-12 DIAGNOSIS — N17.9 ACUTE KIDNEY FAILURE, UNSPECIFIED: ICD-10-CM

## 2024-05-12 DIAGNOSIS — J96.01 ACUTE RESPIRATORY FAILURE WITH HYPOXIA: ICD-10-CM

## 2024-12-04 NOTE — DISCHARGE NOTE NURSING/CASE MANAGEMENT/SOCIAL WORK - NSFLUVACAGEDISCH_IMM_ALL_CORE
Is This A New Presentation, Or A Follow-Up?: Skin Lesion Quality 110: Preventive Care And Screening: Influenza Immunization: Influenza Immunization Administered during Influenza season Quality 47: Advance Care Plan: Advance Care Planning discussed and documented; advance care plan or surrogate decision maker documented in the medical record. Quality 134: Screening For Clinical Depression And Follow-Up Plan: The patient was screened for depression and the screen was negative and no follow up required Quality 431: Preventive Care And Screening: Unhealthy Alcohol Use - Screening: Patient screened for unhealthy alcohol use using a single question and scores less than 2 times per year Detail Level: Detailed Quality 226: Preventive Care And Screening: Tobacco Use: Screening And Cessation Intervention: Patient screened for tobacco and never smoked Quality 154 Part B: Falls: Risk Screening (Should Be Reported With Measure 155.): Patient screened for future fall risk; documentation of no falls in the past year or only one fall without injury in the past year Quality 130: Documentation Of Current Medications In The Medical Record: Current Medications Documented Quality 111:Pneumonia Vaccination Status For Older Adults: Pneumococcal Vaccination Previously Received Quality 154 Part A: Falls: Risk Assessment (Should Be Reported With Measure 155.): Falls risk assessment completed and documented in the past 12 months. Quality 155: Falls Plan Of Care: Falls plan of care documented (including vitamin D supplementation) Quality 131: Pain Assessment And Follow-Up: Pain assessment using a standardized tool is documented as negative, no follow-up plan required Adult

## 2025-04-29 NOTE — PATIENT PROFILE ADULT - FUNCTIONAL ASSESSMENT - BASIC MOBILITY 3.
General Sunscreen Counseling: Wide brimmed hats and sunprotective clothing recommended.  I recommended a broad spectrum sunscreen with a SPF of 30 or higher in an adequate amount with reapplication every 2 hours or every 1 hour if sweating or in water.\\n\\nSigns and symptoms of melanoma and non-melanoma skin cancer reviewed. Detail Level: Zone 1 = Total assistance